# Patient Record
Sex: FEMALE | Race: WHITE | NOT HISPANIC OR LATINO | Employment: PART TIME | URBAN - METROPOLITAN AREA
[De-identification: names, ages, dates, MRNs, and addresses within clinical notes are randomized per-mention and may not be internally consistent; named-entity substitution may affect disease eponyms.]

---

## 2018-10-29 ENCOUNTER — APPOINTMENT (OUTPATIENT)
Dept: GENERAL RADIOLOGY | Facility: CLINIC | Age: 58
End: 2018-10-29
Attending: FAMILY MEDICINE
Payer: MEDICARE

## 2018-10-29 ENCOUNTER — APPOINTMENT (OUTPATIENT)
Dept: CT IMAGING | Facility: CLINIC | Age: 58
End: 2018-10-29
Attending: FAMILY MEDICINE
Payer: MEDICARE

## 2018-10-29 ENCOUNTER — HOSPITAL ENCOUNTER (EMERGENCY)
Facility: CLINIC | Age: 58
Discharge: HOME | End: 2018-10-29
Attending: FAMILY MEDICINE | Admitting: FAMILY MEDICINE
Payer: MEDICARE

## 2018-10-29 ENCOUNTER — TELEPHONE (OUTPATIENT)
Dept: ORTHOPEDICS | Facility: CLINIC | Age: 58
End: 2018-10-29

## 2018-10-29 VITALS
BODY MASS INDEX: 34.69 KG/M2 | RESPIRATION RATE: 16 BRPM | HEIGHT: 62 IN | DIASTOLIC BLOOD PRESSURE: 66 MMHG | SYSTOLIC BLOOD PRESSURE: 145 MMHG | OXYGEN SATURATION: 96 % | WEIGHT: 188.5 LBS | TEMPERATURE: 96.3 F | HEART RATE: 86 BPM

## 2018-10-29 DIAGNOSIS — M84.48XD SACRAL INSUFFICIENCY FRACTURE WITH ROUTINE HEALING, SUBSEQUENT ENCOUNTER: ICD-10-CM

## 2018-10-29 PROCEDURE — 72192 CT PELVIS W/O DYE: CPT

## 2018-10-29 PROCEDURE — 99284 EMERGENCY DEPT VISIT MOD MDM: CPT | Mod: 25 | Performed by: FAMILY MEDICINE

## 2018-10-29 PROCEDURE — 72131 CT LUMBAR SPINE W/O DYE: CPT

## 2018-10-29 PROCEDURE — 99284 EMERGENCY DEPT VISIT MOD MDM: CPT | Mod: Z6 | Performed by: FAMILY MEDICINE

## 2018-10-29 PROCEDURE — 72100 X-RAY EXAM L-S SPINE 2/3 VWS: CPT

## 2018-10-29 PROCEDURE — 72170 X-RAY EXAM OF PELVIS: CPT

## 2018-10-29 RX ORDER — MULTIVITAMIN,THERAPEUTIC
1 TABLET ORAL DAILY
Status: ON HOLD | COMMUNITY
End: 2021-10-13

## 2018-10-29 RX ORDER — SULFASALAZINE 500 MG/1
1000 TABLET ORAL 3 TIMES DAILY
COMMUNITY
End: 2021-10-03

## 2018-10-29 NOTE — DISCHARGE INSTRUCTIONS
Home.  Your xray and ct did not show any major fracture that would require surgery.  Take your pain medications and to follow up with Dr. Cummings at the [facility] for further management plan.  They should call, but call tomorrow afternoon if no one calls you to schedule appointment.  Please bring a copy of your MRI of pelvis and spine to your orthopedic appointment.  Return if any concerns.    Please make an appointment to follow up with Orthopedics (phone: [phone]) as soon as possible.

## 2018-10-29 NOTE — PROGRESS NOTES
"Brief Ortho Note    Paged to review imaging for this patient. Marissa Youssef is a 57 yo F with PMHx including collagenous colitis, PTSD, depression, with hx of pelvic fracture in teens, chronic lower back pain, presenting to the ED for acute on chronic lower back pain with radiculopathy. She has had worsening lower back/pelvic pain and then felt a \"snap\" a week ago. Per chart review, patient has been seen by rheumatology for non-specific spondyloarthritis and sports medicine for back pain in [facility] and was referred to [facility] for her back. Rheum workup significant for normal ESR/mildly elevated CRP, negative KERRI, CCP, RF. Rheum has also sent her for a DEXA scan. Has done PT and had a right abdomen and paraspinal muscle injection by pain medicine in [facility] in April.    Per chart review, MRI of the lumbar spine and pelvis have been done at OSH previously but these are not available to review; MRI pelvis report showed inferior right sacral edema without sacroilitis and lumbar MRI showed degenerative changes at L3-4, L4-5 and L5-S1 with mild to moderate central and foraminal stenosis. XR lumbar spine and XR pelvis in ED today reveal multilevel spondylosis. No pelvic or lumbar fractures visualized. CT pelvis and lumbar spine were also reviewed-- sclerosis seen at right SI joint both anterior and posteriorly, no acute fractures seen.     Recommended WBAT and plan to follow up in spine clinic for SI evaluation. Will need patient to bring discs of lumbar and pelvis MRI to clinic.      Leslee Escobar MD  Orthopaedic Surgery Resident, PGY1   Pager: [phone]      "

## 2018-10-29 NOTE — ED AVS SNAPSHOT
[facility], Emergency Department    [address]    [facility] [address]    Phone:  [phone]    Fax:  [phone]                                       Marissa Youssef   MRN: [id number]    Department:  [facility], Emergency Department   Date of Visit:  10/29/2018           Patient Information     Date Of Birth          1960        Your diagnoses for this visit were:     Sacral insufficiency fracture with routine healing, subsequent encounter        You were seen by Quique Perkins MD.      Follow-up Information     Follow up with Michael Cummings MD In 1 week.    Specialty:  Orthopaedic Surgery    Why:  they should call you for appointment. call tomorrow afternoon if no one calls you to set up appointment.    Contact information:    [address]  [facility] [id number] [phone]          Discharge Instructions       Home.  Your xray and ct did not show any major fracture that would require surgery.  Take your pain medications and to follow up with Dr. Cummings at the [facility] for further management plan.  They should call, but call tomorrow afternoon if no one calls you to schedule appointment.  Please bring a copy of your MRI of pelvis and spine to your orthopedic appointment.  Return if any concerns.    Please make an appointment to follow up with Orthopedics (phone: [phone]) as soon as possible.        24 Hour Appointment Hotline       To make an appointment at any [facility], call [phone] ([phone]). If you don't have a family doctor or clinic, we will help you find one. [facility] are conveniently located to serve the needs of you and your family.          ED Discharge Orders     Follow up with Orthopaedics [facility]       You should receive a call from [facility] to schedule your follow up appointment. If you do not hear from them within 24 business hours, call [phone], option 3 for help in scheduling your  follow up.  If you are seen in the Emergency Department over the weekend, you will receive a phone call on the next business day.                     Review of your medicines      Our records show that you are taking the medicines listed below. If these are incorrect, please call your family doctor or clinic.        Dose / Directions Last dose taken    acetaminophen-codeine 300-30 MG per tablet   Commonly known as:  TYLENOL #3   Dose:  1 tablet        Take 1 tablet by mouth every 6 hours as needed for severe pain   Refills:  0        ALPRAZOLAM PO   Dose:  0.5 mg        Take 0.5 mg by mouth At Bedtime   Refills:  0        ASPIRIN PO   Dose:  81 mg        Take 81 mg by mouth daily   Refills:  0        DILTIAZEM HCL PO   Dose:  120 mg        Take 120 mg by mouth daily   Refills:  0        DULOXETINE HCL PO   Dose:  60 mg        Take 60 mg by mouth daily   Refills:  0        FOLIC ACID PO   Dose:  1 mg        Take 1 mg by mouth daily   Refills:  0        GABAPENTIN PO   Dose:  300 mg        Take 300 mg by mouth 3 times daily   Refills:  0        ISOSORBIDE MONONITRATE PO   Dose:  30 mg        Take 30 mg by mouth daily   Refills:  0        multivitamin, therapeutic Tabs tablet   Dose:  1 tablet        Take 1 tablet by mouth daily   Refills:  0        SIMVASTATIN PO   Dose:  20 mg        Take 20 mg by mouth At Bedtime   Refills:  0        sulfaSALAzine 500 MG tablet   Commonly known as:  AZULFIDINE   Dose:  1000 mg        Take 1,000 mg by mouth 3 times daily   Refills:  0                Information about OPIOIDS     PRESCRIPTION OPIOIDS: WHAT YOU NEED TO KNOW   We gave you an opioid (narcotic) pain medicine. It is important to manage your pain, but opioids are not always the best choice. You should first try all the other options your care team gave you. Take this medicine for as short a time (and as few doses) as possible.    Some activities can increase your pain, such as bandage changes or therapy sessions. It may  help to take your pain medicine 30 to 60 minutes before these activities. Reduce your stress by getting enough sleep, working on hobbies you enjoy and practicing relaxation or meditation. Talk to your care team about ways to manage your pain beyond prescription opioids.    These medicines have risks:    DO NOT drive when on new or higher doses of pain medicine. These medicines can affect your alertness and reaction times, and you could be arrested for driving under the influence (DUI). If you need to use opioids long-term, talk to your care team about driving.    DO NOT operate heavy machinery    DO NOT do any other dangerous activities while taking these medicines.    DO NOT drink any alcohol while taking these medicines.     If the opioid prescribed includes acetaminophen, DO NOT take with any other medicines that contain acetaminophen. Read all labels carefully. Look for the word  acetaminophen  or  Tylenol.  Ask your pharmacist if you have questions or are unsure.    You can get addicted to pain medicines, especially if you have a history of addiction (chemical, alcohol or substance dependence). Talk to your care team about ways to reduce this risk.    All opioids tend to cause constipation. Drink plenty of water and eat foods that have a lot of fiber, such as fruits, vegetables, prune juice, apple juice and high-fiber cereal. Take a laxative (Miralax, milk of magnesia, Colace, Senna) if you don t move your bowels at least every other day. Other side effects include upset stomach, sleepiness, dizziness, throwing up, tolerance (needing more of the medicine to have the same effect), physical dependence and slowed breathing.    Store your pills in a secure place, locked if possible. We will not replace any lost or stolen medicine. If you don t finish your medicine, please throw away (dispose) as directed by your pharmacist. The [place] Pollution Control Agency has more information about safe disposal:  "[email or web address]-[email or web address]        Procedures and tests performed during your visit     CT Pelvis Bone wo Contrast    Lumbar spine CT w/o contrast    XR Lumbar Spine 2/3 Views    XR Pelvis 1/2 Views      Orders Needing Specimen Collection     None      Pending Results     No orders found from 10/27/2018 to 10/30/2018.            Pending Culture Results     No orders found from 10/27/2018 to 10/30/2018.            Pending Results Instructions     If you had any lab results that were not finalized at the time of your Discharge, you can call the ED Lab Result RN at [phone]. You will be contacted by this team for any positive Lab results or changes in treatment. The nurses are available 7 days a week from 10A to 6:30P.  You can leave a message 24 hours per day and they will return your call.        Thank you for choosing [facility]       Thank you for choosing [facility] for your care. Our goal is always to provide you with excellent care. Hearing back from our patients is one way we can continue to improve our services. Please take a few minutes to complete the written survey that you may receive in the mail after you visit with us. Thank you!        [employer] Information     "[employer] lets you send messages to your doctor, view your test results, renew your prescriptions, schedule appointments and more. To sign up, go to [email or web address] . Click on \"Log in\" on the left side of the screen, which will take you to the Welcome page. Then click on \"Sign up Now\" on the right side of the page.     You will be asked to enter the access code listed below, as well as some personal information. Please follow the directions to create your username and password.     Your access code is: [email or web address]  Expires: 2019  9:54 AM     Your access code will  in 90 days. If you need help or a new code, please call your [facility] clinic or [phone].        Care EveryWhere ID     " This is your Care EveryWhere ID. This could be used by other organizations to access your [facility] medical records  [id number]        Equal Access to Services     YONI LIZARRAGA : [place], earline manzano, mariann banks, angy flores. So [facility] [phone].    ATENCIÓN: Si habla español, tiene a winkler disposición servicios gratuitos de asistencia lingüística. Llame al [phone].    We comply with applicable federal civil rights laws and [place] laws. We do not discriminate on the basis of race, color, national origin, age, disability, sex, sexual orientation, or gender identity.            After Visit Summary       This is your record. Keep this with you and show to your community pharmacist(s) and doctor(s) at your next visit.

## 2018-10-29 NOTE — ED AVS SNAPSHOT
[facility] [place], Emergency Department    [address]    [facility] [address]    Phone:  [phone]    Fax:  [phone]                                       Marissa Youssef   MRN: [id number]    Department:  [facility], Emergency Department   Date of Visit:  10/29/2018           After Visit Summary Signature Page     I have received my discharge instructions, and my questions have been answered. I have discussed any challenges I see with this plan with the nurse or doctor.    ..........................................................................................................................................  Patient/Patient Representative Signature      ..........................................................................................................................................  Patient Representative Print Name and Relationship to Patient    ..................................................               ................................................  Date                                   Time    ..........................................................................................................................................  Reviewed by Signature/Title    ...................................................              ..............................................  Date                                               Time          22EPIC Rev 08/18

## 2018-10-29 NOTE — TELEPHONE ENCOUNTER
FUTURE VISIT INFORMATION      FUTURE VISIT INFORMATION:    Date: 11/1/18    Time: 1000    Location: ORTHO  REFERRAL INFORMATION:    Referring provider:  N/A    Referring providers clinic:  [facility]    Reason for visit/diagnosis  SACRAL SPINE    RECORDS REQUESTED FROM:       Clinic name Comments Records Status Imaging Status                                         RECORDS STATUS      ED NOTE AND IMAGES IN CHART

## 2018-10-30 ASSESSMENT — ENCOUNTER SYMPTOMS
BRUISES/BLEEDS EASILY: 0
DECREASED CONCENTRATION: 0
NAUSEA: 0
ABDOMINAL PAIN: 0
FEVER: 0
WEAKNESS: 0
EYE REDNESS: 0
DIFFICULTY URINATING: 0
HEADACHES: 0
NECK STIFFNESS: 0
COLOR CHANGE: 0
NECK PAIN: 0
DYSPHORIC MOOD: 0
ACTIVITY CHANGE: 1
CONFUSION: 0
ARTHRALGIAS: 1
NUMBNESS: 0
SHORTNESS OF BREATH: 0

## 2018-10-30 NOTE — ED PROVIDER NOTES
"  History     Chief Complaint   Patient presents with     Fractured Pelvis     about a month ago was having pain on lower back, pain radiates to both legs. MRI was done: + Fracture on Pelvis. A week ago, came out of the shower \"heard a loud pop\" XR was done\" \" more crack was noted on my pelvis. So I was told to come to the ER for further management\"     HPI  Marissa Youssef is a 58 year old female who presents emergency room for evaluation ongoing pelvic pain.  Patient had history of ongoing pelvic pain and an MRI done a few weeks ago.  Findings did show some right lower sacral edema concerning for a possible fracture.  Patient been doing physical therapy apparently a few days ago was in the shower and felt a pop.  Some increasing pain was seen at an outside facility x-rays were done stating that there was more crack in the pelvis.  Patient now is been managed at outside facility recommended to come to the [place] ER for further evaluation.  Patient notes she does have tell #3 for pain which does help.  She is able to ambulate the pain is just ongoing and does note to do after this.  No bowel or bladder problems no leg weakness.    I have reviewed the Medications, Allergies, Past Medical and Surgical History, and Social History in the Epic system.    Review of Systems   Constitutional: Positive for activity change. Negative for fever.        Pain with ambulation but still able to weight-bear.   HENT: Negative for congestion.    Eyes: Negative for redness.   Respiratory: Negative for shortness of breath.    Cardiovascular: Negative for chest pain.   Gastrointestinal: Negative for abdominal pain and nausea.   Genitourinary: Negative for difficulty urinating.   Musculoskeletal: Positive for arthralgias and gait problem. Negative for neck pain and neck stiffness.   Skin: Negative for color change and rash.   Allergic/Immunologic: Negative for immunocompromised state.   Neurological: Negative for syncope, weakness, " "numbness and headaches.   Hematological: Does not bruise/bleed easily.   Psychiatric/Behavioral: Negative for confusion, decreased concentration and dysphoric mood.   All other systems reviewed and are negative.      Physical Exam   BP: 145/66  Pulse: 86  Temp: 96.3  F (35.7  C)  Resp: 16  Height: 157.5 cm (5' 2\")  Weight: 85.5 kg (188 lb 8 oz)  SpO2: 96 %      Physical Exam   Constitutional: She is oriented to person, place, and time. She appears well-developed and well-nourished. She appears distressed.   Patient ER was pleasant here is minimally uncomfortable  also present.   HENT:   Head: Normocephalic and atraumatic.   Eyes: EOM are normal. Pupils are equal, round, and reactive to light. No scleral icterus.   Neck: Normal range of motion. Neck supple.   Cardiovascular: Regular rhythm.    Pulmonary/Chest: No respiratory distress.   Abdominal: There is no guarding.   Musculoskeletal: She exhibits tenderness. She exhibits no edema or deformity.   Patient with some back tenderness noted.  Neurologically intact otherwise distally without deficit noted.   Neurological: She is alert and oriented to person, place, and time. She has normal reflexes. No cranial nerve deficit. Coordination normal.   Skin: Skin is warm and dry. No rash noted. She is not diaphoretic. No erythema. No pallor.   Psychiatric: She has a normal mood and affect. Her behavior is normal. Judgment and thought content normal.   Nursing note and vitals reviewed.      ED Course     ED Course         Patient evaluated in the ER.  Discussed with ortho.  Lumbar and pelvic xray and CT.      No acute fracture identified.    Discussed  With ortho.  Patient at this point to follow up with ortho spine.    Patient ok to follow up.    Patient did take her t#3 for pain.      Procedures             Critical Care time:  none             Labs Ordered and Resulted from Time of ED Arrival Up to the Time of Departure from the ED - No data to display  Results for " orders placed or performed during the hospital encounter of 10/29/18   Lumbar spine CT w/o contrast    Narrative    CT LUMBAR SPINE WITHOUT CONTRAST 10/29/2018 8:25 AM     HISTORY:  Pain with history of fracture SI region.     Radiation dose for this scan was reduced using automated exposure  control, adjustment of the mA and/or kV according to patient size, or  iterative reconstruction technique.    FINDINGS: There are five nonrib-bearing or lumbar-type vertebrae.  Vertebral body heights, sagittal alignment, and disc heights appear  within normal limits. Apophyseal joint degenerative changes are noted,  most advanced at L5-S1, left greater than right, and next greatest at  L4-L5, right greater than left. There is no CT evidence of disc  bulge/herniation or central stenosis. Lumbar neural foramina appear to  be patent throughout. Sclerotic changes adjacent to the sacroiliac  joints are likely degenerative.      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 Sacral iliac joint degenerative changes. No CT evidence of  central or foraminal stenosis.    BASIA BEAULIEU MD   CT Pelvis Bone wo Contrast    Narrative    CT PELVIS BONE WITHOUT CONTRAST 10/29/2018 8:23 AM     HISTORY:  History of recent pain and question of fracture by MRI of  pelvis, now increasing pain.     Radiation dose for this scan was reduced using automated exposure  control, adjustment of the mA and/or kV according to patient size, or  iterative reconstruction technique.    COMPARISON: None available.    FINDINGS: There is no CT evidence of pelvic, sacral, or proximal  femoral fracture. Degenerative changes are noted at the pubis  symphysis. Sacroiliac joint periarticular sclerosis is noted and  likely degenerative. Apophyseal joint degenerative arthrosis is also  noted in the lower lumbar spine. Hip joint space width appears within  normal limits. No evidence of free peritoneal fluid within the  pelvis.  Prominent fecal debris is noted within the visualized portions of the  colon.      Impression    IMPRESSION:  1. No CT evidence of pelvic fracture. However, MR would be more  sensitive than CT for nondisplaced fracture. MRI referenced inpatient  history is not available for comparison.  2.  Lower lumbar apophyseal joint, sacral iliac joint, and pubis  symphysis degenerative changes.    BASIA BEAULIEU MD   XR Pelvis 1/2 Views    Narrative    PELVIS AP ONE VIEW  10/29/2018 8:30 AM     HISTORY: History of fractures in past; question SI region right.     COMPARISON: None.    FINDINGS: Mild acetabular spurring. There is no gross fracture or  dislocation demonstrated in this single view. There are no worrisome  bony lesions.       Impression    IMPRESSION:  No acute osseous abnormality demonstrated.    DIANNA SEPULVEDA MD   XR Lumbar Spine 2/3 Views    Narrative    LUMBAR SPINE TWO-THREE  VIEWS  10/29/2018 8:30 AM     HISTORY: Back pain, history of pelvic fracture question.     COMPARISON: None.    FINDINGS: Mild multilevel degenerative change.  Normal lumbar  lordosis.   No spondylolysis or spondylolisthesis.  There is no acute  fracture or dislocation.  There are no worrisome bony lesions.      Impression    IMPRESSION:  No acute osseous abnormality demonstrated.    DIANNA SEPULVEDA MD            Assessments & Plan (with Medical Decision Making)  57 yo female with ongoing pelvic and lower back pain. Patient had MRI with ? Fracture noted. CT scan without acute frax. Patient to follow up with ortho spine regarding insufficiency frax possible.           I have reviewed the nursing notes.    I have reviewed the findings, diagnosis, plan and need for follow up with the patient.    Discharge Medication List as of 10/29/2018  9:56 AM          Final diagnoses:   Sacral insufficiency fracture with routine healing, subsequent encounter       10/29/2018   [facility], [place], EMERGENCY DEPARTMENT      This note was created at least  in part by the use of dragon voice dictation system. Inadvertent typographical errors may still exist.  Quique Perkins MD.         Quique Perkins MD  10/30/18 3105

## 2018-11-01 ENCOUNTER — PRE VISIT (OUTPATIENT)
Dept: ORTHOPEDICS | Facility: CLINIC | Age: 58
End: 2018-11-01

## 2018-11-01 ENCOUNTER — TELEPHONE (OUTPATIENT)
Dept: ORTHOPEDICS | Facility: CLINIC | Age: 58
End: 2018-11-01

## 2018-11-01 ENCOUNTER — OFFICE VISIT (OUTPATIENT)
Dept: ORTHOPEDICS | Facility: CLINIC | Age: 58
End: 2018-11-01
Payer: COMMERCIAL

## 2018-11-01 VITALS — WEIGHT: 188 LBS | HEIGHT: 62 IN | BODY MASS INDEX: 34.6 KG/M2

## 2018-11-01 DIAGNOSIS — M53.3 SACROILIAC JOINT PAIN: Primary | ICD-10-CM

## 2018-11-01 ASSESSMENT — ENCOUNTER SYMPTOMS
NUMBNESS: 1
STIFFNESS: 1
BACK PAIN: 1
DECREASED CONCENTRATION: 0
MUSCLE WEAKNESS: 1
MUSCLE CRAMPS: 1
MYALGIAS: 1
NERVOUS/ANXIOUS: 0
MEMORY LOSS: 0
PARALYSIS: 0
HEADACHES: 1
NECK PAIN: 1
SPEECH CHANGE: 0
TREMORS: 0
JOINT SWELLING: 1
SEIZURES: 0
DISTURBANCES IN COORDINATION: 0
WEAKNESS: 1
TINGLING: 1
PANIC: 0
ARTHRALGIAS: 1
DEPRESSION: 0
DIZZINESS: 0
INSOMNIA: 0
LOSS OF CONSCIOUSNESS: 0

## 2018-11-01 NOTE — NURSING NOTE
"Reason For Visit:   Chief Complaint   Patient presents with     Consult     Sacral insufficiency fx. DOI about 1 week ago while in the Shower and feling a pop.        Primary MD: Philomena Altamirano      ?  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  Currently working? Yes.  Work status?  Part-time.  Date of injury: January 2017  Type of injury: Fell on right hip  Date of surgery: None  Smoker: Yes    Ht 1.575 m (5' 2\")  Wt 85.3 kg (188 lb)  BMI 34.39 kg/m2    Pain Assessment  Patient Currently in Pain: Yes  0-10 Pain Scale: 5  Primary Pain Location: Buttocks  Pain Orientation: Right, Left  Pain Descriptors: Aching, Burning, Sharp    Oswestry (FE) Questionnaire    OSWESTRY DISABILITY INDEX 11/1/2018   Count 9   Sum 27   Oswestry Score (%) 60   Some recent data might be hidden        Visual Analog Pain Scale  Back Pain Scale 0-10: 5.5  Right leg pain: 0  Left leg pain: 3    Promis 10 Assessment    PROMIS 10 11/1/2018   In general, would you say your health is: Very good   In general, would you say your quality of life is: Very good   In general, how would you rate your physical health? Very good   In general, how would you rate your mental health, including your mood and your ability to think? Good   In general, how would you rate your satisfaction with your social activities and relationships? Very good   In general, please rate how well you carry out your usual social activities and roles Poor   To what extent are you able to carry out your everyday physical activities such as walking, climbing stairs, carrying groceries, or moving a chair? A little   How often have you been bothered by emotional problems such as feeling anxious, depressed or irritable? Sometimes   How would you rate your fatigue on average? Moderate   How would you rate your pain on average?   0 = No Pain  to  10 = Worst Imaginable Pain 7   In general, would you say your health is: 4   In general, would you say your " quality of life is: 4   In general, how would you rate your physical health? 4   In general, how would you rate your mental health, including your mood and your ability to think? 3   In general, how would you rate your satisfaction with your social activities and relationships? 4   In general, please rate how well you carry out your usual social activities and roles. (This includes activities at home, at work and in your community, and responsibilities as a parent, child, spouse, employee, friend, etc.) 1   To what extent are you able to carry out your everyday physical activities such as walking, climbing stairs, carrying groceries, or moving a chair? 2   In the past 7 days, how often have you been bothered by emotional problems such as feeling anxious, depressed, or irritable? 3   In the past 7 days, how would you rate your fatigue on average? 3   In the past 7 days, how would you rate your pain on average, where 0 means no pain, and 10 means worst imaginable pain? 7   Global Mental Health Score 14   Global Physical Health Score 11   PROMIS TOTAL - SUBSCORES 25   Some recent data might be hidden                Farzaneh Heath LPN

## 2018-11-01 NOTE — PROGRESS NOTES
"REASON FOR CONSULTATION: Consult (Sacral insufficiency fx. DOI about 1 week ago while in the Shower and feling a pop. )     REFERRING PHYSICIAN: Resident Physician Jazmin*   PCP:Philomena Altamirano    Prior surgeries: No previous spine surgeries    History of Present Illness:  Marissa Youssef is a 59 yo F with PMHx including collagenous colitis, PTSD, depression, with hx of pelvic fracture in teens, chronic lower back pain, presenting for consultation on acute on chronic lower back pain with radiculopathy. She has had worsening lower back/pelvic pain after a fall onto her right hip 1 year prior and then felt a \"snap\" a week ago. She had primarily mid back pain after her fall a year ago and then subsequently developed right buttock pain more recently. She did not have any trauma prior to the development of her right buttock pain. She has 75% back pain and 25% leg pain with radiculopathy in her lateral thighs and extending to her arch on the right and to her lateral foot on the left. She does also have occasional numbness and tingling in the same distribution. Per chart review, patient has been seen by rheumatology for non-specific spondyloarthritis and sports medicine for back pain in [facility] and was referred to N for her back. Rheum workup significant for normal ESR/mildly elevated CRP, negative KERRI, CCP, RF. Rheum has also sent her for a DEXA scan. Has done PT and had a right abdomen and paraspinal muscle injection by pain medicine in [facility] in April. She has not had other injections into her lumbar spine or SI joints, although the patient notes the her [facility] provider had suggested SI injection but would defer to N. In terms of medications, she's also tried tizanidine, gabapentin, tramadol, and Tylenol #3 with minimal relief. She is unable to perform her work at [employer] as she is unable to stand for more than 10 minutes without pain. No bowel or bladder symptoms. No gait instability. Per chart review, MRI of " the lumbar spine and pelvis have been done at OSH previously but these are not available to review. Pelvic MRI is available for our review today.    Back 75%, Leg 25%,  Right > Left  Worse: standing, ambulation  Better: rest    Previous treatment:   PT  Paraspinal injections  Meds: gabapentin, tizanidine, tramadol, Tylenol 3      Oswestry (FE) Questionnaire    OSWESTRY DISABILITY INDEX 11/1/2018   Count 9   Sum 27   Oswestry Score (%) 60   Some recent data might be hidden        Visual Analog Pain Scale  Back Pain Scale 0-10: 5.5  Right leg pain: 0  Left leg pain: 3    PROMIS-10 Scores  Global Mental Health Score: (P) 14  Global Physical Health Score: (P) 11  PROMIS TOTAL - SUBSCORES: (P) 25    ROS:  A 12-point review of systems was completed and is negative except for otherwise noted above in the history of present illness.    Med Hx:  Past Medical History:   Diagnosis Date     Hyperlipidemia      Hypertension        Surg Hx:  Past Surgical History:   Procedure Laterality Date     ORTHOPEDIC SURGERY         Allergies:  Allergies   Allergen Reactions     Anzemet [Dolasetron] Difficulty breathing     Demerol [Meperidine] Difficulty breathing     Dilaudid [Hydromorphone] Difficulty breathing       Meds:  Current Outpatient Prescriptions   Medication     acetaminophen-codeine (TYLENOL #3) 300-30 MG per tablet     ALPRAZOLAM PO     ASPIRIN PO     DILTIAZEM HCL PO     DULOXETINE HCL PO     FOLIC ACID PO     GABAPENTIN PO     ISOSORBIDE MONONITRATE PO     multivitamin, therapeutic (THERA-VIT) TABS tablet     SIMVASTATIN PO     sulfaSALAzine (AZULFIDINE) 500 MG tablet     No current facility-administered medications for this visit.        Fam Hx:  No family history on file.    P/S Hx:  Social History   Substance Use Topics     Smoking status: Current Every Day Smoker     Smokeless tobacco: Never Used     Alcohol use No         Physical Exam:  Very pleasant, healthy appearing, alert, oriented x 3, cooperative.  Normal mood  "and affect.  Not in cardiorespiratory distress.  Ht 1.575 m (5' 2\")  Wt 85.3 kg (188 lb)  BMI 34.39 kg/m2  Normal upright posture.    Normal gait without assistive device.  No antalgia / imbalance.  Able to walk on toes and on heels with ease. Negative Romberg's.  Back: no deformity, no skin lesions or surgical scars.  Localizes pain at right PSIS  Tenderness: (-) midline, (-) paraspinal, (-) L PSIS, (+) R PSIS.    Neuro Exam:  Motor: 4/5 right hip flexion (breakaway), 4+/5 right knee extension (breakaway), otherwise 5/5 strength for all muscle groups in both LE's.  Sensory:  Decreased in right L4, otherwise Intact to light touch in both LE's.   Reflexes:  Knee 1+ bilat.  Ankle 1+ bilat.  (-) Babinski, (-) clonus.    Lower Extremity:  Equal leg lengths, full pulses, (-) atrophy / asymmetry.  Full painless passive knee and ankle motion.  Straight leg raise: (-) right, (-) left.  Hip impingement: (-) right, (-) left.  SEBASTIÁN/Damon's: (+) right, (-) left.      Sacroiliac Joint Tests:      TEST RIGHT LEFT   PSIS tenderness + -   Sree finger sign + -   SEBASTIÁN/Damon + + on right   Thigh thrust + +           Gapping +   Compression -   Sacral thrust -   Gaenslen's +           Imaging:  XR lumbar and pelvis 10/29/18: no acute fractures, bony lesions, or soft tissue abnormalities visualized. Mild multilevel spondylosis with mild facet arthropathy.    Lumbar CT and CT pelvis 10/29/18: bridging osteophyte to posterior right SI joint with subchondral sclerosis at right SI joint. No obvious fractures visualized.    MRI pelvis 10/20/18: subtle edema at sacral aspect of right SI joint, no fractures visualized.     Impression:   - 59yo F with lower back, right buttock pain, radicular leg pain likely related to right SI pain 2/2 SI joint arthropathy.     Plan:   We discussed the clinical findings and imaging with the patient today. There is some subtle edema in the right SI on her MRI but this can likely attributed to " degenerative arthropathy in her SI joints. No trauma prior to her SI symptoms to suggest a fracture. We discussed conservative options such as diagnostic/therapeutic injections, RFA, and operative management including SI fusion. We will plan to have her proceed with a diagnostic right SI joint injection, fluoro-guided with both anesthetic and steroids. If she obtains good symptom relief from this injection but it wears off, we could consider an RFA. It would also allow us to determine whether her pain is coming from her SI or if there is a lumbar component as well. We could consider an MIS SI fusion in the future if her SI is truly the cause of her symptoms. She wanted to have the SI injection done locally in [place] which would be reasonable. She will call us with the results of her SI injection and plan our next visit based off of this. She will also continue PT to see if this helps with her symptoms-- an external physical therapy referral was provided today. She will work on obtaining her lumbar MRI images for us.     All questions and concerns were answered to the patient's apparent satisfaction before leaving the clinic.     Total visit time > 30 mins, > 50% counseling and coordination of care.    Leslee Escobar MD  PGY-1 Orthopaedic Surgery    Attestation:  I (Dr. Michael Cummings - Spine Surgeon) have personally evaluated patient with PGY-1 Shawn, and agree with findings and plan outlined in the note.  I discussed at length with the patient/family, explained the nature of spinal condition, and formulated workup and/or treatment plan together.  All questions were answered to the best of my ability and to patient's apparent satisfaction.    58/f, hx of fall on  R buttock 1 yr ago.  Since then, R low back and buttock pain.  Tried and failed nonop mgmt, including PT.  No SIJ injections though suggested.  FE 60%.  PE: at least 3/5 positive provocative SIJ exams.  Imaging:  Pelvis x-rays show SIJ asymmetry, with  increased degenerative changes, spurring and subchondral sclerosis on R side vs L.  Lumbar CT unremarkable.  Opportunistic BMD measurement at L1 = 92 HU.  A>  - Chronic R SIJ pain 2' post-traumatic sacroiliitis 2' to fall approx 1 yr ago.  P>  - R SIJ dxtic injection (anesthetic only); explained to patient; advised to call back re pain response.  - Obtain outside lumbar MRI.  - Continue PT.    If (+) injection response, may consider other SIJ-focused treatments; ultimately, may consider MIS R SIJ fusion.    TT > 30 mins, > 50% CC.

## 2018-11-01 NOTE — LETTER
"11/1/2018     RE: Marissa Youssef  [address]  [place] [address]     Dear Colleague,    Thank you for referring your patient, Marissa Youssef, to [facility]. Please see a copy of my visit note below.    REASON FOR CONSULTATION: Consult (Sacral insufficiency fx. DOI about 1 week ago while in the Shower and feling a pop. )     REFERRING PHYSICIAN: Resident Physician Jazmin*   PCP:Philomena Altamirano    Prior surgeries: No previous spine surgeries    History of Present Illness:  Marissa Youssef is a 59 yo F with PMHx including collagenous colitis, PTSD, depression, with hx of pelvic fracture in teens, chronic lower back pain, presenting for consultation on acute on chronic lower back pain with radiculopathy. She has had worsening lower back/pelvic pain after a fall onto her right hip 1 year prior and then felt a \"snap\" a week ago. She had primarily mid back pain after her fall a year ago and then subsequently developed right buttock pain more recently. She did not have any trauma prior to the development of her right buttock pain. She has 75% back pain and 25% leg pain with radiculopathy in her lateral thighs and extending to her arch on the right and to her lateral foot on the left. She does also have occasional numbness and tingling in the same distribution. Per chart review, patient has been seen by rheumatology for non-specific spondyloarthritis and sports medicine for back pain in [facility] and was referred to [facility] for her back. Rheum workup significant for normal ESR/mildly elevated CRP, negative KERRI, CCP, RF. Rheum has also sent her for a DEXA scan. Has done PT and had a right abdomen and paraspinal muscle injection by pain medicine in [facility] in April. She has not had other injections into her lumbar spine or SI joints, although the patient notes the her [facility] provider had suggested SI injection but would defer to N. In terms " of medications, she's also tried tizanidine, gabapentin, tramadol, and Tylenol #3 with minimal relief. She is unable to perform her work at [employer] as she is unable to stand for more than 10 minutes without pain. No bowel or bladder symptoms. No gait instability. Per chart review, MRI of the lumbar spine and pelvis have been done at OSH previously but these are not available to review. Pelvic MRI is available for our review today.    Back 75%, Leg 25%,  Right > Left  Worse: standing, ambulation  Better: rest    Previous treatment:   PT  Paraspinal injections  Meds: gabapentin, tizanidine, tramadol, Tylenol 3      Oswestry (FE) Questionnaire    OSWESTRY DISABILITY INDEX 11/1/2018   Count 9   Sum 27   Oswestry Score (%) 60   Some recent data might be hidden        Visual Analog Pain Scale  Back Pain Scale 0-10: 5.5  Right leg pain: 0  Left leg pain: 3    PROMIS-10 Scores  Global Mental Health Score: (P) 14  Global Physical Health Score: (P) 11  PROMIS TOTAL - SUBSCORES: (P) 25    ROS:  A 12-point review of systems was completed and is negative except for otherwise noted above in the history of present illness.    Med Hx:  Past Medical History:   Diagnosis Date     Hyperlipidemia      Hypertension        Surg Hx:  Past Surgical History:   Procedure Laterality Date     ORTHOPEDIC SURGERY         Allergies:  Allergies   Allergen Reactions     Anzemet [Dolasetron] Difficulty breathing     Demerol [Meperidine] Difficulty breathing     Dilaudid [Hydromorphone] Difficulty breathing       Meds:  Current Outpatient Prescriptions   Medication     acetaminophen-codeine (TYLENOL #3) 300-30 MG per tablet     ALPRAZOLAM PO     ASPIRIN PO     DILTIAZEM HCL PO     DULOXETINE HCL PO     FOLIC ACID PO     GABAPENTIN PO     ISOSORBIDE MONONITRATE PO     multivitamin, therapeutic (THERA-VIT) TABS tablet     SIMVASTATIN PO     sulfaSALAzine (AZULFIDINE) 500 MG tablet     No current facility-administered medications for this visit.   "      Fam Hx:  No family history on file.    P/S Hx:  Social History   Substance Use Topics     Smoking status: Current Every Day Smoker     Smokeless tobacco: Never Used     Alcohol use No         Physical Exam:  Very pleasant, healthy appearing, alert, oriented x 3, cooperative.  Normal mood and affect.  Not in cardiorespiratory distress.  Ht 1.575 m (5' 2\")  Wt 85.3 kg (188 lb)  BMI 34.39 kg/m2  Normal upright posture.    Normal gait without assistive device.  No antalgia / imbalance.  Able to walk on toes and on heels with ease. Negative Romberg's.  Back: no deformity, no skin lesions or surgical scars.  Localizes pain at right PSIS  Tenderness: (-) midline, (-) paraspinal, (-) L PSIS, (+) R PSIS.    Neuro Exam:  Motor: 4/5 right hip flexion (breakaway), 4+/5 right knee extension (breakaway), otherwise 5/5 strength for all muscle groups in both LE's.  Sensory:  Decreased in right L4, otherwise Intact to light touch in both LE's.   Reflexes:  Knee 1+ bilat.  Ankle 1+ bilat.  (-) Babinski, (-) clonus.    Lower Extremity:  Equal leg lengths, full pulses, (-) atrophy / asymmetry.  Full painless passive knee and ankle motion.  Straight leg raise: (-) right, (-) left.  Hip impingement: (-) right, (-) left.  SEBASTIÁN/Damon's: (+) right, (-) left.      Sacroiliac Joint Tests:      TEST RIGHT LEFT   PSIS tenderness + -   Sree finger sign + -   SEBASTIÁN/Damon + + on right   Thigh thrust + +           Gapping +   Compression -   Sacral thrust -   Gaenslen's +           Imaging:  XR lumbar and pelvis 10/29/18: no acute fractures, bony lesions, or soft tissue abnormalities visualized. Mild multilevel spondylosis with mild facet arthropathy.    Lumbar CT and CT pelvis 10/29/18: bridging osteophyte to posterior right SI joint with subchondral sclerosis at right SI joint. No obvious fractures visualized.    MRI pelvis 10/20/18: subtle edema at sacral aspect of right SI joint, no fractures visualized.     Impression:   - 59yo " F with lower back, right buttock pain, radicular leg pain likely related to right SI pain 2/2 SI joint arthropathy.     Plan:   We discussed the clinical findings and imaging with the patient today. There is some subtle edema in the right SI on her MRI but this can likely attributed to degenerative arthropathy in her SI joints. No trauma prior to her SI symptoms to suggest a fracture. We discussed conservative options such as diagnostic/therapeutic injections, RFA, and operative management including SI fusion. We will plan to have her proceed with a diagnostic right SI joint injection, fluoro-guided with both anesthetic and steroids. If she obtains good symptom relief from this injection but it wears off, we could consider an RFA. It would also allow us to determine whether her pain is coming from her SI or if there is a lumbar component as well. We could consider an MIS SI fusion in the future if her SI is truly the cause of her symptoms. She wanted to have the SI injection done locally in [place] which would be reasonable. She will call us with the results of her SI injection and plan our next visit based off of this. She will also continue PT to see if this helps with her symptoms-- an external physical therapy referral was provided today. She will work on obtaining her lumbar MRI images for us.     All questions and concerns were answered to the patient's apparent satisfaction before leaving the clinic.     Total visit time > 30 mins, > 50% counseling and coordination of care.    Leslee Escobar MD  PGY-1 Orthopaedic Surgery    Attestation:  I (Dr. Michael Cummings - Spine Surgeon) have personally evaluated patient with PGY-1 Shawn, and agree with findings and plan outlined in the note.  I discussed at length with the patient/family, explained the nature of spinal condition, and formulated workup and/or treatment plan together.  All questions were answered to the best of my ability and to patient's apparent  satisfaction.    58/f, hx of fall on  R buttock 1 yr ago.  Since then, R low back and buttock pain.  Tried and failed nonop mgmt, including PT.  No SIJ injections though suggested.  FE 60%.  PE: at least 3/5 positive provocative SIJ exams.  Imaging:  Pelvis x-rays show SIJ asymmetry, with increased degenerative changes, spurring and subchondral sclerosis on R side vs L.  Lumbar CT unremarkable.  Opportunistic BMD measurement at L1 = 92 HU.  A>  - Chronic R SIJ pain 2' post-traumatic sacroiliitis 2' to fall approx 1 yr ago.  P>  - R SIJ dxtic injection (anesthetic only); explained to patient; advised to call back re pain response.  - Obtain outside lumbar MRI.  - Continue PT.    If (+) injection response, may consider other SIJ-focused treatments; ultimately, may consider MIS R SIJ fusion.    TT > 30 mins, > 50% CC.      Again, thank you for allowing me to participate in the care of your patient.      Sincerely,    Michael Cummings MD

## 2018-11-01 NOTE — TELEPHONE ENCOUNTER
[facility] Call Center    Phone Message    May a detailed message be left on voicemail: no    Reason for Call: Other: Pt. would like a call back regarding getting her injections that Dr. Cummings wanted her to get done here at the  [facility] the clinic near her that she was originally going to have it done at.     Action Taken: Message routed to:  [facility] ([facility] Ortho      see phone message & dictation of todays clinic appt.   She changed her mind & wants to do injection here at .  She will call radiology scheduling.   Pt. To call us back after about injection result;  W.O./Emily Raman RN.

## 2018-11-01 NOTE — MR AVS SNAPSHOT
After Visit Summary   11/1/2018    Marissa Youssef    MRN: [id number]           Patient Information     Date Of Birth          1960        Visit Information        Provider Department      11/1/2018 10:00 AM Michael Cummings MD [facility]        Today's Diagnoses     Sacroiliac joint pain    -  1       Follow-ups after your visit        Additional Services     PHYSICAL THERAPY REFERRAL (External-Prints)       Physical Therapy Referral                  Follow-up notes from your care team     Return if symptoms worsen or fail to improve.      Your next 10 appointments already scheduled     Nov 15, 2018 10:00 AM CST   (Arrive by 9:30 AM)   CT SACROILIAC JOINT INJECTION DIAGNOSTIC with URIRCT, UR NEURO RAD, URCT2   [facility], [place], Radiology ([facility])    [address] [place] [id number] [phone]           How do I prepare for my exam? (Food and drink instructions) The day before your exam: Drink extra fluids  at least six 8-ounce glasses (unless your doctor tells you to restrict fluids).  The day of your exam: No eating or drinking for 4 hours before your test. You may take medicine with small sips of water  What should I wear: Please wear loose clothing, such as a sweat suit or jogging clothes. Avoid snaps, zippers and other metal. We may ask you to undress and put on a hospital gown.  How long does the exam take: Plan to spend up to 6 hours at the hospital. The test itself normally takes less than an hour. We will watch for side effects for 1 to 4 hours.  What should I bring: Please bring any scans or X-rays taken at other hospitals, if they may be helpful. Also bring a list of your medicines, including vitamins, minerals and over-the-counter drugs. It is safest to leave personal items at home.  Do I need a : Plan for an adult to drive you home and stay with you until morning.  What do I need to tell  my doctor? Tell your doctor in advance: * If you have any allergies. * If you are breastfeeding or there s any chance you are pregnant. * If you are taking Coumadin (or any other blood thinners) 5 days prior to the exam for any special instructions. * If you are diabetic to determine if your insulin needs have to be adjusted for the exam.  What should I do after the exam: When you get home, you ll need to take it easy for the rest of the day.  Do not drive for 24 hours. You may have slight bruising and mild pain in the area. If so, you may take acetaminophen (Tylenol) or ibuprofen (Motrin, Advil) after you get home.  Some people go home with a small tube (catheter) sewn into the skin. If this case, we will show you how to care for the tube.  What is this test: This test uses a very thin needle to remove tissue, fluid or other cells from your body. We then send the cells to a lab for testing. A biopsy tests for disease in a tissue sample. Aspiration tests for disease or infection in a fluid sample. We use pictures from a CT (computed tomography) scan to guide the needle to the right place. A CT scan is a special X-ray. The scanner creates images of the body in cross sections, much like slices of bread. You may receive contrast (X-ray dye) before or during your scan. Contrast is given through an IV (small needle in your arm) or taken by mouth.  Who should I call with questions: If you have any questions, please call the imaging department where you will have your exam. Directions, parking instructions, and other information is available on our website, [email or web address].              Who to contact     Please call your clinic at [phone] to:    Ask questions about your health    Make or cancel appointments    Discuss your medicines    Learn about your test results    Speak to your doctor            Additional Information About Your Visit        Picitup Information     Picitup is an electronic gateway that  "provides easy, online access to your medical records. With [employer], you can request a clinic appointment, read your test results, renew a prescription or communicate with your care team.     To sign up for [employer] visit the website at [email or web address]   You will be asked to enter the access code listed below, as well as some personal information. Please follow the directions to create your username and password.     Your access code is: [email or web address]  Expires: 2019  8:54 AM     Your access code will  in 90 days. If you need help or a new code, please contact your [facility] or call [phone] for assistance.        Care EveryWhere ID     This is your Care EveryWhere ID. This could be used by other organizations to access your [place] medical records  [id number]        Your Vitals Were     Height BMI (Body Mass Index)                1.575 m (5' 2\") 34.39 kg/m2           Blood Pressure from Last 3 Encounters:   10/29/18 145/66    Weight from Last 3 Encounters:   18 85.3 kg (188 lb)   10/29/18 85.5 kg (188 lb 8 oz)               Primary Care Provider Office Phone # Fax #    Philomena Altamirano [phone]       [address] [address]        Equal Access to Services     YONI LIZARRAGA AH: Hadii aad ku hadasho Socathyali, waaxda luqadaha, qaybta kaalmada adeegyada, angy schaffer . So [facility] [phone].    ATENCIÓN: Si habla español, tiene a winkler disposición servicios gratuitos de asistencia lingüística. [place] al [phone].    We comply with applicable federal civil rights laws and [place] laws. We do not discriminate on the basis of race, color, national origin, age, disability, sex, sexual orientation, or gender identity.            Thank you!     Thank you for choosing [facility]  for your care. Our goal is always to provide you with excellent care. Hearing back " from our patients is one way we can continue to improve our services. Please take a few minutes to complete the written survey that you may receive in the mail after your visit with us. Thank you!             Your Updated Medication List - Protect others around you: Learn how to safely use, store and throw away your medicines at www.disposemymeds.org.          This list is accurate as of 11/1/18 11:59 PM.  Always use your most recent med list.                   Brand Name Dispense Instructions for use Diagnosis    acetaminophen-codeine 300-30 MG per tablet    TYLENOL #3     Take 1 tablet by mouth every 6 hours as needed for severe pain        ALPRAZOLAM PO      Take 0.5 mg by mouth At Bedtime        ASPIRIN PO      Take 81 mg by mouth daily        DILTIAZEM HCL PO      Take 120 mg by mouth daily        DULOXETINE HCL PO      Take 60 mg by mouth daily        FOLIC ACID PO      Take 1 mg by mouth daily        GABAPENTIN PO      Take 300 mg by mouth 3 times daily        ISOSORBIDE MONONITRATE PO      Take 30 mg by mouth daily        multivitamin, therapeutic Tabs tablet      Take 1 tablet by mouth daily        SIMVASTATIN PO      Take 20 mg by mouth At Bedtime        sulfaSALAzine 500 MG tablet    AZULFIDINE     Take 1,000 mg by mouth 3 times daily

## 2018-11-01 NOTE — LETTER
Return to Work  2018     Seen today: yes    Patient:  Marissa Youssef  :   1960  MRN:     [id number]  Physician: EDDY CUMMINGS    Because of level/degree of pain symptoms, patient is deemed unable to return to work at this point.      The next clinic appointment is scheduled for (date/time) prn (as needed).      Electronically signed by Eddy Cummings MD

## 2018-11-13 NOTE — PROGRESS NOTES
Addendum 11/13/18:  Reviewed Lumbar MRI (in PACS) done 5/5/18:  Essentially unremarkable; mild bilateral subarticular stenosis L4-5.  Mild diffuse degenerative changes, age-appropriate.

## 2018-11-15 ENCOUNTER — HOSPITAL ENCOUNTER (OUTPATIENT)
Dept: CT IMAGING | Facility: CLINIC | Age: 58
Discharge: HOME | End: 2018-11-15
Attending: ORTHOPAEDIC SURGERY | Admitting: ORTHOPAEDIC SURGERY
Payer: MEDICARE

## 2018-11-15 DIAGNOSIS — M53.3 SACROILIAC JOINT PAIN: ICD-10-CM

## 2018-11-15 PROCEDURE — 25000125 ZZHC RX 250: Performed by: ORTHOPAEDIC SURGERY

## 2018-11-15 PROCEDURE — 27210258 CT SACROILIAC THERAPEUTIC JOINT INJECTION

## 2018-11-15 PROCEDURE — 25000128 H RX IP 250 OP 636: Performed by: ORTHOPAEDIC SURGERY

## 2018-11-15 RX ORDER — LIDOCAINE HYDROCHLORIDE 10 MG/ML
5 INJECTION, SOLUTION EPIDURAL; INFILTRATION; INTRACAUDAL; PERINEURAL ONCE
Status: COMPLETED | OUTPATIENT
Start: 2018-11-15 | End: 2018-11-15

## 2018-11-15 RX ORDER — TRIAMCINOLONE ACETONIDE 40 MG/ML
40 INJECTION, SUSPENSION INTRA-ARTICULAR; INTRAMUSCULAR ONCE
Status: COMPLETED | OUTPATIENT
Start: 2018-11-15 | End: 2018-11-15

## 2018-11-15 RX ORDER — BUPIVACAINE HYDROCHLORIDE 2.5 MG/ML
5 INJECTION, SOLUTION INFILTRATION; PERINEURAL ONCE
Status: COMPLETED | OUTPATIENT
Start: 2018-11-15 | End: 2018-11-15

## 2018-11-15 RX ADMIN — TRIAMCINOLONE ACETONIDE 40 MG: 40 INJECTION, SUSPENSION INTRA-ARTICULAR; INTRAMUSCULAR at 10:19

## 2018-11-15 RX ADMIN — LIDOCAINE HYDROCHLORIDE 5 ML: 10 INJECTION, SOLUTION EPIDURAL; INFILTRATION; INTRACAUDAL; PERINEURAL at 10:19

## 2018-11-15 RX ADMIN — BUPIVACAINE HYDROCHLORIDE 2 MG: 2.5 INJECTION, SOLUTION EPIDURAL; INFILTRATION; INTRACAUDAL; PERINEURAL at 10:19

## 2018-11-15 NOTE — PROGRESS NOTES
"Patient called regarding increased \"tailbone\" pain and newly decreased forcefulness of her urine stream. Her baseline \"burning\" groin pain has also increased.    Discussed that these symptoms are unlikely to be related to the SI joint injection performed today. Told her that she should seek medical attention if she becomes completely unable to urinate and to call back if she has additional questions.  "

## 2018-11-24 ENCOUNTER — NURSE TRIAGE (OUTPATIENT)
Dept: NURSING | Facility: CLINIC | Age: 58
End: 2018-11-24

## 2018-11-25 NOTE — TELEPHONE ENCOUNTER
Pt calling to discuss her abnormal liver enzyme labs that she had done at [facility] in [place].  She states she was supposed to get a call from her pcp today but has not heard.  She is concerned that her levels keep rising and does not know if she should be seeking care tonight.  Writer advised pt to call her pcp's office and ask to speak to the nurse/provider on call to review the labs she had drawn yesterday.  Pt verbalized understanding and had no further questions.     Jojo Valenzuela RN/FNA

## 2018-11-27 ENCOUNTER — OFFICE VISIT (OUTPATIENT)
Dept: ORTHOPEDICS | Facility: CLINIC | Age: 58
End: 2018-11-27
Payer: COMMERCIAL

## 2018-11-27 ENCOUNTER — TELEPHONE (OUTPATIENT)
Dept: GASTROENTEROLOGY | Facility: CLINIC | Age: 58
End: 2018-11-27

## 2018-11-27 VITALS — BODY MASS INDEX: 34.6 KG/M2 | HEIGHT: 62 IN | WEIGHT: 188 LBS

## 2018-11-27 DIAGNOSIS — M47.817 FACET ARTHROPATHY, LUMBOSACRAL: Primary | ICD-10-CM

## 2018-11-27 DIAGNOSIS — R10.2 PELVIC PAIN IN FEMALE: ICD-10-CM

## 2018-11-27 DIAGNOSIS — M53.3 SACROILIAC JOINT PAIN: ICD-10-CM

## 2018-11-27 ASSESSMENT — ENCOUNTER SYMPTOMS
POLYPHAGIA: 0
BOWEL INCONTINENCE: 0
CHILLS: 1
PARALYSIS: 0
SPEECH CHANGE: 0
NIGHT SWEATS: 1
NAUSEA: 1
WEIGHT LOSS: 1
NERVOUS/ANXIOUS: 0
VOMITING: 0
STIFFNESS: 1
LIGHT-HEADEDNESS: 1
HEADACHES: 1
CONSTIPATION: 0
LEG PAIN: 1
NUMBNESS: 0
ABDOMINAL PAIN: 1
INSOMNIA: 0
SYNCOPE: 0
ARTHRALGIAS: 1
PANIC: 0
POLYDIPSIA: 1
RECTAL PAIN: 0
ORTHOPNEA: 0
BACK PAIN: 1
MEMORY LOSS: 0
PALPITATIONS: 0
FATIGUE: 1
WEIGHT GAIN: 0
WEAKNESS: 1
DIARRHEA: 0
MYALGIAS: 1
HYPOTENSION: 0
DECREASED APPETITE: 1
JOINT SWELLING: 0
EXERCISE INTOLERANCE: 1
LOSS OF CONSCIOUSNESS: 0
BLOATING: 0
DISTURBANCES IN COORDINATION: 0
SLEEP DISTURBANCES DUE TO BREATHING: 0
TINGLING: 0
FEVER: 0
JAUNDICE: 0
SEIZURES: 0
INCREASED ENERGY: 1
HYPERTENSION: 0
DEPRESSION: 1
NECK PAIN: 1
DECREASED CONCENTRATION: 1
BLOOD IN STOOL: 0
ALTERED TEMPERATURE REGULATION: 1
DIZZINESS: 1
TREMORS: 1
HALLUCINATIONS: 0
HEARTBURN: 1
MUSCLE CRAMPS: 1
MUSCLE WEAKNESS: 1

## 2018-11-27 NOTE — PROGRESS NOTES
REASON FOR VISIT: Recheck to follow-up on injection    REFERRING PHYSICIAN: Referred Self     PRIMARY CARE PHYSICIAN: Philomena Altamirano    HISTORY OF PRESENT ILLNESS: Marissa Youssef is a 58 year old female who returns to clinic today for a follow-up after right side sacroiliac joint injection.  This was performed on 11/15/18 at [facility].  Unfortunately, this was not very effective in relieving her pain.  She recalls getting about 30% relief of her right sided low back pain for a short period of time.  However, the injection did not help her left leg sciatica symptoms, the pain in her tailbone and the burning pain of her pubis symphysis.  These   Today, her main complaint is right greater than left groin pain.  Her symptoms are improved with heat and reclining in a chair.  She denies changes in bowel and bladder habits.     Oswestry score: 55.56% (previously 60%)  [id number]: 21 (previously 25)  Pain scale: 0 back pain, 0 right leg pain, 1 left leg pain (previously 5.5 back, 0 right leg pain, 3 left leg)    PHYSICAL EXAM:   Constitutional - Patient is healthy, well-nourished and appears stated age.    BMI = 34.39    Respiratory - Patient is breathing normally and in no respiratory distress.   Skin - No suspicious rashes or lesions.   Psychiatric - Normal mood and affect.   Cardiovascular - Peripheral pulses are normal.   Eyes - Visual acuity is normal to the written word.   ENT - Hearing intact to the spoken word.   GI - No abdominal distention.   Musculoskeletal - Non-antalgic gait without use of assistive devices.            Lumbar Spine:    Appearance - Normal    Palpation - Deferred    ROM - Deferred    Motor -        LOWER EXTREMITY Left Right   Hip flexion 5/5 5/5   Knee flexion 5/5 55/5   Knee extension 5/5 5/5   Ankle dorsiflexion 5/5 5/5   Ankle plantarflexion 5/5 5/5   Great toe extension 5/5 5/5      Neurologic - Sensation intact to light touch bilaterally in the lower extremities.        IMAGING: A review of  the CT guided injection shows that the needle was properly inserted into the right SI joint joint. See full radiologic report in chart.    An MRI of the lumbar spine, dated 5/2018, was reviewed today.  It shows facet arthropathy in the lower lumbar spine.      CLINICAL ASSESSMENT:   1. 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 Facet arthropathy, lumbar  3. Pelvic pain    DISCUSSION/PLAN:   Marissa is a 58 year old female who returned to clinic today for injection follow-up.  She was sent for a CT-guided right sacroiliac joint injection, which was performed on 11/15/18.  Unfortunately, this only provided 30% relief of her right sided low back pain, and it did not touch her other symptoms.  Therefore, if we were to attempt an SI joint fusion, the best we could hope for is about 30% relief, which probably does not justify the procedure.  Her main complaint today is pelvic pain, so we will refer her to the pelvic floor clinic.      All questions and concerns were answered to the patient's apparent satisfaction before leaving the clinic.     Thank you for allowing Dr. Cummings and I to participate in the care of Marissa Youssef.    Respectfully,  Jessica Florez PA-C    Attestation:  I (Dr. Michael Cummings - Spine Surgeon) have personally evaluated patient with SHERRIE Florez, and agree with findings and plan outlined in the note.  I discussed at length with the patient/family, explained the nature of spinal condition, and formulated workup and/or treatment plan together.  All questions were answered to the best of my ability and to patient's apparent satisfaction.    58/f, s/p R SIJ injection, with only minimal relief (~ 30%).  Per patient, did not really hit the right spot.  This response suggests that the SIJ is NOT the main pain generator, and patient unlikely to benefit much from SIJ fusion surgery.  Description of pain may be more suggestive of pelvic floor deficiency.  (+) hx of pelvic fx/injury ~  40 yrs ago.    P> Refer to Gynecology pelvic floor clinic for further evaluation and management.  RTC prn.  TT > 15 mins > 50% CC.    CC  Copy to patient    [address] [place] [address]

## 2018-11-27 NOTE — MR AVS SNAPSHOT
After Visit Summary   11/27/2018    Marissa Youssef    MRN: [id number]           Patient Information     Date Of Birth          1960        Visit Information        Provider Department      11/27/2018 7:30 AM Michael Cummings MD [facility]        Today's Diagnoses     Facet arthropathy, lumbosacral    -  1    Sacroiliac joint pain        Pelvic pain in female           Follow-ups after your visit        Additional Services     OB/GYN REFERRAL       Your provider has referred you to:  [facility] [phone]   [email or web address]    Please be aware that coverage of these services is subject to the terms and limitations of your health insurance plan.  Call member services at your health plan with any benefit or coverage questions.      Please bring the following with you to your appointment:    (1) Any X-Rays, CTs or MRIs which have been performed.  Contact the facility where they were done to arrange for  prior to your scheduled appointment.   (2) List of current medications   (3) This referral request   (4) Any documents/labs given to you for this referral                  Who to contact     Please call your clinic at [phone] to:    Ask questions about your health    Make or cancel appointments    Discuss your medicines    Learn about your test results    Speak to your doctor            Additional Information About Your Visit        MyChart Information     Boom.fmt is an electronic gateway that provides easy, online access to your medical records. With [employer], you can request a clinic appointment, read your test results, renew a prescription or communicate with your care team.     To sign up for [email or web address] visit the website at [email or web address]   You will be asked to enter the access code listed below, as well as some personal information. Please follow the directions to create your username and password.     Your  "access code is: [email or web address]  Expires: 2019  8:54 AM     Your access code will  in 90 days. If you need help or a new code, please contact your [facility] or call [phone] for assistance.        Care EveryWhere ID     This is your Care EveryWhere ID. This could be used by other organizations to access your [facility] medical records  [id number]        Your Vitals Were     Height BMI (Body Mass Index)                1.575 m (5' 2\") 34.39 kg/m2           Blood Pressure from Last 3 Encounters:   10/29/18 145/66    Weight from Last 3 Encounters:   18 85.3 kg (188 lb)   18 85.3 kg (188 lb)   10/29/18 85.5 kg (188 lb 8 oz)              We Performed the Following     OB/GYN REFERRAL        Primary Care Provider Office Phone # Fax #    Philomena Altamirano [phone]       [address] [address]        Equal Access to Services     [facility]: Hadii aad ku hadasho Soomaali, waaxda luqadaha, qaybta kaalmada adeegyada, waxay wadein haybryant schaffer . So [facility] [phone].    ATENCIÓN: Si habla español, tiene a winkler disposición servicios gratuitos de asistencia lingüística. Llame al [phone].    We comply with applicable federal civil rights laws and [place] laws. We do not discriminate on the basis of race, color, national origin, age, disability, sex, sexual orientation, or gender identity.            Thank you!     Thank you for choosing [facility]  for your care. Our goal is always to provide you with excellent care. Hearing back from our patients is one way we can continue to improve our services. Please take a few minutes to complete the written survey that you may receive in the mail after your visit with us. Thank you!             Your Updated Medication List - Protect others around you: Learn how to safely use, store and throw away your medicines at www.disposemymeds.org.    "       This list is accurate as of 11/27/18  7:58 AM.  Always use your most recent med list.                   Brand Name Dispense Instructions for use Diagnosis    acetaminophen-codeine 300-30 MG tablet    TYLENOL #3     Take 1 tablet by mouth every 6 hours as needed for severe pain        ALPRAZOLAM PO      Take 0.5 mg by mouth At Bedtime        ASPIRIN PO      Take 81 mg by mouth daily        DILTIAZEM HCL PO      Take 120 mg by mouth daily        DULOXETINE HCL PO      Take 60 mg by mouth daily        FOLIC ACID PO      Take 1 mg by mouth daily        GABAPENTIN PO      Take 300 mg by mouth 3 times daily        ISOSORBIDE MONONITRATE PO      Take 30 mg by mouth daily        multivitamin, therapeutic Tabs tablet      Take 1 tablet by mouth daily        SIMVASTATIN PO      Take 20 mg by mouth At Bedtime        sulfaSALAzine 500 MG tablet    AZULFIDINE     Take 1,000 mg by mouth 3 times daily

## 2018-11-27 NOTE — TELEPHONE ENCOUNTER
"Referring provider: [facility]    Clinic Contact: Same   Phone: cell [phone] or hospital       Requesting:   Clinic consult  Or Procedure based on recommendation by Dr. Llamas    Evaluation for: Pt previously seen by Dr. Llamas at [facility] for abdominal pain and underwent sphincterotomy per report. Unclear if for SOD or other indication per Dr. Raygoza. I was unable to open in CareEverywhere.    Pt now with recurrence of RUQ pain \"indentical to previous\" and with elevated AST and ALT but normal bili and alk phos. Recent CT with pneumobilia as expected post ERCP.    Imaging reports and labs in CareEverywhere.    Dr. Raygoza calling and requesting eval re if needing additional intervention.    Pt cell = [phone]    Is patient aware of referral and ok to be contacted to schedule? Yes      Additional information: Pt expecting call back in next 24 hours with at least update of plans re ? Clinic or procedure.      "

## 2018-11-27 NOTE — LETTER
11/27/2018       RE: Marissa Youssef  [address]  [place] [address]     Dear Colleague,    Thank you for referring your patient, Marissa Youssef, to the [facility] at [facility]. Please see a copy of my visit note below.    REASON FOR VISIT: Recheck to follow-up on injection    REFERRING PHYSICIAN: Referred Self     PRIMARY CARE PHYSICIAN: Philomena Altamirano    HISTORY OF PRESENT ILLNESS: Marissa Youssef is a 58 year old female who returns to clinic today for a follow-up after right side sacroiliac joint injection.  This was performed on 11/15/18 at [facility].  Unfortunately, this was not very effective in relieving her pain.  She recalls getting about 30% relief of her right sided low back pain for a short period of time.  However, the injection did not help her left leg sciatica symptoms, the pain in her tailbone and the burning pain of her pubis symphysis.  These   Today, her main complaint is right greater than left groin pain.  Her symptoms are improved with heat and reclining in a chair.  She denies changes in bowel and bladder habits.     Oswestry score: 55.56% (previously 60%)  [id number]: 21 (previously 25)  Pain scale: 0 back pain, 0 right leg pain, 1 left leg pain (previously 5.5 back, 0 right leg pain, 3 left leg)    PHYSICAL EXAM:   Constitutional - Patient is healthy, well-nourished and appears stated age.    BMI = 34.39    Respiratory - Patient is breathing normally and in no respiratory distress.   Skin - No suspicious rashes or lesions.   Psychiatric - Normal mood and affect.   Cardiovascular - Peripheral pulses are normal.   Eyes - Visual acuity is normal to the written word.   ENT - Hearing intact to the spoken word.   GI - No abdominal distention.   Musculoskeletal - Non-antalgic gait without use of assistive devices.            Lumbar Spine:    Appearance - Normal    Palpation - Deferred    ROM - Deferred    Motor -        LOWER  EXTREMITY Left Right   Hip flexion 5/5 5/5   Knee flexion 5/5 55/5   Knee extension 5/5 5/5   Ankle dorsiflexion 5/5 5/5   Ankle plantarflexion 5/5 5/5   Great toe extension 5/5 5/5      Neurologic - Sensation intact to light touch bilaterally in the lower extremities.        IMAGING: A review of the CT guided injection shows that the needle was properly inserted into the right SI joint joint. See full radiologic report in chart.    An MRI of the lumbar spine, dated 5/2018, was reviewed today.  It shows facet arthropathy in the lower lumbar spine.      CLINICAL ASSESSMENT:   1. 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 Facet arthropathy, lumbar  3. Pelvic pain    DISCUSSION/PLAN:   Marissa is a 58 year old female who returned to clinic today for injection follow-up.  She was sent for a CT-guided right sacroiliac joint injection, which was performed on 11/15/18.  Unfortunately, this only provided 30% relief of her right sided low back pain, and it did not touch her other symptoms.  Therefore, if we were to attempt an SI joint fusion, the best we could hope for is about 30% relief, which probably does not justify the procedure.  Her main complaint today is pelvic pain, so we will refer her to the pelvic floor clinic.      All questions and concerns were answered to the patient's apparent satisfaction before leaving the clinic.     Thank you for allowing Dr. Cummings and I to participate in the care of Marissa Youssef.    Respectfully,  Jessica Florez PA-C    Attestation:  I (Dr. Michael Cummings - Spine Surgeon) have personally evaluated patient with SHERRIE Florez, and agree with findings and plan outlined in the note.  I discussed at length with the patient/family, explained the nature of spinal condition, and formulated workup and/or treatment plan together.  All questions were answered to the best of my ability and to patient's apparent satisfaction.    58/f, s/p R SIJ injection, with only minimal  relief (~ 30%).  Per patient, did not really hit the right spot.  This response suggests that the SIJ is NOT the main pain generator, and patient unlikely to benefit much from SIJ fusion surgery.  Description of pain may be more suggestive of pelvic floor deficiency.  (+) hx of pelvic fx/injury ~ 40 yrs ago.    P> Refer to Gynecology pelvic floor clinic for further evaluation and management.  RTC prn.  TT > 15 mins > 50% CC.    CC  Copy to patient    [facility] [place] [address]      Again, thank you for allowing me to participate in the care of your patient.      Sincerely,    Michael Cummings MD

## 2018-11-27 NOTE — TELEPHONE ENCOUNTER
Ok to schedule in clinic with Dr. Llamas.     Message sent to scheduling to call and schedule.     Yaneth MEIER RN Care Coordinator  Dr. Llamas, Dr. Up & Dr. Dotson   Advanced Endoscopy  [phone]

## 2018-11-27 NOTE — NURSING NOTE
"Reason For Visit:   Chief Complaint   Patient presents with     RECHECK     F/U CT guided SI joint injection. States she has less sciatic pain but more burning in her pelvis.      Primary MD: Philomena Altamirano        ?  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  Currently working? Yes.  Work status?  Part-time.  Date of injury: January 2017  Type of injury: Fell on right hip  Date of surgery: None  Smoker: Yes       Ht 1.575 m (5' 2\")  Wt 85.3 kg (188 lb)  BMI 34.39 kg/m2    Pain Assessment  Patient Currently in Pain: Yes  0-10 Pain Scale: 4  Primary Pain Location: Other (Comment) (Pelvis)  Pain Descriptors: Burning, Aching    Oswestry (FE) Questionnaire    OSWESTRY DISABILITY INDEX 11/27/2018   Count 9   Sum 25   Oswestry Score (%) 55.56   Some recent data might be hidden          Visual Analog Pain Scale  Back Pain Scale 0-10: 0  Right leg pain: 0  Left leg pain: 1    Promis 10 Assessment    PROMIS 10 11/27/2018   In general, would you say your health is: Fair   In general, would you say your quality of life is: Good   In general, how would you rate your physical health? Good   In general, how would you rate your mental health, including your mood and your ability to think? Good   In general, how would you rate your satisfaction with your social activities and relationships? Fair   In general, please rate how well you carry out your usual social activities and roles Fair   To what extent are you able to carry out your everyday physical activities such as walking, climbing stairs, carrying groceries, or moving a chair? A little   How often have you been bothered by emotional problems such as feeling anxious, depressed or irritable? Sometimes   How would you rate your fatigue on average? Severe   How would you rate your pain on average?   0 = No Pain  to  10 = Worst Imaginable Pain 4   In general, would you say your health is: 2   In general, would you say your quality of life is: 3   In " general, how would you rate your physical health? 3   In general, how would you rate your mental health, including your mood and your ability to think? 3   In general, how would you rate your satisfaction with your social activities and relationships? 2   In general, please rate how well you carry out your usual social activities and roles. (This includes activities at home, at work and in your community, and responsibilities as a parent, child, spouse, employee, friend, etc.) 2   To what extent are you able to carry out your everyday physical activities such as walking, climbing stairs, carrying groceries, or moving a chair? 2   In the past 7 days, how often have you been bothered by emotional problems such as feeling anxious, depressed, or irritable? 3   In the past 7 days, how would you rate your fatigue on average? 4   In the past 7 days, how would you rate your pain on average, where 0 means no pain, and 10 means worst imaginable pain? 4   Global Mental Health Score 11   Global Physical Health Score 10   PROMIS TOTAL - SUBSCORES 21   Some recent data might be hidden                Farzaneh Heath LPN

## 2018-11-28 ENCOUNTER — TELEPHONE (OUTPATIENT)
Dept: GASTROENTEROLOGY | Facility: CLINIC | Age: 58
End: 2018-11-28

## 2018-11-28 NOTE — TELEPHONE ENCOUNTER
"Offered pt 01/02/2019 for clinic consult with , patient declined scheduling, stating, \"I am sitting with my therapist and she does not think I can wait that long. I have not eaten anything for the last two days. I am coming to the ED.\" Advised that we have one spot on 12/5, next Wednesday upon further review of Dr. Llamas's schedule.  Patient was scheduled, location of appt confirmed.   Patient is coming to our ED and may not need this appt.     SR 11/28/18 @ 246 P   "

## 2018-11-29 ENCOUNTER — TELEPHONE (OUTPATIENT)
Dept: ORTHOPEDICS | Facility: CLINIC | Age: 58
End: 2018-11-29

## 2018-11-29 ENCOUNTER — TELEPHONE (OUTPATIENT)
Dept: GASTROENTEROLOGY | Facility: CLINIC | Age: 58
End: 2018-11-29

## 2018-11-29 ENCOUNTER — HOSPITAL ENCOUNTER (EMERGENCY)
Facility: CLINIC | Age: 58
Discharge: HOME | End: 2018-11-29
Attending: EMERGENCY MEDICINE | Admitting: EMERGENCY MEDICINE
Payer: MEDICARE

## 2018-11-29 VITALS
WEIGHT: 186 LBS | SYSTOLIC BLOOD PRESSURE: 145 MMHG | BODY MASS INDEX: 34.02 KG/M2 | HEART RATE: 94 BPM | RESPIRATION RATE: 16 BRPM | OXYGEN SATURATION: 98 % | TEMPERATURE: 98.1 F | DIASTOLIC BLOOD PRESSURE: 87 MMHG

## 2018-11-29 DIAGNOSIS — R10.12 ABDOMINAL PAIN, LEFT UPPER QUADRANT: ICD-10-CM

## 2018-11-29 DIAGNOSIS — R79.89 ABNORMAL LFTS: ICD-10-CM

## 2018-11-29 LAB
ALBUMIN SERPL-MCNC: 4.2 G/DL (ref 3.4–5)
ALP SERPL-CCNC: 101 U/L (ref 40–150)
ALT SERPL W P-5'-P-CCNC: 90 U/L (ref 0–50)
ANION GAP SERPL CALCULATED.3IONS-SCNC: 5 MMOL/L (ref 3–14)
AST SERPL W P-5'-P-CCNC: 61 U/L (ref 0–45)
AST SERPL W P-5'-P-CCNC: ABNORMAL U/L (ref 0–45)
BASOPHILS # BLD AUTO: 0 10E9/L (ref 0–0.2)
BASOPHILS NFR BLD AUTO: 0.3 %
BILIRUB SERPL-MCNC: 0.9 MG/DL (ref 0.2–1.3)
BUN SERPL-MCNC: 22 MG/DL (ref 7–30)
CALCIUM SERPL-MCNC: 9.5 MG/DL (ref 8.5–10.1)
CHLORIDE SERPL-SCNC: 109 MMOL/L (ref 94–109)
CO2 SERPL-SCNC: 30 MMOL/L (ref 20–32)
CREAT SERPL-MCNC: 0.76 MG/DL (ref 0.52–1.04)
DIFFERENTIAL METHOD BLD: NORMAL
EOSINOPHIL # BLD AUTO: 0.2 10E9/L (ref 0–0.7)
EOSINOPHIL NFR BLD AUTO: 3 %
ERYTHROCYTE [DISTWIDTH] IN BLOOD BY AUTOMATED COUNT: 13 % (ref 10–15)
GFR SERPL CREATININE-BSD FRML MDRD: 78 ML/MIN/1.7M2
GLUCOSE SERPL-MCNC: 106 MG/DL (ref 70–99)
HCT VFR BLD AUTO: 44.6 % (ref 35–47)
HGB BLD-MCNC: 14.6 G/DL (ref 11.7–15.7)
IMM GRANULOCYTES # BLD: 0 10E9/L (ref 0–0.4)
IMM GRANULOCYTES NFR BLD: 0.1 %
LACTATE BLD-SCNC: 1 MMOL/L (ref 0.7–2)
LIPASE SERPL-CCNC: 157 U/L (ref 73–393)
LYMPHOCYTES # BLD AUTO: 2.8 10E9/L (ref 0.8–5.3)
LYMPHOCYTES NFR BLD AUTO: 38.5 %
MCH RBC QN AUTO: 32.4 PG (ref 26.5–33)
MCHC RBC AUTO-ENTMCNC: 32.7 G/DL (ref 31.5–36.5)
MCV RBC AUTO: 99 FL (ref 78–100)
MONOCYTES # BLD AUTO: 0.9 10E9/L (ref 0–1.3)
MONOCYTES NFR BLD AUTO: 12.4 %
NEUTROPHILS # BLD AUTO: 3.3 10E9/L (ref 1.6–8.3)
NEUTROPHILS NFR BLD AUTO: 45.7 %
NRBC # BLD AUTO: 0 10*3/UL
NRBC BLD AUTO-RTO: 0 /100
PLATELET # BLD AUTO: 237 10E9/L (ref 150–450)
POTASSIUM SERPL-SCNC: 5 MMOL/L (ref 3.4–5.3)
PROT SERPL-MCNC: 7.1 G/DL (ref 6.8–8.8)
RBC # BLD AUTO: 4.5 10E12/L (ref 3.8–5.2)
SODIUM SERPL-SCNC: 144 MMOL/L (ref 133–144)
WBC # BLD AUTO: 7.3 10E9/L (ref 4–11)

## 2018-11-29 PROCEDURE — 36415 COLL VENOUS BLD VENIPUNCTURE: CPT

## 2018-11-29 PROCEDURE — 84450 TRANSFERASE (AST) (SGOT): CPT | Performed by: EMERGENCY MEDICINE

## 2018-11-29 PROCEDURE — 25000125 ZZHC RX 250: Performed by: EMERGENCY MEDICINE

## 2018-11-29 PROCEDURE — 83690 ASSAY OF LIPASE: CPT | Performed by: EMERGENCY MEDICINE

## 2018-11-29 PROCEDURE — 85025 COMPLETE CBC W/AUTO DIFF WBC: CPT | Performed by: EMERGENCY MEDICINE

## 2018-11-29 PROCEDURE — 80053 COMPREHEN METABOLIC PANEL: CPT | Performed by: EMERGENCY MEDICINE

## 2018-11-29 PROCEDURE — 83605 ASSAY OF LACTIC ACID: CPT | Performed by: EMERGENCY MEDICINE

## 2018-11-29 PROCEDURE — 99283 EMERGENCY DEPT VISIT LOW MDM: CPT

## 2018-11-29 PROCEDURE — 25000132 ZZH RX MED GY IP 250 OP 250 PS 637: Mod: GY | Performed by: EMERGENCY MEDICINE

## 2018-11-29 PROCEDURE — 99284 EMERGENCY DEPT VISIT MOD MDM: CPT | Mod: Z6 | Performed by: EMERGENCY MEDICINE

## 2018-11-29 PROCEDURE — A9270 NON-COVERED ITEM OR SERVICE: HCPCS | Mod: GY | Performed by: EMERGENCY MEDICINE

## 2018-11-29 RX ORDER — PROCHLORPERAZINE 25 MG
25 SUPPOSITORY, RECTAL RECTAL EVERY 12 HOURS PRN
Qty: 5 SUPPOSITORY | Refills: 0 | Status: SHIPPED | OUTPATIENT
Start: 2018-11-29 | End: 2019-03-05

## 2018-11-29 RX ADMIN — LIDOCAINE HYDROCHLORIDE 30 ML: 20 SOLUTION ORAL; TOPICAL at 09:16

## 2018-11-29 ASSESSMENT — ENCOUNTER SYMPTOMS
DIFFICULTY URINATING: 0
HEMATEMESIS: 0
COLOR CHANGE: 0
DIARRHEA: 0
CONSTIPATION: 0
HEMATURIA: 0
FEVER: 0
ANOREXIA: 0
FLATUS: 0
EYE REDNESS: 0
ABDOMINAL PAIN: 1
SORE THROAT: 0
COUGH: 0
DYSURIA: 0
SHORTNESS OF BREATH: 0
CHILLS: 0
HEMATOCHEZIA: 0
HEADACHES: 0
VOMITING: 0
CONFUSION: 0
NECK STIFFNESS: 0
ARTHRALGIAS: 0
BELCHING: 0
FATIGUE: 1
NAUSEA: 1

## 2018-11-29 NOTE — TELEPHONE ENCOUNTER
[facility] Call Center    Phone Message    May a detailed message be left on voicemail: yes    Reason for Call: Other: Pt needs referral for [facility] faxed to the [facility]. [phone]. Pt was told to call them to set appt, and referral was not sent to them. Please do ASAP, as pt has been waiting since Tuesday to schedule.     Action Taken: Message routed to:  [facility] ([facility]): Orthopedics

## 2018-11-29 NOTE — TELEPHONE ENCOUNTER
Received message to organize the following:  This pt came to our ED today and was sent back.wo speaking w me. Live Raygoza her MD in [place] called me concerned about  - my suggestion - admit her to [place] if nec, for hydration and pain control     LETS SEE HER Monday in my clinic.     Called patient and spoke with her and . Advised them that Dr. Llamas would like her to come to clinic on Monday. Advised that they will receive a call tomorrow from our .     LIZZ Landa Dr[address] Dr. Up, & Dr. Dotson  Advanced [facility]  [phone]

## 2018-11-29 NOTE — ED AVS SNAPSHOT
[facility], [place], Emergency Department    [address]    [facility] [address]    Phone:  [phone]    Fax:  [phone]                                       Marissa Youssef   MRN: [id number]    Department:  [facility], Emergency Department   Date of Visit:  11/29/2018           After Visit Summary Signature Page     I have received my discharge instructions, and my questions have been answered. I have discussed any challenges I see with this plan with the nurse or doctor.    ..........................................................................................................................................  Patient/Patient Representative Signature      ..........................................................................................................................................  Patient Representative Print Name and Relationship to Patient    ..................................................               ................................................  Date                                   Time    ..........................................................................................................................................  Reviewed by Signature/Title    ...................................................              ..............................................  Date                                               Time          22EPIC Rev 08/18

## 2018-11-29 NOTE — ED AVS SNAPSHOT
[facility], Emergency Department    [address]    [facility] [address]    Phone:  [phone]    Fax:  [phone]                                       Marissa Youssef   MRN: [id number]    Department:  [facility], Emergency Department   Date of Visit:  11/29/2018           Patient Information     Date Of Birth          1960        Your diagnoses for this visit were:     Abnormal LFTs     Abdominal pain, left upper quadrant        You were seen by King Forman MD.      Follow-up Information     Follow up with Micheal Llamas MD In 6 days.    Specialty:  Gastroenterology    Why:  As scheduled    Contact information:    [address]  [facility] [address] [phone]          Discharge Instructions       Stop sulfasalazine    Discharge References/Attachments     ABDOMINAL PAIN, ADULT (ENGLISH)      Your next 10 appointments already scheduled     Dec 05, 2018 10:30 AM CST   (Arrive by 10:15 AM)   New Patient Visit with Micheal Llamas MD   [facility] ([facility])    [address]  4th Floor  [facility] [address] [phone]              24 Hour Appointment Hotline       To make an appointment at any [facility], call [phone] ([phone]). If you don't have a family doctor or clinic, we will help you find one. [facility] clinics are conveniently located to serve the needs of you and your family.             Review of your medicines      START taking        Dose / Directions Last dose taken    prochlorperazine 25 MG Suppository   Commonly known as:  COMPAZINE   Dose:  25 mg   Quantity:  5 suppository        Place 1 suppository (25 mg) rectally every 12 hours as needed for nausea   Refills:  0          Our records show that you are taking the medicines listed below. If these are incorrect, please call your family doctor or clinic.        Dose / Directions Last dose taken    acetaminophen-codeine 300-30  MG tablet   Commonly known as:  TYLENOL #3   Dose:  1 tablet        Take 1 tablet by mouth every 6 hours as needed for severe pain   Refills:  0        ALPRAZOLAM PO   Dose:  0.5 mg        Take 0.5 mg by mouth At Bedtime   Refills:  0        ASPIRIN PO   Dose:  81 mg        Take 81 mg by mouth daily   Refills:  0        CALCIUM 1200 PO   Dose:  600 capsule        Take 600 capsules by mouth   Refills:  0        DILTIAZEM HCL PO   Dose:  120 mg        Take 120 mg by mouth daily   Refills:  0        DULOXETINE HCL PO   Dose:  60 mg        Take 60 mg by mouth daily   Refills:  0        FOLIC ACID PO   Dose:  1 mg        Take 1 mg by mouth daily   Refills:  0        GABAPENTIN PO   Dose:  300 mg        Take 300 mg by mouth 3 times daily   Refills:  0        ISOSORBIDE MONONITRATE PO   Dose:  30 mg        Take 30 mg by mouth daily   Refills:  0        multivitamin, therapeutic Tabs tablet   Dose:  1 tablet        Take 1 tablet by mouth daily   Refills:  0        SIMVASTATIN PO   Dose:  20 mg        Take 20 mg by mouth At Bedtime   Refills:  0        sulfaSALAzine 500 MG tablet   Commonly known as:  AZULFIDINE   Dose:  1000 mg        Take 1,000 mg by mouth 3 times daily   Refills:  0                Prescriptions were sent or printed at these locations (1 Prescription)                   Other Prescriptions                Printed at Department/Unit printer (1 of 1)         prochlorperazine (COMPAZINE) 25 MG Suppository                Procedures and tests performed during your visit     AST    CBC with platelets differential    Comprehensive metabolic panel    Lactic acid whole blood    Lipase    Peripheral IV catheter      Orders Needing Specimen Collection     Ordered          11/29/18 1863  UA with Microscopic reflex to Culture - STAT, [facility]: STAT, Needs to be Collected     Scheduled Task Status   11/29/18 0931 Collect UA with Microscopic reflex to Culture Open   Order Class:  PCU Collect                  Pending Results   "   Date and Time Order Name Status Description    2018 0944 AST In process             Pending Culture Results     No orders found from 2018 to 2018.            Pending Results Instructions     If you had any lab results that were not finalized at the time of your Discharge, you can call the ED Lab Result RN at [phone]. You will be contacted by this team for any positive Lab results or changes in treatment. The nurses are available 7 days a week from 10A to 6:30P.  You can leave a message 24 hours per day and they will return your call.        Thank you for choosing [facility]       Thank you for choosing [facility] for your care. Our goal is always to provide you with excellent care. Hearing back from our patients is one way we can continue to improve our services. Please take a few minutes to complete the written survey that you may receive in the mail after you visit with us. Thank you!        [email or web address] Information     [employer] lets you send messages to your doctor, view your test results, renew your prescriptions, schedule appointments and more. To sign up, go to [email or web address] . Click on \"Log in\" on the left side of the screen, which will take you to the Welcome page. Then click on \"Sign up Now\" on the right side of the page.     You will be asked to enter the access code listed below, as well as some personal information. Please follow the directions to create your username and password.     Your access code is: [email or web address]  Expires: 2019  8:54 AM     Your access code will  in 90 days. If you need help or a new code, please call your [facility] clinic or [phone].        Care EveryWhere ID     This is your Care EveryWhere ID. This could be used by other organizations to access your [facility] medical records  [id number]        Equal Access to Services     YONI LIZARRAGA AH: earline Roberts, angy mueller " ahmet schaffer ah. So [facility] [phone].    ATENCIÓN: Si habla español, tiene a winkler disposición servicios gratuitos de asistencia lingüística. Llame al [phone].    We comply with applicable federal civil rights laws and [place] laws. We do not discriminate on the basis of race, color, national origin, age, disability, sex, sexual orientation, or gender identity.            After Visit Summary       This is your record. Keep this with you and show to your community pharmacist(s) and doctor(s) at your next visit.

## 2018-11-29 NOTE — ED PROVIDER NOTES
History     Chief Complaint   Patient presents with     Abnormal Labs     Pt c/o increased liver labs     Abdominal Pain     Pt c/o abd pain an unable to eat or drink     Patient is a 58 year old female presenting with abdominal pain. The history is provided by the patient.   Abdominal Pain   Pain location:  Epigastric  Pain quality: not aching, not bloating, not burning, not cramping, not dull, no fullness, not gnawing, not heavy, no pressure, not sharp, not shooting, not squeezing, not stabbing, no stiffness and not tearing    Pain quality comment:  Spasm  Pain radiates to:  Back  Pain severity:  Moderate  Onset quality:  Gradual  Duration:  3 weeks  Timing:  Intermittent  Chronicity:  Recurrent  Context: not alcohol use, not awakening from sleep, not diet changes, not eating, not laxative use, not medication withdrawal, not previous surgeries, not recent illness, not recent sexual activity, not recent travel, not retching, not sick contacts, not suspicious food intake and not trauma    Relieved by:  Nothing  Worsened by:  Eating  Ineffective treatments:  None tried  Associated symptoms: fatigue and nausea    Associated symptoms: no anorexia, no belching, no chest pain, no chills, no constipation, no cough, no diarrhea, no dysuria, no fever, no flatus, no hematemesis, no hematochezia, no hematuria, no melena, no shortness of breath, no sore throat, no vaginal bleeding, no vaginal discharge and no vomiting    Fatigue:     Severity:  Moderate    Duration:  3 weeks  Risk factors: NSAID use    Risk factors: no alcohol abuse, no aspirin use, not elderly, has not had multiple surgeries, not obese, not pregnant and no recent hospitalization      Marissa Youssef is a 58 year old female with a history of sphincter of Oddi dysfunction, hypertension, anxiety, hyperlipidemia who presents for further evaluation of abdominal pain and elevated LFTs.  Patient notes that she was diagnosed with sphincter of Oddi dysfunction more  than 10 years ago.  As well, at that time she was diagnosed with microscopic colitis.  She states that she had done reasonably well until approximately 6 months ago when she had recurrence of pain and diarrhea and her primary internist/gastroenterologist ordered a colonoscopy as she was having pain.  Per her report, she was diagnosed with microscopic colitis and was placed on sulfasalazine.  She notes that approximately 3 weeks ago she felt a pop and had associated postprandial epigastric pain.  She went into the local emergency room on November 13 at which time a CAT scan was obtained which was normal.  She was followed up by her gastroenterologist who had ordered liver enzymes revealing AST and ALT to be elevated.  Further workup including hepatitis C serology was negative.  She was told to follow-up with Dr. Micheal [facility]lamas at the [facility].  She has an appointment for December 5-6 days from now-but while visiting with her therapist yesterday evening was told that she should go to the [facility] to be seen earlier.  She notes that her pain is a spasm that occurs in the epigastric and left upper quadrant region within 10 minutes of eating solid food or ingesting water with ice.  The pain will radiate into her back.  The pain will last approximately 2-3 hours and then gradually fade away.  She has had ongoing nausea but no vomiting or diarrhea.  She denies use of alcohol or other drugs.    I have reviewed the Medications, Allergies, Past Medical and Surgical History, and Social History in the Epic system.    Review of Systems   Constitutional: Positive for fatigue. Negative for chills and fever.   HENT: Negative for congestion and sore throat.    Eyes: Negative for redness.   Respiratory: Negative for cough and shortness of breath.    Cardiovascular: Negative for chest pain.   Gastrointestinal: Positive for abdominal pain and nausea. Negative for anorexia, constipation, diarrhea, flatus, hematemesis,  hematochezia, melena and vomiting.   Genitourinary: Negative for difficulty urinating, dysuria, hematuria, vaginal bleeding and vaginal discharge.   Musculoskeletal: Negative for arthralgias and neck stiffness.   Skin: Negative for color change.   Neurological: Negative for headaches.   Psychiatric/Behavioral: Negative for confusion.       Physical Exam   BP: 138/77  Pulse: 94  Temp: 98.1  F (36.7  C)  Resp: 16  Weight: 84.4 kg (186 lb)  SpO2: 98 %      Physical Exam   Constitutional: No distress.   HENT:   Head: Atraumatic.   Mouth/Throat: Oropharynx is clear and moist. No oropharyngeal exudate.   Eyes: Pupils are equal, round, and reactive to light. No scleral icterus.   Cardiovascular: Normal heart sounds and intact distal pulses.    Pulmonary/Chest: Breath sounds normal. No respiratory distress.   Abdominal: Soft. Bowel sounds are normal. There is tenderness in the left upper quadrant.   Musculoskeletal: She exhibits no edema or tenderness.   Skin: Skin is warm. No rash noted. She is not diaphoretic.       ED Course     ED Course     Procedures             Labs Ordered and Resulted from Time of ED Arrival Up to the Time of Departure from the ED   COMPREHENSIVE METABOLIC PANEL - Abnormal; Notable for the following:        Result Value    Glucose 106 (*)     ALT 90 (*)     All other components within normal limits   CBC WITH PLATELETS DIFFERENTIAL   LIPASE   LACTIC ACID WHOLE BLOOD   AST   ROUTINE UA WITH MICROSCOPIC REFLEX TO CULTURE   PERIPHERAL IV CATHETER     Results for orders placed or performed during the hospital encounter of 11/29/18 (from the past 24 hour(s))   CBC with platelets differential   Result Value Ref Range    WBC 7.3 4.0 - 11.0 10e9/L    RBC Count 4.50 3.8 - 5.2 10e12/L    Hemoglobin 14.6 11.7 - 15.7 g/dL    Hematocrit 44.6 35.0 - 47.0 %    MCV 99 78 - 100 fl    MCH 32.4 26.5 - 33.0 pg    MCHC 32.7 31.5 - 36.5 g/dL    RDW 13.0 10.0 - 15.0 %    Platelet Count 237 150 - 450 10e9/L    Diff Method  Automated Method     % Neutrophils 45.7 %    % Lymphocytes 38.5 %    % Monocytes 12.4 %    % Eosinophils 3.0 %    % Basophils 0.3 %    % Immature Granulocytes 0.1 %    Nucleated RBCs 0 0 /100    Absolute Neutrophil 3.3 1.6 - 8.3 10e9/L    Absolute Lymphocytes 2.8 0.8 - 5.3 10e9/L    Absolute Monocytes 0.9 0.0 - 1.3 10e9/L    Absolute Eosinophils 0.2 0.0 - 0.7 10e9/L    Absolute Basophils 0.0 0.0 - 0.2 10e9/L    Abs Immature Granulocytes 0.0 0 - 0.4 10e9/L    Absolute Nucleated RBC 0.0    Comprehensive metabolic panel   Result Value Ref Range    Sodium 144 133 - 144 mmol/L    Potassium 5.0 3.4 - 5.3 mmol/L    Chloride 109 94 - 109 mmol/L    Carbon Dioxide 30 20 - 32 mmol/L    Anion Gap 5 3 - 14 mmol/L    Glucose 106 (H) 70 - 99 mg/dL    Urea Nitrogen 22 7 - 30 mg/dL    Creatinine 0.76 0.52 - 1.04 mg/dL    GFR Estimate 78 >60 mL/min/1.7m2    GFR Estimate If Black >90 >60 mL/min/1.7m2    Calcium 9.5 8.5 - 10.1 mg/dL    Bilirubin Total 0.9 0.2 - 1.3 mg/dL    Albumin 4.2 3.4 - 5.0 g/dL    Protein Total 7.1 6.8 - 8.8 g/dL    Alkaline Phosphatase 101 40 - 150 U/L    ALT 90 (H) 0 - 50 U/L    AST Unsatisfactory specimen - hemolyzed 0 - 45 U/L   Lipase   Result Value Ref Range    Lipase 157 73 - 393 U/L   Lactic acid whole blood   Result Value Ref Range    Lactic Acid 1.0 0.7 - 2.0 mmol/L              Assessments & Plan (with Medical Decision Making)   58-year-old female presents for evaluation of postprandial left upper quadrant and epigastric abdominal pain over the past several weeks.  In addition she has been noted to have elevated AST and ALT.  These findings began after starting sulfasalazine which she was prescribed for microscopic colitis.  Apparently this medication was chosen secondary to cost and insurance issues.  Differential includes but is not limited to ulcer, gastritis, medication side effect, gastroenteritis.  I have reviewed the nursing notes.  In the emergency room, laboratories were obtained including  CBC, comprehensive metabolic panel, lipase and lactic acid all of which were normal with exception of minimally elevated glucose of 106, ALT of 90 and AST which was hemolyzed.  AST was repeated and was elevated at 61.  A GI cocktail made her symptoms worse.  She has an appointment with gastroenterology in 6 days which she was encouraged to keep.  Sulfasalazine is known to cause her current side effects and therefore I recommended discontinuing this medication until she is seen in 6 days.  I will also discharge her with a prescription for Compazine.  I have asked her to bring a copy of her biopsy report from most recent colonoscopy or have this information forwarded to gastroenterology at the [facility].    I have reviewed the findings, diagnosis, plan and need for follow up with the patient.    New Prescriptions    PROCHLORPERAZINE (COMPAZINE) 25 MG SUPPOSITORY    Place 1 suppository (25 mg) rectally every 12 hours as needed for nausea       Final diagnoses:   Abnormal LFTs   Abdominal pain, left upper quadrant       11/29/2018   [facility], [place], EMERGENCY DEPARTMENT     King Forman MD  11/29/18 6513

## 2018-11-29 NOTE — ED NOTES
"Up at desk with , wanting to leave. IV still in. \"I will take it out myself\". MD aware. IV removed by ERT.   "

## 2018-11-30 ENCOUNTER — TELEPHONE (OUTPATIENT)
Dept: GASTROENTEROLOGY | Facility: CLINIC | Age: 58
End: 2018-11-30

## 2018-11-30 NOTE — TELEPHONE ENCOUNTER
Marissa is informed that she is scheduled for a clinic consult with Dr. Llamas on 12/3/18 @ 230 P.     A 11/30/18

## 2018-12-03 ENCOUNTER — PATIENT OUTREACH (OUTPATIENT)
Dept: CARE COORDINATION | Facility: CLINIC | Age: 58
End: 2018-12-03

## 2018-12-03 ENCOUNTER — OFFICE VISIT (OUTPATIENT)
Dept: GASTROENTEROLOGY | Facility: CLINIC | Age: 58
End: 2018-12-03
Payer: COMMERCIAL

## 2018-12-03 VITALS
DIASTOLIC BLOOD PRESSURE: 81 MMHG | TEMPERATURE: 98.3 F | HEIGHT: 62 IN | OXYGEN SATURATION: 97 % | HEART RATE: 82 BPM | WEIGHT: 181.1 LBS | BODY MASS INDEX: 33.33 KG/M2 | SYSTOLIC BLOOD PRESSURE: 142 MMHG

## 2018-12-03 DIAGNOSIS — K83.4 SPHINCTER OF ODDI DYSFUNCTION: Primary | ICD-10-CM

## 2018-12-03 ASSESSMENT — ENCOUNTER SYMPTOMS
NERVOUS/ANXIOUS: 0
ARTHRALGIAS: 0
TREMORS: 0
POLYPHAGIA: 0
BLOOD IN STOOL: 0
DIZZINESS: 1
BLOATING: 0
MUSCLE CRAMPS: 1
VOMITING: 0
NECK PAIN: 0
HALLUCINATIONS: 0
DIARRHEA: 0
BACK PAIN: 1
DECREASED APPETITE: 1
NIGHT SWEATS: 1
PANIC: 1
ABDOMINAL PAIN: 1
DECREASED CONCENTRATION: 0
STIFFNESS: 0
SEIZURES: 0
BOWEL INCONTINENCE: 0
JOINT SWELLING: 0
FATIGUE: 1
POLYDIPSIA: 0
DEPRESSION: 0
INCREASED ENERGY: 1
MEMORY LOSS: 0
SPEECH CHANGE: 0
HEADACHES: 1
DISTURBANCES IN COORDINATION: 0
JAUNDICE: 0
MYALGIAS: 0
MUSCLE WEAKNESS: 1
CHILLS: 1
HEARTBURN: 0
WEIGHT GAIN: 0
WEAKNESS: 1
LOSS OF CONSCIOUSNESS: 0
NAUSEA: 1
CONSTIPATION: 0
NUMBNESS: 0
WEIGHT LOSS: 1
PARALYSIS: 0
RECTAL PAIN: 0
TINGLING: 0
FEVER: 0
ALTERED TEMPERATURE REGULATION: 0

## 2018-12-03 ASSESSMENT — PAIN SCALES - GENERAL: PAINLEVEL: MILD PAIN (2)

## 2018-12-03 NOTE — LETTER
12/3/2018      RE: Marissa Youssef  [address]  [place] [address]       59 yo referred by Dr Wolfgang Raygoza of [facility]. His full clinic note cut and pasted below.   [place] [facility] [place] Sphincter of Oddi dysfunction, biliary and pancreatic, responded to dual sphincterotomies and repeat dilation then. Developed intractable diarrhea over last 6 months, diagnosed w microscopic colitis w Dr Raygoza. Coulndt afford mesalamine, took sulfasalazine instead, responded excellently. Diarrhea resolved. About 2-3 weeks ago, developed severe left upper abdominal pain, similar to years ago, much worse w any type of food, previously liquids. Now OK if fasting. Several ED evaluations. AST normal May 2018, elevated to peak of 101 on 11/20 w pain. Other lfts, lipase normal. CT w pneumobilia cw patent sphincterotomy. No BM for 2 weeks. EGD negative on Saturday. Meds as listed, off azulfidine for 3 days, still on ASA, no PPI  PEX: mild LUQ tenderness, otherwise abd negative.  IMP: 30 mins more than 50% counseling. Unclear source of pain, not likely related to biliary tract w LUQ pain, mild AST only. More likely fatty liver. Does not drink ETOH per patient. Pain may be related to azulfidine, constipation, less likely sphincter restenosis.   REC: Stay of azulfidine, hydrate w liquids, if needs ED go to [facility] not [facility] as likely better attention, hydration there.   Start PPI at home. Check LFTs at [facility] in two weeks. If not improved, will schedule EUS/possible ERCP here at [facility] in new year. Will make sure Dr Raygoza gets communication.      Wolfgang Raygoza MD - 11/20/2018 9:45 AM CST  Formatting of this note may be different from the original.  SUBJECTIVE:    Marissa Youssef is a 58 y.o. female who presents for microscopic colitis    HPI  Patient's had excellent response to sulfasalazine for symptoms of microscopic colitis. Sulfasalazine was chosen on the basis of cost and insurance issues for  this patient. She is taking a milligram of folic acid each day. She is asymptomatic. Patient's been found to have mildly elevated LFTs most recently aspartate aminotransferase at 39 hepatitis C serology is negative recent CT imaging of the liver was unremarkable  Allergies   Allergen Reactions     Dolasetron Mesylate Shortness Of Breath     Hydromorphone Hcl Shortness Of Breath     Meperidine Hcl Shortness Of Breath   ,   Current Outpatient Prescriptions on File Prior to Visit   Medication Sig Dispense Refill     acetaminophen-codeine, 300-30 mg, (TYLENOL-CODEINE #3) tablet Take 1 tablet by mouth 2 times daily if needed. Max acetaminophen dose: 4000mg in 24 hrs. 60 tablet 0     ALPRAZolam (XANAX) 0.5 mg tablet Take one tablet at bedtime 5     aspirin chewable 81 mg chewable tablet Take 1 tablet by mouth once daily with a meal. 0     diltiazem (CARDIZEM) 120 mg tablet 1 tablet daily 90 tablet 3     DULoxetine (CYMBALTA) 60 mg Delayed-release capsule Take 1 capsule by mouth once daily. 5     folic acid 1 mg tablet Take 1 tablet by mouth once daily. 30 tablet 3     gabapentin (NEURONTIN) 300 mg capsule Take 1 capsule by mouth 3 times daily. 270 capsule 1     ibuprofen (ADVIL; MOTRIN) 100 mg tablet Take 200 mg by mouth 4 times daily if needed.     isosorbide mononitrate (IMDUR) 30 mg extended release tablet 24 Hour Take 1 tablet by mouth once daily. 90 tablet 3     multivitamin (MULTIPLE VITAMINS) tablet Take 1 tablet by mouth once daily.     nitroglycerin (NITROSTAT) 0.4 mg sublingual tablet Place 1 tablet under the tongue every 5 minutes if needed for Chest Pain. 1 Bottle 0     simvastatin (ZOCOR) 20 mg tablet Take 1 tablet by mouth at bedtime. 90 tablet 3     sulfaSALAzine (AZULFIDINE) 500 mg tablet Take 2 tablets by mouth 3 times daily. Rx changed to 2 tablet TID per Dr Raygoza. 540 tablet 3     No current facility-administered medications on file prior to visit.   and   Past Medical History:   Diagnosis Date      "Bladder spasms     Borderline personality disorder (HC)     Chest pain 6/15/2018   Overview:   Left ventricular systolic function is normal.   Left ventricular estimated EF is 55-60%.   Left ventricular diastolic function: Normal.   Left atrium is normal by linear dimension. Left atrial cavity size is normal.   Right ventricular systolic function is normal.   Pulmonary artery pressure could not be obtained.   No prior study available Last Assessment & Plan: Pain has been with recurrent chest pain. CE are negative. EKG without ischemia. Echo noted with normal Ef. For definitive diagnosis, will plan [facility] today to evaluate coronary anatomy. Risk vs benefits explained, she consents. Will follow.     Collagenous colitis 9/28/2018   Last colonoscopy 8/6/2018     Controlled substance agreement signed 4/30/2018   Controlled substance agreement signed on 4-30-18 in the pain clinic with Dr. Ascencion Ramirez MD, PhD Pain Management and Rehabilitation Specilialist of the [facility] pain clinic. See scanned images for further details. Oneida Vilchis .................... 5/10/2018 3:38 PM     Depression     GERD (gastroesophageal reflux disease)     Hyperlipidemia     Hypertension     Miscarriage 1985     Mixed, or nondependent drug abuse 4/23/2007   Overview: Laxative Abuse     Mixed, or nondependent drug abuse, in remission (HC) 4/11/2007   Overview: LW Modifier: Tylenol 3, Ambien ; Substance Abuse Remission     Pre-diabetes 10/6/2016   10/16 - 6.3     PTSD (post-traumatic stress disorder)     Tobacco use disorder 10/05/2016     Transfusion history 1987     REVIEW OF SYSTEMS:  ROS    OBJECTIVE:  /84 (Cuff Site: Right Arm, Position: Sitting, Cuff Size: Adult Large)  Pulse 84  Temp 97.7  F (36.5  C) (Oral)  Ht 1.575 m (5' 2\")  Wt 83.5 kg (184 lb)  BMI 33.65 kg/m      EXAM:   Physical Exam    ASSESSMENT/PLAN:  ICD-10-CM   1. Abnormal LFTs R94.5        Micheal Llamas MD      "

## 2018-12-03 NOTE — PATIENT INSTRUCTIONS
1. LFT's checked locally with Dr. Raygoza     2. Start omeprazole, which you already have at home.     3. Check in via [employer] or phone to let us know how you are doing.     Follow up: as needed     Please call with any questions or concerns regarding your clinic visit today.    It is a pleasure being involved in your health care.    Contacts post-consultation depending on your need:    Schedule Clinic Appointments          [phone] # 1   M-F 7:30 - 5 pm    Yaneth MEIER RN Care Coordinator         [phone]  Chiara Lowe LPN            [phone]       OR Procedure Scheduling                [phone]    My Chart is available 24 hours a day and is a secure way to access your records and communicate with your care team.  I strongly recommend signing up if you haven't already done so, if you are comfortable with computers.  If you would like to inquire about this or are having problems with My Chart access, you may call [phone] or go online at [email or web address].  Please allow at least 24 hours for a response and extra time on weekends and Holidays.

## 2018-12-03 NOTE — LETTER
12/3/2018       RE: Marissa Youssef  [address]  [place] [address]     Dear Colleague,    Thank you for referring your patient, Marissa Youssef, to the [facility]CREAS AND BILIARY at [facility]. Please see a copy of my visit note below.    59 yo referred by Dr Wolfgang Raygoza of [facility]. His full clinic note cut and pasted below.   Pt known to me from 12 years ago at [facility] [place]. Sphincter of Oddi dysfunction, biliary and pancreatic, responded to dual sphincterotomies and repeat dilation then. Developed intractable diarrhea over last 6 months, diagnosed w microscopic colitis w Dr Raygoza. Coulndt afford mesalamine, took sulfasalazine instead, responded excellently. Diarrhea resolved. About 2-3 weeks ago, developed severe left upper abdominal pain, similar to years ago, much worse w any type of food, previously liquids. Now OK if fasting. Several ED evaluations. AST normal May 2018, elevated to peak of 101 on 11/20 w pain. Other lfts, lipase normal. CT w pneumobilia cw patent sphincterotomy. No BM for 2 weeks. EGD negative on Saturday. Meds as listed, off azulfidine for 3 days, still on ASA, no PPI  PEX: mild LUQ tenderness, otherwise abd negative.  IMP: 30 mins more than 50% counseling. Unclear source of pain, not likely related to biliary tract w LUQ pain, mild AST only. More likely fatty liver. Does not drink ETOH per patient. Pain may be related to azulfidine, constipation, less likely sphincter restenosis.   REC: Stay of azulfidine, hydrate w liquids, if needs ED go to [facility] not [facility] as likely better attention, hydration there.   Start PPI at home. Check LFTs at [facility] in two weeks. If not improved, will schedule EUS/possible ERCP here at [facility] in new year. Will make sure Dr Raygoza gets communication.      Wolfgang Raygoza MD - 11/20/2018 9:45 AM CST  Formatting of this note may be different from the original.  SUBJECTIVE:    Marissa Youssef is a 58 y.o.  female who presents for microscopic colitis    HPI  Patient's had excellent response to sulfasalazine for symptoms of microscopic colitis. Sulfasalazine was chosen on the basis of cost and insurance issues for this patient. She is taking a milligram of folic acid each day. She is asymptomatic. Patient's been found to have mildly elevated LFTs most recently aspartate aminotransferase at 39 hepatitis C serology is negative recent CT imaging of the liver was unremarkable  Allergies   Allergen Reactions     Dolasetron Mesylate Shortness Of Breath     Hydromorphone Hcl Shortness Of Breath     Meperidine Hcl Shortness Of Breath   ,   Current Outpatient Prescriptions on File Prior to Visit   Medication Sig Dispense Refill     acetaminophen-codeine, 300-30 mg, (TYLENOL-CODEINE #3) tablet Take 1 tablet by mouth 2 times daily if needed. Max acetaminophen dose: 4000mg in 24 hrs. 60 tablet 0     ALPRAZolam (XANAX) 0.5 mg tablet Take one tablet at bedtime 5     aspirin chewable 81 mg chewable tablet Take 1 tablet by mouth once daily with a meal. 0     diltiazem (CARDIZEM) 120 mg tablet 1 tablet daily 90 tablet 3     DULoxetine (CYMBALTA) 60 mg Delayed-release capsule Take 1 capsule by mouth once daily. 5     folic acid 1 mg tablet Take 1 tablet by mouth once daily. 30 tablet 3     gabapentin (NEURONTIN) 300 mg capsule Take 1 capsule by mouth 3 times daily. 270 capsule 1     ibuprofen (ADVIL; MOTRIN) 100 mg tablet Take 200 mg by mouth 4 times daily if needed.     isosorbide mononitrate (IMDUR) 30 mg extended release tablet 24 Hour Take 1 tablet by mouth once daily. 90 tablet 3     multivitamin (MULTIPLE VITAMINS) tablet Take 1 tablet by mouth once daily.     nitroglycerin (NITROSTAT) 0.4 mg sublingual tablet Place 1 tablet under the tongue every 5 minutes if needed for Chest Pain. 1 Bottle 0     simvastatin (ZOCOR) 20 mg tablet Take 1 tablet by mouth at bedtime. 90 tablet 3     sulfaSALAzine (AZULFIDINE) 500 mg tablet Take 2  tablets by mouth 3 times daily. Rx changed to 2 tablet TID per Dr Raygoza. 540 tablet 3     No current facility-administered medications on file prior to visit.   and   Past Medical History:   Diagnosis Date     Bladder spasms     Borderline personality disorder (HC)     Chest pain 6/15/2018   Overview:   Left ventricular systolic function is normal.   Left ventricular estimated EF is 55-60%.   Left ventricular diastolic function: Normal.   Left atrium is normal by linear dimension. Left atrial cavity size is normal.   Right ventricular systolic function is normal.   Pulmonary artery pressure could not be obtained.   No prior study available Last Assessment & Plan: Pain has been with recurrent chest pain. CE are negative. EKG without ischemia. Echo noted with normal Ef. For definitive diagnosis, will plan [facility] today to evaluate coronary anatomy. Risk vs benefits explained, she consents. Will follow.     Collagenous colitis 9/28/2018   Last colonoscopy 8/6/2018     Controlled substance agreement signed 4/30/2018   Controlled substance agreement signed on 4-30-18 in the pain clinic with Dr. Ascencion Ramirez MD, PhD Pain Management and Rehabilitation Specilialist of the [facility] pain clinic. See scanned images for further details. Oneida Vilchis .................... 5/10/2018 3:38 PM     Depression     GERD (gastroesophageal reflux disease)     Hyperlipidemia     Hypertension     Miscarriage 1985     Mixed, or nondependent drug abuse 4/23/2007   Overview: Laxative Abuse     Mixed, or nondependent drug abuse, in remission (HC) 4/11/2007   Overview: LW Modifier: Tylenol 3, Ambien ; Substance Abuse Remission     Pre-diabetes 10/6/2016   10/16 - 6.3     PTSD (post-traumatic stress disorder)     Tobacco use disorder 10/05/2016     Transfusion history 1987     REVIEW OF SYSTEMS:  ROS    OBJECTIVE:  /84 (Cuff Site: Right Arm, Position: Sitting, Cuff Size: Adult Large)  Pulse 84  Temp 97.7  F (36.5  C) (Oral)  Ht 1.575 m  "(5' 2\")  Wt 83.5 kg (184 lb)  BMI 33.65 kg/m      EXAM:   Physical Exam    ASSESSMENT/PLAN:  ICD-10-CM   1. Abnormal LFTs R94.5        Again, thank you for allowing me to participate in the care of your patient.      Sincerely,    Micheal Llamas MD    "

## 2018-12-03 NOTE — LETTER
12/3/2018       RE: Marissa Youssef  [address]  [place] [address]     Dear Colleague,    Thank you for referring your patient, Marissa Youssef, to the [facility] PANCREAS AND BILIARY at [facility]. Please see a copy of my visit note below.    57 yo referred by Dr Wolfgang Raygoza of [facility]. His full clinic note cut and pasted below.   [place] [facility] [place] Sphincter of Oddi dysfunction, biliary and pancreatic, responded to dual sphincterotomies and repeat dilation then. Developed intractable diarrhea over last 6 months, diagnosed w microscopic colitis w Dr Raygoza. Coulndt afford mesalamine, took sulfasalazine instead, responded excellently. Diarrhea resolved. About 2-3 weeks ago, developed severe left upper abdominal pain, similar to years ago, much worse w any type of food, previously liquids. Now OK if fasting. Several ED evaluations. AST normal May 2018, elevated to peak of 101 on 11/20 w pain. Other lfts, lipase normal. CT w pneumobilia cw patent sphincterotomy. No BM for 2 weeks. EGD negative on Saturday. Meds as listed, off azulfidine for 3 days, still on ASA, no PPI  PEX: mild LUQ tenderness, otherwise abd negative.  IMP: 30 mins more than 50% counseling. Unclear source of pain, not likely related to biliary tract w LUQ pain, mild AST only. More likely fatty liver. Does not drink ETOH per patient. Pain may be related to azulfidine, constipation, less likely sphincter restenosis.   REC: Stay of azulfidine, hydrate w liquids, if needs ED go to [facility] not [facility] as likely better attention, hydration there.   Start PPI at home. Check LFTs at ST in two weeks. If not improved, will schedule EUS/possible ERCP here at [facility] in new year. Will make sure Dr Raygoza gets communication.      Wolfgang Raygoza MD - 11/20/2018 9:45 AM CST  Formatting of this note may be different from the original.  SUBJECTIVE:    Marissa Youssef is a 58 y.o.  female who presents for microscopic colitis    HPI  Patient's had excellent response to sulfasalazine for symptoms of microscopic colitis. Sulfasalazine was chosen on the basis of cost and insurance issues for this patient. She is taking a milligram of folic acid each day. She is asymptomatic. Patient's been found to have mildly elevated LFTs most recently aspartate aminotransferase at 39 hepatitis C serology is negative recent CT imaging of the liver was unremarkable  Allergies   Allergen Reactions     Dolasetron Mesylate Shortness Of Breath     Hydromorphone Hcl Shortness Of Breath     Meperidine Hcl Shortness Of Breath   ,   Current Outpatient Prescriptions on File Prior to Visit   Medication Sig Dispense Refill     acetaminophen-codeine, 300-30 mg, (TYLENOL-CODEINE #3) tablet Take 1 tablet by mouth 2 times daily if needed. Max acetaminophen dose: 4000mg in 24 hrs. 60 tablet 0     ALPRAZolam (XANAX) 0.5 mg tablet Take one tablet at bedtime 5     aspirin chewable 81 mg chewable tablet Take 1 tablet by mouth once daily with a meal. 0     diltiazem (CARDIZEM) 120 mg tablet 1 tablet daily 90 tablet 3     DULoxetine (CYMBALTA) 60 mg Delayed-release capsule Take 1 capsule by mouth once daily. 5     folic acid 1 mg tablet Take 1 tablet by mouth once daily. 30 tablet 3     gabapentin (NEURONTIN) 300 mg capsule Take 1 capsule by mouth 3 times daily. 270 capsule 1     ibuprofen (ADVIL; MOTRIN) 100 mg tablet Take 200 mg by mouth 4 times daily if needed.     isosorbide mononitrate (IMDUR) 30 mg extended release tablet 24 Hour Take 1 tablet by mouth once daily. 90 tablet 3     multivitamin (MULTIPLE VITAMINS) tablet Take 1 tablet by mouth once daily.     nitroglycerin (NITROSTAT) 0.4 mg sublingual tablet Place 1 tablet under the tongue every 5 minutes if needed for Chest Pain. 1 Bottle 0     simvastatin (ZOCOR) 20 mg tablet Take 1 tablet by mouth at bedtime. 90 tablet 3     sulfaSALAzine (AZULFIDINE) 500 mg tablet Take 2  tablets by mouth 3 times daily. Rx changed to 2 tablet TID per Dr Raygoza. 540 tablet 3     No current facility-administered medications on file prior to visit.   and   Past Medical History:   Diagnosis Date     Bladder spasms     Borderline personality disorder (HC)     Chest pain 6/15/2018   Overview:   Left ventricular systolic function is normal.   Left ventricular estimated EF is 55-60%.   Left ventricular diastolic function: Normal.   Left atrium is normal by linear dimension. Left atrial cavity size is normal.   Right ventricular systolic function is normal.   Pulmonary artery pressure could not be obtained.   No prior study available Last Assessment & Plan: Pain has been with recurrent chest pain. CE are negative. EKG without ischemia. Echo noted with normal Ef. For definitive diagnosis, will plan [facility] today to evaluate coronary anatomy. Risk vs benefits explained, she consents. Will follow.     Collagenous colitis 9/28/2018   Last colonoscopy 8/6/2018     Controlled substance agreement signed 4/30/2018   Controlled substance agreement signed on 4-30-18 in the pain clinic with Dr. Ascencion Ramirez MD, PhD Pain Management and Rehabilitation Specilialist of the [facility] pain clinic. See scanned images for further details. Oneida Vilchis .................... 5/10/2018 3:38 PM     Depression     GERD (gastroesophageal reflux disease)     Hyperlipidemia     Hypertension     Miscarriage 1985     Mixed, or nondependent drug abuse 4/23/2007   Overview: Laxative Abuse     Mixed, or nondependent drug abuse, in remission (HC) 4/11/2007   Overview: LW Modifier: Tylenol 3, Ambien ; Substance Abuse Remission     Pre-diabetes 10/6/2016   10/16 - 6.3     PTSD (post-traumatic stress disorder)     Tobacco use disorder 10/05/2016     Transfusion history 1987     REVIEW OF SYSTEMS:  ROS    OBJECTIVE:  /84 (Cuff Site: Right Arm, Position: Sitting, Cuff Size: Adult Large)  Pulse 84  Temp 97.7  F (36.5  C) (Oral)  Ht 1.575 m  "(5' 2\")  Wt 83.5 kg (184 lb)  BMI 33.65 kg/m      EXAM:   Physical Exam    ASSESSMENT/PLAN:  ICD-10-CM   1. Abnormal LFTs R94.5        Again, thank you for allowing me to participate in the care of your patient.      Sincerely,    Micheal Llamas MD    "

## 2018-12-03 NOTE — LETTER
12/3/2018       RE: Marissa Youssef  [address]  [place] [address]     Dear Colleague,    Thank you for referring your patient, Marissa Youssef, to the [facility] PANCREAS AND BILIARY at [facility]. Please see a copy of my visit note below.    59 yo referred by Dr Wolfgang Raygoza of [facility]. His full clinic note cut and pasted below.   Pt known to me from 12 [place] ago at [facility] Sphincter of Oddi dysfunction, biliary and pancreatic, responded to dual sphincterotomies and repeat dilation then. Developed intractable diarrhea over last 6 months, diagnosed w microscopic colitis w Dr Raygoza. Coulndt afford mesalamine, took sulfasalazine instead, responded excellently. Diarrhea resolved. About 2-3 weeks ago, developed severe left upper abdominal pain, similar to years ago, much worse w any type of food, previously liquids. Now OK if fasting. Several ED evaluations. AST normal May 2018, elevated to peak of 101 on 11/20 w pain. Other lfts, lipase normal. CT w pneumobilia cw patent sphincterotomy. No BM for 2 weeks. EGD negative on Saturday. Meds as listed, off azulfidine for 3 days, still on ASA, no PPI  PEX: mild LUQ tenderness, otherwise abd negative.  IMP: 30 mins more than 50% counseling. Unclear source of pain, not likely related to biliary tract w LUQ pain, mild AST only. More likely fatty liver. Does not drink ETOH per patient. Pain may be related to azulfidine, constipation, less likely sphincter restenosis.   REC: Stay of azulfidine, hydrate w liquids, if needs ED go to [facility] not [facility] as likely better attention, hydration there.   Start PPI at home. Check LFTs at [facility] in two weeks. If not improved, will schedule EUS/possible ERCP here at [facility] in new year. Will make sure Dr Raygoza gets communication.      Wolfgang Raygoza MD - 11/20/2018 9:45 AM CST  Formatting of this note may be different from the original.  SUBJECTIVE:    Marissa Youssef is a 58 y.o.  female who presents for microscopic colitis    HPI  Patient's had excellent response to sulfasalazine for symptoms of microscopic colitis. Sulfasalazine was chosen on the basis of cost and insurance issues for this patient. She is taking a milligram of folic acid each day. She is asymptomatic. Patient's been found to have mildly elevated LFTs most recently aspartate aminotransferase at 39 hepatitis C serology is negative recent CT imaging of the liver was unremarkable  Allergies   Allergen Reactions     Dolasetron Mesylate Shortness Of Breath     Hydromorphone Hcl Shortness Of Breath     Meperidine Hcl Shortness Of Breath   ,   Current Outpatient Prescriptions on File Prior to Visit   Medication Sig Dispense Refill     acetaminophen-codeine, 300-30 mg, (TYLENOL-CODEINE #3) tablet Take 1 tablet by mouth 2 times daily if needed. Max acetaminophen dose: 4000mg in 24 hrs. 60 tablet 0     ALPRAZolam (XANAX) 0.5 mg tablet Take one tablet at bedtime 5     aspirin chewable 81 mg chewable tablet Take 1 tablet by mouth once daily with a meal. 0     diltiazem (CARDIZEM) 120 mg tablet 1 tablet daily 90 tablet 3     DULoxetine (CYMBALTA) 60 mg Delayed-release capsule Take 1 capsule by mouth once daily. 5     folic acid 1 mg tablet Take 1 tablet by mouth once daily. 30 tablet 3     gabapentin (NEURONTIN) 300 mg capsule Take 1 capsule by mouth 3 times daily. 270 capsule 1     ibuprofen (ADVIL; MOTRIN) 100 mg tablet Take 200 mg by mouth 4 times daily if needed.     isosorbide mononitrate (IMDUR) 30 mg extended release tablet 24 Hour Take 1 tablet by mouth once daily. 90 tablet 3     multivitamin (MULTIPLE VITAMINS) tablet Take 1 tablet by mouth once daily.     nitroglycerin (NITROSTAT) 0.4 mg sublingual tablet Place 1 tablet under the tongue every 5 minutes if needed for Chest Pain. 1 Bottle 0     simvastatin (ZOCOR) 20 mg tablet Take 1 tablet by mouth at bedtime. 90 tablet 3     sulfaSALAzine (AZULFIDINE) 500 mg tablet Take 2  tablets by mouth 3 times daily. Rx changed to 2 tablet TID per Dr Raygoza. 540 tablet 3     No current facility-administered medications on file prior to visit.   and   Past Medical History:   Diagnosis Date     Bladder spasms     Borderline personality disorder (HC)     Chest pain 6/15/2018   Overview:   Left ventricular systolic function is normal.   Left ventricular estimated EF is 55-60%.   Left ventricular diastolic function: Normal.   Left atrium is normal by linear dimension. Left atrial cavity size is normal.   Right ventricular systolic function is normal.   Pulmonary artery pressure could not be obtained.   No prior study available Last Assessment & Plan: Pain has been with recurrent chest pain. CE are negative. EKG without ischemia. Echo noted with normal Ef. For definitive diagnosis, will plan [facility] today to evaluate coronary anatomy. Risk vs benefits explained, she consents. Will follow.     Collagenous colitis 9/28/2018   Last colonoscopy 8/6/2018     Controlled substance agreement signed 4/30/2018   Controlled substance agreement signed on 4-30-18 in the pain clinic with Dr. Ascencion Ramirez MD, PhD Pain Management and Rehabilitation Specilialist of the [facility] pain clinic. See scanned images for further details. Oneida Vilchis .................... 5/10/2018 3:38 PM     Depression     GERD (gastroesophageal reflux disease)     Hyperlipidemia     Hypertension     Miscarriage 1985     Mixed, or nondependent drug abuse 4/23/2007   Overview: Laxative Abuse     Mixed, or nondependent drug abuse, in remission (HC) 4/11/2007   Overview: LW Modifier: Tylenol 3, Ambien ; Substance Abuse Remission     Pre-diabetes 10/6/2016   10/16 - 6.3     PTSD (post-traumatic stress disorder)     Tobacco use disorder 10/05/2016     Transfusion history 1987     REVIEW OF SYSTEMS:  ROS    OBJECTIVE:  /84 (Cuff Site: Right Arm, Position: Sitting, Cuff Size: Adult Large)  Pulse 84  Temp 97.7  F (36.5  C) (Oral)  Ht 1.575 m  "(5' 2\")  Wt 83.5 kg (184 lb)  BMI 33.65 kg/m      EXAM:   Physical Exam    ASSESSMENT/PLAN:  ICD-10-CM   1. Abnormal LFTs R94.5        Again, thank you for allowing me to participate in the care of your patient.      Sincerely,    Micheal Llamas MD      "

## 2018-12-03 NOTE — NURSING NOTE
"Chief Complaint   Patient presents with     Consult     NEW        Vitals:    12/03/18 1405   BP: 142/81   Pulse: 82   Temp: 98.3  F (36.8  C)   TempSrc: Oral   SpO2: 97%   Weight: 181 lb 1.6 oz   Height: 5' 2\"       Body mass index is 33.12 kg/(m^2).      Skinny Blanc on 12/3/2018 at 2:09 PM                   ]    "

## 2018-12-03 NOTE — MR AVS SNAPSHOT
After Visit Summary   12/3/2018    Marissa Youssef    MRN: [id number]           Patient Information     Date Of Birth          1960        Visit Information        Provider Department      12/3/2018 2:30 PM Micheal Llamas MD [facility] Pancreas and Biliary        Today's Diagnoses     Sphincter of Oddi dysfunction    -  1      Care Instructions    1. LFT's checked locally with Dr. Raygoza     2. Start omeprazole, which you already have at home.     3. Check in via Tanslerhart or phone to let us know how you are doing.     Follow up: as needed     Please call with any questions or concerns regarding your clinic visit today.    It is a pleasure being involved in your health care.    Contacts post-consultation depending on your need:    Schedule Clinic Appointments          [phone] # 1   M-F 7:30 - 5 pm    Yaneth MEIER RN Care Coordinator         [phone]  Chiara Lowe LPN            [phone]       OR Procedure Scheduling                [phone]    My Chart is available 24 hours a day and is a secure way to access your records and communicate with your care team.  I strongly recommend signing up if you haven't already done so, if you are comfortable with computers.  If you would like to inquire about this or are having problems with My Chart access, you may call [phone] or go online at [email or web address].  Please allow at least 24 hours for a response and extra time on weekends and Holidays.               Follow-ups after your visit        Your next 10 appointments already scheduled     Jan 03, 2019 10:30 AM CST   (Arrive by 10:15 AM)   NEW FEMALE PELVIC with Elana See Laron Ma MD   [facility] and [facility] for Prostate and Urologic Cancers ([facility])    [address]  [address] [place] [address] [phone]              Future tests that were ordered for you today     Open Future Orders        Priority Expected  "Expires Ordered    Hepatic panel Routine  12/3/2019 12/3/2018            Who to contact     Please call your clinic at [phone] to:    Ask questions about your health    Make or cancel appointments    Discuss your medicines    Learn about your test results    Speak to your doctor            Additional Information About Your Visit        MyChart Information     [employer] gives you secure access to your electronic health record. If you see a primary care provider, you can also send messages to your care team and make appointments. If you have questions, please call your primary care clinic.  If you do not have a primary care provider, please call [phone] and they will assist you.      [employer] is an electronic gateway that provides easy, online access to your medical records. With [employer], you can request a clinic appointment, read your test results, renew a prescription or communicate with your care team.     To access your existing account, please contact your [facility] or call [phone] for assistance.        Care EveryWhere ID     This is your Care EveryWhere ID. This could be used by other organizations to access your [facility] medical records  [id number]        Your Vitals Were     Pulse Temperature Height Pulse Oximetry BMI (Body Mass Index)       82 98.3  F (36.8  C) (Oral) 1.575 m (5' 2\") 97% 33.12 kg/m2        Blood Pressure from Last 3 Encounters:   12/03/18 142/81   11/29/18 145/87   10/29/18 145/66    Weight from Last 3 Encounters:   12/03/18 82.1 kg (181 lb 1.6 oz)   11/29/18 84.4 kg (186 lb)   11/27/18 85.3 kg (188 lb)               Primary Care Provider Office Phone # Fax #    Philomena Altamirano [phone]       [address] [address]        Equal Access to Services     YONI LIZARRAGA AH: [place], earline manzano, angy mueller. So " [facility] [phone].    ATENCIÓN: Si agn gordillo, tiene a winkler disposición servicios gratuitos de asistencia lingüística. Israel bustillo [phone].    We comply with applicable federal civil rights laws and [place] laws. We do not discriminate on the basis of race, color, national origin, age, disability, sex, sexual orientation, or gender identity.            Thank you!     Thank you for choosing [facility] PANCREAS AND BILIARY  for your care. Our goal is always to provide you with excellent care. Hearing back from our patients is one way we can continue to improve our services. Please take a few minutes to complete the written survey that you may receive in the mail after your visit with us. Thank you!             Your Updated Medication List - Protect others around you: Learn how to safely use, store and throw away your medicines at www.disposemymeds.org.          This list is accurate as of 12/3/18  4:30 PM.  Always use your most recent med list.                   Brand Name Dispense Instructions for use Diagnosis    acetaminophen-codeine 300-30 MG tablet    TYLENOL #3     Take 1 tablet by mouth every 6 hours as needed for severe pain        ALPRAZOLAM PO      Take 0.5 mg by mouth At Bedtime        ASPIRIN PO      Take 81 mg by mouth daily        CALCIUM + D PO           CALCIUM 1200 PO      Take 600 capsules by mouth        DILTIAZEM HCL PO      Take 120 mg by mouth daily        DULOXETINE HCL PO      Take 60 mg by mouth daily        FOLIC ACID PO      Take 1 mg by mouth daily        GABAPENTIN PO      Take 300 mg by mouth 3 times daily        ISOSORBIDE MONONITRATE PO      Take 30 mg by mouth daily        multivitamin, therapeutic Tabs tablet      Take 1 tablet by mouth daily        prochlorperazine 25 MG suppository    COMPAZINE    5 suppository    Place 1 suppository (25 mg) rectally every 12 hours as needed for nausea        SIMVASTATIN PO      Take 20 mg by mouth At Bedtime        sulfaSALAzine 500 MG tablet     AZULFIDINE     Take 1,000 mg by mouth 3 times daily

## 2018-12-07 ENCOUNTER — TRANSFERRED RECORDS (OUTPATIENT)
Dept: HEALTH INFORMATION MANAGEMENT | Facility: CLINIC | Age: 58
End: 2018-12-07

## 2018-12-07 ENCOUNTER — TELEPHONE (OUTPATIENT)
Dept: GASTROENTEROLOGY | Facility: CLINIC | Age: 58
End: 2018-12-07

## 2018-12-07 DIAGNOSIS — K83.4 SPHINCTER OF ODDI DYSFUNCTION: Primary | ICD-10-CM

## 2018-12-07 NOTE — TELEPHONE ENCOUNTER
"Patient reports upper abdominal pain that radiates into her back, rated 5-6/10. Constant nausea. 5 stools, and the last 3 have been clear and oily. Has a bad headache and is taking ibuprofen. All of these symptoms started at 0300 today.    She is unable to eat much and reports she is \"drinking a sip here and there but I think I am staying hydrated\".  Started taking omeprazole since clinic visit on Monday.   Denies fever and vomiting.     Advised I would get this message to Dr. Llamas and let her know his recommendation.     LIZZ Landa Dr., Dr. Up, & Dr. Dotson  [facility]  [phone]    "

## 2018-12-07 NOTE — TELEPHONE ENCOUNTER
Per Dr. Llamas:   We had instructed her to go to [facility] if needs hospitalization rather than come down here, as all supportive care. Recheck LFTs in [facility]. Hydrate.      Called patient and advised her of above. Order placed and faxed to PCP.   Gave clinic number for further questions/concerns.     LIZZ Landa Dr., Dr. Up, & Dr. Dotson  [facility]  [phone]

## 2018-12-11 ENCOUNTER — CARE COORDINATION (OUTPATIENT)
Dept: GASTROENTEROLOGY | Facility: CLINIC | Age: 58
End: 2018-12-11

## 2018-12-11 NOTE — PROGRESS NOTES
Received lab results from [facility] and scanned into Epic.     LIZZ Landa Dr., Dr. Up, & Dr. Dotson  [facility]  [phone]

## 2018-12-18 ENCOUNTER — PRE VISIT (OUTPATIENT)
Dept: UROLOGY | Facility: CLINIC | Age: 58
End: 2018-12-18

## 2018-12-18 NOTE — TELEPHONE ENCOUNTER
MEDICAL RECORDS REQUEST   [facility]  Urology Clinic  [address] [place] [address]  PHONE: [phone]  Fax: [phone]        FUTURE VISIT INFORMATION                                                   Marissa Youssef, : 1960 scheduled for future visit at [facility]    APPOINTMENT INFORMATION:    Date: 2019    Provider:  SHAWN CARMICHAEL    Reason for Visit/Diagnosis: PELVIC PAIN    REFERRAL INFORMATION:    Referring provider:  EDDY PHIPPS    Specialty: MD    Referring providers clinic:  [facility]    Clinic contact number:[phone]      RECORDS REQUESTED FOR VISIT                                                     NOTES  STATUS/DETAILS   OFFICE NOTE from referring provider  yes   OFFICE NOTE from other specialist  yes   DISCHARGE SUMMARY from hospital  no   DISCHARGE REPORT from the ER  no   OPERATIVE REPORT  no   MEDICATION LIST  yes       PRE-VISIT CHECKLIST      Record collection complete Yes   Appointment appropriately scheduled           (right time/right provider) Yes   MyChart activation Yes   Questionnaire complete If no, please explain IN PROCESS     Completed by: Shawn Miller

## 2018-12-19 ENCOUNTER — TELEPHONE (OUTPATIENT)
Dept: GASTROENTEROLOGY | Facility: CLINIC | Age: 58
End: 2018-12-19

## 2018-12-19 NOTE — TELEPHONE ENCOUNTER
[facility] Call Center    Phone Message    May a detailed message be left on voicemail: yes    Reason for Call: Other: Patient called wanting to schedule a follow appointment or procedure with Dr. Llamas. Patient stated she is still feeling nauseous and still experiencing the pain after her appointment on 12/3/18. Patient stated that Dr. Llamas had mentioned possibly doing a procedure. Patient wondering if Dr. Llamas would want to do that now or if he would like to see her back in clinic.       Action Taken: Message routed to:  [facility] ([facility]): Guera-Luis

## 2018-12-21 NOTE — TELEPHONE ENCOUNTER
Returned call to Marissa and she states she is still having pain after eating and nausea all the time and she feels really run down.     Pain is mostly with eating and drinking. Yesterday she had an orange, a popicle and hamburger hotdish. She states the pain is the same no matter what she eats. She has been drinking mostly water but she has pain when she drinks and then she has nausea this is intermittent.      Had labs done locally   AST was the only abnormal lab 42.     Advised will send message to Dr. Llamas for review and get back to her.     Yaneth MEIER RN Care Coordinator  Dr. Llamas, Dr. Up & Dr. Dotson   [facility]  [phone]

## 2018-12-26 ENCOUNTER — PRE VISIT (OUTPATIENT)
Dept: UROLOGY | Facility: CLINIC | Age: 58
End: 2018-12-26

## 2018-12-26 NOTE — PROGRESS NOTES
Reason for visit: pelvic pain consult     Relevant information: history of pelvic injury    Records/imaging/labs: referral note available    Pt called: no need for a call    Rooming: dip/pvr

## 2019-01-03 ENCOUNTER — OFFICE VISIT (OUTPATIENT)
Dept: UROLOGY | Facility: CLINIC | Age: 59
End: 2019-01-03
Payer: COMMERCIAL

## 2019-01-03 VITALS
HEIGHT: 62 IN | HEART RATE: 79 BPM | SYSTOLIC BLOOD PRESSURE: 147 MMHG | WEIGHT: 165 LBS | DIASTOLIC BLOOD PRESSURE: 71 MMHG | BODY MASS INDEX: 30.36 KG/M2

## 2019-01-03 DIAGNOSIS — M79.18 MYALGIA OF PELVIC FLOOR: ICD-10-CM

## 2019-01-03 DIAGNOSIS — M53.3 SI (SACROILIAC) JOINT DYSFUNCTION: ICD-10-CM

## 2019-01-03 DIAGNOSIS — N94.9 PAIN OF FEMALE SYMPHYSIS PUBIS: ICD-10-CM

## 2019-01-03 DIAGNOSIS — M62.89 PELVIC FLOOR DYSFUNCTION: ICD-10-CM

## 2019-01-03 DIAGNOSIS — R10.2 PELVIC PAIN IN FEMALE: Primary | ICD-10-CM

## 2019-01-03 LAB
APPEARANCE UR: CLEAR
BILIRUB UR QL: NORMAL
COLOR UR: YELLOW
GLUCOSE URINE: NORMAL MG/DL
HGB UR QL: NORMAL
KETONES UR QL: NORMAL MG/DL
LEUKOCYTE ESTERASE URINE: NORMAL
NITRITE UR QL STRIP: NORMAL
PH UR STRIP: 8 PH (ref 5–7)
PROTEIN ALBUMIN URINE: NORMAL MG/DL
SOURCE: NORMAL
SP GR UR STRIP: 1.02 (ref 1–1.03)
UROBILINOGEN UR QL STRIP: 0.2 EU/DL (ref 0.2–1)

## 2019-01-03 ASSESSMENT — ENCOUNTER SYMPTOMS
NAIL CHANGES: 0
NUMBNESS: 1
BACK PAIN: 1
DECREASED CONCENTRATION: 1
SEIZURES: 0
MUSCLE WEAKNESS: 1
TREMORS: 1
SKIN CHANGES: 0
JAUNDICE: 0
CHILLS: 0
MYALGIAS: 1
HALLUCINATIONS: 1
PARALYSIS: 0
INCREASED ENERGY: 1
STIFFNESS: 1
RECTAL PAIN: 0
CONSTIPATION: 0
ABDOMINAL PAIN: 1
DISTURBANCES IN COORDINATION: 0
ALTERED TEMPERATURE REGULATION: 1
NIGHT SWEATS: 0
POOR WOUND HEALING: 1
WEIGHT GAIN: 0
JOINT SWELLING: 1
WEAKNESS: 1
DECREASED APPETITE: 1
INSOMNIA: 0
PANIC: 0
HEARTBURN: 0
FEVER: 0
DIZZINESS: 1
BLOOD IN STOOL: 0
DIARRHEA: 0
NERVOUS/ANXIOUS: 1
SPEECH CHANGE: 0
ARTHRALGIAS: 1
WEIGHT LOSS: 1
MUSCLE CRAMPS: 1
NAUSEA: 1
NECK PAIN: 1
HEADACHES: 1
POLYPHAGIA: 0
BLOATING: 0
LOSS OF CONSCIOUSNESS: 0
SWOLLEN GLANDS: 0
MEMORY LOSS: 0
TINGLING: 1
POLYDIPSIA: 0
BRUISES/BLEEDS EASILY: 1
FATIGUE: 1
BOWEL INCONTINENCE: 0

## 2019-01-03 ASSESSMENT — MIFFLIN-ST. JEOR: SCORE: 1276.69

## 2019-01-03 ASSESSMENT — PAIN SCALES - GENERAL: PAINLEVEL: MILD PAIN (3)

## 2019-01-03 NOTE — NURSING NOTE
"Chief Complaint   Patient presents with     Consult     Pelvic pain consult. History of pelvic bone fracture       Blood pressure 147/71, pulse 79, height 1.575 m (5' 2\"), weight 74.8 kg (165 lb), not currently breastfeeding. Body mass index is 30.18 kg/m .    There is no problem list on file for this patient.      Allergies   Allergen Reactions     Anzemet [Dolasetron] Difficulty breathing     Demerol [Meperidine] Difficulty breathing     Dilaudid [Hydromorphone] Difficulty breathing       Current Outpatient Medications   Medication Sig Dispense Refill     acetaminophen-codeine (TYLENOL #3) 300-30 MG per tablet Take 1 tablet by mouth every 6 hours as needed for severe pain       ALPRAZOLAM PO Take 0.5 mg by mouth At Bedtime       ASPIRIN PO Take 81 mg by mouth daily       Calcium Carbonate-Vit D-Min (CALCIUM 1200 PO) Take 600 capsules by mouth       DULOXETINE HCL PO Take 60 mg by mouth daily       FOLIC ACID PO Take 1 mg by mouth daily       multivitamin, therapeutic (THERA-VIT) TABS tablet Take 1 tablet by mouth daily       prochlorperazine (COMPAZINE) 25 MG Suppository Place 1 suppository (25 mg) rectally every 12 hours as needed for nausea 5 suppository 0     SIMVASTATIN PO Take 20 mg by mouth At Bedtime       sulfaSALAzine (AZULFIDINE) 500 MG tablet Take 1,000 mg by mouth 3 times daily       DILTIAZEM HCL PO Take 120 mg by mouth daily       GABAPENTIN PO Take 300 mg by mouth 3 times daily       ISOSORBIDE MONONITRATE PO Take 30 mg by mouth daily         Social History     Tobacco Use     Smoking status: Current Every Day Smoker     Smokeless tobacco: Never Used     Tobacco comment: 3 cigarettes a day   Substance Use Topics     Alcohol use: No     Drug use: No       SILVA Mancia  1/3/2019  10:12 AM       "

## 2019-01-03 NOTE — LETTER
1/3/2019       RE: Marissa Youssef  [address]  [place] [address]     Dear Colleague,    Thank you for referring your patient, Marissa Youssef, to the [facility] UROLOGY AND INST FOR PROSTATE AND UROLOGIC CANCERS at [facility]. Please see a copy of my visit note below.    January 3, 2019    We are pleased to see Mrs. 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 Patient feels the pain in her back, hips, pelvis, pubic symphysis and inner thighs. She has tried physical therapy and steroid injected in SI joint with no much improvement.  She reports pubic symphysis pain and that it is similar to when she was pregnant with her 2nd child (she has 3)    Today she denies frequency, urgency, dysuria, bladder pain, hematuria, nocturia    AMANDA via pfannenstiel for endometriosis and bleeding    On T3 for her pain from her local pain clinic           Past Medical History:     Past Medical History:   Diagnosis Date     Hyperlipidemia      Hypertension      Pelvic floor instability      Sphincter of Oddi dysfunction             Past Surgical History:     Past Surgical History:   Procedure Laterality Date     ORTHOPEDIC SURGERY      right knee replacement 2008            Social History:   Works part time     Alcohol: occasional   IV Drug Use: None         Family History:   No family history on file.  No urologic cancers in the family.         Allergies:     Allergies   Allergen Reactions     Anzemet [Dolasetron] Difficulty breathing     Demerol [Meperidine] Difficulty breathing     Dilaudid [Hydromorphone] Difficulty breathing            Medications:     Current Outpatient  "Medications   Medication Sig     acetaminophen-codeine (TYLENOL #3) 300-30 MG per tablet Take 1 tablet by mouth every 6 hours as needed for severe pain     ALPRAZOLAM PO Take 0.5 mg by mouth At Bedtime     ASPIRIN PO Take 81 mg by mouth daily     Calcium Carbonate-Vit D-Min (CALCIUM 1200 PO) Take 600 capsules by mouth     DULOXETINE HCL PO Take 60 mg by mouth daily     FOLIC ACID PO Take 1 mg by mouth daily     multivitamin, therapeutic (THERA-VIT) TABS tablet Take 1 tablet by mouth daily     prochlorperazine (COMPAZINE) 25 MG Suppository Place 1 suppository (25 mg) rectally every 12 hours as needed for nausea     SIMVASTATIN PO Take 20 mg by mouth At Bedtime     sulfaSALAzine (AZULFIDINE) 500 MG tablet Take 1,000 mg by mouth 3 times daily     DILTIAZEM HCL PO Take 120 mg by mouth daily     GABAPENTIN PO Take 300 mg by mouth 3 times daily     ISOSORBIDE MONONITRATE PO Take 30 mg by mouth daily     No current facility-administered medications for this visit.             PHYSICAL EXAM   /71   Pulse 79   Ht 1.575 m (5' 2\")   Wt 74.8 kg (165 lb)   BMI 30.18 kg/m     GENERAL: No acute distress. Well nourished.   HEENT:  Sclerae anicteric.  Conjunctivae pink.  Moist mucous membranes.  NECK:  Supple.  No lymphadenopathy.  CARDIAC:  Regular rate and rhythm.  LUNGS:  Non-labored breathing  BACK:  No costovertebral tenderness.  ABDOMEN: Soft,minimal diffuse tender, + pfannenstiel scars, no organomegaly, non-tender.  :  Normal looking external genitalia. No stress UI or ureterovaginal prolapse  Diffuse myofascial tenderness and pubic symphysis tenderness.   SKIN: No rashes.  Dry.     EXTREMITIES:  Warm, well perfused.   NEURO: normal gait, no focal deficits.             ASSESSMENT:   Marissa Youssef is a 58 year old female who presents for pelvic pain             PLAN:     referral to pelvic floor PT     Follow up in 4-6 months     Pradeep Castellon MD  Urology PGY4    Addendum:    The patient was seen and evaluated with " the resident.  The plan was formulated in conjunction with me and I agree with the above note with changes made as necessary.    Discussed the musculoskeletal nature of her symptoms in relation to her SI joint and pubic symphysis issues.  We discussed how her symptoms are related to the physical exam findings and her pelvic floor myofascial dysfunction.  We discussed how the recommended treatment is dedicated pelvic floor therapy.  We discussed how the pelvic floor physical therapy works and patient is agreeable.  Referral was placed.  Also will need visceral therapy given the diffuse abdominal discomfort    RTC 4-6 months, sooner if needed    40 minutes were spent with patient today, >50% in counseling and coordination of care    Elana Ma MD MPH   of Urology    CC  Patient Care Team:  Philomena Altamirano as PCP - General (Physician Assistant)  Elana Ma MD as MD (Urology)  Pau Brantley, RN as Registered Nurse (Urology)  EDDY PHIPPS

## 2019-01-03 NOTE — PROGRESS NOTES
Answers for HPI/ROS submitted by the patient on 1/3/2019   General Symptoms: Yes  Skin Symptoms: Yes  HENT Symptoms: No  EYE SYMPTOMS: No  HEART SYMPTOMS: No  LUNG SYMPTOMS: No  INTESTINAL SYMPTOMS: Yes  URINARY SYMPTOMS: No  GYNECOLOGIC SYMPTOMS: No  BREAST SYMPTOMS: No  SKELETAL SYMPTOMS: Yes  BLOOD SYMPTOMS: Yes  NERVOUS SYSTEM SYMPTOMS: Yes  MENTAL HEALTH SYMPTOMS: Yes  Fever: No  Loss of appetite: Yes  Weight loss: Yes  Weight gain: No  Fatigue: Yes  Night sweats: No  Chills: No  Increased stress: Yes  Excessive hunger: No  Excessive thirst: No  Feeling hot or cold when others believe the temperature is normal: Yes  Loss of height: No  Post-operative complications: No  Surgical site pain: No  Hallucinations: Yes  Change in or Loss of Energy: Yes  Hyperactivity: Yes  Confusion: Yes  Changes in hair: No  Changes in moles/birth marks: No  Itching: No  Rashes: Yes  Changes in nails: No  Acne: No  Hair in places you don't want it: No  Change in facial hair: No  Warts: No  Non-healing sores: Yes  Scarring: No  Flaking of skin: No  Color changes of hands/feet in cold : No  Sun sensitivity: No  Skin thickening: No  Heart burn or indigestion: No  Nausea: Yes  Abdominal pain: Yes  Bloating: No  Constipation: No  Diarrhea: No  Blood in stool: No  Black stools: No  Rectal or Anal pain: No  Fecal incontinence: No  Yellowing of skin or eyes: No  Vomit with blood: No  Change in stools: No  Back pain: Yes  Muscle aches: Yes  Neck pain: Yes  Swollen joints: Yes  Joint pain: Yes  Bone pain: Yes  Muscle cramps: Yes  Muscle weakness: Yes  Joint stiffness: Yes  Bone fracture: No  Anemia: No  Swollen glands: No  Easy bleeding or bruising: Yes  Edema or swelling: No  Trouble with coordination: No  Dizziness or trouble with balance: Yes  Fainting or black-out spells: No  Memory loss: No  Headache: Yes  Seizures: No  Speech problems: No  Tingling: Yes  Tremor: Yes  Weakness: Yes  Difficulty walking: Yes  Paralysis: No  Numbness:  Yes  Nervous or Anxious: Yes  Trouble sleeping: No  Trouble thinking or concentrating: Yes  Mood changes: Yes  Panic attacks: No

## 2019-01-03 NOTE — PROGRESS NOTES
January 3, 2019    We are pleased to see Mrs. 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 Patient feels the pain in her back, hips, pelvis, pubic symphysis and inner thighs. She has tried physical therapy and steroid injected in SI joint with no much improvement.  She reports pubic symphysis pain and that it is similar to when she was pregnant with her 2nd child (she has 3)    Today she denies frequency, urgency, dysuria, bladder pain, hematuria, nocturia    AMANDA via pfannenstiel for endometriosis and bleeding    On T3 for her pain from her local pain clinic           Past Medical History:     Past Medical History:   Diagnosis Date     Hyperlipidemia      Hypertension      Pelvic floor instability      Sphincter of Oddi dysfunction             Past Surgical History:     Past Surgical History:   Procedure Laterality Date     ORTHOPEDIC SURGERY      right knee replacement 2008            Social History:   Works part time     Alcohol: occasional   IV Drug Use: None         Family History:   No family history on file.  No urologic cancers in the family.         Allergies:     Allergies   Allergen Reactions     Anzemet [Dolasetron] Difficulty breathing     Demerol [Meperidine] Difficulty breathing     Dilaudid [Hydromorphone] Difficulty breathing            Medications:     Current Outpatient Medications   Medication Sig     acetaminophen-codeine (TYLENOL #3) 300-30 MG per tablet Take 1 tablet by mouth every 6 hours as needed for severe pain     ALPRAZOLAM PO Take 0.5 mg by mouth At Bedtime     ASPIRIN PO Take 81 mg by mouth daily     Calcium Carbonate-Vit D-Min (CALCIUM 1200 PO) Take 600 capsules by mouth      DULOXETINE HCL PO Take 60 mg by mouth daily     FOLIC ACID PO Take 1 mg by mouth daily     multivitamin, therapeutic (THERA-VIT) TABS tablet Take 1 tablet by mouth daily     prochlorperazine (COMPAZINE) 25 MG Suppository Place 1 suppository (25 mg) rectally every 12 hours as needed for nausea     SIMVASTATIN PO Take 20 mg by mouth At Bedtime     sulfaSALAzine (AZULFIDINE) 500 MG tablet Take 1,000 mg by mouth 3 times daily     DILTIAZEM HCL PO Take 120 mg by mouth daily     GABAPENTIN PO Take 300 mg by mouth 3 times daily     ISOSORBIDE MONONITRATE PO Take 30 mg by mouth daily     No current facility-administered medications for this visit.             REVIEW OF SYSTEMS:    See HPI for pertinent details.  Remainder of 10-point ROS negative.    Answers for HPI/ROS submitted by the patient on 1/3/2019   General Symptoms: Yes  Skin Symptoms: Yes  HENT Symptoms: No  EYE SYMPTOMS: No  HEART SYMPTOMS: No  LUNG SYMPTOMS: No  INTESTINAL SYMPTOMS: Yes  URINARY SYMPTOMS: No  GYNECOLOGIC SYMPTOMS: No  BREAST SYMPTOMS: No  SKELETAL SYMPTOMS: Yes  BLOOD SYMPTOMS: Yes  NERVOUS SYSTEM SYMPTOMS: Yes  MENTAL HEALTH SYMPTOMS: Yes  Fever: No  Loss of appetite: Yes  Weight loss: Yes  Weight gain: No  Fatigue: Yes  Night sweats: No  Chills: No  Increased stress: Yes  Excessive hunger: No  Excessive thirst: No  Feeling hot or cold when others believe the temperature is normal: Yes  Loss of height: No  Post-operative complications: No  Surgical site pain: No  Hallucinations: Yes  Change in or Loss of Energy: Yes  Hyperactivity: Yes  Confusion: Yes  Changes in hair: No  Changes in moles/birth marks: No  Itching: No  Rashes: Yes  Changes in nails: No  Acne: No  Hair in places you don't want it: No  Change in facial hair: No  Warts: No  Non-healing sores: Yes  Scarring: No  Flaking of skin: No  Color changes of hands/feet in cold : No  Sun sensitivity: No  Skin thickening: No  Heart burn or indigestion: No  Nausea: Yes  Abdominal pain:  "Yes  Bloating: No  Constipation: No  Diarrhea: No  Blood in stool: No  Black stools: No  Rectal or Anal pain: No  Fecal incontinence: No  Yellowing of skin or eyes: No  Vomit with blood: No  Change in stools: No  Back pain: Yes  Muscle aches: Yes  Neck pain: Yes  Swollen joints: Yes  Joint pain: Yes  Bone pain: Yes  Muscle cramps: Yes  Muscle weakness: Yes  Joint stiffness: Yes  Bone fracture: No  Anemia: No  Swollen glands: No  Easy bleeding or bruising: Yes  Edema or swelling: No  Trouble with coordination: No  Dizziness or trouble with balance: Yes  Fainting or black-out spells: No  Memory loss: No  Headache: Yes  Seizures: No  Speech problems: No  Tingling: Yes  Tremor: Yes  Weakness: Yes  Difficulty walking: Yes  Paralysis: No  Numbness: Yes  Nervous or Anxious: Yes  Trouble sleeping: No  Trouble thinking or concentrating: Yes  Mood changes: Yes  Panic attacks: No         PHYSICAL EXAM   /71   Pulse 79   Ht 1.575 m (5' 2\")   Wt 74.8 kg (165 lb)   BMI 30.18 kg/m    GENERAL: No acute distress. Well nourished.   HEENT:  Sclerae anicteric.  Conjunctivae pink.  Moist mucous membranes.  NECK:  Supple.  No lymphadenopathy.  CARDIAC:  Regular rate and rhythm.  LUNGS:  Non-labored breathing  BACK:  No costovertebral tenderness.  ABDOMEN: Soft,minimal diffuse tender, + pfannenstiel scars, no organomegaly, non-tender.  :  Normal looking external genitalia. No stress UI or ureterovaginal prolapse  Diffuse myofascial tenderness and pubic symphysis tenderness.   SKIN: No rashes.  Dry.     EXTREMITIES:  Warm, well perfused.   NEURO: normal gait, no focal deficits.             ASSESSMENT:   Marissa Youssef is a 58 year old female who presents for pelvic pain             PLAN:     referral to pelvic floor PT     Follow up in 4-6 months     Pradeep Castellon MD  Urology PGY4    Addendum:    The patient was seen and evaluated with the resident.  The plan was formulated in conjunction with me and I agree with the above note " with changes made as necessary.    Discussed the musculoskeletal nature of her symptoms in relation to her SI joint and pubic symphysis issues.  We discussed how her symptoms are related to the physical exam findings and her pelvic floor myofascial dysfunction.  We discussed how the recommended treatment is dedicated pelvic floor therapy.  We discussed how the pelvic floor physical therapy works and patient is agreeable.  Referral was placed.  Also will need visceral therapy given the diffuse abdominal discomfort    RTC 4-6 months, sooner if needed    40 minutes were spent with patient today, >50% in counseling and coordination of care    Elana Ma MD MPH   of Urology    CC  Patient Care Team:  Philomena Altamirano as PCP - General (Physician Assistant)  Elana Ma MD as MD (Urology)  Pau Brantley, RN as Registered Nurse (Urology)  EDDY PHIPPS

## 2019-01-04 NOTE — TELEPHONE ENCOUNTER
Per Dr. Llamas:   No procedures planned  This pt needs pain management. Is not a sphincter or pancreatic biliary problem that I can see     Called and advised of above. Asked her to call this RN back if she had questions/concerns and if she would like [facility]     Clinic number left on voicemail for her to call back.     Yaneth MEIER RN Care Coordinator  Dr. Llamas, Dr. Up & Dr. Dotson   [facility]  [phone]

## 2019-01-16 ENCOUNTER — HOSPITAL ENCOUNTER (OUTPATIENT)
Dept: PHYSICAL THERAPY | Facility: CLINIC | Age: 59
Setting detail: THERAPIES SERIES
End: 2019-01-16
Attending: UROLOGY
Payer: MEDICARE

## 2019-01-16 DIAGNOSIS — N94.9 PAIN OF FEMALE SYMPHYSIS PUBIS: ICD-10-CM

## 2019-01-16 DIAGNOSIS — M62.89 PELVIC FLOOR DYSFUNCTION: ICD-10-CM

## 2019-01-16 DIAGNOSIS — M79.18 MYALGIA OF PELVIC FLOOR: ICD-10-CM

## 2019-01-16 DIAGNOSIS — R10.2 PELVIC PAIN IN FEMALE: ICD-10-CM

## 2019-01-16 DIAGNOSIS — M53.3 SI (SACROILIAC) JOINT DYSFUNCTION: ICD-10-CM

## 2019-01-16 PROCEDURE — 97140 MANUAL THERAPY 1/> REGIONS: CPT | Mod: GP | Performed by: PHYSICAL THERAPIST

## 2019-01-16 PROCEDURE — 97162 PT EVAL MOD COMPLEX 30 MIN: CPT | Mod: GP | Performed by: PHYSICAL THERAPIST

## 2019-01-16 PROCEDURE — 97110 THERAPEUTIC EXERCISES: CPT | Mod: GP | Performed by: PHYSICAL THERAPIST

## 2019-01-17 NOTE — PROGRESS NOTES
[facility]          OUTPATIENT PHYSICAL THERAPY ORTHOPEDIC EVALUATION  PLAN OF TREATMENT FOR OUTPATIENT REHABILITATION  (COMPLETE FOR INITIAL CLAIMS ONLY)  Patient's Last Name, First Name, M.I.  YOB: 1960  Marissa Youssef    Provider s Name:  [facility]   Medical Record No.  [id number]   Start of Care Date:  01/16/19   Onset Date:  01/03/19   Type:     _X__PT   ___OT   ___SLP Medical Diagnosis:  Pelvic pain in female, pain in pubis symphysis, SIJ Dysfunction, myalgia of pelvic floor,      PT Diagnosis:  Impaired function of the Pelvic Floor muscle and lumbopelvic jct   Visits from SOC:  1      _________________________________________________________________________________  Plan of Treatment/Functional Goals:  joint mobilization, manual therapy, motor coordination training, neuromuscular re-education, strengthening, stretching     Biofeedback, Electrical stimulation     Goals  Goal Identifier: STG  Goal Description: 1)Pt will report pain at 4/10 or less for full work day in 3 weeks.   Target Date: 02/06/19    Goal Identifier: LTG  Goal Description: 2)Pt will report no feeling of needing to push out her urine in 4 weeks.  Target Date: 02/13/19    Goal Identifier: LTG  Goal Description: 3)Pt will report 2/7 days with 2/10 or less pain during work day, in 6 weeks.  Target Date: 02/27/19    Goal Identifier: LTG  Goal Description: 4)Pt will be indep in HEP to prevent return of symptoms, in 8 weeks.   Target Date: 03/13/19    Goal Identifier: LTG  Goal Description: 5)Pt will report sleeping through the nite without pain waking her in 8 weeks.   Target Date: 03/13/19        Therapy Frequency:  1 time/week  Predicted Duration of Therapy Intervention:  8 weeks for up to 8 sessions    Karie Dorado, PT                 I CERTIFY THE NEED FOR THESE SERVICES FURNISHED UNDER        THIS PLAN OF TREATMENT AND WHILE UNDER MY CARE     (Physician co-signature of this  document indicates review and certification of the therapy plan).                       Certification Date From:  01/16/19   Certification Date To:  03/13/19    Referring Provider:  Elana Ma MD    Initial Assessment        See Epic Evaluation Start of Care Date: 01/16/19

## 2019-01-17 NOTE — PROGRESS NOTES
"Physical Therapy Initial Pelvic Floor/Lumbar Evaluation   01/16/19 1400   General Information   Type of Visit Initial OP Ortho PT Evaluation   Start of Care Date 01/16/19   Referring Physician Elana Ma MD   Patient/Family Goals Statement \"I want to figure out how I can manage the pain in my pelvis.\"   Orders Evaluate and Treat   Orders Comment visceral work, internal myofascial release, patient with SI joint and pubic symphysis pain   Date of Order 01/03/19   Insurance Type Medicare  (medica)   Medical Diagnosis Pelvic pain in female, pain in pubis symphysis, SIJ Dysfunction, myalgia of pelvic floor,    Surgical/Medical history reviewed Yes   Precautions/Limitations no known precautions/limitations   General Information Comments PMHx: (R) knee surgeries with TKA 2009, cholecystectomy 2007, depression, HTN, menopause, mental illness, arthritis, Osteoporosis, smoking (current). Hysterectomy d/t endometriosis s/p 2002 with (B) oophorectomy.   Body Part(s)   Body Part(s) Pelvic Floor Dysfunction;Lumbar Spine/SI   Presentation and Etiology   Pertinent history of current problem (include personal factors and/or comorbidities that impact the POC) Pt has long h/o pelvic pain d/t \"back problems in my 20's, slipped on ice 3 yrs ago but didn't get treatment for this specficially (thought kidney stones) and this developed into sciatica.  Though possibly had a pelvic fx, but was \"unstable.\" Had been seen at Pain Clinic - finally sent to Dr Cummings (no Pelvic Fx) and had injections and began to have pain in symphysis pubis and was sent to Dr Ma for Pelvic Issues. Had PT on/off for LBP/Pelvis during this time.  Currently sent to PT for PFM, pelvic and visceral treatment. Additionally, pt states long h/o endometriosis.    Impairments A. Pain;D. Decreased ROM;E. Decreased flexibility;F. Decreased strength and endurance;G. Impaired balance;J. Burning;K. Numbness;L. Tingling;N. Headaches;O. Blurred vision   Functional " Limitations perform activities of daily living;perform required work activities;perform desired leisure / sports activities   Symptom Location Symphysis Pubis and SIJ   How/Where did it occur From insidious onset;With a fall   Onset date of current episode/exacerbation 01/03/19   Chronicity Chronic   Best (/10) 3   Worst (/10) 10   Pain quality A. Sharp;B. Dull;C. Aching;D. Burning;E. Shooting;F. Stabbing;G. Cramping   Frequency of pain/symptoms A. Constant   Pain/symptoms are: The same all the time   Pain/symptoms exacerbated by A. Sitting;B. Walking;C. Lifting;D. Carrying;E. Rest;F. Nothing;G. Certain positions;H. Overhead reach;I. Bending;J. ADL;K. Home tasks;L. Work tasks  (to some degree all activity)   Pain/symptoms eased by G. Heat;I. OTC medication(s);K. Other   Pain eased by comment rx for Tylenol #3 prn   Progression of symptoms since onset: Worsened  (beginning of 12/2018 had inj's & not really helpful (R) SIJ)   Current Level of Function   Current Community Support Family/friend caregiver   Patient role/employment history A. Employed   Employment Comments Part time at [employer], standing for shift   Living environment [place]   Fall Risk Screen   Fall screen completed by PT   Have you fallen 2 or more times in the past year? Yes   Have you fallen and had an injury in the past year? No   Timed Up and Go score (seconds) 7 sec   Is patient a fall risk? No   Fall screen comments Has fallen 3x in last 1 yr w/o injury   Abuse Screen (yes response referral indicated)   Feels Unsafe at Home or Work/School no   Feels Threatened by Someone no   Does Anyone Try to Keep You From Having Contact with Others or Doing Things Outside Your Home? no   Physical Signs of Abuse Present no   Lumbar Spine/SI Objective Findings   Integumentary No anomalies noted   Posture Mild increase in lumbar lordosis, mild fwd head and rounded shoulders   Flexion ROM Fingertips 3 inches from floor   Extension ROM WNL   Right Side  "Bending ROM Fingertips to 2 inches above knee jt line   Left Side Bending ROM Fingertips to 2 inches above knee jt line   Lumbar ROM Comment No c/o pain with ROM testing today   Pelvic Screen (L) IC mild elevation vs (R) in static stand, neg march, (+) supine to sit for (L) ilium rotation, (+) SIJ provocations   Hip Flexion (L2) Strength 5 (B   Hip Abduction Strength 5-(B)   Hip Adduction Strength 4+/5 (B) with mild pubic ramus pain/pull   Knee Flexion Strength 5 (B)   Knee Extension (L3) Strength 5 (B)   Ankle Dorsiflexion (L4) Strength 5 (B)   Ankle Plantar Flexion (S1) Strength 5 (B)   Hamstring Flexibility 75* (B)   Obers Flexibility Tight (B) with palpable tension in (B) ITB   Piriformis Flexibility Tight at midline (B)   Lumbar/SI Flexibility Comments Hip abduction (\"frog\") in supine is painful at symphysis pubis.   Sensation Testing WNL for mod and light touch (B) LEs   Patellar Tendon Reflexes  WNL   Achilles Tendon Reflexes WNL   Palpation Tenderness (L) SIJ vs (R) in standing   Pelvic Floor Dysfunction Questions   Regular exercise Yes   Fluid intake-glasses/day (one glass/cup = 8oz Water = 128oz/day   Caffeinated beverages-glasses/day Coffee = not every day, Soda (diet SPrite) = 32-64oz/day   Alcoholic beverages - glasses/day none   Recent diet change? No   How long can you delay the need to urinate?  60 mins   How many times do you wake to urinate at night?   1   How often do you urinate during the day?   every 2 hours if not at work, at work 4+ hours   Can you stop the flow of urine when on the toilet?  No   Is the volume of urine passed usually  Medium   Do you have the sensation that you need to go to the toilet?  Yes   Do you empty your bladder frequently, before you experience the urge to pass urine?  No   Do you have \"triggers\" that make you feel you can't wait to go to the toilet?  No   Number of bladder infections last year?  1   Frequency of bowel movements:  Daily    Consistency of stool?  (Most " common Type 5-7)   Do you ignore the urge to defecate?  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   Pad Pantiliner - just in case   Number of pads 1   Pelvic Symphysis Pubis Pain   Planned Therapy Interventions   Planned Therapy Interventions joint mobilization;manual therapy;motor coordination training;neuromuscular re-education;strengthening;stretching   Planned Modality Interventions   Planned Modality Interventions Biofeedback;Electrical stimulation   Clinical Impression   Criteria for Skilled Therapeutic Interventions Met yes, treatment indicated   PT Diagnosis Impaired function of the Pelvic Floor muscle and lumbopelvic jct   Influenced by the following impairments Pain, poor control of PFM (per subjective report of symptoms), constipation.   Functional limitations due to impairments Not letting symptoms limit her, but is painful at symphysis pubis to do standing for job, sitting for prolonged periods, and pain is waking her at nite.    Clinical Presentation Unstable/Unpredictable   Clinical Presentation Rationale Longstanding h/o pain in pelvis/LB/LE's; pain can change from day to day, is unpredictable in intensity with any given activity; multiple comorbidities   Clinical Decision Making (Complexity) Moderate complexity   Therapy Frequency 1 time/week   Predicted Duration of Therapy Intervention  (days/wks) 8 weeks for up to 8 sessions   Risk & Benefits of therapy have been explained Yes   Patient, Family & other staff in agreement with plan of care Yes   Clinical Impression Comments Pt presents with primary c/o Pelvic/symphysis pubis/Low Back Pain. Has had for many years and has been to PT in past, pain clinic, had SIJ and lumbar injections. Finally was sent to Pelvic Floor MD and referred to PT for symphysis pubis pain.  Additionally diagnosed with sphyncter of Oddi Dysfunction.   Pt could benefit from skilled PT for fascial release, PFM control training/relaxation, and general core/body mechanics training to meet set goals.    Education Assessment   Preferred Learning Style Listening;Reading;Demonstration;Pictures/video   Barriers to Learning No barriers   Ortho Goal 1   Goal Identifier STG   Goal Description 1)Pt will report pain at 4/10 or less for full work day in 3 weeks.    Target Date 02/06/19   Ortho Goal 2   Goal Identifier LTG   Goal Description 2)Pt will report no feeling of needing to push out her urine in 4 weeks.   Target Date 02/13/19   Ortho Goal 3   Goal Identifier LTG   Goal Description 3)Pt will report 2/7 days with 2/10 or less pain during work day, in 6 weeks.   Target Date 02/27/19   Ortho Goal 4   Goal Identifier LTG   Goal Description 4)Pt will be indep in HEP to prevent return of symptoms, in 8 weeks.    Target Date 03/13/19   Ortho Goal 5   Goal Identifier LTG   Goal Description 5)Pt will report sleeping through the nite without pain waking her in 8 weeks.    Target Date 03/13/19   Total Evaluation Time   PT Eval, Moderate Complexity Minutes (50125) 35   Therapy Certification   Certification date from 01/16/19   Certification date to 03/13/19   Medical Diagnosis Pelvic pain in female, pain in pubis symphysis, SIJ Dysfunction, myalgia of pelvic floor,    Thank you for the referral of this patient.  Karie Dorado, PT, MA  #[id number]

## 2019-01-25 ENCOUNTER — HOSPITAL ENCOUNTER (OUTPATIENT)
Dept: PHYSICAL THERAPY | Facility: CLINIC | Age: 59
Setting detail: THERAPIES SERIES
End: 2019-01-25
Attending: UROLOGY
Payer: MEDICARE

## 2019-01-25 PROCEDURE — 97110 THERAPEUTIC EXERCISES: CPT | Mod: GP | Performed by: PHYSICAL THERAPIST

## 2019-01-25 PROCEDURE — 97140 MANUAL THERAPY 1/> REGIONS: CPT | Mod: GP | Performed by: PHYSICAL THERAPIST

## 2019-01-31 ENCOUNTER — HOSPITAL ENCOUNTER (OUTPATIENT)
Dept: PHYSICAL THERAPY | Facility: CLINIC | Age: 59
Setting detail: THERAPIES SERIES
End: 2019-01-31
Attending: UROLOGY
Payer: MEDICARE

## 2019-01-31 PROCEDURE — 97140 MANUAL THERAPY 1/> REGIONS: CPT | Mod: GP | Performed by: PHYSICAL THERAPIST

## 2019-01-31 PROCEDURE — 97110 THERAPEUTIC EXERCISES: CPT | Mod: GP | Performed by: PHYSICAL THERAPIST

## 2019-02-11 ENCOUNTER — HOSPITAL ENCOUNTER (OUTPATIENT)
Dept: PHYSICAL THERAPY | Facility: CLINIC | Age: 59
Setting detail: THERAPIES SERIES
End: 2019-02-11
Attending: UROLOGY
Payer: MEDICARE

## 2019-02-11 DIAGNOSIS — M53.3 SI (SACROILIAC) JOINT DYSFUNCTION: ICD-10-CM

## 2019-02-11 DIAGNOSIS — N94.9 PAIN OF FEMALE SYMPHYSIS PUBIS: Primary | ICD-10-CM

## 2019-02-11 DIAGNOSIS — M62.89 PELVIC FLOOR DYSFUNCTION: ICD-10-CM

## 2019-02-11 PROCEDURE — 97140 MANUAL THERAPY 1/> REGIONS: CPT | Mod: GP | Performed by: PHYSICAL THERAPIST

## 2019-02-15 ENCOUNTER — HEALTH MAINTENANCE LETTER (OUTPATIENT)
Age: 59
End: 2019-02-15

## 2019-02-18 ENCOUNTER — HOSPITAL ENCOUNTER (OUTPATIENT)
Dept: PHYSICAL THERAPY | Facility: CLINIC | Age: 59
Setting detail: THERAPIES SERIES
End: 2019-02-18
Attending: UROLOGY
Payer: MEDICARE

## 2019-02-18 PROCEDURE — 90911 ZZHC PT BIOFEEDBACK (PFM): CPT | Mod: GP | Performed by: PHYSICAL THERAPIST

## 2019-02-18 PROCEDURE — 97110 THERAPEUTIC EXERCISES: CPT | Mod: GP,59 | Performed by: PHYSICAL THERAPIST

## 2019-02-26 ENCOUNTER — HOSPITAL ENCOUNTER (OUTPATIENT)
Dept: PHYSICAL THERAPY | Facility: CLINIC | Age: 59
Setting detail: THERAPIES SERIES
End: 2019-02-26
Attending: UROLOGY
Payer: MEDICARE

## 2019-02-26 PROCEDURE — 97140 MANUAL THERAPY 1/> REGIONS: CPT | Mod: GP | Performed by: PHYSICAL THERAPIST

## 2019-02-26 PROCEDURE — 97110 THERAPEUTIC EXERCISES: CPT | Mod: GP | Performed by: PHYSICAL THERAPIST

## 2019-03-01 ASSESSMENT — ENCOUNTER SYMPTOMS
HOARSE VOICE: 1
NECK MASS: 1
MUSCLE WEAKNESS: 0
TASTE DISTURBANCE: 0
SORE THROAT: 1
NECK PAIN: 0
MUSCLE CRAMPS: 0
JOINT SWELLING: 0
MYALGIAS: 1
BACK PAIN: 1
SINUS PAIN: 0
SINUS CONGESTION: 0
TROUBLE SWALLOWING: 0
STIFFNESS: 1
SMELL DISTURBANCE: 0
ARTHRALGIAS: 1

## 2019-03-01 ASSESSMENT — ANXIETY QUESTIONNAIRES
GAD7 TOTAL SCORE: 1
6. BECOMING EASILY ANNOYED OR IRRITABLE: NOT AT ALL
1. FEELING NERVOUS, ANXIOUS, OR ON EDGE: NOT AT ALL
4. TROUBLE RELAXING: NOT AT ALL
GAD7 TOTAL SCORE: 1
5. BEING SO RESTLESS THAT IT IS HARD TO SIT STILL: NOT AT ALL
7. FEELING AFRAID AS IF SOMETHING AWFUL MIGHT HAPPEN: NOT AT ALL
2. NOT BEING ABLE TO STOP OR CONTROL WORRYING: NOT AT ALL
3. WORRYING TOO MUCH ABOUT DIFFERENT THINGS: SEVERAL DAYS
7. FEELING AFRAID AS IF SOMETHING AWFUL MIGHT HAPPEN: NOT AT ALL

## 2019-03-05 ENCOUNTER — OFFICE VISIT (OUTPATIENT)
Dept: OBGYN | Facility: CLINIC | Age: 59
End: 2019-03-05
Attending: OBSTETRICS & GYNECOLOGY
Payer: MEDICARE

## 2019-03-05 VITALS
DIASTOLIC BLOOD PRESSURE: 79 MMHG | SYSTOLIC BLOOD PRESSURE: 149 MMHG | WEIGHT: 178.2 LBS | HEIGHT: 62 IN | HEART RATE: 75 BPM | BODY MASS INDEX: 32.79 KG/M2

## 2019-03-05 DIAGNOSIS — G89.29 CHRONIC PELVIC PAIN IN FEMALE: ICD-10-CM

## 2019-03-05 DIAGNOSIS — R10.2 CHRONIC PELVIC PAIN IN FEMALE: ICD-10-CM

## 2019-03-05 PROCEDURE — G0463 HOSPITAL OUTPT CLINIC VISIT: HCPCS | Mod: ZF

## 2019-03-05 ASSESSMENT — ANXIETY QUESTIONNAIRES
3. WORRYING TOO MUCH ABOUT DIFFERENT THINGS: NOT AT ALL
2. NOT BEING ABLE TO STOP OR CONTROL WORRYING: NOT AT ALL
1. FEELING NERVOUS, ANXIOUS, OR ON EDGE: NOT AT ALL
GAD7 TOTAL SCORE: 0
7. FEELING AFRAID AS IF SOMETHING AWFUL MIGHT HAPPEN: NOT AT ALL
5. BEING SO RESTLESS THAT IT IS HARD TO SIT STILL: NOT AT ALL
6. BECOMING EASILY ANNOYED OR IRRITABLE: NOT AT ALL

## 2019-03-05 ASSESSMENT — PATIENT HEALTH QUESTIONNAIRE - PHQ9
5. POOR APPETITE OR OVEREATING: NOT AT ALL
SUM OF ALL RESPONSES TO PHQ QUESTIONS 1-9: 8

## 2019-03-05 ASSESSMENT — MIFFLIN-ST. JEOR: SCORE: 1336.56

## 2019-03-05 NOTE — PROGRESS NOTES
Gyn Clinic Visit Note  3/5/2019    S: Marissa Youssef is a 58 year old  here today for chronic pelvic pain. She reports that for as long as she can remember she has had pelvic pain. It has worsened over past year and now is present all day every day. She has tried tylenol #3, gabapentin, ibuprofen, pelvic floor PT, and SI joint injections without significant improvement. Had AMANDA/BSO for pain and endometriosis in 2004, did not have significant change in symptoms after this surgery. No problems with urinary incontinence, diarrhea, constipation, or prolapse symptoms. She had dyspareunia with deep penetration in the past, so severe that she is no longer sexually active. Speculum exams have also always been very painful. She had yeast infection that required several doses of diflucan a few months ago after a course of antibiotics, but that has since resolved. Otherwise no vaginal discharge, itching, irritation, or dryness. No vaginal bleeding since her 2004 hyst.     Gyn Hx:   No LMP recorded. Patient has had a hysterectomy.  Sexual Activity  not at present due to pain  Sexually transmitted disease history:  none  PAP smear history:  Patient is s/p AMANDA in 2004 and had no h/o abnormal paps beforehand.          Answers for HPI/ROS submitted by the patient on 3/1/2019 , reviewed with patient on 3/5/19  RACQUEL 7 TOTAL SCORE: 1  General Symptoms: No  Skin Symptoms: No  HENT Symptoms: Yes  EYE SYMPTOMS: No  HEART SYMPTOMS: No  LUNG SYMPTOMS: No  INTESTINAL SYMPTOMS: No  URINARY SYMPTOMS: No  GYNECOLOGIC SYMPTOMS: No  BREAST SYMPTOMS: No  SKELETAL SYMPTOMS: Yes  BLOOD SYMPTOMS: No  NERVOUS SYSTEM SYMPTOMS: No  MENTAL HEALTH SYMPTOMS: No  Ear pain: Yes  Ear discharge: No  Hearing loss: No  Tinnitus: No  Nosebleeds: No  Congestion: No  Sinus pain: No  Trouble swallowing: No   Voice hoarseness: Yes  Mouth sores: No  Sore throat: Yes  Tooth pain: No  Gum tenderness: No  Bleeding gums: No  Change in taste: No  Change in sense of  "smell: No  Dry mouth: No  Hearing aid used: No  Neck lump: Yes  Back pain: Yes  Muscle aches: Yes  Neck pain: No  Swollen joints: No  Joint pain: Yes  Bone pain: No  Muscle cramps: No  Muscle weakness: No  Joint stiffness: Yes  Bone fracture: No      O:   /79 (BP Location: Left arm, Patient Position: Chair)   Pulse 75   Ht 1.575 m (5' 2\")   Wt 80.8 kg (178 lb 3.2 oz)   Breastfeeding? No   BMI 32.59 kg/m     Gen: NAD  CV: Regular rate, normal perfusion  Resp: No respiratory distress   Pelvic exam: normal-appearing vagina and vulva, uterus surgically absent, adenexa without masses. Diffuse tenderness throughout pelvic exam with no definitive areas of greater tenderness than others. Good vaginal mucosa elasticity without signs of significant atrophy. Pelvic musculature does not feel particularly tight or spastic but is very tender throughout as well.     A/P:  Marissa Youssef is a 58 year old y/o  here today for chronic pelvic pain. She has h/o endometriosis and underwent AMANDA/BSO in  for these symptoms. Discussed that there is no obvious anatomic finding on today's exam that would explain pain, but that findings could be c/w neuropathic pain. Recommended continued pelvic floor PT, potentially with component of targeted relaxation techniques. She can continue to take gabapentin as prescribed. Discussed that while she may have some residual adhesive disease from endometriotic implants that were active prior to her AMANDA-BSO, that surgery at this point would not be likely to improve her pain and has associated risks that outweigh potential benefits.     Agnieszka Weldon MD  OB/GYN Resident-PGY1  3/5/2019 10:51 AM    S Staff Note:  I examined Marissa Youssef on 3/5/2019 with Dr. Weldon and agree with the presentation, exam and plan of care documented in this note with edits by me.   Kerri Greene MD  3/5/19    "

## 2019-03-05 NOTE — LETTER
"  RE: Marissa Youssef  [address]  [place] [address]     Dear Colleague,    Thank you for referring your patient, Marissa Youssef, to the [facility] at [facility]. Please see a copy of my visit note below.    Gyn Clinic Visit Note  3/5/2019    S: Marissa Youssef is a 58 year old  here today for chronic pelvic pain. She reports that for as long as she can remember she has had pelvic pain. It has worsened over past year and now is present all day every day. She has tried tylenol #3, gabapentin, ibuprofen, pelvic floor PT, and SI joint injections without significant improvement. Had AMANDA/BSO for pain and endometriosis in , did not have significant change in symptoms after this surgery. No problems with urinary incontinence, diarrhea, constipation, or prolapse symptoms. She had dyspareunia with deep penetration in the past, so severe that she is no longer sexually active. Speculum exams have also always been very painful. She had yeast infection that required several doses of diflucan a few months ago after a course of antibiotics, but that has since resolved. Otherwise no vaginal discharge, itching, irritation, or dryness. No vaginal bleeding since her 2004 hyst.     Gyn Hx:   No LMP recorded. Patient has had a hysterectomy.  Sexual Activity  not at present due to pain  Sexually transmitted disease history:  none  PAP smear history:  Patient is s/p AMANDA in  and had no h/o abnormal paps beforehand.    O:   /79 (BP Location: Left arm, Patient Position: Chair)   Pulse 75   Ht 1.575 m (5' 2\")   Wt 80.8 kg (178 lb 3.2 oz)   Breastfeeding? No   BMI 32.59 kg/m      Gen: NAD  CV: Regular rate, normal perfusion  Resp: No respiratory distress   Pelvic exam: normal-appearing vagina and vulva, uterus surgically absent, adenexa without masses. Diffuse tenderness throughout pelvic exam with no definitive areas of greater tenderness than " others. Good vaginal mucosa elasticity without signs of significant atrophy. Pelvic musculature does not feel particularly tight or spastic but is very tender throughout as well.     A/P:  Marissa Youssef is a 58 year old y/o  here today for chronic pelvic pain. She has h/o endometriosis and underwent AMANDA/BSO in  for these symptoms. Discussed that there is no obvious anatomic finding on today's exam that would explain pain, but that findings could be c/w neuropathic pain. Recommended continued pelvic floor PT, potentially with component of targeted relaxation techniques. She can continue to take gabapentin as prescribed. Discussed that while she may have some residual adhesive disease from endometriotic implants that were active prior to her AMANDA-BSO, that surgery at this point would not be likely to improve her pain and has associated risks that outweigh potential benefits.     Agnieszka Weldon MD  OB/GYN Resident-PGY1  3/5/2019 10:51 AM    S Staff Note:  I examined Marissa Youssef on 3/5/2019 with Dr. Weldon and agree with the presentation, exam and plan of care documented in this note with edits by me.   Kerri Greene MD  3/5/19

## 2019-03-05 NOTE — NURSING NOTE
Chief Complaint   Patient presents with     Consult For     Hx of endometriosis. Had a complete hysterectomy with oophorectomy. Doing PT for pelvic floor dysfunction and not helping. Wondering if pelvic pain is from scar tissue.       See RUFINO Segura 3/5/2019

## 2019-03-06 ENCOUNTER — HOSPITAL ENCOUNTER (OUTPATIENT)
Dept: PHYSICAL THERAPY | Facility: CLINIC | Age: 59
Setting detail: THERAPIES SERIES
End: 2019-03-06
Attending: UROLOGY
Payer: MEDICARE

## 2019-03-06 PROBLEM — R10.2 CHRONIC PELVIC PAIN IN FEMALE: Status: ACTIVE | Noted: 2019-03-06

## 2019-03-06 PROBLEM — G89.29 CHRONIC PELVIC PAIN IN FEMALE: Status: ACTIVE | Noted: 2019-03-06

## 2019-03-06 PROCEDURE — 97140 MANUAL THERAPY 1/> REGIONS: CPT | Mod: GP | Performed by: PHYSICAL THERAPIST

## 2019-03-06 PROCEDURE — 97110 THERAPEUTIC EXERCISES: CPT | Mod: GP | Performed by: PHYSICAL THERAPIST

## 2019-03-08 ENCOUNTER — NURSE TRIAGE (OUTPATIENT)
Dept: NURSING | Facility: CLINIC | Age: 59
End: 2019-03-08

## 2019-03-08 NOTE — TELEPHONE ENCOUNTER
2 ear infections R ear and 2 courses of abx completed over past month. Still c/o R ear pain and blood/bloody drainage going down throat. No bleeding or discharge from ear canal. Can taste blood all the time and c/o nausea. Vomiting 1x a day w/ scant to small amount of blood and/or brown emesis. Some unsteadiness but not severe.No vertigo. States she has discussed this w/ PCP (non-[facility]) and PCP examined ear. Referred to ENT. Will see ENT in 1 wk. Advised see provider w/i 24 hrs. Call if new/worse sx. Shirin Mcghee RN/FNA      Reason for Disposition    Bloody discharge or unexplained bleeding from ear canal    Additional Information    Negative: Moving the earlobe or touching the ear clearly increases the pain    Negative: Foreign body struck in the ear (e.g., bug, piece of cotton)    Negative: Followed an ear injury    Negative: [1] Recently diagnosed with otitis media AND [2] currently on oral antibiotics    Negative: [1] Stiff neck (unable to touch chin to chest) AND [2] fever    Negative: [1] Bony area of skull behind the ear is pink or swollen AND [2] fever    Negative: Fever > 104 F (40 C)    Negative: Patient sounds very sick or weak to the triager    Negative: [1] SEVERE pain AND [2] not improved 2 hours after taking analgesic medication (e.g., ibuprofen or acetaminophen)    Negative: Walking is very unsteady    Negative: Sudden onset of ear pain after long - thin object was inserted into the ear canal (e.g., pencil, Q-tip)    Negative: Diabetes mellitus or weak immune system (e.g., HIV positive, cancer chemo, splenectomy, organ transplant, chronic steroids)    Negative: Recent onset of blurred vision    Negative: White, yellow, or green discharge    Protocols used: EARACHE-ADULT-

## 2019-03-27 ENCOUNTER — PRE VISIT (OUTPATIENT)
Dept: UROLOGY | Facility: CLINIC | Age: 59
End: 2019-03-27

## 2019-03-27 NOTE — TELEPHONE ENCOUNTER
Reason for visit: PT follow up     Relevant information: Pelvic floor dysfunction    Records/imaging/labs/orders: all records available    Pt called: no need for a call    At Rooming: regular

## 2019-05-23 NOTE — ADDENDUM NOTE
Encounter addended by: Karie Dorado, PT on: 5/23/2019 10:00 AM   Actions taken: Flowsheet data copied forward, Flowsheet accepted, Episode resolved, Sign clinical note

## 2019-05-23 NOTE — PROGRESS NOTES
"PHYSICAL THERAPY DISCHARGE SUMMARY    DATE:  5/23/2019  NAME:  Marissa Youssef           MR#:  [id number]  YOB: 1960  Diagnosis:  Myofascial pain, symphysis pubis pain  Referring Physician:  Elana Ma MD    Patient was seen from 1/16/19 to 3/6/19.    Session Number: 7/8 (, [facility])      Subjective Report: Had CTS on 2/27/19 (B).  Has an ear infection \"issue,\" but won't be seen by ENT for 1.5 weeks.  Did see OB/GYN and found she thinks there is scarring left from her endometriosis, and told that she no longer requires PAP smears.  Continues to have pain.        Objective Measurements:  Objective Measure: Pelvic Alignment  Details: (R) ilium anteriorly rotated, WNL in transverse plane.    Objective Measure: Palpation  Details: TrP in (B) glute meds and piriformis mms.  Decreased fascial mobility in lower abdomen (B) and primarily central over scar tissue.   Objective Measure: Pain  Details: Overall - \"normal dull pain\" and points to (B) SIJ region.  Has not been in to work d/t CTS, so pain is less as she is not on her feet as much.   Objective Measure: Segmental Mobility  Details: No anomalies noted today           Goals:    Goal Identifier STG   Goal Description 1)Pt will report pain at 4/10 or less for full work day in 3 weeks.   Not Met: has not been into work since CTS.(Cont for 2 weeks)   Target Date 03/20/19   Date Met      Progress:     Goal Identifier LTG   Goal Description 2)Pt will report no feeling of needing to push out her urine in 4 weeks.  Met: \"I don't feel I have to push it out anymore.\"    Target Date 02/13/19   Date Met  03/06/19   Progress:     Goal Identifier LTG   Goal Description 3)Pt will report 2/7 days with 2/10 or less pain during work day, in 6 weeks.   Target Date 02/27/19   Date Met      Progress:     Goal Identifier LTG   Goal Description 4)Pt will be indep in HEP to prevent return of symptoms, in 8 weeks.    Target Date 03/13/19   Date Met      Progress:     Goal " Identifier LTG   Goal Description 5)Pt will report sleeping through the nite without pain waking her in 8 weeks.  Met: no longer getting woke with pain, will be up 1x per nite to void.    Target Date 03/13/19   Date Met  03/06/19       Progress Toward Goals:   Progress this reporting period: Last known status as above.  Pt did not return for further recommended sessions.  Current status unkown.    Plan:  Discharge from therapy. Cont with HEP on her own.     Reason for Discharge:  Patient chooses to discontinue therapy.      Karie Dorado, PT, MA  #[id number]

## 2019-07-25 ENCOUNTER — TELEPHONE (OUTPATIENT)
Dept: UROLOGY | Facility: CLINIC | Age: 59
End: 2019-07-25

## 2019-07-25 NOTE — TELEPHONE ENCOUNTER
[facility] Call Center    Phone Message    May a detailed message be left on voicemail: yes    Reason for Call: Other: pt was sent to pelvic floor therapy and to see an OBGYN, pt has stage 4 bladder prolapse, pt is concerned that she was never examined by Dr Ma, pt would like a call back to discuss further     Action Taken: Message routed to:  [facility] ([facility]): Urology

## 2019-07-26 NOTE — TELEPHONE ENCOUNTER
Patient is scheduled to have surgery next week with Dr Jaimes. She wanted to let Dr Ma know how she wasted her time by not completing her examination back in January. As I was reading what was written for the examination the patient started to raise her voice to me to say that was not true, she had me undress and left the room and never came back to the room. Patient says she was sent to physical therapy. When asked if she wanted to return to clinic patient stated never. When asked why are you calling patient states just wanted to make sure you know you wasted my time.  I wished her well with surgery next week and she ended the call    Pau Brantley, RN   Care Coordinator Urology

## 2019-10-02 ENCOUNTER — HEALTH MAINTENANCE LETTER (OUTPATIENT)
Age: 59
End: 2019-10-02

## 2019-12-15 ENCOUNTER — HEALTH MAINTENANCE LETTER (OUTPATIENT)
Age: 59
End: 2019-12-15

## 2020-03-04 ENCOUNTER — RECORDS - HEALTHEAST (OUTPATIENT)
Dept: ADMINISTRATIVE | Facility: OTHER | Age: 60
End: 2020-03-04

## 2020-03-09 ENCOUNTER — RECORDS - HEALTHEAST (OUTPATIENT)
Dept: RADIOLOGY | Facility: CLINIC | Age: 60
End: 2020-03-09

## 2021-01-15 ENCOUNTER — HEALTH MAINTENANCE LETTER (OUTPATIENT)
Age: 61
End: 2021-01-15

## 2021-05-11 ENCOUNTER — TRANSFERRED RECORDS (OUTPATIENT)
Dept: HEALTH INFORMATION MANAGEMENT | Facility: CLINIC | Age: 61
End: 2021-05-11

## 2021-09-04 ENCOUNTER — HEALTH MAINTENANCE LETTER (OUTPATIENT)
Age: 61
End: 2021-09-04

## 2021-10-03 ENCOUNTER — HOSPITAL ENCOUNTER (INPATIENT)
Facility: CLINIC | Age: 61
LOS: 22 days | Discharge: HOME | DRG: 885 | End: 2021-10-25
Attending: EMERGENCY MEDICINE | Admitting: PSYCHIATRY & NEUROLOGY
Payer: MEDICARE

## 2021-10-03 ENCOUNTER — TELEPHONE (OUTPATIENT)
Dept: BEHAVIORAL HEALTH | Facility: CLINIC | Age: 61
End: 2021-10-03

## 2021-10-03 DIAGNOSIS — F31.81 BIPOLAR 2 DISORDER (H): ICD-10-CM

## 2021-10-03 DIAGNOSIS — M79.18 MYALGIA OF PELVIC FLOOR: ICD-10-CM

## 2021-10-03 DIAGNOSIS — K21.00 GASTROESOPHAGEAL REFLUX DISEASE WITH ESOPHAGITIS, UNSPECIFIED WHETHER HEMORRHAGE: ICD-10-CM

## 2021-10-03 DIAGNOSIS — F33.2 MDD (MAJOR DEPRESSIVE DISORDER), RECURRENT SEVERE, WITHOUT PSYCHOSIS (H): Primary | ICD-10-CM

## 2021-10-03 DIAGNOSIS — I10 HYPERTENSION, UNSPECIFIED TYPE: ICD-10-CM

## 2021-10-03 DIAGNOSIS — E55.9 VITAMIN D DEFICIENCY: ICD-10-CM

## 2021-10-03 DIAGNOSIS — M53.3 SI (SACROILIAC) JOINT DYSFUNCTION: ICD-10-CM

## 2021-10-03 DIAGNOSIS — Z11.52 ENCOUNTER FOR SCREENING LABORATORY TESTING FOR SEVERE ACUTE RESPIRATORY SYNDROME CORONAVIRUS 2 (SARS-COV-2): ICD-10-CM

## 2021-10-03 DIAGNOSIS — F41.1 GAD (GENERALIZED ANXIETY DISORDER): ICD-10-CM

## 2021-10-03 DIAGNOSIS — R45.851 SUICIDAL IDEATION: ICD-10-CM

## 2021-10-03 LAB
ALBUMIN SERPL-MCNC: 3.7 G/DL (ref 3.4–5)
ALBUMIN UR-MCNC: NEGATIVE MG/DL
ALP SERPL-CCNC: 103 U/L (ref 40–150)
ALT SERPL W P-5'-P-CCNC: 37 U/L (ref 0–50)
AMORPH CRY #/AREA URNS HPF: ABNORMAL /HPF
AMPHETAMINES UR QL SCN: NORMAL
ANION GAP SERPL CALCULATED.3IONS-SCNC: 3 MMOL/L (ref 3–14)
APPEARANCE UR: ABNORMAL
AST SERPL W P-5'-P-CCNC: 32 U/L (ref 0–45)
BARBITURATES UR QL: NORMAL
BASOPHILS # BLD AUTO: 0 10E3/UL (ref 0–0.2)
BASOPHILS NFR BLD AUTO: 0 %
BENZODIAZ UR QL: NORMAL
BILIRUB SERPL-MCNC: 0.8 MG/DL (ref 0.2–1.3)
BILIRUB UR QL STRIP: NEGATIVE
BUN SERPL-MCNC: 22 MG/DL (ref 7–30)
CALCIUM SERPL-MCNC: 9 MG/DL (ref 8.5–10.1)
CANNABINOIDS UR QL SCN: NORMAL
CAOX CRY #/AREA URNS HPF: ABNORMAL /HPF
CHLORIDE BLD-SCNC: 109 MMOL/L (ref 94–109)
CO2 SERPL-SCNC: 27 MMOL/L (ref 20–32)
COCAINE UR QL: NORMAL
COLOR UR AUTO: ABNORMAL
CREAT SERPL-MCNC: 1.02 MG/DL (ref 0.52–1.04)
EOSINOPHIL # BLD AUTO: 0.2 10E3/UL (ref 0–0.7)
EOSINOPHIL NFR BLD AUTO: 2 %
ERYTHROCYTE [DISTWIDTH] IN BLOOD BY AUTOMATED COUNT: 11.9 % (ref 10–15)
GFR SERPL CREATININE-BSD FRML MDRD: 60 ML/MIN/1.73M2
GLUCOSE BLD-MCNC: 109 MG/DL (ref 70–99)
GLUCOSE UR STRIP-MCNC: NEGATIVE MG/DL
HCG UR QL: NEGATIVE
HCT VFR BLD AUTO: 41.6 % (ref 35–47)
HGB BLD-MCNC: 13.9 G/DL (ref 11.7–15.7)
HGB UR QL STRIP: NEGATIVE
IMM GRANULOCYTES # BLD: 0 10E3/UL
IMM GRANULOCYTES NFR BLD: 0 %
KETONES UR STRIP-MCNC: NEGATIVE MG/DL
LEUKOCYTE ESTERASE UR QL STRIP: ABNORMAL
LYMPHOCYTES # BLD AUTO: 2.9 10E3/UL (ref 0.8–5.3)
LYMPHOCYTES NFR BLD AUTO: 39 %
MCH RBC QN AUTO: 31.3 PG (ref 26.5–33)
MCHC RBC AUTO-ENTMCNC: 33.4 G/DL (ref 31.5–36.5)
MCV RBC AUTO: 94 FL (ref 78–100)
MONOCYTES # BLD AUTO: 1 10E3/UL (ref 0–1.3)
MONOCYTES NFR BLD AUTO: 13 %
MUCOUS THREADS #/AREA URNS LPF: PRESENT /LPF
NEUTROPHILS # BLD AUTO: 3.4 10E3/UL (ref 1.6–8.3)
NEUTROPHILS NFR BLD AUTO: 46 %
NITRATE UR QL: NEGATIVE
NRBC # BLD AUTO: 0 10E3/UL
NRBC BLD AUTO-RTO: 0 /100
OPIATES UR QL SCN: NORMAL
PH UR STRIP: 7 [PH] (ref 5–7)
PLATELET # BLD AUTO: 248 10E3/UL (ref 150–450)
POTASSIUM BLD-SCNC: 3.9 MMOL/L (ref 3.4–5.3)
PROT SERPL-MCNC: 6.9 G/DL (ref 6.8–8.8)
RBC # BLD AUTO: 4.44 10E6/UL (ref 3.8–5.2)
RBC URINE: <1 /HPF
SARS-COV-2 RNA RESP QL NAA+PROBE: NEGATIVE
SODIUM SERPL-SCNC: 139 MMOL/L (ref 133–144)
SP GR UR STRIP: 1.02 (ref 1–1.03)
SQUAMOUS EPITHELIAL: 2 /HPF
TRANSITIONAL EPI: 1 /HPF
UROBILINOGEN UR STRIP-MCNC: NORMAL MG/DL
WBC # BLD AUTO: 7.5 10E3/UL (ref 4–11)
WBC URINE: 52 /HPF

## 2021-10-03 PROCEDURE — 90791 PSYCH DIAGNOSTIC EVALUATION: CPT

## 2021-10-03 PROCEDURE — 81001 URINALYSIS AUTO W/SCOPE: CPT | Performed by: EMERGENCY MEDICINE

## 2021-10-03 PROCEDURE — 87086 URINE CULTURE/COLONY COUNT: CPT | Performed by: EMERGENCY MEDICINE

## 2021-10-03 PROCEDURE — 80053 COMPREHEN METABOLIC PANEL: CPT | Performed by: EMERGENCY MEDICINE

## 2021-10-03 PROCEDURE — 250N000013 HC RX MED GY IP 250 OP 250 PS 637: Performed by: NURSE PRACTITIONER

## 2021-10-03 PROCEDURE — 99284 EMERGENCY DEPT VISIT MOD MDM: CPT | Performed by: EMERGENCY MEDICINE

## 2021-10-03 PROCEDURE — H2032 ACTIVITY THERAPY, PER 15 MIN: HCPCS

## 2021-10-03 PROCEDURE — 81025 URINE PREGNANCY TEST: CPT | Performed by: EMERGENCY MEDICINE

## 2021-10-03 PROCEDURE — 85025 COMPLETE CBC W/AUTO DIFF WBC: CPT | Performed by: EMERGENCY MEDICINE

## 2021-10-03 PROCEDURE — 250N000013 HC RX MED GY IP 250 OP 250 PS 637: Performed by: EMERGENCY MEDICINE

## 2021-10-03 PROCEDURE — 250N000011 HC RX IP 250 OP 636: Performed by: EMERGENCY MEDICINE

## 2021-10-03 PROCEDURE — 36415 COLL VENOUS BLD VENIPUNCTURE: CPT | Performed by: EMERGENCY MEDICINE

## 2021-10-03 PROCEDURE — C9803 HOPD COVID-19 SPEC COLLECT: HCPCS | Performed by: EMERGENCY MEDICINE

## 2021-10-03 PROCEDURE — 124N000003 HC R&B MH SENIOR/ADOLESCENT

## 2021-10-03 PROCEDURE — U0003 INFECTIOUS AGENT DETECTION BY NUCLEIC ACID (DNA OR RNA); SEVERE ACUTE RESPIRATORY SYNDROME CORONAVIRUS 2 (SARS-COV-2) (CORONAVIRUS DISEASE [COVID-19]), AMPLIFIED PROBE TECHNIQUE, MAKING USE OF HIGH THROUGHPUT TECHNOLOGIES AS DESCRIBED BY CMS-2020-01-R: HCPCS | Performed by: EMERGENCY MEDICINE

## 2021-10-03 PROCEDURE — 99223 1ST HOSP IP/OBS HIGH 75: CPT | Mod: AI | Performed by: NURSE PRACTITIONER

## 2021-10-03 PROCEDURE — 80307 DRUG TEST PRSMV CHEM ANLYZR: CPT | Performed by: EMERGENCY MEDICINE

## 2021-10-03 PROCEDURE — 99285 EMERGENCY DEPT VISIT HI MDM: CPT | Mod: 25 | Performed by: EMERGENCY MEDICINE

## 2021-10-03 RX ORDER — AMLODIPINE BESYLATE 5 MG/1
5 TABLET ORAL DAILY
Status: ON HOLD | COMMUNITY
End: 2021-10-13

## 2021-10-03 RX ORDER — QUETIAPINE 300 MG/1
300 TABLET, FILM COATED, EXTENDED RELEASE ORAL AT BEDTIME
Status: DISCONTINUED | OUTPATIENT
Start: 2021-10-03 | End: 2021-10-03

## 2021-10-03 RX ORDER — LANOLIN ALCOHOL/MO/W.PET/CERES
3 CREAM (GRAM) TOPICAL
Status: DISCONTINUED | OUTPATIENT
Start: 2021-10-03 | End: 2021-10-03

## 2021-10-03 RX ORDER — TOPIRAMATE SPINKLE 25 MG/1
50 CAPSULE ORAL EVERY 12 HOURS SCHEDULED
Status: DISCONTINUED | OUTPATIENT
Start: 2021-10-03 | End: 2021-10-03

## 2021-10-03 RX ORDER — NALOXONE HYDROCHLORIDE 0.4 MG/ML
0.4 INJECTION, SOLUTION INTRAMUSCULAR; INTRAVENOUS; SUBCUTANEOUS
Status: DISCONTINUED | OUTPATIENT
Start: 2021-10-03 | End: 2021-10-25 | Stop reason: HOSPADM

## 2021-10-03 RX ORDER — MULTIVIT-MIN/IRON/FOLIC ACID/K 18-600-40
1000 CAPSULE ORAL
Status: ON HOLD | COMMUNITY
End: 2021-10-13

## 2021-10-03 RX ORDER — QUETIAPINE FUMARATE 50 MG/1
100 TABLET, EXTENDED RELEASE ORAL AT BEDTIME
Status: DISCONTINUED | OUTPATIENT
Start: 2021-10-03 | End: 2021-10-03

## 2021-10-03 RX ORDER — PROPRANOLOL HYDROCHLORIDE 10 MG/1
10 TABLET ORAL 2 TIMES DAILY PRN
Status: ON HOLD | COMMUNITY
End: 2021-10-13

## 2021-10-03 RX ORDER — PANTOPRAZOLE SODIUM 40 MG/1
40 TABLET, DELAYED RELEASE ORAL DAILY
Status: DISCONTINUED | OUTPATIENT
Start: 2021-10-03 | End: 2021-10-25 | Stop reason: HOSPADM

## 2021-10-03 RX ORDER — QUETIAPINE FUMARATE 50 MG/1
100 TABLET, EXTENDED RELEASE ORAL AT BEDTIME
Status: ON HOLD | COMMUNITY
End: 2021-10-13

## 2021-10-03 RX ORDER — MULTIVITAMIN,THERAPEUTIC
1 TABLET ORAL DAILY
Status: DISCONTINUED | OUTPATIENT
Start: 2021-10-03 | End: 2021-10-25 | Stop reason: HOSPADM

## 2021-10-03 RX ORDER — HYDROXYZINE HYDROCHLORIDE 50 MG/1
50 TABLET, FILM COATED ORAL 3 TIMES DAILY PRN
Status: ON HOLD | COMMUNITY
End: 2021-10-13

## 2021-10-03 RX ORDER — OLANZAPINE 5 MG/1
5 TABLET ORAL 3 TIMES DAILY PRN
Status: DISCONTINUED | OUTPATIENT
Start: 2021-10-03 | End: 2021-10-25 | Stop reason: HOSPADM

## 2021-10-03 RX ORDER — ONDANSETRON 4 MG/1
TABLET, FILM COATED ORAL 2 TIMES DAILY
Status: ON HOLD | COMMUNITY
End: 2021-10-13

## 2021-10-03 RX ORDER — HYDROXYZINE HYDROCHLORIDE 50 MG/1
50 TABLET, FILM COATED ORAL 3 TIMES DAILY PRN
Status: DISCONTINUED | OUTPATIENT
Start: 2021-10-03 | End: 2021-10-25 | Stop reason: HOSPADM

## 2021-10-03 RX ORDER — NICOTINE POLACRILEX 4 MG/1
40 GUM, CHEWING ORAL DAILY
Status: ON HOLD | COMMUNITY
End: 2021-10-13

## 2021-10-03 RX ORDER — NALOXONE HYDROCHLORIDE 0.4 MG/ML
0.2 INJECTION, SOLUTION INTRAMUSCULAR; INTRAVENOUS; SUBCUTANEOUS
Status: DISCONTINUED | OUTPATIENT
Start: 2021-10-03 | End: 2021-10-25 | Stop reason: HOSPADM

## 2021-10-03 RX ORDER — DULOXETIN HYDROCHLORIDE 60 MG/1
60 CAPSULE, DELAYED RELEASE ORAL DAILY
Status: DISCONTINUED | OUTPATIENT
Start: 2021-10-03 | End: 2021-10-06

## 2021-10-03 RX ORDER — QUETIAPINE FUMARATE 200 MG/1
200 TABLET, FILM COATED ORAL AT BEDTIME
Status: DISCONTINUED | OUTPATIENT
Start: 2021-10-03 | End: 2021-10-04

## 2021-10-03 RX ORDER — CEPHALEXIN 500 MG/1
500 CAPSULE ORAL EVERY 12 HOURS SCHEDULED
Status: DISCONTINUED | OUTPATIENT
Start: 2021-10-03 | End: 2021-10-04

## 2021-10-03 RX ORDER — PHENOL 1.4 %
10 AEROSOL, SPRAY (ML) MUCOUS MEMBRANE
Status: ON HOLD | COMMUNITY
End: 2021-10-13

## 2021-10-03 RX ORDER — AMOXICILLIN 250 MG
1 CAPSULE ORAL 2 TIMES DAILY PRN
Status: DISCONTINUED | OUTPATIENT
Start: 2021-10-03 | End: 2021-10-25 | Stop reason: HOSPADM

## 2021-10-03 RX ORDER — TOPIRAMATE 50 MG/1
50 TABLET, FILM COATED ORAL 2 TIMES DAILY
Status: ON HOLD | COMMUNITY
End: 2021-10-13

## 2021-10-03 RX ORDER — MAGNESIUM HYDROXIDE/ALUMINUM HYDROXICE/SIMETHICONE 120; 1200; 1200 MG/30ML; MG/30ML; MG/30ML
30 SUSPENSION ORAL EVERY 4 HOURS PRN
Status: DISCONTINUED | OUTPATIENT
Start: 2021-10-03 | End: 2021-10-25 | Stop reason: HOSPADM

## 2021-10-03 RX ORDER — ONDANSETRON 4 MG/1
4 TABLET, FILM COATED ORAL EVERY 6 HOURS PRN
Status: DISCONTINUED | OUTPATIENT
Start: 2021-10-03 | End: 2021-10-25 | Stop reason: HOSPADM

## 2021-10-03 RX ORDER — TOPIRAMATE SPINKLE 25 MG/1
50 CAPSULE ORAL DAILY
Status: DISCONTINUED | OUTPATIENT
Start: 2021-10-03 | End: 2021-10-03

## 2021-10-03 RX ORDER — QUETIAPINE FUMARATE 200 MG/1
200 TABLET, FILM COATED ORAL AT BEDTIME
Status: ON HOLD | COMMUNITY
End: 2021-10-13

## 2021-10-03 RX ORDER — OLANZAPINE 10 MG/2ML
5 INJECTION, POWDER, FOR SOLUTION INTRAMUSCULAR 3 TIMES DAILY PRN
Status: DISCONTINUED | OUTPATIENT
Start: 2021-10-03 | End: 2021-10-25 | Stop reason: HOSPADM

## 2021-10-03 RX ORDER — VITAMIN B COMPLEX
1000 TABLET ORAL DAILY
Status: DISCONTINUED | OUTPATIENT
Start: 2021-10-03 | End: 2021-10-25 | Stop reason: HOSPADM

## 2021-10-03 RX ORDER — PROPRANOLOL HYDROCHLORIDE 10 MG/1
10 TABLET ORAL 2 TIMES DAILY PRN
Status: DISCONTINUED | OUTPATIENT
Start: 2021-10-03 | End: 2021-10-04

## 2021-10-03 RX ORDER — AMLODIPINE BESYLATE 5 MG/1
5 TABLET ORAL DAILY
Status: DISCONTINUED | OUTPATIENT
Start: 2021-10-03 | End: 2021-10-25 | Stop reason: HOSPADM

## 2021-10-03 RX ORDER — QUETIAPINE FUMARATE 200 MG/1
200 TABLET, FILM COATED ORAL AT BEDTIME
Status: DISCONTINUED | OUTPATIENT
Start: 2021-10-03 | End: 2021-10-03

## 2021-10-03 RX ORDER — QUETIAPINE 300 MG/1
300 TABLET, FILM COATED, EXTENDED RELEASE ORAL AT BEDTIME
COMMUNITY
End: 2021-10-03

## 2021-10-03 RX ORDER — TOPIRAMATE 50 MG/1
50 TABLET, FILM COATED ORAL 2 TIMES DAILY
Status: DISCONTINUED | OUTPATIENT
Start: 2021-10-03 | End: 2021-10-04

## 2021-10-03 RX ORDER — QUETIAPINE FUMARATE 50 MG/1
100 TABLET, EXTENDED RELEASE ORAL AT BEDTIME
Status: DISCONTINUED | OUTPATIENT
Start: 2021-10-03 | End: 2021-10-04

## 2021-10-03 RX ADMIN — THERA TABS 1 TABLET: TAB at 07:58

## 2021-10-03 RX ADMIN — PANTOPRAZOLE SODIUM 40 MG: 40 TABLET, DELAYED RELEASE ORAL at 07:58

## 2021-10-03 RX ADMIN — QUETIAPINE FUMARATE 100 MG: 50 TABLET, EXTENDED RELEASE ORAL at 21:28

## 2021-10-03 RX ADMIN — TOPIRAMATE 50 MG: 50 TABLET, FILM COATED ORAL at 19:52

## 2021-10-03 RX ADMIN — Medication 1000 UNITS: at 12:46

## 2021-10-03 RX ADMIN — Medication 10 MG: at 21:28

## 2021-10-03 RX ADMIN — ACETAMINOPHEN AND CODEINE PHOSPHATE 2 TABLET: 300; 30 TABLET ORAL at 08:13

## 2021-10-03 RX ADMIN — CEPHALEXIN 500 MG: 500 CAPSULE ORAL at 09:20

## 2021-10-03 RX ADMIN — TOPIRAMATE 50 MG: 25 CAPSULE, COATED PELLETS ORAL at 09:50

## 2021-10-03 RX ADMIN — HYDROXYZINE HYDROCHLORIDE 50 MG: 50 TABLET, FILM COATED ORAL at 07:47

## 2021-10-03 RX ADMIN — CEPHALEXIN 500 MG: 500 CAPSULE ORAL at 19:52

## 2021-10-03 RX ADMIN — AMLODIPINE BESYLATE 5 MG: 5 TABLET ORAL at 12:46

## 2021-10-03 RX ADMIN — QUETIAPINE 200 MG: 200 TABLET, FILM COATED ORAL at 21:28

## 2021-10-03 RX ADMIN — HYDROXYZINE HYDROCHLORIDE 50 MG: 50 TABLET, FILM COATED ORAL at 19:52

## 2021-10-03 RX ADMIN — ONDANSETRON HYDROCHLORIDE 4 MG: 4 TABLET, FILM COATED ORAL at 21:28

## 2021-10-03 RX ADMIN — DULOXETINE HYDROCHLORIDE 60 MG: 30 CAPSULE, DELAYED RELEASE ORAL at 07:58

## 2021-10-03 ASSESSMENT — ENCOUNTER SYMPTOMS
NAUSEA: 0
FEVER: 0
COUGH: 0
SORE THROAT: 0
BACK PAIN: 0
DIFFICULTY URINATING: 0
SLEEP DISTURBANCE: 0
ABDOMINAL PAIN: 0
HEADACHES: 0
EYE REDNESS: 0
NECK PAIN: 0
VOMITING: 0
DYSPHORIC MOOD: 1
SHORTNESS OF BREATH: 0
DYSURIA: 0

## 2021-10-03 ASSESSMENT — ACTIVITIES OF DAILY LIVING (ADL)
DRESS: INDEPENDENT
WALKING_OR_CLIMBING_STAIRS_DIFFICULTY: NO
ORAL_HYGIENE: INDEPENDENT
CONCENTRATING,_REMEMBERING_OR_MAKING_DECISIONS_DIFFICULTY: NO
TOILETING_ISSUES: NO
VISION_MANAGEMENT: GLASSES
HEARING_DIFFICULTY_OR_DEAF: NO
DRESSING/BATHING_DIFFICULTY: NO
HYGIENE/GROOMING: INDEPENDENT
DIFFICULTY_COMMUNICATING: NO
HYGIENE/GROOMING: INDEPENDENT
DRESS: INDEPENDENT
WEAR_GLASSES_OR_BLIND: YES
PATIENT_/_FAMILY_COMMUNICATION_STYLE: SPOKEN LANGUAGE (ENGLISH OR BILINGUAL)
DIFFICULTY_EATING/SWALLOWING: NO
ORAL_HYGIENE: INDEPENDENT
DOING_ERRANDS_INDEPENDENTLY_DIFFICULTY: NO

## 2021-10-03 ASSESSMENT — MIFFLIN-ST. JEOR: SCORE: 1396.42

## 2021-10-03 NOTE — TELEPHONE ENCOUNTER
8:05a Provider called intake. The pt's UA has abnormal results. Provider would like the ER doc to assess the pt's UA. The pt can been placed on 3B for admission after consult.     8:06p Intake called ER RN to report the providers concerns. Intake awaiting return call from ER that the pt is cleared.

## 2021-10-03 NOTE — TELEPHONE ENCOUNTER
R: Pt fully medically cleared and has received first dose of antibiotics.   Pt accepted for 3B/[facility]. Pt placed in queue.   ED and unit informed of disposition. Unit is ready for Pt.

## 2021-10-03 NOTE — ED PROVIDER NOTES
ED Provider Note  [facility]      History     Chief Complaint   Patient presents with     Suicidal     Inpatient in [place]-bad experience, then went to [place]-  beds. Discharged then came here for evaluation. Suicidal thought with plan to OD. Pt states she OD in [place]. Took Propranolol  Amlodipine.     HPI  Marissa Youssef is a 60 year old female who presents to the emergency department with ongoing suicidal ideation.  She was admitted to the hospital in [place] last week after amlodipine and propranolol overdose.  She continues to feel suicidal and unsafe at home.  She went to [facility] in [place] yesterday and was going to be a voluntary admission; however, there are no beds available.  Patient subsequently was discharged and decided to come here.  She continues to endorse suicidal ideation with plan to overdose.  She denies any subsequent attempt at self-harm.  She denies any medical concerns or recent illness.    Past Medical History  Past Medical History:   Diagnosis Date     Anxiety      Depressive disorder      Diabetes (H)     Diet controlled.     Eating disorder      Hx of previous reproductive problem 1977     Hyperlipidemia      Hypertension      Pelvic floor instability      PTSD (post-traumatic stress disorder)      Sphincter of Oddi dysfunction      Past Surgical History:   Procedure Laterality Date     ABDOMEN SURGERY  1999     CHOLECYSTECTOMY  2000     COLONOSCOPY  2018     GYN SURGERY  2005    Complete hysterectomy     ORTHOPEDIC SURGERY      right knee replacement 2008     SOFT TISSUE SURGERY  2019    Carpel tunnel, trigger finger release     No current outpatient medications on file.    No Known Allergies  Family History  No family history on file.  Social History   Social History     Tobacco Use     Smoking status: Former Smoker     Smokeless tobacco: Never Used     Tobacco comment: 3 cigarettes a day   Substance Use Topics      "Alcohol use: No     Drug use: No      Past medical history, past surgical history, medications, allergies, family history, and social history were reviewed with the patient. No additional pertinent items.       Review of Systems   Constitutional: Negative for fever.   HENT: Negative for sore throat.    Eyes: Negative for redness.   Respiratory: Negative for cough and shortness of breath.    Cardiovascular: Negative for chest pain.   Gastrointestinal: Negative for abdominal pain, nausea and vomiting.   Genitourinary: Negative for difficulty urinating and dysuria.   Musculoskeletal: Negative for back pain and neck pain.   Skin: Negative for rash.   Neurological: Negative for headaches.   Psychiatric/Behavioral: Positive for dysphoric mood and suicidal ideas. Negative for sleep disturbance.   All other systems reviewed and are negative.    A complete review of systems was performed with pertinent positives and negatives noted in the HPI, and all other systems negative.    Physical Exam   BP: 136/86  Pulse: 79  Temp: 98.6  F (37  C)  Resp: 16  Height: 160 cm (5' 3\")  Weight: 86.2 kg (190 lb)  SpO2: 98 %  Physical Exam  Vitals and nursing note reviewed.   Constitutional:       General: She is not in acute distress.     Appearance: Normal appearance. She is not diaphoretic.   HENT:      Head: Normocephalic and atraumatic.      Mouth/Throat:      Pharynx: No oropharyngeal exudate.   Eyes:      General: No scleral icterus.     Pupils: Pupils are equal, round, and reactive to light.   Cardiovascular:      Rate and Rhythm: Normal rate.      Heart sounds: Normal heart sounds.   Pulmonary:      Effort: Pulmonary effort is normal. No respiratory distress.      Breath sounds: Normal breath sounds.   Abdominal:      General: Bowel sounds are normal.      Palpations: Abdomen is soft.      Tenderness: There is no abdominal tenderness.   Musculoskeletal:         General: No tenderness. Normal range of motion.   Skin:     General: " Skin is warm.      Capillary Refill: Capillary refill takes less than 2 seconds.      Findings: No rash.   Neurological:      General: No focal deficit present.      Mental Status: She is alert.      Motor: No weakness.      Gait: Gait normal.         ED Course      Procedures       The medical record was reviewed and interpreted.  Current labs reviewed and interpreted.       Results for orders placed or performed during the hospital encounter of 10/03/21   HCG qualitative urine     Status: Normal   Result Value Ref Range    hCG Urine Qualitative Negative Negative   Drug abuse screen 1 urine (ED)     Status: Normal   Result Value Ref Range    Amphetamines Urine Screen Negative Screen Negative    Barbiturates Urine Screen Negative Screen Negative    Benzodiazepines Urine Screen Negative Screen Negative    Cannabinoids Urine Screen Negative Screen Negative    Cocaine Urine Screen Negative Screen Negative    Opiates Urine Screen Negative Screen Negative   Asymptomatic COVID-19 Virus (Coronavirus) by PCR Nasopharyngeal     Status: Normal    Specimen: Nasopharyngeal; Swab   Result Value Ref Range    SARS CoV2 PCR Negative Negative    Narrative    Testing was performed using the [employer]  SARS-CoV-2 & Influenza A/B Assay on the [employer]  [employer]  System.  This test should be ordered for the detection of SARS-COV-2 in individuals who meet SARS-CoV-2 clinical and/or epidemiological criteria. Test performance is unknown in asymptomatic patients.  This test is for in vitro diagnostic use under the FDA EUA for laboratories certified under CLIA to perform moderate and/or high complexity testing. This test has not been FDA cleared or approved.  A negative test does not rule out the presence of PCR inhibitors in the specimen or target RNA in concentration below the limit of detection for the assay. The possibility of a false negative should be considered if the patient's recent exposure or clinical presentation suggests COVID-19.  [facility] are certified under the Clinical Laboratory Improvement Amendments of 1988 (CLIA-88) as qualified to perform moderate and/or high complexity laboratory testing.   Comprehensive metabolic panel     Status: Abnormal   Result Value Ref Range    Sodium 139 133 - 144 mmol/L    Potassium 3.9 3.4 - 5.3 mmol/L    Chloride 109 94 - 109 mmol/L    Carbon Dioxide (CO2) 27 20 - 32 mmol/L    Anion Gap 3 3 - 14 mmol/L    Urea Nitrogen 22 7 - 30 mg/dL    Creatinine 1.02 0.52 - 1.04 mg/dL    Calcium 9.0 8.5 - 10.1 mg/dL    Glucose 109 (H) 70 - 99 mg/dL    Alkaline Phosphatase 103 40 - 150 U/L    AST 32 0 - 45 U/L    ALT 37 0 - 50 U/L    Protein Total 6.9 6.8 - 8.8 g/dL    Albumin 3.7 3.4 - 5.0 g/dL    Bilirubin Total 0.8 0.2 - 1.3 mg/dL    GFR Estimate 60 (L) >60 mL/min/1.73m2   UA with Microscopic reflex to Culture     Status: Abnormal    Specimen: Urine, Midstream   Result Value Ref Range    Color Urine Light Yellow Colorless, Straw, Light Yellow, Yellow    Appearance Urine Slightly Cloudy (A) Clear    Glucose Urine Negative Negative mg/dL    Bilirubin Urine Negative Negative    Ketones Urine Negative Negative mg/dL    Specific Gravity Urine 1.016 1.003 - 1.035    Blood Urine Negative Negative    pH Urine 7.0 5.0 - 7.0    Protein Albumin Urine Negative Negative mg/dL    Urobilinogen Urine Normal Normal, 2.0 mg/dL    Nitrite Urine Negative Negative    Leukocyte Esterase Urine Large (A) Negative    Mucus Urine Present (A) None Seen /LPF    Amorphous Crystals Urine Few (A) None Seen /HPF    Calcium Oxalate Crystals Urine Few (A) None Seen /HPF    RBC Urine <1 <=2 /HPF    WBC Urine 52 (H) <=5 /HPF    Squamous Epithelials Urine 2 (H) <=1 /HPF    Transitional Epithelials Urine 1 <=1 /HPF    Narrative    Urine Culture ordered based on laboratory criteria   CBC with platelets and differential     Status: None   Result Value Ref Range    WBC Count 7.5 4.0 - 11.0 10e3/uL    RBC Count 4.44 3.80 - 5.20 10e6/uL     Hemoglobin 13.9 11.7 - 15.7 g/dL    Hematocrit 41.6 35.0 - 47.0 %    MCV 94 78 - 100 fL    MCH 31.3 26.5 - 33.0 pg    MCHC 33.4 31.5 - 36.5 g/dL    RDW 11.9 10.0 - 15.0 %    Platelet Count 248 150 - 450 10e3/uL    % Neutrophils 46 %    % Lymphocytes 39 %    % Monocytes 13 %    % Eosinophils 2 %    % Basophils 0 %    % Immature Granulocytes 0 %    NRBCs per 100 WBC 0 <1 /100    Absolute Neutrophils 3.4 1.6 - 8.3 10e3/uL    Absolute Lymphocytes 2.9 0.8 - 5.3 10e3/uL    Absolute Monocytes 1.0 0.0 - 1.3 10e3/uL    Absolute Eosinophils 0.2 0.0 - 0.7 10e3/uL    Absolute Basophils 0.0 0.0 - 0.2 10e3/uL    Absolute Immature Granulocytes 0.0 <=0.0 10e3/uL    Absolute NRBCs 0.0 10e3/uL   Urine Drugs of Abuse Screen     Status: Normal    Narrative    The following orders were created for panel order Urine Drugs of Abuse Screen.  Procedure                               Abnormality         Status                     ---------                               -----------         ------                     Drug abuse screen 1 urin...[[id number]]  Normal              Final result                 Please view results for these tests on the individual orders.   CBC with platelets differential     Status: None    Narrative    The following orders were created for panel order CBC with platelets differential.  Procedure                               Abnormality         Status                     ---------                               -----------         ------                     CBC with platelets and d...[[id number]]                      Final result                 Please view results for these tests on the individual orders.     Medications   DULoxetine (CYMBALTA) DR capsule 60 mg (60 mg Oral Given 10/3/21 5149)   hydrOXYzine (ATARAX) tablet 50 mg (50 mg Oral Given 10/3/21 1159)   multivitamin, therapeutic (THERA-VIT) tablet 1 tablet (1 tablet Oral Given 10/3/21 2948)   pantoprazole (PROTONIX) EC tablet 40 mg (40 mg Oral Given 10/3/21  0758)   ondansetron (ZOFRAN) tablet 4 mg (has no administration in time range)   cephALEXin (KEFLEX) capsule 500 mg (500 mg Oral Given 10/3/21 0920)   alum & mag hydroxide-simethicone (MAALOX) suspension 30 mL (has no administration in time range)   senna-docusate (SENOKOT-S/PERICOLACE) 8.6-50 MG per tablet 1 tablet (has no administration in time range)   OLANZapine (zyPREXA) tablet 5 mg (has no administration in time range)     Or   OLANZapine (zyPREXA) injection 5 mg (has no administration in time range)   amLODIPine (NORVASC) tablet 5 mg (5 mg Oral Given 10/3/21 1246)   propranolol (INDERAL) tablet 10 mg (has no administration in time range)   QUEtiapine (SEROquel XR) 24 hr tablet 100 mg (has no administration in time range)   QUEtiapine (SEROquel) tablet 200 mg (has no administration in time range)   topiramate (TOPAMAX) tablet 50 mg (has no administration in time range)   Vitamin D3 (CHOLECALCIFEROL) tablet 1,000 Units (1,000 Units Oral Given 10/3/21 1246)   Melatonin tablet 10 mg (has no administration in time range)   acetaminophen-codeine (TYLENOL #3) 300-30 MG per tablet 1 tablet (has no administration in time range)   naloxone (NARCAN) injection 0.2 mg (has no administration in time range)     Or   naloxone (NARCAN) injection 0.4 mg (has no administration in time range)     Or   naloxone (NARCAN) injection 0.2 mg (has no administration in time range)     Or   naloxone (NARCAN) injection 0.4 mg (has no administration in time range)   acetaminophen-codeine (TYLENOL #3) 300-30 MG per tablet 2 tablet (2 tablets Oral Given 10/3/21 0813)         Patient evaluated by DEC .  Patient discussed with DEC consultant.  See note for complete details.       Assessments & Plan (with Medical Decision Making)   60 year old female with history of depression and borderline personality through disorder to the emergency department with worsening symptoms of depression.  She is unable to contract for safety.  She feels  suicidal but has not attempted to harm herself in the past day.  She reportedly overdosed last week on beta-blocker and amlodipine and was admitted to hospital in [place].  She endorses ongoing suicidal ideation.  She does not have any medical concerns or acute illness.  She does have white blood cells in her urine but no symptoms of UTI.  Will perform urine culture.  Patient underwent DEC assessment.  She will be admitted on a voluntary basis.    I have reviewed the nursing notes. I have reviewed the findings, diagnosis, plan and need for follow up with the patient.    Current Discharge Medication List          Final diagnoses:   Suicidal ideation     Chart documentation was completed with Dragon voice-recognition software. Even though reviewed, this chart may still contain some grammatical, spelling, and word errors.     --  Wagner Jimenez Md  [facility] EMERGENCY DEPARTMENT  10/3/2021     Wagner Jimenez MD  10/03/21 4396
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
## 2021-10-03 NOTE — PLAN OF CARE
"Resting in bed this afternoon because of the lack of sleep.  States her anxiety remains at 8/10 but depression is now about a 4-5/10.  Denies SI or the wish to be dead at this time.  Visible in the lounge watching TV.  Very pleasant.  Appears comfortable.    At approx 2000 patient c/o \"chest pain\", upper midsternal, nonradiating, no SOB.  VS as follows: /85, HR 82, 98% room air, afebrile.  Gill lighter notified of chest discomfort and examined the patient.  Patient states she had a recent ECHO which was negative.  Also an angiogram 3 yrs ago which was also negative.  Patient believes this is anxiety.  Patient given hydroxyzine 50mg per request and pain was gone within 10-15 min.  Patient states she will inform us if the discomfort returns or changes in nature.    Out in the lounge visiting with peers.  Appears comfortable.    P:  Continue to monitor closely.  "
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
## 2021-10-03 NOTE — TELEPHONE ENCOUNTER
Patient cleared and ready for behavioral bed placement: Yes     S: 61 y/o female presented to the [facility] ED with SI and depression.      B: Hx depression, SI, fibromyalgia, hypertension.  Pt reported she overdosed on propanolol and amlodipine on Wednesday in [place] and was referred for IP MH.  Pt then went to [place] in [place] and was discharged but she reported she was still suicidal.  There were no beds available in [place] so pt subsequently drove with  to [place].  Hx 12 suicide attempts via overdose.  Pt does not feel she can keep herself safe at home and pt's  is unable to monitor her 24/7.  No reported HI, psychosis or substance abuse.    A: Voluntary  No acute medical concerns.  COVID has been ordered.  Drug screen  HCG negative  Most recent vitals: 136/86 P 79 T 98.6    R: [id number] On-Call paged.    0648 On-Call requesting CBC, CMP and UA for admission to .  MD notified at 0650.  ED will notify Intake once requested lab results are back.  Case will be passed to days at 0700 to track.

## 2021-10-03 NOTE — PHARMACY-ADMISSION MEDICATION HISTORY
Admission Medication History status for the 10/3/2021 admission is complete.  See EPIC admission navigator for Prior to Admission medications.    Medication history sources:  patient and Surescripts     Medication history source reliability: Good    Medication adherence:  Good    Changes made to PTA medication list (reason)  Added:   1. Topamax 50 po bid  2. Propranolol 10 mg po bid prn  3. Amlodipine 5 mg po daily    4. Vitamin D, Cholecalciferol, 25 MCG (1000 UT) TABS  Deleted: n/a  Changed:   1. QUEtiapine (SEROQUEL XR) 300 MG TB24 24 hr tablet at bedtime changed to QUEtiapine (SEROQUEL XR) 100 MG TB24 24 hr tablet at bedtime and QUEtiapine (SEROQUEL ) 200 MG tab at bedtime     Additional medication history information (including reliability of information, actions taken by pharmacist): None    Time spent in this activity: 20 minutes     Medication history completed by: Dianna Che Pharm.D    Prior to Admission medications    Medication Sig Last Dose Taking? Auth Provider   acetaminophen-codeine (TYLENOL #3) 300-30 MG per tablet Take 1 tablet by mouth every 6 hours as needed for severe pain Past Week at Unknown time Yes Reported, Patient   amLODIPine (NORVASC) 5 MG tablet Take 5 mg by mouth daily Past Week at Unknown time Yes Unknown, Entered By History   Calcium Carbonate-Vit D-Min (CALCIUM 1200 PO) Take 600 capsules by mouth Past Week at Unknown time Yes Reported, Patient   DULOXETINE HCL PO Take 60 mg by mouth daily 10/3/2021 at Unknown time Yes Reported, Patient   hydrOXYzine (ATARAX) 50 MG tablet Take 50 mg by mouth 3 times daily as needed for itching Past Week at Unknown time Yes Reported, Patient   Melatonin 10 MG TABS tablet Take 10 mg by mouth nightly as needed for sleep Past Week at Unknown time Yes Reported, Patient   multivitamin, therapeutic (THERA-VIT) TABS tablet Take 1 tablet by mouth daily Past Week at Unknown time Yes Reported, Patient   omeprazole 20 MG tablet Take 40 mg by mouth daily  Past Week at Unknown time Yes Reported, Patient   ondansetron (ZOFRAN) 4 MG tablet Take by mouth 2 times daily AM and HS Past Week at Unknown time Yes Reported, Patient   propranolol (INDERAL) 10 MG tablet Take 10 mg by mouth 2 times daily as needed Past Week at Unknown time Yes Unknown, Entered By History   QUEtiapine (SEROQUEL XR) 50 MG TB24 24 hr tablet Take 100 mg by mouth At Bedtime Pt takes Seroquel XR 100MG at bedtime AND Seroquel 200MG at bedtime Past Week at Unknown time Yes Unknown, Entered By History   QUEtiapine (SEROQUEL) 200 MG tablet Take 200 mg by mouth At Bedtime Pt takes Seroquel XR 100MG at bedtime AND Seroquel 200MG at bedtime Past Week at Unknown time Yes Unknown, Entered By History   topiramate (TOPAMAX) 50 MG tablet Take 50 mg by mouth 2 times daily Past Week at Unknown time Yes Unknown, Entered By History   Vitamin D, Cholecalciferol, 25 MCG (1000 UT) TABS Take 1,000 Units by mouth Past Week at Unknown time Yes Unknown, Entered By History

## 2021-10-03 NOTE — PROGRESS NOTES
10/03/21 1133   Patient Belongings   Did you bring any home meds/supplements to the hospital?  No   Patient Belongings locker;sent to security per site process   Patient Belongings Put in Hospital Secure Location (Security or Locker, etc.) cash/credit card;cell phone/electronics;purse/wallet;shoes   Belongings Search Yes     In locker:  1 phone, 1 pair of crocs, 1 pad, 1 floral print wallet, [employer] membership card, [employer] library card, Silver sneakers card, Roadside assistance card, [employer] card, [employer] card, Covid vaxx card, dental care card, [place] drivers license, [place] [employer] membership card, [employer] auto card, Smart trip card, AARP medicare card, AARP membership card, phone number list, medica card, medicare health insurance card, & speedway cards x3    In security:  [place] debit card x2, [employer] visa card, Capital One card, Care credit card, [employer] credit union card, & $10.50    Addendum 10/4:    With Pt:  5 gold and silver colored rings, 4 silver colored earrings, red pants, socks, shirt, glasses, sweatpants, sweatshirt, t shirt, flannel shirt, gregg, underwear x2, toothbrush, toothpaste    In locker:  Pink cap, black pants, black sweater, hairbrush, underwear x3, purple t shirt, beige gregg, smithsonian tshirt, toothpaste, socks x2.    Security:  Sealed envelope with meds.    A               Admission:  I am responsible for any personal items that are not sent to the safe or pharmacy.  [facility] is not responsible for loss, theft or damage of any property in my possession.    Signature:  _________________________________ Date: _______  Time: _____                                              Staff Signature:  ____________________________ Date: ________  Time: _____      2nd Staff person, if patient is unable/unwilling to sign:    Signature: ________________________________ Date: ________  Time: _____     Discharge:  Jase has returned all of my personal belongings:    Signature:  _________________________________ Date: ________  Time: _____                                          Staff Signature:  ____________________________ Date: ________  Time: _____

## 2021-10-03 NOTE — ED NOTES
[facility] ED Mental Health Handoff Note:       Brief HPI:  This is a 60 year old female signed out to me by Dr. Jimenez.  See initial ED Provider note for full details of the presentation. Pt is a 60 yof who presented seeking admission, was just discharged from [facility]. Has been admitted for overdose. She is voluntarily admitted, not holdable at this time. Pt is borderline.     Home meds reviewed and ordered/administered: No    Medically stable for inpatient mental health admission: Yes.    Evaluated by mental health: Yes. The recommendation is for inpatient mental health treatment. Bed search in process    Safety concerns: At the time I received sign out, there were no safety concerns.    Hold Status:  Active Orders   N/A            Exam:   Patient Vitals for the past 24 hrs:   BP Temp Temp src Pulse Resp SpO2 Weight   10/03/21 0501 136/86 98.6  F (37  C) Oral 79 16 98 % 86.2 kg (190 lb)             ED Course:    Medications - No data to display         There were no significant events during my shift.    Patient to be signed out to the oncoming provider, Dr. Lubin      Impression:    ICD-10-CM    1. Suicidal ideation  R45.851        Plan:    1. Awaiting inpatient mental health admission/transfer.      RESULTS:   Results for orders placed or performed during the hospital encounter of 10/03/21 (from the past 24 hour(s))   Urine Drugs of Abuse Screen     Status: Normal    Collection Time: 10/03/21  5:14 AM    Narrative    The following orders were created for panel order Urine Drugs of Abuse Screen.  Procedure                               Abnormality         Status                     ---------                               -----------         ------                     Drug abuse screen 1 urin...[[id number]]  Normal              Final result                 Please view results for these tests on the individual orders.   HCG qualitative urine     Status: Normal    Collection Time: 10/03/21  5:14  AM   Result Value Ref Range    hCG Urine Qualitative Negative Negative   Drug abuse screen 1 urine (ED)     Status: Normal    Collection Time: 10/03/21  5:14 AM   Result Value Ref Range    Amphetamines Urine Screen Negative Screen Negative    Barbiturates Urine Screen Negative Screen Negative    Benzodiazepines Urine Screen Negative Screen Negative    Cannabinoids Urine Screen Negative Screen Negative    Cocaine Urine Screen Negative Screen Negative    Opiates Urine Screen Negative Screen Negative   Asymptomatic COVID-19 Virus (Coronavirus) by PCR Nasopharyngeal     Status: Normal    Collection Time: 10/03/21  6:56 AM    Specimen: Nasopharyngeal; Swab   Result Value Ref Range    SARS CoV2 PCR Negative Negative    Narrative    Testing was performed using the diomedes  SARS-CoV-2 & Influenza A/B Assay on the diomedes  Josselin  System.  This test should be ordered for the detection of SARS-COV-2 in individuals who meet SARS-CoV-2 clinical and/or epidemiological criteria. Test performance is unknown in asymptomatic patients.  This test is for in vitro diagnostic use under the FDA EUA for laboratories certified under CLIA to perform moderate and/or high complexity testing. This test has not been FDA cleared or approved.  A negative test does not rule out the presence of PCR inhibitors in the specimen or target RNA in concentration below the limit of detection for the assay. The possibility of a false negative should be considered if the patient's recent exposure or clinical presentation suggests COVID-19.  [facility] are certified under the Clinical Laboratory Improvement Amendments of 1988 (CLIA-88) as qualified to perform moderate and/or high complexity laboratory testing.   CBC with platelets differential     Status: None    Collection Time: 10/03/21  7:03 AM    Narrative    The following orders were created for panel order CBC with platelets differential.  Procedure                                Abnormality         Status                     ---------                               -----------         ------                     CBC with platelets and d...[[id number]]                      Final result                 Please view results for these tests on the individual orders.   CBC with platelets and differential     Status: None    Collection Time: 10/03/21  7:03 AM   Result Value Ref Range    WBC Count 7.5 4.0 - 11.0 10e3/uL    RBC Count 4.44 3.80 - 5.20 10e6/uL    Hemoglobin 13.9 11.7 - 15.7 g/dL    Hematocrit 41.6 35.0 - 47.0 %    MCV 94 78 - 100 fL    MCH 31.3 26.5 - 33.0 pg    MCHC 33.4 31.5 - 36.5 g/dL    RDW 11.9 10.0 - 15.0 %    Platelet Count 248 150 - 450 10e3/uL    % Neutrophils 46 %    % Lymphocytes 39 %    % Monocytes 13 %    % Eosinophils 2 %    % Basophils 0 %    % Immature Granulocytes 0 %    NRBCs per 100 WBC 0 <1 /100    Absolute Neutrophils 3.4 1.6 - 8.3 10e3/uL    Absolute Lymphocytes 2.9 0.8 - 5.3 10e3/uL    Absolute Monocytes 1.0 0.0 - 1.3 10e3/uL    Absolute Eosinophils 0.2 0.0 - 0.7 10e3/uL    Absolute Basophils 0.0 0.0 - 0.2 10e3/uL    Absolute Immature Granulocytes 0.0 <=0.0 10e3/uL    Absolute NRBCs 0.0 10e3/uL             MD Betzy Marroquin Michelle C, MD  10/03/21 1119

## 2021-10-03 NOTE — ED NOTES
ED to Behavioral Floor Handoff    SITUATION  Marissa Youssef is a 60 year old female who speaks English and lives in a home with a spouse The patient arrived in the ED by private car from [facility] from [facility] with a complaint of Suicidal (Inpatient in [facility] experience, then went to [facility]. Discharged then came here for evaluation. Suicidal thought with plan to OD. Pt states she OD in [place]. Took Propranolol  Amlodipine.)  .The patient's current symptoms started/worsened 2 week(s) ago and during this time the symptoms have increased.   In the ED, pt was diagnosed with   Final diagnoses:   Suicidal ideation        Initial vitals were: BP: 136/86  Pulse: 79  Temp: 98.6  F (37  C)  Resp: 16  Weight: 86.2 kg (190 lb)  SpO2: 98 %   --------  Is the patient diabetic? No   If yes, last blood glucose? --     If yes, was this treated in the ED? --  --------  Is the patient inebriated (ETOH) No or Impaired on other substances? No  MSSA done? N/A  Last MSSA score: --    Were withdrawal symptoms treated? N/A  Does the patient have a seizure history? No. If yes, date of most recent seizure--  --------  Is the patient patient experiencing suicidal ideation? reports the following suicide factors: Pt states she OD last Wed on propranolol amlodipine and was treated.    Homicidal ideation? denies current or recent homicidal ideation or behaviors.    Self-injurious behavior/urges? denies current or recent self injurious behavior or ideation.  ------  Was pt aggressive in the ED No  Was a code called No  Is the pt now cooperative? Yes  -------  Meds given in ED:   Medications   DULoxetine (CYMBALTA) DR capsule 60 mg (60 mg Oral Given 10/3/21 4378)   hydrOXYzine (ATARAX) tablet 50 mg (50 mg Oral Given 10/3/21 6515)   multivitamin, therapeutic (THERA-VIT) tablet 1 tablet (1 tablet Oral Given 10/3/21 9378)   pantoprazole (PROTONIX) EC tablet 40 mg (40 mg Oral Given  10/3/21 0758)   ondansetron (ZOFRAN) tablet 4 mg (has no administration in time range)   QUEtiapine ER (SEROquel XR) 24 hr tablet 300 mg (has no administration in time range)   topiramate (TOPAMAX) capsule 50 mg (has no administration in time range)   cephALEXin (KEFLEX) capsule 500 mg (has no administration in time range)   acetaminophen-codeine (TYLENOL #3) 300-30 MG per tablet 2 tablet (2 tablets Oral Given 10/3/21 0818)      Family present during ED course? Yes  Family currently present? No    BACKGROUND  Does the patient have a cognitive impairment or developmental disability? No  Allergies: No Known Allergies.   Social demographics are   Social History     Socioeconomic History     Marital status:      Spouse name: None     Number of children: None     Years of education: None     Highest education level: None   Occupational History     None   Tobacco Use     Smoking status: Former Smoker     Smokeless tobacco: Never Used     Tobacco comment: 3 cigarettes a day   Substance and Sexual Activity     Alcohol use: No     Drug use: No     Sexual activity: Not Currently     Partners: Male     Birth control/protection: Post-menopausal, Female Surgical   Other Topics Concern     Parent/sibling w/ CABG, MI or angioplasty before 65F 55M? Not Asked   Social History Narrative     None     Social Determinants of Health     Financial Resource Strain:      Difficulty of Paying Living Expenses:    Food Insecurity:      Worried About Running Out of Food in the Last Year:      Ran Out of Food in the Last Year:    Transportation Needs:      Lack of Transportation (Medical):      Lack of Transportation (Non-Medical):    Physical Activity:      Days of Exercise per Week:      Minutes of Exercise per Session:    Stress:      Feeling of Stress :    Social Connections:      Frequency of Communication with Friends and Family:      Frequency of Social Gatherings with Friends and Family:      Attends Religion Services:       Active Member of Clubs or Organizations:      Attends Club or Organization Meetings:      Marital Status:    Intimate Partner Violence:      Fear of Current or Ex-Partner:      Emotionally Abused:      Physically Abused:      Sexually Abused:         ASSESSMENT  Labs results   Labs Ordered and Resulted from Time of ED Arrival Up to the Time of Departure from the ED   COMPREHENSIVE METABOLIC PANEL - Abnormal; Notable for the following components:       Result Value    Glucose 109 (*)     GFR Estimate 60 (*)     All other components within normal limits   ROUTINE UA WITH MICROSCOPIC REFLEX TO CULTURE - Abnormal; Notable for the following components:    Appearance Urine Slightly Cloudy (*)     Leukocyte Esterase Urine Large (*)     Mucus Urine Present (*)     Amorphous Crystals Urine Few (*)     Calcium Oxalate Crystals Urine Few (*)     WBC Urine 52 (*)     Squamous Epithelials Urine 2 (*)     All other components within normal limits    Narrative:     Urine Culture ordered based on laboratory criteria   HCG QUALITATIVE URINE - Normal   DRUG ABUSE SCREEN 1 URINE (ED) - Normal   COVID-19 VIRUS (CORONAVIRUS) BY PCR - Normal    Narrative:     Testing was performed using the diomedes  SARS-CoV-2 & Influenza A/B Assay on the diomedes  Josselin  System.  This test should be ordered for the detection of SARS-COV-2 in individuals who meet SARS-CoV-2 clinical and/or epidemiological criteria. Test performance is unknown in asymptomatic patients.  This test is for in vitro diagnostic use under the FDA EUA for laboratories certified under CLIA to perform moderate and/or high complexity testing. This test has not been FDA cleared or approved.  A negative test does not rule out the presence of PCR inhibitors in the specimen or target RNA in concentration below the limit of detection for the assay. The possibility of a false negative should be considered if the patient's recent exposure or clinical presentation suggests COVID-19.  [facility]  [employer] are certified under the Clinical Laboratory Improvement Amendments of 1988 (CLIA-88) as qualified to perform moderate and/or high complexity laboratory testing.   CBC WITH PLATELETS AND DIFFERENTIAL   URINE CULTURE   URINE DRUGS OF ABUSE SCREEN    Narrative:     The following orders were created for panel order Urine Drugs of Abuse Screen.  Procedure                               Abnormality         Status                     ---------                               -----------         ------                     Drug abuse screen 1 urin...[[id number]]  Normal              Final result                 Please view results for these tests on the individual orders.   CBC WITH PLATELETS & DIFFERENTIAL    Narrative:     The following orders were created for panel order CBC with platelets differential.  Procedure                               Abnormality         Status                     ---------                               -----------         ------                     CBC with platelets and d...[[id number]]                      Final result                 Please view results for these tests on the individual orders.      Imaging Studies: No results found for this or any previous visit (from the past 24 hour(s)).   Most recent vital signs BP (!) 145/84   Pulse 74   Temp 97.9  F (36.6  C) (Oral)   Resp 18   Wt 86.2 kg (190 lb)   SpO2 98%   Breastfeeding No   BMI 34.75 kg/m     Abnormal labs/tests/findings requiring intervention:---   Pain control: fair  Nausea control: pt had none    RECOMMENDATION  Are any infection precautions needed (MRSA, VRE, etc.)? No If yes, what infection?  Pt does not have any open wounds currently  Treated in 6/2021 for MRAa  ---  Does the patient have mobility issues? independently. If yes, what device does the pt use? ---  ---  Is patient on 72 hour hold or commitment? No If on 72 hour hold, have hold and rights been given to patient? N/A  Are admitting orders  written if after 10 p.m. ?N/A  Tasks needing to be completed:---     Jazmin Gupta RN   [facility]-- [phone] [facility] ED   [phone] East ED
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
## 2021-10-04 LAB
BACTERIA UR CULT: NORMAL
CHOLEST SERPL-MCNC: 252 MG/DL
DEPRECATED CALCIDIOL+CALCIFEROL SERPL-MC: 49 UG/L (ref 20–75)
FOLATE SERPL-MCNC: 28.7 NG/ML
GLUCOSE BLDC GLUCOMTR-MCNC: 118 MG/DL (ref 70–99)
HDLC SERPL-MCNC: 61 MG/DL
LDLC SERPL CALC-MCNC: 147 MG/DL
NONHDLC SERPL-MCNC: 191 MG/DL
T4 FREE SERPL-MCNC: 0.85 NG/DL (ref 0.76–1.46)
TRIGL SERPL-MCNC: 220 MG/DL
TROPONIN I SERPL-MCNC: <0.015 UG/L (ref 0–0.04)
TSH SERPL DL<=0.005 MIU/L-ACNC: 0.12 MU/L (ref 0.4–4)
VIT B12 SERPL-MCNC: 503 PG/ML (ref 193–986)

## 2021-10-04 PROCEDURE — 250N000013 HC RX MED GY IP 250 OP 250 PS 637: Performed by: PSYCHIATRY & NEUROLOGY

## 2021-10-04 PROCEDURE — 36415 COLL VENOUS BLD VENIPUNCTURE: CPT | Performed by: NURSE PRACTITIONER

## 2021-10-04 PROCEDURE — 84443 ASSAY THYROID STIM HORMONE: CPT | Performed by: NURSE PRACTITIONER

## 2021-10-04 PROCEDURE — 84439 ASSAY OF FREE THYROXINE: CPT | Performed by: NURSE PRACTITIONER

## 2021-10-04 PROCEDURE — 250N000013 HC RX MED GY IP 250 OP 250 PS 637: Performed by: EMERGENCY MEDICINE

## 2021-10-04 PROCEDURE — 124N000003 HC R&B MH SENIOR/ADOLESCENT

## 2021-10-04 PROCEDURE — 82465 ASSAY BLD/SERUM CHOLESTEROL: CPT | Performed by: NURSE PRACTITIONER

## 2021-10-04 PROCEDURE — G0177 OPPS/PHP; TRAIN & EDUC SERV: HCPCS

## 2021-10-04 PROCEDURE — 250N000013 HC RX MED GY IP 250 OP 250 PS 637: Performed by: PHYSICIAN ASSISTANT

## 2021-10-04 PROCEDURE — 82607 VITAMIN B-12: CPT | Performed by: NURSE PRACTITIONER

## 2021-10-04 PROCEDURE — 84484 ASSAY OF TROPONIN QUANT: CPT | Performed by: PHYSICIAN ASSISTANT

## 2021-10-04 PROCEDURE — 99207 PR CONSULT E&M CHANGED TO SUBSEQUENT LEVEL: CPT | Performed by: PHYSICIAN ASSISTANT

## 2021-10-04 PROCEDURE — 90853 GROUP PSYCHOTHERAPY: CPT

## 2021-10-04 PROCEDURE — 82746 ASSAY OF FOLIC ACID SERUM: CPT | Performed by: NURSE PRACTITIONER

## 2021-10-04 PROCEDURE — 82306 VITAMIN D 25 HYDROXY: CPT | Performed by: NURSE PRACTITIONER

## 2021-10-04 PROCEDURE — 250N000011 HC RX IP 250 OP 636: Performed by: EMERGENCY MEDICINE

## 2021-10-04 PROCEDURE — 36415 COLL VENOUS BLD VENIPUNCTURE: CPT | Performed by: PHYSICIAN ASSISTANT

## 2021-10-04 PROCEDURE — 99232 SBSQ HOSP IP/OBS MODERATE 35: CPT | Performed by: PHYSICIAN ASSISTANT

## 2021-10-04 PROCEDURE — 99232 SBSQ HOSP IP/OBS MODERATE 35: CPT | Performed by: PSYCHIATRY & NEUROLOGY

## 2021-10-04 PROCEDURE — 250N000013 HC RX MED GY IP 250 OP 250 PS 637: Performed by: NURSE PRACTITIONER

## 2021-10-04 RX ORDER — QUETIAPINE FUMARATE 50 MG/1
100 TABLET, EXTENDED RELEASE ORAL DAILY
Status: DISCONTINUED | OUTPATIENT
Start: 2021-10-05 | End: 2021-10-25 | Stop reason: HOSPADM

## 2021-10-04 RX ORDER — TOPIRAMATE 50 MG/1
100 TABLET, FILM COATED ORAL 2 TIMES DAILY
Status: DISCONTINUED | OUTPATIENT
Start: 2021-10-04 | End: 2021-10-15

## 2021-10-04 RX ORDER — QUETIAPINE FUMARATE 200 MG/1
400 TABLET, FILM COATED ORAL AT BEDTIME
Status: DISCONTINUED | OUTPATIENT
Start: 2021-10-05 | End: 2021-10-25 | Stop reason: HOSPADM

## 2021-10-04 RX ORDER — PRAZOSIN HYDROCHLORIDE 1 MG/1
1 CAPSULE ORAL AT BEDTIME
Status: DISCONTINUED | OUTPATIENT
Start: 2021-10-04 | End: 2021-10-08

## 2021-10-04 RX ADMIN — THERA TABS 1 TABLET: TAB at 08:43

## 2021-10-04 RX ADMIN — PRAZOSIN HYDROCHLORIDE 1 MG: 1 CAPSULE ORAL at 21:00

## 2021-10-04 RX ADMIN — AMLODIPINE BESYLATE 5 MG: 5 TABLET ORAL at 09:43

## 2021-10-04 RX ADMIN — Medication 1000 UNITS: at 08:43

## 2021-10-04 RX ADMIN — BACLOFEN 5 MG: 20 TABLET ORAL at 13:18

## 2021-10-04 RX ADMIN — QUETIAPINE 200 MG: 200 TABLET, FILM COATED ORAL at 21:00

## 2021-10-04 RX ADMIN — DULOXETINE HYDROCHLORIDE 60 MG: 30 CAPSULE, DELAYED RELEASE ORAL at 08:43

## 2021-10-04 RX ADMIN — Medication 10 MG: at 21:00

## 2021-10-04 RX ADMIN — ACETAMINOPHEN AND CODEINE PHOSPHATE 1 TABLET: 300; 30 TABLET ORAL at 16:31

## 2021-10-04 RX ADMIN — PANTOPRAZOLE SODIUM 40 MG: 40 TABLET, DELAYED RELEASE ORAL at 08:43

## 2021-10-04 RX ADMIN — TOPIRAMATE 100 MG: 50 TABLET ORAL at 21:00

## 2021-10-04 RX ADMIN — TOPIRAMATE 50 MG: 50 TABLET, FILM COATED ORAL at 08:43

## 2021-10-04 RX ADMIN — CEPHALEXIN 500 MG: 500 CAPSULE ORAL at 08:43

## 2021-10-04 RX ADMIN — DOCUSATE SODIUM AND SENNOSIDES 1 TABLET: 8.6; 5 TABLET ORAL at 21:34

## 2021-10-04 RX ADMIN — ONDANSETRON HYDROCHLORIDE 4 MG: 4 TABLET, FILM COATED ORAL at 21:00

## 2021-10-04 ASSESSMENT — ACTIVITIES OF DAILY LIVING (ADL)
ORAL_HYGIENE: INDEPENDENT
LAUNDRY: UNABLE TO COMPLETE
HYGIENE/GROOMING: INDEPENDENT
ORAL_HYGIENE: INDEPENDENT
HYGIENE/GROOMING: INDEPENDENT
DRESS: INDEPENDENT
DRESS: STREET CLOTHES

## 2021-10-04 NOTE — PLAN OF CARE
Problem: Suicidal Behavior  Goal: Suicidal Behavior is Absent or Managed  Outcome: Improving  Pt. is pleasant, calm and cooperative with care. Presented with full range affect with a calm mood. Denies SI, SIB and hallucination. Reported worsening depression today and rated 8/10, anxiety rated 6/10. Pt. is medication complaint. Denied any chest pain and physical discomfort during the shift. Appetite and sleep is good. Participated in group. Social with staff.  visited her. At 1254 while pt. was eating lunch she reported that having spasm feeling on her throat and couldn't swallow anything, she appeared to be having a feeling of vomiting, denied any CP and trouble breathing. Pt. stated that this happed in the past, mostly caused by the texture of food she is eating, vital sign were taken and WNL, IM notified and a one time dose of baclofen suspension ordered and given, pt. stated that was helpful. Continue to monitor pt. as plan of care.

## 2021-10-04 NOTE — CONSULTS
"[facility]  Consult Note - Hospitalist Service     Date of Admission:  10/3/2021  Consult Requested by: Anita Elizondo NP  Reason for Consult: \"Co-management of patient with newly diagnosed UTI, chronic back pain, history of gastric ulcer, fibromyalgia & dysphagia\"     Assessment & Plan   Marissa Youssef is a 60 year old female with PMHx  HLD, DM2, prinzmetal angina, GERD with hx of gastric ulcer, chronic pelvic pain, sphincter of Oddi dysfunction, pancreatic insufficiency, microscopic colitis, borderline personality disorder, PTSD, and depression  admitted on 10/3/2021 to inpatient psychiatry for suicidal ideation with plan to overdose.     # Depresssion with suicidal ideation  # Anxiety  # PTSD  - Management per psychiatry. Patient previuosly on duloxetine 60 mg daily, atarax 50 mg TID PRN, propanolol 10 mg BID PRN, seroquel  mg at bedtime and seroquel 200 mg at bedtime, and topamax 50 mg BID.     # Chest pain, hx of Prinzmetal angina - patient with chest pain last night that resolved after taking atarax. Reports having chronic intermittent pain.  +Family hx of CAD, with patient's father dying of MI at age 52. Previously see in ED at [facility] on 9/18/21. EKG, troponin negative for any ACS. Stress echocardiogram test on 9/24/21 grossly normal with negative for ST segment depression with no WMA on echo. Previously followed with cardiology, with cardiac cath in 6/2018 with normal coronaries. Currently denies any CP. Troponin negative.   - Continue PTA Norvasc 5 mg daily  - Add on troponin to AM labs   - Please notify medicine if patient develops any chest pain     # Abnormal UA - Denies any urinary symptoms. UA with + LE, and 52 WBCs. Urine culture with mixed urogenital kennedy. Afebrile. No leukocytosis. No antibiotics indicated. Discontinue keflex.     # Subclinical hyperthyroidism - TSH low at 0.12 with normal T4 0.85. ROS as noted below.   - Recommend repeat " "thyroid function test in 2-4 weeks with PCP     # HLD- Total Cholesterol 252, , HDL 61. Not on any medications. Outpatient follow up with PCP.     # DM2- Diet controlled. Hemoglobin A1c 6.0 on 3/29/2021.      # GERD, Hx of gastric ulcer-   EGD on 12/21/2020 with grade B reflux esophagitis. Continue PTA omeprazole 40 mg daily    # Pancreatic insufficieny- Insurance does not cover Creon or ZenPEP, and patient unable to afford out of pocket price. Previous EGD with EUS on 12/2020 at OSH with Fatty pancreas. No chronic pancreatitis by EUS criteria. Patient reports improvement in diarrhea with zofran.   - Continue PTA zofran BID PRN     # Collagenous colitis - Positive biopsy on 12/29/2020 for collagenous colitis at OSH. Not on any medications. Denies any currently diarrhea, or abdominal pain.     # Chronic pelvic pain - Previously followed by GYN and Urology. She has h/o endometriosis and underwent AMANDA/BSO in 2004 for these symptoms. Previously seen with GYN, who felt her symptoms were consistent with neuropathic pain. Patient was recommended to work with pelvic floor PT, potentially with component of targeted relaxation techniques. Previously prescribed gabapentin but no longer takes.     # Dysphagia - Patient followed by GI and had a recent manometry on 9/27/21 which showed no anatomic abnormalities present to dispose patient to GERD, including hiatal hernia. Normal peristalsis. No signs of obstruction. No evident of esophageal motility disorder seen. Patient reports intermittent symptoms with dry foods like rice and pasta which has been happening for \"a while\".   - Outpatient follow up with GI     ADDENDUM: Patient had an episode of dysphagia, where after eating a sand which she felt like it got stuck in her esophagus with nausea and vomiting up clear liquid. Symptoms improved after a dose of baclofen. Discussed with GI who recommends outpatient follow up with patient's establish GI doc. If happens again in " the hospital can try another dose of baclofen 5 mg once PRN for possible esophageal spasms.     # Chronic pain- Chronic low back pain and bilateral shoulder pain. S/p cortisone injection in the past.   Patient follows with outpatient pain specialist Dr. Ramirez in [facility]. Patient takes tylenol #3 300-30 mg, 1 tab TID PRN, confirmed on PDMP.   - Continue PTA tylenol #3 300-30 mg, 1 tab TID PRN        Medicine will sign off. No further recommendations at this time. Please page the on-call JOSÉ ANTONIO for any intercurrent medical issues which arise.     Feli Haque PA-C  [facility]  Securely message with the Vocera Web Console (learn more here)  Text page via [employer] Paging/Directory      Clinically Significant Risk Factors Present on Admission                ______________________________________________________________________    Chief Complaint   Depression, suicidal ideation     History is obtained from the patient    History of Present Illness   Marissa Youssef is a 60 year old female with PMHx  HLD, DM2, prinzmetal angina, GERD with hx of gastric ulcer, chronic pelvic pain, sphincter of Oddi dysfunction, pancreatic insufficiency, microscopic colitis, borderline personality disorder, PTSD, and depression  admitted on 10/3/2021 to inpatient psychiatry for suicidal ideation with plan to overdose.     Patient denies any acute new medical issues.  Denies any dysuria, hematuria, or urinary frequency.      Patient reports intermittent chest pain that she has had for years which she reports is due to her Prinzmetal angina.  States she had an episode last night which was a squeezing in her chest with some mild shortness of breath, occurring at rest that improved after taking Atarax.  She says she has seen a cardiologist in the past and had a cath in 2018 in [place].  Per chart review patient was recently in the ED on 9/18/2021 with chest pain with a nonischemic EKG  and negative troponin.  Patient did have a stress echocardiogram on 9/24/2021 which was grossly normal without any evidence of ST changes or wall motion abnormalities on echo.  Patient states she takes amlodipine for this chest pain.    Patient reports having pancreatic insufficiency which she previously had up to 7 episodes of diarrhea a day.  Unfortunately her insurance does not cover any Creon or Zenpep.  She follows a Facebook group for EIP, and some in her group felt they had relief from Zofran.  Since taking Zofran twice a day she has had resolution of diarrhea.  Patient denies any nausea, vomiting, or abdominal pain.  Patient does report intermittent difficulty swallowing more so when she eats dry foods like braces in the past does.  Patient has been following with the GI with multiple studies with recently a manometry that was normal.  Patient does follow with a pain specialist at [facility] and takes Tylenol 3 1 tab 3 times a day as needed for pain in her low back, bilateral shoulders and chronic pelvic pain.    Currently patient denies any chest pain, shortness of breath, fevers, chills, rashes, joint pain, or joint swelling.       Review of Systems   The 10 point Review of Systems is negative other than noted in the HPI or here.     Past Medical History    I have reviewed this patient's medical history and updated it with pertinent information if needed.   Past Medical History:   Diagnosis Date     Anxiety      Depressive disorder      Diabetes (H)     Diet controlled.     Eating disorder      Hx of previous reproductive problem 1977     Hyperlipidemia      Hypertension      Pelvic floor instability      PTSD (post-traumatic stress disorder)      Sphincter of Oddi dysfunction        Past Surgical History   I have reviewed this patient's surgical history and updated it with pertinent information if needed.  Past Surgical History:   Procedure Laterality Date     ABDOMEN SURGERY  1999      CHOLECYSTECTOMY  2000     COLONOSCOPY  2018     GYN SURGERY  2005    Complete hysterectomy     ORTHOPEDIC SURGERY      right knee replacement 2008     SOFT TISSUE SURGERY  2019    Carpel tunnel, trigger finger release       Social History   I have reviewed this patient's social history and updated it with pertinent information if needed.  Social History     Tobacco Use     Smoking status: Former Smoker     Smokeless tobacco: Never Used     Tobacco comment: 3 cigarettes a day   Substance Use Topics     Alcohol use: No     Drug use: No       Family History   I have reviewed this patient's family history and updated it with pertinent information if needed.  Family History   Problem Relation Age of Onset     Chronic Obstructive Pulmonary Disease Mother      Coronary Artery Disease Father      Myocardial Infarction Father      Hyperlipidemia Sister      Kidney failure Brother      Heart Failure Brother        Medications   I have reviewed this patient's current medications  Current Facility-Administered Medications   Medication     acetaminophen-codeine (TYLENOL #3) 300-30 MG per tablet 1 tablet     alum & mag hydroxide-simethicone (MAALOX) suspension 30 mL     amLODIPine (NORVASC) tablet 5 mg     cephALEXin (KEFLEX) capsule 500 mg     DULoxetine (CYMBALTA) DR capsule 60 mg     hydrOXYzine (ATARAX) tablet 50 mg     Melatonin tablet 10 mg     multivitamin, therapeutic (THERA-VIT) tablet 1 tablet     naloxone (NARCAN) injection 0.2 mg    Or     naloxone (NARCAN) injection 0.4 mg    Or     naloxone (NARCAN) injection 0.2 mg    Or     naloxone (NARCAN) injection 0.4 mg     OLANZapine (zyPREXA) tablet 5 mg    Or     OLANZapine (zyPREXA) injection 5 mg     ondansetron (ZOFRAN) tablet 4 mg     pantoprazole (PROTONIX) EC tablet 40 mg     propranolol (INDERAL) tablet 10 mg     QUEtiapine (SEROquel XR) 24 hr tablet 100 mg     QUEtiapine (SEROquel) tablet 200 mg     senna-docusate (SENOKOT-S/PERICOLACE) 8.6-50 MG per tablet 1  tablet     topiramate (TOPAMAX) tablet 50 mg     Vitamin D3 (CHOLECALCIFEROL) tablet 1,000 Units       Allergies   No Known Allergies    Physical Exam   Vital Signs: Temp: (!) 96.5  F (35.8  C) Temp src: Temporal BP: 126/81 Pulse: 80   Resp: 16 SpO2: 97 % O2 Device: None (Room air)    Weight: 190 lbs 1.6 oz  GENERAL: Alert and oriented x 3. NAD. Pleasant and conversational. Pink dyed hair.  HEENT: AT/NC. Anicteric sclera. Mucous membranes moist   CARDIOVASCULAR: RRR. S1, S2. No murmurs, rubs, or gallops.   RESPIRATORY: Effort normal on RA. Clear to auscultation bilaterally, no rales, rhonchi or wheezes, respirations unlabored   GI: Abdomen soft, non-tender abdomen without rebound or guarding, normoactive bowel sounds present  EXTREMITIES: No peripheral edema.   NEUROLOGICAL: CN II-XII grossly intact. Moving all extremities symmetrically. Steady gait.   SKIN: Intact. Warm and dry. No jaundice.     Data   Results for orders placed or performed during the hospital encounter of 10/03/21 (from the past 24 hour(s))   TSH with free T4 reflex   Result Value Ref Range    TSH 0.12 (L) 0.40 - 4.00 mU/L   Lipid panel reflex to direct LDL   Result Value Ref Range    Cholesterol 252 (H) <200 mg/dL    Triglycerides 220 (H) <150 mg/dL    Direct Measure HDL 61 >=50 mg/dL    LDL Cholesterol Calculated 147 (H) <=100 mg/dL    Non HDL Cholesterol 191 (H) <130 mg/dL    Narrative    Cholesterol  Desirable:  <200 mg/dL    Triglycerides  Normal:  Less than 150 mg/dL  Borderline High:  150-199 mg/dL  High:  200-499 mg/dL  Very High:  Greater than or equal to 500 mg/dL    Direct Measure HDL  Female:  Greater than or equal to 50 mg/dL   Male:  Greater than or equal to 40 mg/dL    LDL Cholesterol  Desirable:  <100mg/dL  Above Desirable:  100-129 mg/dL   Borderline High:  130-159 mg/dL   High:  160-189 mg/dL   Very High:  >= 190 mg/dL    Non HDL Cholesterol  Desirable:  130 mg/dL  Above Desirable:  130-159 mg/dL  Borderline High:  160-189  mg/dL  High:  190-219 mg/dL  Very High:  Greater than or equal to 220 mg/dL   T4 free   Result Value Ref Range    Free T4 0.85 0.76 - 1.46 ng/dL   Glucose by meter   Result Value Ref Range    GLUCOSE BY METER POCT 118 (H) 70 - 99 mg/dL

## 2021-10-04 NOTE — PLAN OF CARE
RN Assessment:    Pt presented with euthymic affect. Pt was calm and cooperative while interacting with the writer. Pt was alert and oriented x 4. Pt denied having SI, HI, thoughts of SIB, and hallucinations. Pt denied having a wish to be dead. Pt endorsed having left hip pain. Pt given PRN medication for pain. Pt also used T-Pump to help reduce hip pain. Pt endorsed having constipation. Pt stated she has not had a bowel movement in five days. Pt given PRN medication for constipation. Pt feels the medications that are currently ordered are working well. Pt could not elaborate on that notion. No medication side effects endorsed by pt or observed by writer. Pt identified listening to music as an effective coping skill. Pt was intermittently present in the milieu. Continue to monitor for safety and changes in medical condition.     Pt is requesting that [facility] help to find a Psychiatrist for pt to see after discharge that practices in the area where she lives.     PRN Medications passed this shift:    1631: Tylenol #3 PO for left hip pain. This medication was effective per pt report.     2100: Zofran 4 mg PO for nausea. This medication was effective per pt report.    2100: Melatonin 10 mg PO for insomnia.     2134: Senna-Docusate 8.6-50 mg PO for constipation.

## 2021-10-04 NOTE — PROGRESS NOTES
I was called for chest pain  Interviewed and examined the patient  On arrival, patient sitting on the front lobby and eating night snacks  Patient reports its not chest pain, but more like palpitation and pounding.   She reports she has these episodes of anxiety,and already feeling better after taking hydroxyzine  Patient reports hx of prinzmental angina, and reports she had extensive workup last month which were all normal  Discussed getting EKG,and Troponin. Patient strongly feels it is due to anxiety  On my impression, I feel that patient is aware of these episodes, and feel that this most likely is due to anxiety  I advised to call medicine if clinical situation changes    Dhaval Matthews MD  [facility]  Contact information available via [facility] Paging/Directory

## 2021-10-04 NOTE — PLAN OF CARE
BEHAVIORAL TEAM DISCUSSION    Participants: Anjel Sarah MD, Devon Dillon, RN, Geneva Paul, FRANCISCA, CTC, Emily Bolden, DONIS  Progress:New admit  Anticipated length of stay: 5-7 days  Continued Stay Criteria/Rationale: SI  Medical/Physical: see H&P  Precautions:   Behavioral Orders   Procedures     Code 1 - Restrict to Unit     Discontinue 1:1 attendant for suicide risk     Order Specific Question:   I have performed an in person assessment of the patient     Answer:   Based on this assessment the patient no longer requires a one on one attendant at this point in time.     Routine Programming     As clinically indicated     Status 15     Every 15 minutes.     Suicide precautions     Patients on Suicide Precautions should have a Combination Diet ordered that includes a Diet selection(s) AND a Behavioral Tray selection for Safe Tray - with utensils, or Safe Tray - NO utensils       Plan: Patient will have psychiatric assessment and medication management by the psychiatrist. Medications will be reviewed and adjusted per MD as indicated. The treatment team will continue to assess and stabilize the patient's mental health symptoms with the use of medications and therapeutic programming. Hospital staff will provide a safe environment and a therapeutic milieu. Staff will continue to assess patient as needed. Patient will participate in unit groups and activities. Patient will receive individual and group support on the unit.      CTC will do individual inpatient treatment planning and after care planning. CTC will discuss options for increasing community supports with the patient. CTC will coordinate with outpatient providers and will place referrals to ensure appropriate follow up care is in place.    Rationale for change in precautions or plan: Initial plan

## 2021-10-04 NOTE — PLAN OF CARE
Problem: OT General Care Plan  Goal: OT Goal 1  Description: Will consistently attend OT groups and improve coping strategies with increasing repertoire of ideas and understanding of symptoms of when to use the strategies.       Note: Pt attended 2 of 2 OT groups. She participated in a goal oriented task group for 50 minutes with 6 pts working independently on a multi step task of familiar steps. She took time to plan and organize details and followed through on her ideas. She was social, initiated conversations and talked about hoping for improved care from past experiences at other hospitals.. She also participated for 60 minutes with 6 pts in an activity focused on Stress management, identifying areas on one's life that are balanced and areas to chosen to focus on by setting helpful goals. She offered insightful ideas and answers, appeared interested in being actively engaged. See other OT note with OT goals pt set.

## 2021-10-04 NOTE — PLAN OF CARE
Problem: Adult Inpatient Plan of Care  Goal: Optimal Comfort and Wellbeing  Outcome: Improving     Slept all night for 8.5 hours  No complaints raised

## 2021-10-04 NOTE — PROGRESS NOTES
"Called by Medicine consult regarding episodic dysphagia, unable to swallow food last for about a few hours, then resolved. Occurs randomly from once a week to a few times a years, been on going for several years.     \"At 1254 while pt. was eating lunch she reported that having spasm feeling on her throat and couldn't swallow anything, she appeared to be having a feeling of vomiting, denied any CP and trouble breathing. Pt. stated that this happed in the past, mostly caused by the texture of food she is eating, vital sign were taken and WNL, IM notified and a one time dose of baclofen suspension ordered and given, pt. stated that was helpful. \"    Patient is a 59 yo F admitted with suicidal attempt. Other PMH include HLD, DM2, prinzmetal angina, GERD with hx of gastric ulcer, chronic pelvic pain, sphincter of Oddi dysfunction, microscopic colitis (pathology 12/2020), borderline personality disorder, PTSD, and depression.     Recent manometry 9/2021 was unremarkable:   1. There were no anatomic abnormalities present that would predispose the patient to GERD. There is no manometric evidence of a hiatal hernia.   2. Pharyngeal peristalsis is normal.   3. UES function is normal.   4. Esophagel body peristalsis is normal.   5. LES function is normal. On supine swallow, the IRP was elevated at 15.6, suggestive of possible EGJ outflow obstruction but this was ruled out with upright swallows showing normal IRP of 11.1   6. There is no manometric evidence of an esophageal motility   disorder based on current guidelines.      Pathology  A.  Duodenum, 2nd part, endoscopic biopsy:  Small bowel   mucosa without diagnostic abnormality. Villi and plasma   cells are present. No evidence of Whipple's disease, celiac   sprue, or Giardia.   B.  Stomach, antrum, body, incisura, endoscopic biopsy:   Antral fundic mucosa with mild chronic gastritis.  No   intestinal metaplasia.  Immunohistochemical stain for   Helicobacter pylori is " pending and will be reported in an   addendum.   C.  Colon, left, endoscopic biopsy:  Collagenous colitis.    EUS 12/2020  - Grade B reflux esophagitis. Small bowel and gastric biopsies taken as        requested.        - Fatty pancreas. No chronic pancreatitis by EUS criteria. No pancreas        divisum. PD head 2.5mm and tapers normally to the tail.        - Fatty liver. Status post cholecystectomy. CBD 3.8mm. Pneumobilia. No        common ducts stones/ sludge.        - No ascites or lymphadenopathy.    Assessment and plan:  Given recent work-up on manometry and endoscopy were negative. Less likely having concerning lesion including obstruction or esophageal dysmotility. Can consider VDO barium swallowing/esophagogram if persistent, or as outpatient.     Recommendation  - Follow-up GI outpatient, can follow-up with her previous GI provider  - No GI intervention required at this time    Discussed w/ Dr. Bishop.    Kiera Jules MD  Gastroenterology/Hepatology Fellow  Page: [phone]

## 2021-10-04 NOTE — PLAN OF CARE
"  Problem: General Rehab Plan of Care  Goal: Therapeutic Recreation/Music Therapy Goal (Art Therapy)  Description: The patient and/or their representative will achieve their patient-specific goals related to the plan of care.  The patient-specific goals include:  Outcome: Improving   Art Therapy directive was the \"Two Hearts\" directive is which pt draw two overlapping hearts with their own personal values in one heart, the values of a loved one(s) in the other heart and shared values where the hearts overlap.  Goals of directive: relational, communication, identifying personal values, personal strengths and goals.  Pt was a quiet participant, participated for a half hour of group total. Pt finished drawing and briefly shared with group.   Pt shared her own personal values and the values of her close friends and family.  Pt said she is close to those who share similar values to her, especially her son.  Pts mood was calm, pleasant participant.  "

## 2021-10-05 PROCEDURE — H2032 ACTIVITY THERAPY, PER 15 MIN: HCPCS

## 2021-10-05 PROCEDURE — 99221 1ST HOSP IP/OBS SF/LOW 40: CPT | Mod: GC | Performed by: INTERNAL MEDICINE

## 2021-10-05 PROCEDURE — 250N000013 HC RX MED GY IP 250 OP 250 PS 637: Performed by: EMERGENCY MEDICINE

## 2021-10-05 PROCEDURE — 250N000013 HC RX MED GY IP 250 OP 250 PS 637: Performed by: NURSE PRACTITIONER

## 2021-10-05 PROCEDURE — G0177 OPPS/PHP; TRAIN & EDUC SERV: HCPCS

## 2021-10-05 PROCEDURE — 250N000013 HC RX MED GY IP 250 OP 250 PS 637: Performed by: PHYSICIAN ASSISTANT

## 2021-10-05 PROCEDURE — 124N000003 HC R&B MH SENIOR/ADOLESCENT

## 2021-10-05 PROCEDURE — 250N000013 HC RX MED GY IP 250 OP 250 PS 637: Performed by: PSYCHIATRY & NEUROLOGY

## 2021-10-05 RX ORDER — POLYETHYLENE GLYCOL 3350 17 G/17G
17 POWDER, FOR SOLUTION ORAL DAILY
Status: DISCONTINUED | OUTPATIENT
Start: 2021-10-05 | End: 2021-10-22

## 2021-10-05 RX ADMIN — POLYETHYLENE GLYCOL 3350 17 G: 17 POWDER, FOR SOLUTION ORAL at 15:43

## 2021-10-05 RX ADMIN — TOPIRAMATE 100 MG: 50 TABLET ORAL at 22:01

## 2021-10-05 RX ADMIN — TOPIRAMATE 100 MG: 50 TABLET ORAL at 08:35

## 2021-10-05 RX ADMIN — DOCUSATE SODIUM AND SENNOSIDES 1 TABLET: 8.6; 5 TABLET ORAL at 08:35

## 2021-10-05 RX ADMIN — DOCUSATE SODIUM AND SENNOSIDES 1 TABLET: 8.6; 5 TABLET ORAL at 22:01

## 2021-10-05 RX ADMIN — AMLODIPINE BESYLATE 5 MG: 5 TABLET ORAL at 08:35

## 2021-10-05 RX ADMIN — THERA TABS 1 TABLET: TAB at 08:35

## 2021-10-05 RX ADMIN — QUETIAPINE 400 MG: 200 TABLET, FILM COATED ORAL at 22:01

## 2021-10-05 RX ADMIN — PRAZOSIN HYDROCHLORIDE 1 MG: 1 CAPSULE ORAL at 22:01

## 2021-10-05 RX ADMIN — DULOXETINE HYDROCHLORIDE 60 MG: 30 CAPSULE, DELAYED RELEASE ORAL at 08:35

## 2021-10-05 RX ADMIN — Medication 1000 UNITS: at 08:35

## 2021-10-05 RX ADMIN — QUETIAPINE FUMARATE 100 MG: 50 TABLET, EXTENDED RELEASE ORAL at 08:35

## 2021-10-05 RX ADMIN — PANTOPRAZOLE SODIUM 40 MG: 40 TABLET, DELAYED RELEASE ORAL at 08:35

## 2021-10-05 ASSESSMENT — ACTIVITIES OF DAILY LIVING (ADL)
DRESS: STREET CLOTHES
LAUNDRY: UNABLE TO COMPLETE
ORAL_HYGIENE: INDEPENDENT
LAUNDRY: UNABLE TO COMPLETE
ORAL_HYGIENE: INDEPENDENT
HYGIENE/GROOMING: INDEPENDENT
HYGIENE/GROOMING: INDEPENDENT
DRESS: STREET CLOTHES

## 2021-10-05 NOTE — PLAN OF CARE
Problem: Sleep Disturbance (Depressive Signs/Symptoms)  Goal: Improved Sleep (Depressive Signs/Symptoms)  Outcome: Improving     Slept well for 8.5 hours  No complaints/ requests made.

## 2021-10-05 NOTE — PROGRESS NOTES
Behavioral Health  Note    Behavioral Health  Spirituality Group Note    UNIT 3BW    Name: Marissa Youssef YOB: 1960   MRN: [id number] Age: 60 year old      Patient attended -led group, which included discussion of spirituality, coping with illness and cultivating peace.    Patient attended group for 1.0 hrs.    The patient actively participated in group discussion and patient demonstrated an appreciation of topic's application for their personal circumstances.    Prudence Nevarez MDiv  Associate   Pager [phone]  Office [phone]

## 2021-10-05 NOTE — PLAN OF CARE
Pt presents with full range affect and calm mood. Denies SI/SIB and hallucinations. Rates depression and anxiety both 4/10 - states they are starting to level out. Pt was present in the lounge and social with peers. Pt c/o constipation - PRN Senokot given, offered Prune juice but pt refused, later in the day pt requested next intervention as she has not had a BM x6 days, typically goes every other day, internal medicine paged. Attended groups. VSS. Medication compliant. Denies pain. Appetite and fluid intake adequate. No other concerns or complaints noted.

## 2021-10-05 NOTE — PLAN OF CARE
Problem: OT General Care Plan  Goal: OT Goal 1  Description: Will consistently attend OT groups and improve coping strategies with increasing repertoire of ideas and understanding of symptoms of when to use the strategies.       Note: Attended 2 of 2 OT groups. In the am group, she participated for 55 minutes with 7 pts in a goal oriented task group. She continued work on a highly detailed creatively designed task, taking time to plan and follow through on her ideas. She was social, initiated comments and booker peers into conversation. She appeared comfortable, invested, focused and attentive.  In the afternoon group, she participated for 55 minutes with 6 pts in an activity focused on identifying helpful ideas for calming using the 5 senses, and practicing a grounding technique with this focus. Answers were elaborated on with information in context and insightful information. She appears engaged and interested, and supported a peer in a thoughtful manner.

## 2021-10-05 NOTE — PROGRESS NOTES
PSYCHIATRIC PROGRESS NOTE    Admission Date: 10/03/2021  Date of Service: 10/04/2021    The patient was seen, her chart reviewed, her case discussed with staff.  Observation on the unit revealed the patient to be normally active and social. She has been visible in the [facility] [place];ieu and attended some groups. She slept about 8 hours.  Upon evaluation today the patient appeared to be clearly manic with some emotional lability. She told me that she had a long history of mood swing whereby she would feel depressed for a couple of weeks had an intermission lasting up to a month and then would become manic for about a week. Most recently her mood would change from one phase to another without intermissionsShe has been on Seroquel and Cymbalta for 4-5 years and never has been prescribed mood stabilizers. She was prescribed small doses of Topamax but not for mood stabilization    This is a 60 year old  white mother of three who presented to our ED  with ongoing suicidal ideation. She was admitted to the hospital in [place] last week after amlodipine and propranolol overdose. She continues to feel suicidal and unsafe at home. She went to [facility] in [place] one day prior to admission however, there were no beds available. Patient subsequently was discharged and decided to come here. She continued to endorse suicidal ideation with plan to overdose. She denied any subsequent attempt at self-harm. She was admitted to [facility] and was seen by BRIT Oneal CNP    MEDICATIONS    Cymbalta 60 mg QAM  Seroquel 200 mg at bedtime  Seroquel  mg at bedtime  Topamax 50 mg BID  Amlodipine 5 mg daily  Protonix 40 mg daily  Vitamin D3 1000 units daily    LABS: Glucose by meter 118. Troponin <0.015.    VS: Pulse - 80, BP - 11/64, T - 96.9, Resp - 16, SpO2 - 97%    MENTAL STATUS EXAMINATION  Revealed a normally built and normally developed, pleasant, friendly and cooperative 60-year-old white female appearing  her stated age. She was alert and oriented X 3. Her speech was slightly pressured. Her mood was elated, affect euphoric but labile. She denied SI at the moment but admitted to a recent overdose. No overt psychotic features were noted. She had marginal insight into her problems    DIAGNOSTIC IMPRESSION  Bipolar Disorder, mixed  RACQUEL  PTSD    Plan: I will increase Topamax to 100 mg BID for better mood stabilization. I will change Seroquel XR to QAM and increase her Seroquel to 400 mg at bedtime. I will give her a trial with Prazosin. Continue Cymbalta in the same dose.    Anjel Sarah MD

## 2021-10-05 NOTE — PROGRESS NOTES
" 10/04/21 2200   Groups   Details    (Psychotherapy)   Number of patients attending the group:  5  Group Length:  1 Hours     Group Therapy Type: Psychotherapy Process Group     Summary of Group / Topics Discussed:        The  Psychotherapy group goal is to promote insight to positive choice and change. Group processing is within a supportive and safe environment. Patients will process emotions using verbal group and expressive psychotherapy interventions including visual art/writing interventions.     Group interventions support patients by: communication/social skills and supports, emotional regulation and mental health management     Modalities to reach these goals include: Mindfulness and acceptance and commitment therapy skills- process group     Subjective -patient report of mood today-  \" less anxiety but more depression\" anxiety is 4, depression is 8    Objective/ Intervention- Goal of group and Therapeutic modality utilized- Mindfulness and leaves on a stream meditation, discussion about yoga and meditative music and sounds.     Group Response- engaged     Patient Response-Pt was pleasant, cooperative and engaged. She spoke about anxiety being bad yesterday when trying to find hospitalization.  She could not find a place in [place] close to home. Today, feeling depressed because she misses home. She participated in a conversation about yoga. She had some experience, she has a niece who is an expert.    Otis Downs, LINDA, ATR-BC           "

## 2021-10-05 NOTE — PROGRESS NOTES
"   10/04/21 1400   General Information   Date Initially Attended OT 10/05/21   Special Considerations see note   Clinical Impression   Affect Appropriate to situation   Orientation Oriented to person, place and time   Appearance and ADLs General cleanliness observed in most areas   Attention to Internal Stimuli No observed signs   Interaction Skills Initiates appropriately with staff;Initiates appropriately with peers   Ability to Communicate Needs Independent   Verbal Content Clear;Appropriate to topic   Ability to Maintain Boundaries Maintains appropriate physical boundaries;Maintains appropriate verbal boundaries   Participation Initiates participation   Concentration Concentrates 50 minutes   Ability to Concentrate Without difficulty   Follows and Comprehends Directions Independently follows multi-step directions   Memory Delayed and immediate recall intact;Needs further assessment   Organization Independently organizes medium tasks   Decision Making Independent   Planning and Problem Solving Independently plans ahead   Ability to Apply and Learn Concepts Applies within group structure   Frustrations / Stress Tolerance Independently identifies sources of frustration/stress   Level of Insight Insightful into needs, issues, goals   Self Esteem Can identify positives   Social Supports Identifies utilizing supports   General Observation/Plan   General Observations/Plan See Comments   Attended 2 of 2 OT groups, see other note with attendance details.   Pt stated reason for admission as \"hopeless\". She identified a personal strength as \"empathy\". Changes she hopes for at time of discharge is \"feel worthwhile\". OT goal focus she chose was deal with frustration more effectively, improve self confidence and esteem, ask for support when needed and finish what she begins.  Plan: Will encourage attendance, provide opportunity for more creative and complex task work to provide a feeling of success and challenge as well as " self esteem and confidence.  Provide opportunity for exploring and expanding coping skills and signs of when to use them. Assess further.

## 2021-10-06 PROCEDURE — 250N000013 HC RX MED GY IP 250 OP 250 PS 637: Performed by: NURSE PRACTITIONER

## 2021-10-06 PROCEDURE — G0177 OPPS/PHP; TRAIN & EDUC SERV: HCPCS

## 2021-10-06 PROCEDURE — H2032 ACTIVITY THERAPY, PER 15 MIN: HCPCS

## 2021-10-06 PROCEDURE — 250N000013 HC RX MED GY IP 250 OP 250 PS 637: Performed by: PSYCHIATRY & NEUROLOGY

## 2021-10-06 PROCEDURE — 124N000003 HC R&B MH SENIOR/ADOLESCENT

## 2021-10-06 PROCEDURE — 90853 GROUP PSYCHOTHERAPY: CPT

## 2021-10-06 PROCEDURE — 250N000013 HC RX MED GY IP 250 OP 250 PS 637: Performed by: EMERGENCY MEDICINE

## 2021-10-06 PROCEDURE — 250N000013 HC RX MED GY IP 250 OP 250 PS 637: Performed by: PHYSICIAN ASSISTANT

## 2021-10-06 RX ADMIN — PANTOPRAZOLE SODIUM 40 MG: 40 TABLET, DELAYED RELEASE ORAL at 08:55

## 2021-10-06 RX ADMIN — QUETIAPINE 400 MG: 200 TABLET, FILM COATED ORAL at 21:39

## 2021-10-06 RX ADMIN — QUETIAPINE FUMARATE 100 MG: 50 TABLET, EXTENDED RELEASE ORAL at 08:55

## 2021-10-06 RX ADMIN — THERA TABS 1 TABLET: TAB at 08:55

## 2021-10-06 RX ADMIN — POLYETHYLENE GLYCOL 3350 17 G: 17 POWDER, FOR SOLUTION ORAL at 08:55

## 2021-10-06 RX ADMIN — TOPIRAMATE 100 MG: 50 TABLET ORAL at 21:39

## 2021-10-06 RX ADMIN — ALUMINUM HYDROXIDE, MAGNESIUM HYDROXIDE, AND SIMETHICONE 30 ML: 200; 200; 20 SUSPENSION ORAL at 10:43

## 2021-10-06 RX ADMIN — PRAZOSIN HYDROCHLORIDE 1 MG: 1 CAPSULE ORAL at 21:39

## 2021-10-06 RX ADMIN — DULOXETINE HYDROCHLORIDE 60 MG: 30 CAPSULE, DELAYED RELEASE ORAL at 08:55

## 2021-10-06 RX ADMIN — Medication 1000 UNITS: at 08:55

## 2021-10-06 RX ADMIN — AMLODIPINE BESYLATE 5 MG: 5 TABLET ORAL at 08:56

## 2021-10-06 RX ADMIN — TOPIRAMATE 100 MG: 50 TABLET ORAL at 08:55

## 2021-10-06 RX ADMIN — HYDROXYZINE HYDROCHLORIDE 50 MG: 50 TABLET, FILM COATED ORAL at 10:43

## 2021-10-06 ASSESSMENT — ACTIVITIES OF DAILY LIVING (ADL)
ORAL_HYGIENE: INDEPENDENT
LAUNDRY: UNABLE TO COMPLETE
DRESS: SCRUBS (BEHAVIORAL HEALTH);INDEPENDENT
HYGIENE/GROOMING: INDEPENDENT
ORAL_HYGIENE: INDEPENDENT
HYGIENE/GROOMING: INDEPENDENT
DRESS: STREET CLOTHES

## 2021-10-06 NOTE — PLAN OF CARE
Assessment/Intervention/Current Symptoms and Care Coordination  The patient's care was discussed with the treatment team and chart notes were reviewed    Discharge Plan or Goal  Discharge to home when stable.  She has outpatient psychiatry.  plans to increase frequency of therapy.  She requests a DBT telehealth referral.    Barriers to Discharge   Needs further psychiatric Stabilization and disposition planning    Referral Status  None identified.     Legal Status  Vol.

## 2021-10-06 NOTE — PLAN OF CARE
"  Problem: Activity and Energy Impairment (Depressive Signs/Symptoms)  Goal: Optimized Energy Level (Depressive Signs/Symptoms)  Outcome: No Change  Intervention: Optimize Energy Level  Recent Flowsheet Documentation  Taken 10/6/2021 1110 by Phyllis Wiggins RN  Patient Performed Hygiene: (by patient) teeth brushed  Activity (Behavioral Health): up [facility]     Problem: Suicidal Behavior  Goal: Suicidal Behavior is Absent or Managed  10/6/2021 1243 by Phyllis Wiggins RN  Outcome: Improving  Flowsheets (Taken 10/6/2021 1243)  Mutually Determined Action Steps (Suicidal Behavior Absent/Managed): verbalizes safety check rationale  10/6/2021 1107 by Phyllis Wiggins RN  Outcome: Improving     Patient stated that she is quite disoriented when she woke up this morning. She said that she had two (2) things to talk with the psychiatrist this morning but \"they were gone. It was really clear when I woke up but now, they are gone\". She further stated that this happened before too.  Patient's goal today is to attend all groups and to socialize with everyone. That, as much as possible she gets out of her room and interact with other patients on the unit. Patient said that she still feels depressed and rated her depression as 7 out of 10 wherein 10 is the worst. She also rated her anxiety as 8 out of 10. Patient denied SI/SIB nor hallucinations. Denies racing thoughts. She denied wishing herself to be dead. Patient said that her medications are starting to work. She is hopeful and feels safe on the unit. Her appetite is good. She complained of heartburns. Maalox 30 ml given for epigastric discomfort. Hydroxyzine 50 mgs given as prn for anxiety.    "

## 2021-10-06 NOTE — PLAN OF CARE
Pt presents with full range affect and calm mood. Denies SI/SIB and hallucinations. Continues to rate depression and anxiety 4/10. Pt was present in the lounge this evening and social with peers. Watched movie until bedtime. Attended all groups. Was given Miralax that was ordered for constipation, no effects as of yet. PRN Senokot given at HS as well. Denies pain. VSS. Medication compliant. Appetite and fluid intake adequate. No other concerns or complaints noted.

## 2021-10-06 NOTE — PROGRESS NOTES
"Pt participated in dance/movement therapy (D/MT) using synchronous movement to make social connections based in the body and symbolic nonverbal communication.  Group theme centered on the need for these connections to reconnect to personal mental health.  She had \"Big Moves\" in her and she stood to express herself through dance.  She also joined discussion appropriately, seeking vitality through both verbal and nonverbal therapeutic interactions.       10/05/21 1115   Dance Movement Therapy   Type of Intervention structured groups   Response participates, initiates socially appropriate   Hours 1     "

## 2021-10-06 NOTE — PLAN OF CARE
Pt attended the structured Therapeutic Recreation group, participating in a group activity. Pt participated in group discussion, leisure participation, and social engagement to gain self-esteem, manage behaviors, improve social skills, decrease isolation, and reduce anxiety/depression.   Pt remained focused and engaged throughout group activity.  Pt mood was sociable and was appropriate with interactions, contributing to the clues and descriptions throughout the activity. Pt was a full participant for the duration of the group. Pt had a bright affect, often laughing and joking with peers appropriately.

## 2021-10-06 NOTE — PLAN OF CARE
Problem: OT General Care Plan  Goal: OT Goal 1  Description: Will consistently attend OT groups and improve coping strategies with increasing repertoire of ideas and understanding of symptoms of when to use the strategies.       Outcome: Improving  Note: Attended 2 of 2 OT groups. She took an active role in both groups. In the first session, she participated for 50 minutes with 7 pts in a goal oriented task group. She worked independently on a creative and highly detailed task with her taking time to plan and organize to completion. She also attended the 2nd session for 60 minutes with 7 pts and participated in an activity on the topic of Affirmations, choosing ones that would be helpful to work on and explain the reasons why they were chosen.  She initiated speaking up, added comments to support others and offered insightful comments. Affect brightened with interactions. She spoke of gratitude and support she is receiving.

## 2021-10-06 NOTE — PLAN OF CARE
Assessment/Intervention/Current Symptoms and Care Coordination  The patient's care was discussed with the treatment team and chart notes were reviewed. Psychiatric met with patient to discuss her progress, current symptomology, ongoing cares and discharge plans/outpatient services.      Discharge Plan or Goal  Discharge to home when stable.  She has outpatient psychiatry.  plans to increase frequency of therapy.  She requests a DBT telehealth referral.     Barriers to Discharge   Needs further psychiatric Stabilization and disposition planning     Referral Status  None identified.      Legal Status  Vol.

## 2021-10-07 PROCEDURE — 250N000013 HC RX MED GY IP 250 OP 250 PS 637: Performed by: PSYCHIATRY & NEUROLOGY

## 2021-10-07 PROCEDURE — 250N000013 HC RX MED GY IP 250 OP 250 PS 637: Performed by: NURSE PRACTITIONER

## 2021-10-07 PROCEDURE — G0177 OPPS/PHP; TRAIN & EDUC SERV: HCPCS

## 2021-10-07 PROCEDURE — 124N000003 HC R&B MH SENIOR/ADOLESCENT

## 2021-10-07 PROCEDURE — 250N000013 HC RX MED GY IP 250 OP 250 PS 637: Performed by: PHYSICIAN ASSISTANT

## 2021-10-07 PROCEDURE — 250N000013 HC RX MED GY IP 250 OP 250 PS 637: Performed by: EMERGENCY MEDICINE

## 2021-10-07 PROCEDURE — H2032 ACTIVITY THERAPY, PER 15 MIN: HCPCS

## 2021-10-07 RX ADMIN — DULOXETINE 80 MG: 20 CAPSULE, DELAYED RELEASE ORAL at 08:51

## 2021-10-07 RX ADMIN — QUETIAPINE 400 MG: 200 TABLET, FILM COATED ORAL at 21:28

## 2021-10-07 RX ADMIN — ACETAMINOPHEN AND CODEINE PHOSPHATE 1 TABLET: 300; 30 TABLET ORAL at 21:33

## 2021-10-07 RX ADMIN — Medication 10 MG: at 21:33

## 2021-10-07 RX ADMIN — HYDROXYZINE HYDROCHLORIDE 50 MG: 50 TABLET, FILM COATED ORAL at 12:58

## 2021-10-07 RX ADMIN — QUETIAPINE FUMARATE 100 MG: 50 TABLET, EXTENDED RELEASE ORAL at 08:51

## 2021-10-07 RX ADMIN — Medication 1000 UNITS: at 08:51

## 2021-10-07 RX ADMIN — HYDROXYZINE HYDROCHLORIDE 50 MG: 50 TABLET, FILM COATED ORAL at 17:43

## 2021-10-07 RX ADMIN — PRAZOSIN HYDROCHLORIDE 1 MG: 1 CAPSULE ORAL at 21:27

## 2021-10-07 RX ADMIN — THERA TABS 1 TABLET: TAB at 08:52

## 2021-10-07 RX ADMIN — POLYETHYLENE GLYCOL 3350 17 G: 17 POWDER, FOR SOLUTION ORAL at 08:50

## 2021-10-07 RX ADMIN — TOPIRAMATE 100 MG: 50 TABLET ORAL at 08:51

## 2021-10-07 RX ADMIN — AMLODIPINE BESYLATE 5 MG: 5 TABLET ORAL at 08:52

## 2021-10-07 RX ADMIN — DOCUSATE SODIUM AND SENNOSIDES 1 TABLET: 8.6; 5 TABLET ORAL at 21:34

## 2021-10-07 RX ADMIN — PANTOPRAZOLE SODIUM 40 MG: 40 TABLET, DELAYED RELEASE ORAL at 08:52

## 2021-10-07 RX ADMIN — TOPIRAMATE 100 MG: 50 TABLET ORAL at 21:28

## 2021-10-07 ASSESSMENT — ACTIVITIES OF DAILY LIVING (ADL)
DRESS: INDEPENDENT
LAUNDRY: UNABLE TO COMPLETE
ORAL_HYGIENE: INDEPENDENT
HYGIENE/GROOMING: INDEPENDENT

## 2021-10-07 ASSESSMENT — MIFFLIN-ST. JEOR: SCORE: 1407.76

## 2021-10-07 NOTE — PROGRESS NOTES
PSYCHIATRIC PROGRESS NOTE    Admission Date: 10/03/2021  Date of Service: 10/06/2021    The patient was seen, her chart reviewed, he case discussed with staff  She slept better last couple of nights and had no nightmares. She seems to have tolerated recent medication change well and now reports feeling less anxious. Yet she continued to have fleeting suicidal thoughts    Marissa is a 60 year old  white mother of three with a long history of mood swings whereby she would feel depressed for a couple of weeks, would have an intermission lasting up to a month and then would become manic for about a week. Most recently her moods would change from one phase to another without intermissions. She has been on Seroquel and Cymbalta for 4-5 years and has never has been prescribed mood stabilizers. She was prescribed small doses of Topamax but not for mood stabilization. She presented to our ED with ongoing suicidal ideation. She was admitted to the hospital in [place] the week prior to this admission after amlodipine and propranolol overdose. She continued to feel suicidal and unsafe at home. She went to [facility] in [place], [place] one day prior to admission however, there were no beds available and she subsequently was discharged and decided to come here. She continued to endorse suicidal ideation with plan to overdose. She denied any subsequent attempt at self-harm. She was admitted to [facility] and was seen by BRIT Oneal CNP. I saw her for the first time on 10/04/21, she c/o nightly nightmares. I increased her Topamax, changed her Seroquel XR to QAM and increased HS Seroquel. I gave her a trial with Prazosin.    MEDICATIONS  Cymbalta 60 mg QAM   Seroquel 400 mg at bedtime   Seroquel  mg QAM  Topamax 100 mg BID   Amlodipine 5 mg daily   Protonix 40 mg daily   Vitamin D3 1000 units daily   Prazosin 1 mg at bedtime    LABS: No new results    VS: Pulse - 82, BP - 130/82, T - 98.5, Resp - 16,  SpO2 - 99%    MENTAL STATUS EXAMINATION   Revealed a normally built and normally developed, pleasant, friendly and cooperative 60-year-old white female appearing her stated age. She was alert and oriented X 3. Her speech was coherent and goal related.. Her mood was slightly elated, affect euphoric but labile. She admitted to fleeting thoughts but no plan. No overt psychotic features were noted. She had marginal insight into her problems     DIAGNOSTIC IMPRESSION   Bipolar Disorder, mixed   RACQUEL   PTSD    PLAN: I will increase Cymbalta to 80 mg QAM and continue the rest of medications without change.    Anjel Sarah MD

## 2021-10-07 NOTE — PLAN OF CARE
Problem: OT General Care Plan  Goal: OT Goal 1  Description: Will consistently attend OT groups and improve coping strategies with increasing repertoire of ideas and understanding of symptoms of when to use the strategies.       Outcome: Improving  Note: Attended 2 of 2 OT groups. In the am session, she participated for 50 minutes with 8 pts. in a goal oriented  task group. She continues to take an active role in all opportunities of involvement. She worked at a constant pace on a task requiring creative planning and organization. She initiated conversation and booker others in with comments. She is supportive of others in a thoughtful manner. Affect brightens with interactions. She appears interested, involved, attentive and motivated.  In the afternoon session, she attended and participated for 50 minutes with 4 pts in an activity focused on Distorted Thinking, identifying thinking she may resort to at times and methods to identify and work through those moments. Again, comments are insightful. She offered a long list of healthy ideas of how to feel grounded and ideas of managing distorted thinking.

## 2021-10-07 NOTE — PLAN OF CARE
Pt presents with full range affect and depressed, anxious mood. Denies SI/SIB and hallucinations. Rates depression and anxiety 6/10 - reports that she is doing somewhat better than this morning. She was present in the lounge all shift and social with staff and peers. Attended all groups. Appetite and fluid intake adequate. Reports having bowel movement this AM. Denies pain. Medication compliant. VSS. No other concerns or complaints noted.

## 2021-10-07 NOTE — PROGRESS NOTES
Pt participated in dance/movement therapy (D/MT) using inspirational images, music and movements to remain vitalized and socially connected. Pt also used body and posture to self-sooth and express comfort.  She offered support to peers.       10/07/21 6513   Dance Movement Therapy   Type of Intervention structured groups   Response participates, initiates socially appropriate   Hours 1

## 2021-10-07 NOTE — PLAN OF CARE
Problem: General Rehab Plan of Care  Goal: Therapeutic Recreation/Music Therapy Goal  Description: The patient and/or their representative will achieve their patient-specific goals related to the plan of care.  The patient-specific goals include:  Outcome: No Change        Marissa attended art therapy group for 1 hour (5 patients total). Her affect appeared bright. Marissa engaged in group discussion about self-compassion, stating that she didn't know what it was but was interested in learning. She was receptive to writer's explanation of self-compassion, but had little to say about the idea. She participated in the art directive to make an image or symbol to represent self-compassion. She decided to color a coloring sheet of a hand holding a heart. She appeared calm and focused while coloring and appeared pleased when peers complimented her work. She interacted appropriately with peers, complimenting other's work.

## 2021-10-07 NOTE — PLAN OF CARE
Problem: Sleep Disturbance (Depressive Signs/Symptoms)  Goal: Improved Sleep (Depressive Signs/Symptoms)  Outcome: Improving    Patient slept comfortably for 8.75 hours. No complaints made.

## 2021-10-07 NOTE — PLAN OF CARE
"  Problem: Suicidal Behavior  Goal: Suicidal Behavior is Absent or Managed  Outcome: Improving  Note: Patient verbalized having one fleeting thoughts about suicide today, during group. She said she thought about overdosing on the medications she is taking now. She contracted for safety. Denied wishing to be dead. Patient talked about her most recent overdose, and her other 2 previous attempts 20 yrs ago. She said she did not have any specific event or situation that lead to the overdose. Said it was just her depression. Patient said that in sone ways she feels improvement and in some not yet, but she admitted that he symptoms are not getting worse. She rated depression and anxiety at 6/10. Accepted PRN Hydroxyzine 50 mg PO. Patient denied hallucinations, HI, or paranoia.   Behavior is acceptable, she is socially appropriate. Reported good night sleep, and feeling rested today. She kept self clean and well groomed. Appetite is good, she ate close to 100% of both meals. Communicated needs well.   Thoughts are linear and future oriented. Patient is asking for a DBT classes after discharge. She is also asking to be assigned to a more permanent psychiatrist, as she has been seeing different providers each time she would have an appointment. Speech is clear and organized. Mood is calm, affect is blunted. Memory appeared intact.  Patient took scheduled medications: she reported having \"tingling in her face\" with the increased Topamax. However, she also said that she had the same sensations when she srated taking it, that went away with time. Patient said she tad talked to the provider about this and he told her that he expects that the SE will subside with time.      "

## 2021-10-07 NOTE — PLAN OF CARE
Assessment/Intervention/Current Symptoms and Care Coordination  The patient's care was discussed with the treatment team and chart notes were reviewed. [facility] met with patient to discuss her progress, current symptomology, ongoing cares and discharge plans/outpatient services.      Patient notified writer that she needs help find a psychiatrist that she can on more long term basis. She stated that she sees different psychiatrist their [facility] and that she wants to have someone who is consistent. She stated he medications get adjusted every time she see a new psychiatrist and that has been hard on her. [facility] will help get a new psychiatrist in [place].       Discharge Plan or Goal  Discharge to home when stable.  She has outpatient psychiatry.  plans to increase frequency of therapy.  She requests a DBT telehealth referral.     Barriers to Discharge   Needs further psychiatric Stabilization and disposition planning     Referral Status  None identified.      Legal Status  Vol.

## 2021-10-08 PROCEDURE — H2032 ACTIVITY THERAPY, PER 15 MIN: HCPCS

## 2021-10-08 PROCEDURE — 87641 MR-STAPH DNA AMP PROBE: CPT | Performed by: PHYSICIAN ASSISTANT

## 2021-10-08 PROCEDURE — 250N000013 HC RX MED GY IP 250 OP 250 PS 637: Performed by: EMERGENCY MEDICINE

## 2021-10-08 PROCEDURE — G0177 OPPS/PHP; TRAIN & EDUC SERV: HCPCS

## 2021-10-08 PROCEDURE — 250N000013 HC RX MED GY IP 250 OP 250 PS 637: Performed by: PHYSICIAN ASSISTANT

## 2021-10-08 PROCEDURE — 250N000013 HC RX MED GY IP 250 OP 250 PS 637: Performed by: NURSE PRACTITIONER

## 2021-10-08 PROCEDURE — 87640 STAPH A DNA AMP PROBE: CPT | Performed by: PHYSICIAN ASSISTANT

## 2021-10-08 PROCEDURE — 99231 SBSQ HOSP IP/OBS SF/LOW 25: CPT | Performed by: PSYCHIATRY & NEUROLOGY

## 2021-10-08 PROCEDURE — 124N000003 HC R&B MH SENIOR/ADOLESCENT

## 2021-10-08 PROCEDURE — 250N000013 HC RX MED GY IP 250 OP 250 PS 637: Performed by: PSYCHIATRY & NEUROLOGY

## 2021-10-08 RX ORDER — PRAZOSIN HYDROCHLORIDE 1 MG/1
2 CAPSULE ORAL AT BEDTIME
Status: DISCONTINUED | OUTPATIENT
Start: 2021-10-08 | End: 2021-10-25 | Stop reason: HOSPADM

## 2021-10-08 RX ADMIN — TOPIRAMATE 100 MG: 50 TABLET ORAL at 08:45

## 2021-10-08 RX ADMIN — PANTOPRAZOLE SODIUM 40 MG: 40 TABLET, DELAYED RELEASE ORAL at 08:46

## 2021-10-08 RX ADMIN — Medication 1000 UNITS: at 08:46

## 2021-10-08 RX ADMIN — QUETIAPINE 400 MG: 200 TABLET, FILM COATED ORAL at 22:08

## 2021-10-08 RX ADMIN — TOPIRAMATE 100 MG: 50 TABLET ORAL at 22:09

## 2021-10-08 RX ADMIN — QUETIAPINE FUMARATE 100 MG: 50 TABLET, EXTENDED RELEASE ORAL at 08:45

## 2021-10-08 RX ADMIN — PRAZOSIN HYDROCHLORIDE 2 MG: 1 CAPSULE ORAL at 22:09

## 2021-10-08 RX ADMIN — THERA TABS 1 TABLET: TAB at 08:46

## 2021-10-08 RX ADMIN — Medication 10 MG: at 22:09

## 2021-10-08 RX ADMIN — AMLODIPINE BESYLATE 5 MG: 5 TABLET ORAL at 08:46

## 2021-10-08 RX ADMIN — ACETAMINOPHEN AND CODEINE PHOSPHATE 1 TABLET: 300; 30 TABLET ORAL at 17:26

## 2021-10-08 RX ADMIN — POLYETHYLENE GLYCOL 3350 17 G: 17 POWDER, FOR SOLUTION ORAL at 08:45

## 2021-10-08 RX ADMIN — DULOXETINE 80 MG: 20 CAPSULE, DELAYED RELEASE ORAL at 08:46

## 2021-10-08 RX ADMIN — HYDROXYZINE HYDROCHLORIDE 50 MG: 50 TABLET, FILM COATED ORAL at 23:50

## 2021-10-08 ASSESSMENT — ACTIVITIES OF DAILY LIVING (ADL)
HYGIENE/GROOMING: INDEPENDENT
HYGIENE/GROOMING: INDEPENDENT
LAUNDRY: UNABLE TO COMPLETE
LAUNDRY: WITH SUPERVISION
DRESS: INDEPENDENT
ORAL_HYGIENE: INDEPENDENT
DRESS: INDEPENDENT
ORAL_HYGIENE: INDEPENDENT

## 2021-10-08 NOTE — PLAN OF CARE
Problem: General Rehab Plan of Care  Goal: Therapeutic Recreation/Music Therapy Goal (Art Therapy)  Description: The patient and/or their representative will achieve their patient-specific goals related to the plan of care.  The patient-specific goals include:  Outcome: Improving   Art Therapy directive was to create an image representing a personal character of strength.  Goals of directive: identifying personal strengths and positive support systems, emotional expression.  Pt was a pleasant participant, focused on task for the majority of group. Pt booker an image representing her sister and brother in law. Pt talked about how they are positive relationships in her life.  Pts mood was calm.

## 2021-10-08 NOTE — PLAN OF CARE
"Group length: 1 hour     Number of participants: 6      Summary of Group / Topics Discussed:    The Psychotherapy group goal is to promote insight to positive choice and change. Group processing is within a supportive and safe environment. Patients will process emotions using verbal group and expressive psychotherapy interventions. The specific topic discussed today was \"practicing vulnerability\"     Patient's Response: Patient was alert cooperative and oriented x4, patient was attentive, engaged, made eye contact and asked questions. Patient was respectful towards other participants and engaged with others appropriately.     "

## 2021-10-08 NOTE — PLAN OF CARE
Pt participated in OT clinic IND, where she initiated a chosen project (pour painting), followed through with plan, and asked for support with supplies as needed. Pt was engaged and attentive to task throughout and assisted peers with projects when asked.

## 2021-10-08 NOTE — PLAN OF CARE
Pt actively participated in occupational therapy clinic with 3 patients for 45 minutes. Pt was able to ask for assistance as needed, and independently return to a novel, multi-step, goal-directed task with minimal assistance for task setup. Pt demonstrated good focus (30 minutes without interruption), planning, and attention to detail during task completion. Pt appeared comfortable interacting with peers and writer, and socialized pleasantly throughout her time in clinic. She offered support and encouragement to a peer who was expressing some difficult emotions. She was calm/pleasant and engaged for the full duration of clinic.

## 2021-10-08 NOTE — PROGRESS NOTES
Call placed to infection control regarding +MRSA. Recommend retesting which involves 2 negative nares cultures collected 1 week apart from each other. Will update IM with recommendation. Im web paged for order for nares swabs for MRSA.

## 2021-10-08 NOTE — PLAN OF CARE
"  Problem: Cognitive Impairment (Anxiety Signs/Symptoms)  Goal: Optimized Cognitive Function (Anxiety Signs/Symptoms)  Outcome: Improving  Note:   Patient denied thoughts of suicide today this evening. She contracted for safety. Denied wishing to be dead. She rated depression at 5/10. Accepted PRN Hydroxyzine 50 mg PO for anxiety 6/10, that was effective and decreased her anxiety to 4/10. Patient denied hallucinations, HI, or paranoia.   Behavior is acceptable, she is socially appropriate. Took PRN Melatonin at HS. She kept self clean and well groomed. Appetite is good, she ate close to 100% of dinner. Communicated needs well.   Thoughts are linear and future oriented. Patient continued to talk about her need for DBT after discharge and finding a more permanent psychiatrist. Speech is clear and organized. Mood is calm, affect is blunted. Memory appeared intact.  Patient took scheduled medications: she reported decrease in having \"tingling in her face\".   Pain, bilateral hip, rated at 6/10, she requested and took Tylenol#3 PRN along with Loraine Colace.      "

## 2021-10-08 NOTE — PLAN OF CARE
Problem: Mood Impairment (Anxiety Signs/Symptoms)  Goal: Improved Mood Symptoms (Anxiety Signs/Symptoms)  Outcome: Improving     Behavioral  Pt slept 7.5 hours overnight; Pt oriented x 4; Pt pleasant and cooperative upon approach;  pt compliant with medications and cares; pt took shower and performed self-cares.  Pt rates appetite as ok eating 75% of meals and hydrating adequately; Pt social with select staff and peers; attended groups; speech appropriate in context; Pt denied SI, SIB, HI, and hallucinations; affect flat and blunted.     Medical   Pt endorses no new medical complaints    Plan   Discharge home when stable

## 2021-10-08 NOTE — PLAN OF CARE
Problem: Sleep Disturbance (Depressive Signs/Symptoms)  Goal: Improved Sleep (Depressive Signs/Symptoms)  Outcome: Improving     Slept well for 7.5 hours  No issues encountered this shift.

## 2021-10-08 NOTE — PROGRESS NOTES
"  PSYCHIATRIC PROGRESS NOTE    Admission Date: 10/03/2021  Date of Service: 10/08/2021    The patient was seen, her chart reviewed, her case was discussed with staff at the Team Meeting.  Reportedly she did not sleep well last night having \"a terrible nightmare\" which turned out to be very trivial. She dreamed about being forcefully discharged without saying good bye to her friends. She slept well the night before and tolerated Prazosin well. Otherwise she has been pleasant, normally active and social attending all scheduled activities. Her mood was more stable and she has been feeling less anxious.    This is a 60 year old  white mother of three with a long history of mood swings whereby she would feel depressed for a couple of weeks, would have an intermission lasting up to a month and then would become manic for about a week. Most recently her moods would change from one phase to another without intermissions. She has been on Seroquel and Cymbalta for 4-5 years and has never has been prescribed mood stabilizers. She was prescribed small doses of Topamax ordered to trat her \"essential tremor\". She presented to our ED with ongoing suicidal ideation. She was admitted to the hospital in [place] the week prior to this admission after amlodipine and propranolol overdose. She continued to feel suicidal and unsafe at home. She went to [facility] in [place], [place] one day prior to admission however, there were no beds available and she subsequently was discharged and decided to come here. She continued to endorse suicidal ideation with plan to overdose. She denied any subsequent attempt at self-harm. She was admitted to [facility] and was seen by BRIT Oneal CNP. I saw her for the first time on 10/04/21, she c/o nightly nightmares. I increased her Topamax, changed her Seroquel XR to QAM and increased HS Seroquel. I gave her a trial with Prazosin.    MEDICATIONS  Cymbalta 60 mg QAM   Seroquel 400 " mg at bedtime   Seroquel  mg QAM   Topamax 100 mg BID   Amlodipine 5 mg daily   Protonix 40 mg daily   Vitamin D3 1000 units daily   Prazosin 1 mg at bedtime    LABS: No new results    VS: Pulse - 82, BP - 113/74, T - 96.7, Resp - 16, SpO2 - 96%    MENTAL STATUS EXAMINATION  Revealed a normally built and normally developed, pleasant, friendly and cooperative 60-year-old white female appearing her stated age. She was alert and oriented X 3. Her speech was coherent and goal related.. Her mood was was more stable. She appeared to be less depressed and less anxious. She denied SI at the moment. She contracted for safety. No overt psychotic features were noted. She had marginal insight into her problems     DIAGNOSTIC IMPRESSION   Bipolar Disorder, mixed   RACQUEL   PTSD     PLAN: I will increase Prazosin to 2 mg at bedtime and continue the rest of medications without change.    Anjel Sarah MD

## 2021-10-09 LAB
MRSA DNA SPEC QL NAA+PROBE: NEGATIVE
SA TARGET DNA: POSITIVE

## 2021-10-09 PROCEDURE — 250N000013 HC RX MED GY IP 250 OP 250 PS 637: Performed by: PHYSICIAN ASSISTANT

## 2021-10-09 PROCEDURE — 124N000003 HC R&B MH SENIOR/ADOLESCENT

## 2021-10-09 PROCEDURE — 250N000013 HC RX MED GY IP 250 OP 250 PS 637: Performed by: EMERGENCY MEDICINE

## 2021-10-09 PROCEDURE — 250N000013 HC RX MED GY IP 250 OP 250 PS 637: Performed by: PSYCHIATRY & NEUROLOGY

## 2021-10-09 PROCEDURE — 250N000013 HC RX MED GY IP 250 OP 250 PS 637: Performed by: NURSE PRACTITIONER

## 2021-10-09 PROCEDURE — H2032 ACTIVITY THERAPY, PER 15 MIN: HCPCS

## 2021-10-09 RX ADMIN — THERA TABS 1 TABLET: TAB at 08:36

## 2021-10-09 RX ADMIN — ACETAMINOPHEN AND CODEINE PHOSPHATE 1 TABLET: 300; 30 TABLET ORAL at 08:33

## 2021-10-09 RX ADMIN — TOPIRAMATE 100 MG: 50 TABLET ORAL at 08:31

## 2021-10-09 RX ADMIN — Medication 1000 UNITS: at 08:32

## 2021-10-09 RX ADMIN — DOCUSATE SODIUM AND SENNOSIDES 1 TABLET: 8.6; 5 TABLET ORAL at 08:31

## 2021-10-09 RX ADMIN — PANTOPRAZOLE SODIUM 40 MG: 40 TABLET, DELAYED RELEASE ORAL at 08:32

## 2021-10-09 RX ADMIN — QUETIAPINE 400 MG: 200 TABLET, FILM COATED ORAL at 22:02

## 2021-10-09 RX ADMIN — PRAZOSIN HYDROCHLORIDE 2 MG: 1 CAPSULE ORAL at 22:03

## 2021-10-09 RX ADMIN — AMLODIPINE BESYLATE 5 MG: 5 TABLET ORAL at 08:32

## 2021-10-09 RX ADMIN — POLYETHYLENE GLYCOL 3350 17 G: 17 POWDER, FOR SOLUTION ORAL at 08:29

## 2021-10-09 RX ADMIN — DULOXETINE 80 MG: 20 CAPSULE, DELAYED RELEASE ORAL at 08:31

## 2021-10-09 RX ADMIN — ACETAMINOPHEN AND CODEINE PHOSPHATE 1 TABLET: 300; 30 TABLET ORAL at 22:03

## 2021-10-09 RX ADMIN — QUETIAPINE FUMARATE 100 MG: 50 TABLET, EXTENDED RELEASE ORAL at 08:29

## 2021-10-09 RX ADMIN — TOPIRAMATE 100 MG: 50 TABLET ORAL at 22:02

## 2021-10-09 RX ADMIN — Medication 10 MG: at 22:03

## 2021-10-09 ASSESSMENT — ACTIVITIES OF DAILY LIVING (ADL)
HYGIENE/GROOMING: INDEPENDENT
ORAL_HYGIENE: INDEPENDENT
DRESS: INDEPENDENT
HYGIENE/GROOMING: INDEPENDENT
ORAL_HYGIENE: INDEPENDENT
LAUNDRY: WITH SUPERVISION
DRESS: INDEPENDENT

## 2021-10-09 NOTE — PLAN OF CARE
Calm, pleasant, cooperative. Rates depression at 4/10, anxiety at 6/10.  Denies SI or the wish to be dead.  Visible in the lounge and social with select peers.  Attends group.  Tylenol #3 given x1 per request for back pain and patient states that it was helpful  P:  continue same plan of care.

## 2021-10-09 NOTE — PROGRESS NOTES
Pt requested PRN Hydroxyzine at around 2345. Pt appeared to be sleeping comfortably. Total number of 7.5 hours . No other concerns at this time. Will continue to monitor.

## 2021-10-09 NOTE — PLAN OF CARE
"Music Therapy Group note    Clinical Hours in session: 1.0    Number of patients in group: 5    Scope of service: psychodynamic     Patient progress: initial encounter    Intervention: Motivational Moments     Goal of group: to inspire     Patient response/reaction to treatment intervention(s): Marissa was highly involved and cooperative in group process tonight.  Showed positive social skills and was expressive about what the music from the musical \"Alvarado\" meant, explaining it in detail, and expressing it being very close to her heart.  Stated how helpful group was and how she wishes there could be \"more music on the unit.\"      Delma Richard, MT-BC  Board-Certified Music Therapist           "

## 2021-10-10 PROCEDURE — H2032 ACTIVITY THERAPY, PER 15 MIN: HCPCS

## 2021-10-10 PROCEDURE — 250N000013 HC RX MED GY IP 250 OP 250 PS 637: Performed by: NURSE PRACTITIONER

## 2021-10-10 PROCEDURE — 250N000013 HC RX MED GY IP 250 OP 250 PS 637: Performed by: PHYSICIAN ASSISTANT

## 2021-10-10 PROCEDURE — 124N000003 HC R&B MH SENIOR/ADOLESCENT

## 2021-10-10 PROCEDURE — 250N000013 HC RX MED GY IP 250 OP 250 PS 637: Performed by: PSYCHIATRY & NEUROLOGY

## 2021-10-10 PROCEDURE — 250N000013 HC RX MED GY IP 250 OP 250 PS 637: Performed by: EMERGENCY MEDICINE

## 2021-10-10 RX ORDER — LIDOCAINE 4 G/G
1 PATCH TOPICAL
Status: DISCONTINUED | OUTPATIENT
Start: 2021-10-10 | End: 2021-10-25 | Stop reason: HOSPADM

## 2021-10-10 RX ADMIN — PANTOPRAZOLE SODIUM 40 MG: 40 TABLET, DELAYED RELEASE ORAL at 08:34

## 2021-10-10 RX ADMIN — Medication 10 MG: at 22:20

## 2021-10-10 RX ADMIN — ACETAMINOPHEN AND CODEINE PHOSPHATE 1 TABLET: 300; 30 TABLET ORAL at 22:20

## 2021-10-10 RX ADMIN — PRAZOSIN HYDROCHLORIDE 2 MG: 1 CAPSULE ORAL at 22:20

## 2021-10-10 RX ADMIN — QUETIAPINE 400 MG: 200 TABLET, FILM COATED ORAL at 22:19

## 2021-10-10 RX ADMIN — TOPIRAMATE 100 MG: 50 TABLET ORAL at 22:19

## 2021-10-10 RX ADMIN — LIDOCAINE PATCH 4% 1 PATCH: 40 PATCH TOPICAL at 12:49

## 2021-10-10 RX ADMIN — AMLODIPINE BESYLATE 5 MG: 5 TABLET ORAL at 08:36

## 2021-10-10 RX ADMIN — ACETAMINOPHEN AND CODEINE PHOSPHATE 1 TABLET: 300; 30 TABLET ORAL at 11:07

## 2021-10-10 RX ADMIN — QUETIAPINE FUMARATE 100 MG: 50 TABLET, EXTENDED RELEASE ORAL at 08:34

## 2021-10-10 RX ADMIN — Medication 1000 UNITS: at 08:34

## 2021-10-10 RX ADMIN — TOPIRAMATE 100 MG: 50 TABLET ORAL at 08:34

## 2021-10-10 RX ADMIN — DULOXETINE 80 MG: 20 CAPSULE, DELAYED RELEASE ORAL at 08:34

## 2021-10-10 RX ADMIN — HYDROXYZINE HYDROCHLORIDE 50 MG: 50 TABLET, FILM COATED ORAL at 22:22

## 2021-10-10 RX ADMIN — ACETAMINOPHEN AND CODEINE PHOSPHATE 1 TABLET: 300; 30 TABLET ORAL at 05:06

## 2021-10-10 RX ADMIN — THERA TABS 1 TABLET: TAB at 08:34

## 2021-10-10 RX ADMIN — POLYETHYLENE GLYCOL 3350 17 G: 17 POWDER, FOR SOLUTION ORAL at 08:34

## 2021-10-10 ASSESSMENT — MIFFLIN-ST. JEOR: SCORE: 1421.37

## 2021-10-10 ASSESSMENT — ACTIVITIES OF DAILY LIVING (ADL)
ORAL_HYGIENE: INDEPENDENT
DRESS: INDEPENDENT
DRESS: INDEPENDENT
LAUNDRY: WITH SUPERVISION
HYGIENE/GROOMING: INDEPENDENT
ORAL_HYGIENE: INDEPENDENT
HYGIENE/GROOMING: INDEPENDENT

## 2021-10-10 NOTE — PLAN OF CARE
Visible in the lounge and is social with select peers.  Affect bright on approach.  Rates depression at3/10, anxiety at 8/10.  Anxious because was thinking about discharge tomorrow and is now afraid to go home without a plan in place and she wants to go to DBT but doesn't know how to go about getting into that or where.  Denies SI or the wish to be dead.  C/o neck pain now and requesting lidocaine patches and tylenol#3.  Will get a lido patch order but will give the T3 as requested after she comes out of group.  Lidocaine patch applied.  No further concerns or complaints.

## 2021-10-10 NOTE — PLAN OF CARE
"  Problem: General Rehab Plan of Care  Goal: Therapeutic Recreation/Music Therapy Goal  Description: The patient and/or their representative will achieve their patient-specific goals related to the plan of care.  The patient-specific goals include:  Outcome: Improving     Marissa attended art therapy group for 1 hour (5 patients total). She reported feeling \"alright.\" She reported she already had an idea for what she wanted to make. Writer allowed her to work on her idea. She used oil pastels to make a landscape drawing. She appeared calm and focused wile drawing. She interacted appropriately with peers, making friendly conversation. She shared her art with the group and talked about how much she loves sunsets. She reported feeling relaxed.   "

## 2021-10-10 NOTE — PLAN OF CARE
Problem: Sleep Disturbance (Depressive Signs/Symptoms)  Goal: Improved Sleep (Depressive Signs/Symptoms)  Outcome: Improving    Patient slept comfortably for 7.25 hours with a T-pump on.  She complained of pain on her shoulders. Tylenol #3 given 1 tablet per patient's request. No other complained made.      ----- Message from Jennie Ontiveros MD sent at 4/27/2019  9:58 AM CDT -----  Please call patient with results of blood work  First his thyroid level is very very very high.  Looks like he has not been taking his thyroid medications for quite some time  All of the prescriptions were sent to the pharmacy, he needs to restart those as soon as possible, plus he needs to see endocrinologist, we discussed this already  Also his cholesterol is high, atorvastatin was refilled  His diabetes still remains controlled.  This appears to continue with diet  The rest of the blood work was normal

## 2021-10-10 NOTE — PLAN OF CARE
Problem: Suicidal Behavior  Goal: Suicidal Behavior is Absent or Managed  Outcome: Improving  Note:   Patient reported feeling better these evening. She talked about having pain last night, said she did not sleep very well. She agreed to plan taking a PRN at HS, before her pain gets out of control. Patient took a nap at the beginning of the shift. She denied suicidal thoughts or wishing to be dead, contracted for safety. Rated again depression at 3/10 and anxiety at 4/10. Denied hallucinations, HI, or paranoia.   Behavior is appropriate, patient is social, pleasant on approach, keeps self clean dn well groomed. Appetite is good.   Thoughts are linear and future oriented. Speech is clear and organized. Mood is blunted, affect is calm. Memory appeared intact.   Given PRN Tylenol#3 1 tab for back pain and PRN melatonin 10 mg per her request.

## 2021-10-10 NOTE — PROGRESS NOTES
Brief Medicine Note    Contacted by nursing regarding patient requesting lidocaine patch for some neck pain. Ordered lidocaine patch per request.     Medicine will sign off. No further recommendations at this time. Please page the on-call [facility]TON[facility] for any intercurrent medical issues which arise.     Feli Haque PA-C  Hospitalist Service  Contact information available via [facility] Paging/Directory

## 2021-10-10 NOTE — PLAN OF CARE
Assessment/Intervention/Current Symptoms and Care Coordination  The patient's care was discussed with the treatment team and chart notes were reviewed. [facility] met with patient to discuss her progress, current symptomology, ongoing cares and discharge plans/outpatient services.      Discharge Plan or Goal  Discharge to home when stable.  She has outpatient psychiatry.  plans to increase frequency of therapy.  She requests a DBT telehealth referral.     Barriers to Discharge   Needs further psychiatric Stabilization and disposition planning     Referral Status  none     Legal Status  Vol.

## 2021-10-11 PROCEDURE — 250N000013 HC RX MED GY IP 250 OP 250 PS 637: Performed by: EMERGENCY MEDICINE

## 2021-10-11 PROCEDURE — H2032 ACTIVITY THERAPY, PER 15 MIN: HCPCS

## 2021-10-11 PROCEDURE — 250N000013 HC RX MED GY IP 250 OP 250 PS 637: Performed by: PSYCHIATRY & NEUROLOGY

## 2021-10-11 PROCEDURE — 250N000013 HC RX MED GY IP 250 OP 250 PS 637: Performed by: PHYSICIAN ASSISTANT

## 2021-10-11 PROCEDURE — 124N000003 HC R&B MH SENIOR/ADOLESCENT

## 2021-10-11 PROCEDURE — 250N000013 HC RX MED GY IP 250 OP 250 PS 637: Performed by: NURSE PRACTITIONER

## 2021-10-11 PROCEDURE — G0177 OPPS/PHP; TRAIN & EDUC SERV: HCPCS

## 2021-10-11 RX ORDER — BUSPIRONE HYDROCHLORIDE 5 MG/1
5 TABLET ORAL 3 TIMES DAILY
Status: DISCONTINUED | OUTPATIENT
Start: 2021-10-11 | End: 2021-10-13

## 2021-10-11 RX ADMIN — Medication 10 MG: at 21:35

## 2021-10-11 RX ADMIN — TOPIRAMATE 100 MG: 50 TABLET ORAL at 08:53

## 2021-10-11 RX ADMIN — THERA TABS 1 TABLET: TAB at 08:55

## 2021-10-11 RX ADMIN — BUSPIRONE HYDROCHLORIDE 5 MG: 5 TABLET ORAL at 14:33

## 2021-10-11 RX ADMIN — QUETIAPINE FUMARATE 100 MG: 50 TABLET, EXTENDED RELEASE ORAL at 08:56

## 2021-10-11 RX ADMIN — AMLODIPINE BESYLATE 5 MG: 5 TABLET ORAL at 08:55

## 2021-10-11 RX ADMIN — QUETIAPINE 400 MG: 200 TABLET, FILM COATED ORAL at 21:35

## 2021-10-11 RX ADMIN — POLYETHYLENE GLYCOL 3350 17 G: 17 POWDER, FOR SOLUTION ORAL at 08:53

## 2021-10-11 RX ADMIN — HYDROXYZINE HYDROCHLORIDE 50 MG: 50 TABLET, FILM COATED ORAL at 21:35

## 2021-10-11 RX ADMIN — PANTOPRAZOLE SODIUM 40 MG: 40 TABLET, DELAYED RELEASE ORAL at 08:53

## 2021-10-11 RX ADMIN — DULOXETINE 80 MG: 20 CAPSULE, DELAYED RELEASE ORAL at 08:53

## 2021-10-11 RX ADMIN — ACETAMINOPHEN AND CODEINE PHOSPHATE 1 TABLET: 300; 30 TABLET ORAL at 10:04

## 2021-10-11 RX ADMIN — Medication 1000 UNITS: at 08:53

## 2021-10-11 RX ADMIN — ACETAMINOPHEN AND CODEINE PHOSPHATE 1 TABLET: 300; 30 TABLET ORAL at 21:36

## 2021-10-11 RX ADMIN — TOPIRAMATE 100 MG: 50 TABLET ORAL at 21:35

## 2021-10-11 RX ADMIN — LIDOCAINE PATCH 4% 1 PATCH: 40 PATCH TOPICAL at 08:52

## 2021-10-11 RX ADMIN — BUSPIRONE HYDROCHLORIDE 5 MG: 5 TABLET ORAL at 21:35

## 2021-10-11 RX ADMIN — PRAZOSIN HYDROCHLORIDE 2 MG: 1 CAPSULE ORAL at 21:36

## 2021-10-11 ASSESSMENT — ACTIVITIES OF DAILY LIVING (ADL)
LAUNDRY: WITH SUPERVISION
DRESS: INDEPENDENT
HYGIENE/GROOMING: INDEPENDENT
ORAL_HYGIENE: INDEPENDENT

## 2021-10-11 NOTE — PLAN OF CARE
Pt actively participated in a structured Therapeutic Recreation group with a focus on leisure participation, socializing, and exercise. Pt participated in the guided exercise for the full duration of the group. Pt followed along, engaged in the guided chair exercise routine and added to the discussion prompts throughout the routine.  Pt was encouraged to use positive imagery with the deep breathing and stretching to foster relaxation, improves focus, and reduce stress.

## 2021-10-11 NOTE — PLAN OF CARE
Patient has been calm, attending group activities and socializing with peers. She reports improved mood, decreased anxiety and depression. She complained of neck and shoulder pain, had scheduled lidocaine patch and PRN Tylenol #3. She reports anxiety at 6/10, depression at 2/10, denies SI/SIB. She reports readiness for discharge but states she would like to establish DBT treatment program before she discharges.

## 2021-10-11 NOTE — PROGRESS NOTES
"Patient seen, chart reviewed, care discussed with staff.  Blood pressure 129/89, pulse 78, temperature 98.3  F (36.8  C), temperature source Oral, resp. rate 16, height 1.6 m (5' 3\"), weight 88.7 kg (195 lb 9.6 oz), SpO2 96 %, not currently breastfeeding.    Patient Active Problem List   Diagnosis     Pain of female symphysis pubis     Pelvic floor dysfunction     Myalgia of pelvic floor     SI (sacroiliac) joint dysfunction     Chronic pelvic pain in female     Suicidal ideation     Current Facility-Administered Medications   Medication     acetaminophen-codeine (TYLENOL #3) 300-30 MG per tablet 1 tablet     alum & mag hydroxide-simethicone (MAALOX) suspension 30 mL     amLODIPine (NORVASC) tablet 5 mg     busPIRone (BUSPAR) tablet 5 mg     [START ON 10/12/2021] DULoxetine (CYMBALTA) DR capsule 90 mg     hydrOXYzine (ATARAX) tablet 50 mg     Lidocaine (LIDOCARE) 4 % Patch 1 patch     lidocaine patch in PLACE     Melatonin tablet 10 mg     multivitamin, therapeutic (THERA-VIT) tablet 1 tablet     naloxone (NARCAN) injection 0.2 mg    Or     naloxone (NARCAN) injection 0.4 mg    Or     naloxone (NARCAN) injection 0.2 mg    Or     naloxone (NARCAN) injection 0.4 mg     OLANZapine (zyPREXA) tablet 5 mg    Or     OLANZapine (zyPREXA) injection 5 mg     ondansetron (ZOFRAN) tablet 4 mg     pantoprazole (PROTONIX) EC tablet 40 mg     polyethylene glycol (MIRALAX) Packet 17 g     prazosin (MINIPRESS) capsule 2 mg     QUEtiapine (SEROquel XR) 24 hr tablet 100 mg     QUEtiapine (SEROquel) tablet 400 mg     senna-docusate (SENOKOT-S/PERICOLACE) 8.6-50 MG per tablet 1 tablet     topiramate (TOPAMAX) tablet 100 mg     Vitamin D3 (CHOLECALCIFEROL) tablet 1,000 Units     She is quite concerned about safety of leaving without firm appointments in place.  General appearance: fair  Alert.   Affect: fair  Mood: fair    Speech:  normal.   Eye contact:  good.    Psychomotor behavior: normal  Gait: normal.    Abnormal movements: none.  " TD risk and movements discussed in detail  Delusions: none  Hallucinations:   none  Thoughts: logical  Associations: intact  Judgement: good  Insight: good  Cognitions: intact in conversation  Memory:  intact in conversation  Orientation: normal    Not suicidal.    Imp: Bipolar mixed  Anxiety    Plan: increase Cymbalta to 90mg  2.  Add Buspar

## 2021-10-11 NOTE — PLAN OF CARE
Problem: Sleep Disturbance (Depressive Signs/Symptoms)  Goal: Improved Sleep (Depressive Signs/Symptoms)  Outcome: Improving       Patient slept well the whole night for 8.75 hours. No complaints of pain made.

## 2021-10-11 NOTE — PLAN OF CARE
Problem: OT General Care Plan  Goal: OT Goal 1  Description: Will consistently attend OT groups and improve coping strategies with increasing repertoire of ideas and understanding of symptoms of when to use the strategies.       Note: Attended am OT goal oriented task groups for 50 minutes  with 4 pts X2. She initiated conversation and booker others in with focus on assorted topics. Work was organized and attentive to detail. She appeared comfortable, involved and engaged. Affect brightened some with interactions.   Patient attended the afternoon OT session for 70 minutes with 6 patients, participated in an activity focused on Aftercare,  identiying support people, coping strategies, behaviors and red flags, affirmations and daily and weekly routines that are helpful with gaining balance.  Comments were insightful with initiating additions and support to peers with information helpful to the topic.  She appears interested in being actively involved.

## 2021-10-11 NOTE — PLAN OF CARE
Problem: Mood Impairment (Anxiety Signs/Symptoms)  Goal: Improved Mood Symptoms (Anxiety Signs/Symptoms)  Outcome: Improving  Note:   Patient reported feeling better since admission. Reported low depression and anxiety. She said she feels safe and ready to go home, however, she is asking to have DBT and psychiatrist set before discharge.     Behavior is appropriate, patient is social, pleasant on approach, keeps self clean dn well groomed. Appetite is good.     Thoughts are linear and future oriented. Speech is clear and organized. Mood is calm, affect is bright on approach. Memory appeared intact.     Given PRN Tylenol#3 1 tab for left neck/shoulder pain, PRN melatonin 10 mg, and PRN Hydroxyzine 50 mg at HS per pt's request.

## 2021-10-12 LAB — SARS-COV-2 RNA RESP QL NAA+PROBE: NEGATIVE

## 2021-10-12 PROCEDURE — G0177 OPPS/PHP; TRAIN & EDUC SERV: HCPCS

## 2021-10-12 PROCEDURE — 250N000013 HC RX MED GY IP 250 OP 250 PS 637: Performed by: PSYCHIATRY & NEUROLOGY

## 2021-10-12 PROCEDURE — 124N000003 HC R&B MH SENIOR/ADOLESCENT

## 2021-10-12 PROCEDURE — U0003 INFECTIOUS AGENT DETECTION BY NUCLEIC ACID (DNA OR RNA); SEVERE ACUTE RESPIRATORY SYNDROME CORONAVIRUS 2 (SARS-COV-2) (CORONAVIRUS DISEASE [COVID-19]), AMPLIFIED PROBE TECHNIQUE, MAKING USE OF HIGH THROUGHPUT TECHNOLOGIES AS DESCRIBED BY CMS-2020-01-R: HCPCS | Performed by: PSYCHIATRY & NEUROLOGY

## 2021-10-12 PROCEDURE — 250N000013 HC RX MED GY IP 250 OP 250 PS 637: Performed by: EMERGENCY MEDICINE

## 2021-10-12 PROCEDURE — 250N000013 HC RX MED GY IP 250 OP 250 PS 637: Performed by: NURSE PRACTITIONER

## 2021-10-12 PROCEDURE — 250N000013 HC RX MED GY IP 250 OP 250 PS 637: Performed by: PHYSICIAN ASSISTANT

## 2021-10-12 PROCEDURE — H2032 ACTIVITY THERAPY, PER 15 MIN: HCPCS

## 2021-10-12 RX ADMIN — TOPIRAMATE 100 MG: 50 TABLET ORAL at 21:33

## 2021-10-12 RX ADMIN — PRAZOSIN HYDROCHLORIDE 2 MG: 1 CAPSULE ORAL at 21:33

## 2021-10-12 RX ADMIN — DULOXETINE HYDROCHLORIDE 90 MG: 30 CAPSULE, DELAYED RELEASE ORAL at 08:28

## 2021-10-12 RX ADMIN — TOPIRAMATE 100 MG: 50 TABLET ORAL at 08:29

## 2021-10-12 RX ADMIN — PANTOPRAZOLE SODIUM 40 MG: 40 TABLET, DELAYED RELEASE ORAL at 08:30

## 2021-10-12 RX ADMIN — ACETAMINOPHEN AND CODEINE PHOSPHATE 1 TABLET: 300; 30 TABLET ORAL at 14:04

## 2021-10-12 RX ADMIN — AMLODIPINE BESYLATE 5 MG: 5 TABLET ORAL at 08:33

## 2021-10-12 RX ADMIN — HYDROXYZINE HYDROCHLORIDE 50 MG: 50 TABLET, FILM COATED ORAL at 04:10

## 2021-10-12 RX ADMIN — HYDROXYZINE HYDROCHLORIDE 50 MG: 50 TABLET, FILM COATED ORAL at 14:04

## 2021-10-12 RX ADMIN — THERA TABS 1 TABLET: TAB at 08:30

## 2021-10-12 RX ADMIN — QUETIAPINE FUMARATE 100 MG: 50 TABLET, EXTENDED RELEASE ORAL at 08:28

## 2021-10-12 RX ADMIN — QUETIAPINE 400 MG: 200 TABLET, FILM COATED ORAL at 21:33

## 2021-10-12 RX ADMIN — Medication 1000 UNITS: at 08:29

## 2021-10-12 RX ADMIN — POLYETHYLENE GLYCOL 3350 17 G: 17 POWDER, FOR SOLUTION ORAL at 08:28

## 2021-10-12 RX ADMIN — BUSPIRONE HYDROCHLORIDE 5 MG: 5 TABLET ORAL at 14:04

## 2021-10-12 RX ADMIN — ACETAMINOPHEN AND CODEINE PHOSPHATE 1 TABLET: 300; 30 TABLET ORAL at 21:33

## 2021-10-12 RX ADMIN — BUSPIRONE HYDROCHLORIDE 5 MG: 5 TABLET ORAL at 08:29

## 2021-10-12 RX ADMIN — LIDOCAINE PATCH 4% 1 PATCH: 40 PATCH TOPICAL at 08:34

## 2021-10-12 RX ADMIN — BUSPIRONE HYDROCHLORIDE 5 MG: 5 TABLET ORAL at 21:33

## 2021-10-12 ASSESSMENT — ACTIVITIES OF DAILY LIVING (ADL)
LAUNDRY: WITH SUPERVISION
HYGIENE/GROOMING: INDEPENDENT
ORAL_HYGIENE: INDEPENDENT
DRESS: INDEPENDENT
ORAL_HYGIENE: INDEPENDENT
DRESS: INDEPENDENT
HYGIENE/GROOMING: INDEPENDENT

## 2021-10-12 ASSESSMENT — MIFFLIN-ST. JEOR: SCORE: 1427.26

## 2021-10-12 NOTE — PLAN OF CARE
Assessment/Intervention/Current Symptoms and Care Coordination  The patient's care was discussed with the treatment team and chart notes were reviewed. [facility] met with patient to discuss her progress, current symptomology, ongoing cares and discharge plans/outpatient services.      Discharge Plan or Goal  Discharge to home when stable.  She has outpatient psychiatry.  plans to increase frequency of therapy.  She requests a DBT telehealth referral.     Barriers to Discharge   Needs further psychiatric Stabilization and disposition planning     Referral Status  CTC made a referral to [facility] [place] (all locations) for DBT. Facility will likely call to make an appointment. Patient was also encouraged to call the agency if she doesn't receive a call.     CTC made a referral to [facility].      Legal Status  Vol.   INDICATION:

fall on ice



COMPARISON:

None.



TECHNIQUE:

AP, lateral views of the right humerus.



FINDINGS:

The osseous structures and joint spaces are intact and normal.  There is no evidence

for acute fracture or dislocation.  Surrounding soft tissues are unremarkable.  No

subcutaneous emphysema or radiodense foreign body.



IMPRESSION:

.  No acute fracture or dislocation.





<Electronically signed by José Miguel Gordon > 02/01/21 0916

## 2021-10-12 NOTE — PROGRESS NOTES
"Patient seen, chart reviewed, care discussed with staff.  Blood pressure 126/85, pulse 89, temperature 98.2  F (36.8  C), resp. rate 16, height 1.6 m (5' 3\"), weight 89.3 kg (196 lb 14.4 oz), SpO2 98 %, not currently breastfeeding.  By report, slept, anxious (8 out of 10)  General appearance: good  Alert.   Affect: fair, anxious  Mood: fair    Speech:  normal.   Eye contact:  good.    Psychomotor behavior: normal  Gait: normal.    Abnormal movements: none  Delusions: none  Hallucinations:   none  Thoughts: logical  Associations: intact  Judgement: good  Insight: good  Cognitions: intact in conversation  Memory:  intact in conversation  Orientation: normal    Not suicidal.  Imp: MDD f33.2, plus:   Patient Active Problem List   Diagnosis     Pain of female symphysis pubis     Pelvic floor dysfunction     Myalgia of pelvic floor     SI (sacroiliac) joint dysfunction     Chronic pelvic pain in female     Suicidal ideation     \  Plan:  Discharge when appointments are in place and when anxiety is manageable for her.  Buspar start yesterday    Current Facility-Administered Medications   Medication     acetaminophen-codeine (TYLENOL #3) 300-30 MG per tablet 1 tablet     alum & mag hydroxide-simethicone (MAALOX) suspension 30 mL     amLODIPine (NORVASC) tablet 5 mg     busPIRone (BUSPAR) tablet 5 mg     DULoxetine (CYMBALTA) DR capsule 90 mg     hydrOXYzine (ATARAX) tablet 50 mg     Lidocaine (LIDOCARE) 4 % Patch 1 patch     lidocaine patch in PLACE     Melatonin tablet 10 mg     multivitamin, therapeutic (THERA-VIT) tablet 1 tablet     naloxone (NARCAN) injection 0.2 mg    Or     naloxone (NARCAN) injection 0.4 mg    Or     naloxone (NARCAN) injection 0.2 mg    Or     naloxone (NARCAN) injection 0.4 mg     OLANZapine (zyPREXA) tablet 5 mg    Or     OLANZapine (zyPREXA) injection 5 mg     ondansetron (ZOFRAN) tablet 4 mg     pantoprazole (PROTONIX) EC tablet 40 mg     polyethylene glycol (MIRALAX) Packet 17 g     prazosin " (MINIPRESS) capsule 2 mg     QUEtiapine (SEROquel XR) 24 hr tablet 100 mg     QUEtiapine (SEROquel) tablet 400 mg     senna-docusate (SENOKOT-S/PERICOLACE) 8.6-50 MG per tablet 1 tablet     topiramate (TOPAMAX) tablet 100 mg     Vitamin D3 (CHOLECALCIFEROL) tablet 1,000 Units     Recent Results (from the past 168 hour(s))   MRSA MSSA PCR, Nasal Swab    Collection Time: 10/08/21  5:46 PM    Specimen: Nares, Bilateral; Swab   Result Value Ref Range    MRSA Target DNA Negative Negative    SA Target DNA Positive

## 2021-10-12 NOTE — PLAN OF CARE
Problem: OT General Care Plan  Goal: OT Goal 1  Description: Will consistently attend OT groups and improve coping strategies with increasing repertoire of ideas and understanding of symptoms of when to use the strategies.       Outcome: Improving  Note: Attended 2 of 2 OT groups.  In the a.m. she participated for 50 minutes with 6 patients in a goal oriented task group.  She worked independently on a highly detailed task, commented on the shakiness in her hands though continued work with a positive outcome and seemingly acceptance.  She was social and supported others in a kind and thoughtful manner.  In the afternoon session, she participated or 50 minutes with 7 pts  focused on the Process of Recovery, identifying healthy perspectives and ways in managing one's positive growth.  She spoke up offering insightful comments and a positive perspective in which she had to say.

## 2021-10-12 NOTE — PLAN OF CARE
Problem: Sleep Disturbance (Depressive Signs/Symptoms)  Goal: Improved Sleep (Depressive Signs/Symptoms)  Outcome: Improving    Patient slept comfortably for 7 hours the whole night. No complaints of pain the whole shift. Lidocaine patch removed. She complained of anxiety. Hydroxyzine 50 mgs given orally @ 4:10 a.m. as prn for anxiety.

## 2021-10-12 NOTE — PLAN OF CARE
"Music Therapy Group note    Clinical Hours in session: 0.75    Number of patients in group: 8    Scope of service: psychodynamic     Patient progress: improving    Intervention: Songs of the Heart     Goal of group: group cohesion    Patient response/reaction to treatment intervention(s): Marissa shared an upbeat song from \"Vivo\" in group tonight.  Stated \"I am that girl\" (The main character).  \"She lost her dad, her mother was never there for her and she didn't fit in with the other kids-that was me.\", she said.  Marissa has very strong connections to music and musicals specifically, and they appear to be very healing and comforting for her. She was a positive group participant with an upbeat affect.    Delma Richard, [facility]  Board-Certified Music Therapist           "

## 2021-10-12 NOTE — PLAN OF CARE
Problem: Activity and Energy Impairment (Depressive Signs/Symptoms)  Goal: Optimized Energy Level (Depressive Signs/Symptoms)  Outcome: Improving  Intervention: Optimize Energy Level  Recent Flowsheet Documentation  Taken 10/12/2021 0900 by Anjel Perkins RN  Diversional Activity: (conversing with other patients)    art work    other (see comments)     Problem: Sleep Disturbance (Depressive Signs/Symptoms)  Goal: Improved Sleep (Depressive Signs/Symptoms)  Outcome: Improving     Problem: Cognitive Impairment (Anxiety Signs/Symptoms)  Goal: Optimized Cognitive Function (Anxiety Signs/Symptoms)  Outcome: Improving     Problem: Mood Impairment (Anxiety Signs/Symptoms)  Goal: Improved Mood Symptoms (Anxiety Signs/Symptoms)  Outcome: Improving       This morning patient is pleasant cooperative and medication compliment. Good appetite. Patient attended morning group. Denies SI/SIB. Reports levels of depression and anxiety are decreasing. Patient reported an episode of racing thoughts this morning ~0400, prn atarax given. No reports of racing thoughts this shift, but patient requested Atarax at 1400 this shift.  Cymbalta recently increased to 90 mg and Buspar added will continue to monitor for efficacy. Patient reports having access to Narcotics Anonymous meeting after discharge. DBT referral made by [facility], patient encouraged to also follow up. Right shoulder pain ongoing, managed with lidocaine patch and tylenol. Patient requested Tylenol #3 at 1400. Will continue with plan of care.

## 2021-10-12 NOTE — PLAN OF CARE
Problem: Mood Impairment (Anxiety Signs/Symptoms)  Goal: Improved Mood Symptoms (Anxiety Signs/Symptoms)  Outcome: Improving  Note: Patient continued to deny suicidal thoughts or wishing to be dead. Rated depression at 2/10, anxiety at 5/10. She related her anxiety to her difficulties finding an answer from [facility] about DBT programs in [place]. Said she talked to them, they told her they will check the available DBT programs, and never called her back. She said she is planning to call them back in the morning.  Patient said she is happy with Dr. Hightower approach and about changes in her medications.   Behavior is appropriate. Patient spent most of the evening out on the unit, playing cards, coloring, attending a group, and socializing with other patients. She keeps self clean and well groomed. Appetite is good. Patient reported good night sleep.   Pain: shoulder, patient is happy with the effect of the Lidocaine patch, but asked to take PRN Tylenol # 3 at HS, as the pain is getting worse at night.   Patient is medications compliant. Denied SE, none assessed by staff.

## 2021-10-12 NOTE — PLAN OF CARE
Patient rates depression at 4/10, anxiety at 6/10.  Denies SI or the wish to be dead.  Social in the lounge.  Attends group and participates.    States she  may be discharging tomorrow if [facility] call her back to set up an appointment.  Patient apparently called them yesterday and got no return phone call today.  Didn't want to bother them again today with another phone call.  This writer called and left a message again with Trav and  and explained she would not be discharged until she talked to them (according to the patient).  Patient taking tylenol#3 for neck discomfort as needed.  No further concerns.

## 2021-10-13 ENCOUNTER — TELEPHONE (OUTPATIENT)
Dept: BEHAVIORAL HEALTH | Facility: CLINIC | Age: 61
End: 2021-10-13

## 2021-10-13 PROBLEM — F33.2 MDD (MAJOR DEPRESSIVE DISORDER), RECURRENT SEVERE, WITHOUT PSYCHOSIS (H): Status: ACTIVE | Noted: 2021-10-13

## 2021-10-13 PROCEDURE — G0177 OPPS/PHP; TRAIN & EDUC SERV: HCPCS

## 2021-10-13 PROCEDURE — 124N000003 HC R&B MH SENIOR/ADOLESCENT

## 2021-10-13 PROCEDURE — 250N000013 HC RX MED GY IP 250 OP 250 PS 637: Performed by: PSYCHIATRY & NEUROLOGY

## 2021-10-13 PROCEDURE — 250N000013 HC RX MED GY IP 250 OP 250 PS 637: Performed by: EMERGENCY MEDICINE

## 2021-10-13 PROCEDURE — 90853 GROUP PSYCHOTHERAPY: CPT

## 2021-10-13 PROCEDURE — H2032 ACTIVITY THERAPY, PER 15 MIN: HCPCS

## 2021-10-13 PROCEDURE — 250N000013 HC RX MED GY IP 250 OP 250 PS 637: Performed by: PHYSICIAN ASSISTANT

## 2021-10-13 PROCEDURE — 250N000013 HC RX MED GY IP 250 OP 250 PS 637: Performed by: NURSE PRACTITIONER

## 2021-10-13 RX ORDER — QUETIAPINE FUMARATE 400 MG/1
400 TABLET, FILM COATED ORAL AT BEDTIME
Qty: 30 TABLET | Refills: 3 | Status: ON HOLD | OUTPATIENT
Start: 2021-10-13 | End: 2022-04-22

## 2021-10-13 RX ORDER — MULTIVIT-MIN/IRON/FOLIC ACID/K 18-600-40
1000 CAPSULE ORAL DAILY
Qty: 90 TABLET | Refills: 1 | Status: SHIPPED | OUTPATIENT
Start: 2021-10-13

## 2021-10-13 RX ORDER — ONDANSETRON 4 MG/1
4 TABLET, FILM COATED ORAL EVERY 6 HOURS PRN
Qty: 60 TABLET | Refills: 1 | Status: SHIPPED | OUTPATIENT
Start: 2021-10-13

## 2021-10-13 RX ORDER — HYDROXYZINE HYDROCHLORIDE 50 MG/1
50 TABLET, FILM COATED ORAL 3 TIMES DAILY PRN
Qty: 90 TABLET | Refills: 3 | Status: ON HOLD | OUTPATIENT
Start: 2021-10-13 | End: 2022-04-22

## 2021-10-13 RX ORDER — BUSPIRONE HYDROCHLORIDE 10 MG/1
10 TABLET ORAL 3 TIMES DAILY
Qty: 90 TABLET | Refills: 3 | Status: SHIPPED | OUTPATIENT
Start: 2021-10-13 | End: 2021-10-19

## 2021-10-13 RX ORDER — QUETIAPINE FUMARATE 50 MG/1
100 TABLET, EXTENDED RELEASE ORAL DAILY
Qty: 30 TABLET | Refills: 3 | Status: SHIPPED | OUTPATIENT
Start: 2021-10-14 | End: 2022-04-12

## 2021-10-13 RX ORDER — NICOTINE POLACRILEX 4 MG/1
40 GUM, CHEWING ORAL DAILY
Qty: 30 TABLET | Refills: 3 | Status: SHIPPED | OUTPATIENT
Start: 2021-10-13

## 2021-10-13 RX ORDER — DULOXETIN HYDROCHLORIDE 30 MG/1
90 CAPSULE, DELAYED RELEASE ORAL DAILY
Qty: 90 CAPSULE | Refills: 3 | Status: ON HOLD | OUTPATIENT
Start: 2021-10-14 | End: 2022-04-22

## 2021-10-13 RX ORDER — BUSPIRONE HYDROCHLORIDE 10 MG/1
10 TABLET ORAL 3 TIMES DAILY
Status: DISCONTINUED | OUTPATIENT
Start: 2021-10-13 | End: 2021-10-19

## 2021-10-13 RX ORDER — PRAZOSIN HYDROCHLORIDE 2 MG/1
2 CAPSULE ORAL AT BEDTIME
Qty: 30 CAPSULE | Refills: 3 | Status: SHIPPED | OUTPATIENT
Start: 2021-10-13 | End: 2022-04-12

## 2021-10-13 RX ORDER — TOPIRAMATE 100 MG/1
100 TABLET, FILM COATED ORAL 2 TIMES DAILY
Qty: 60 TABLET | Refills: 3 | Status: SHIPPED | OUTPATIENT
Start: 2021-10-13 | End: 2021-10-18

## 2021-10-13 RX ORDER — AMLODIPINE BESYLATE 5 MG/1
5 TABLET ORAL DAILY
Qty: 30 TABLET | Refills: 3 | Status: SHIPPED | OUTPATIENT
Start: 2021-10-13

## 2021-10-13 RX ORDER — LIDOCAINE 4 G/G
1 PATCH TOPICAL EVERY 24 HOURS
Qty: 30 PATCH | Refills: 3 | Status: ON HOLD | OUTPATIENT
Start: 2021-10-13 | End: 2022-04-20

## 2021-10-13 RX ORDER — PHENOL 1.4 %
10 AEROSOL, SPRAY (ML) MUCOUS MEMBRANE
Qty: 30 TABLET | Refills: 3 | Status: SHIPPED | OUTPATIENT
Start: 2021-10-13

## 2021-10-13 RX ORDER — MULTIVITAMIN,THERAPEUTIC
1 TABLET ORAL DAILY
Qty: 90 TABLET | Refills: 1 | Status: SHIPPED | OUTPATIENT
Start: 2021-10-13

## 2021-10-13 RX ADMIN — TOPIRAMATE 100 MG: 50 TABLET ORAL at 08:18

## 2021-10-13 RX ADMIN — BUSPIRONE HYDROCHLORIDE 10 MG: 10 TABLET ORAL at 14:03

## 2021-10-13 RX ADMIN — QUETIAPINE 400 MG: 200 TABLET, FILM COATED ORAL at 21:21

## 2021-10-13 RX ADMIN — LIDOCAINE PATCH 4% 1 PATCH: 40 PATCH TOPICAL at 08:31

## 2021-10-13 RX ADMIN — PRAZOSIN HYDROCHLORIDE 2 MG: 1 CAPSULE ORAL at 21:21

## 2021-10-13 RX ADMIN — HYDROXYZINE HYDROCHLORIDE 50 MG: 50 TABLET, FILM COATED ORAL at 08:29

## 2021-10-13 RX ADMIN — PANTOPRAZOLE SODIUM 40 MG: 40 TABLET, DELAYED RELEASE ORAL at 08:19

## 2021-10-13 RX ADMIN — ACETAMINOPHEN AND CODEINE PHOSPHATE 1 TABLET: 300; 30 TABLET ORAL at 21:22

## 2021-10-13 RX ADMIN — BUSPIRONE HYDROCHLORIDE 5 MG: 5 TABLET ORAL at 08:19

## 2021-10-13 RX ADMIN — AMLODIPINE BESYLATE 5 MG: 5 TABLET ORAL at 08:19

## 2021-10-13 RX ADMIN — Medication 1000 UNITS: at 08:19

## 2021-10-13 RX ADMIN — DULOXETINE HYDROCHLORIDE 90 MG: 30 CAPSULE, DELAYED RELEASE ORAL at 08:19

## 2021-10-13 RX ADMIN — BUSPIRONE HYDROCHLORIDE 10 MG: 10 TABLET ORAL at 21:21

## 2021-10-13 RX ADMIN — THERA TABS 1 TABLET: TAB at 08:19

## 2021-10-13 RX ADMIN — QUETIAPINE FUMARATE 100 MG: 50 TABLET, EXTENDED RELEASE ORAL at 08:19

## 2021-10-13 RX ADMIN — TOPIRAMATE 100 MG: 50 TABLET ORAL at 21:21

## 2021-10-13 ASSESSMENT — ACTIVITIES OF DAILY LIVING (ADL)
ORAL_HYGIENE: INDEPENDENT
ORAL_HYGIENE: INDEPENDENT
LAUNDRY: WITH SUPERVISION
LAUNDRY: WITH SUPERVISION
DRESS: INDEPENDENT
HYGIENE/GROOMING: INDEPENDENT
HYGIENE/GROOMING: INDEPENDENT
DRESS: INDEPENDENT

## 2021-10-13 NOTE — PLAN OF CARE
Pt Navigator scheduled  eval for 55+ program with Yamilka Contreras on Tuesday, 10/26/21.  This will be a video visit through [employer].

## 2021-10-13 NOTE — PLAN OF CARE
Problem: Sleep Disturbance (Depressive Signs/Symptoms)  Goal: Improved Sleep (Depressive Signs/Symptoms)  Outcome: Improving    Patient slept a total of 8.25 hours. Patient's Lidocaine patch has been removed earlier in the evening shift. No complaints of pain and anxiety noted the whole night.

## 2021-10-13 NOTE — PLAN OF CARE
Assessment/Intervention/Current Symptoms and Care Coordination  The patient's care was discussed with the treatment team and chart notes were reviewed. [facility] met with patient to discuss her progress, current symptomology, ongoing cares and discharge plans/outpatient services.     [facility] spoke with Grant at the [facility] program. She notified writer that she received the referral and that they do have openings for next week. She stated their Social workers are currently reviewing this referral and will reach out to [facility] when and if patient gets accepted.     Discharge Plan or Goal  Discharge to home when stable.  She has outpatient psychiatry.  plans to increase frequency of therapy.  She requests a DBT telehealth referral.       Barriers to Discharge   Needs further psychiatric Stabilization and disposition planning     Referral Status  CTC made a referral to [facility] (all locations) for DBT. Facility will likely call to make an appointment. Patient was also encouraged to call the agency if she doesn't receive a call.     [facility] made a referral to [facility] in [place].     PCP - Gerardo Mcmillan PA-C - [facility]  Psychiatry - [facility]  Provider: Jessica Wallace  In person appointment: October 14th, 2021 at 1:30 pm  Address: [address] [place] [address] [address], [place] [address]  Phone: [phone]    Individual Therapy: [facility]  Therapist - Devi Barajas PsyD   Appointment: November 8th, 2021 at 9 am   Address: [address] [place] [address]  Phone: [phone]     Legal Status  Vol.

## 2021-10-13 NOTE — PLAN OF CARE
Group length: 1 hour      Number of participants: 6        Summary of Group / Topics Discussed:     The Psychotherapy group goal is to promote insight to positive choice and change. Group processing is within a supportive and safe environment. Patients will process emotions using verbal group and expressive psychotherapy interventions. The specific topic discussed today was safety planning      Patient's Response: Patient was alert cooperative and oriented x4, patient was attentive, engaged, made eye contact, and asked questions. Patient was respectful towards other participants and engaged with others appropriately. Patient presented with a great insight when discussing their problem behaviors, triggers, warning signs, and helpful interventions during a crisis. Patient was able to appropriately participate and create a safety plan for crisis prevention.

## 2021-10-13 NOTE — PLAN OF CARE
Assessment/Intervention/Current Symptoms and Care Coordination  The patient's care was discussed with the treatment team and chart notes were reviewed. CTC met with patient to discuss her progress, current symptomology, ongoing cares and discharge plans/outpatient services.      Patent told ELIZABETH that she no longer wants to attend [facility] program.     Discharge Plan or Goal  Discharge to home when stable.  She has outpatient psychiatry.  plans to increase frequency of therapy.  She requests a DBT telehealth referral.        Barriers to Discharge   Needs further psychiatric Stabilization and disposition planning     Referral Status  CTC made a referral to [facility] [place] (all locations) for DBT. Facility will likely call to make an appointment. Patient was also encouraged to call the agency if she doesn't receive a call.      CTC made a referral to [facility].      PCP - Gerardo Mcmillan PA-C - [facility]  Psychiatry - [facility]  Provider: Jessica Wallace  In person appointment: October 14th, 2021 at 1:30 pm  Address: [address] [place] [address] [address], [place] [address]  Phone: [phone]     Individual Therapy: [facility]  Therapist - Devi Barajas PsyD   Appointment: November 8th, 2021 at 9 am   Address: [address] [place] [address]  Phone: [phone]     Legal Status  Vol.

## 2021-10-13 NOTE — PLAN OF CARE
BEHAVIORAL TEAM DISCUSSION    Participants: Yohan Hightower MD, Kasie Mcmullen, RAFAL, Geneva Paul, LGSW, CTC, Emily Bolden, OT  Progress: Improving  Anticipated length of stay: 3-5 days  Continued Stay Criteria/Rationale: Medication adjustments.   Medical/Physical:   Major depressive disorder, severe, recurrent, without psychosis  Generalized anxiety disorder  PTSD  Borderline personality disorder.  Precautions:   Behavioral Orders   Procedures     Code 1 - Restrict to Unit     Discontinue 1:1 attendant for suicide risk     Order Specific Question:   I have performed an in person assessment of the patient     Answer:   Based on this assessment the patient no longer requires a one on one attendant at this point in time.     Occupational Therapy     Order Specific Question:   Reason for Consult     Answer:   Eval of thought process, functional skills and behavior     Order Specific Question:   Course of Action:     Answer:   Eval & Treat as indicated     Routine Programming     As clinically indicated     Status 15     Every 15 minutes.     Suicide precautions     Patients on Suicide Precautions should have a Combination Diet ordered that includes a Diet selection(s) AND a Behavioral Tray selection for Safe Tray - with utensils, or Safe Tray - NO utensils       Plan:   1. refer to our adult day or senior day treatment program  2.  Increase Buspar  3.  Anticipate discharge Friday 10/15  Rationale for change in precautions or plan: continue with current plan.

## 2021-10-13 NOTE — TELEPHONE ENCOUNTER
By request of CTC on 3B, Pt Navigator scheduled  eval for 55+ program with Yamilka Contreras on Tuesday, 10/26.  This will be a video visit through [employer].  Referral created, teresa requested

## 2021-10-13 NOTE — PROGRESS NOTES
Pt participated in dance/movement therapy (D/MT) with vitalized and symbolic movement that inspired a sense of hope, connection and strength.  For example, group movement themes were: keeping our heads up, marching like a soldier, chopping through obstacles until we are victorious.  Dances were organized and emotionally-charged imbuing participants with the will to rise up.    Pt shared both verbally and through nonverbal movement.  She was organized, socially-engaged and personally-expressive.         10/12/21 1130   Dance Movement Therapy   Type of Intervention structured groups   Response participates, initiates socially appropriate   Hours 1

## 2021-10-13 NOTE — PROGRESS NOTES
"Patient seen, chart reviewed, care discussed with staff.    Blood pressure 134/80, pulse 84, temperature 97  F (36.1  C), temperature source Temporal, resp. rate 16, height 1.6 m (5' 3\"), weight 89.3 kg (196 lb 14.4 oz), SpO2 97 %, not currently breastfeeding.    The delay in followup services is very difficult for her, This is likely related to the Borderline personality diagnosis as well as the MDD and RACUQEL    General appearance: fair  Alert.   Affect: fair, anxious  Mood: fair    Speech:  normal.   Eye contact:  good.    Psychomotor behavior: normal  Gait: normal.    Abnormal movements: none  Delusions: none  Hallucinations:   none  Thoughts: logical  Associations: intact  Judgement: fair  Insight: fair  Cognitions: intact in conversation  Memory:  intact in conversation  Orientation: normal    Not suicidal with adequate supports.    Plan: refer to our adult day or senior day treatment program  2.  Increase Buspar  3.  Anticipate discharge Friday 10/15    Current Facility-Administered Medications   Medication     acetaminophen-codeine (TYLENOL #3) 300-30 MG per tablet 1 tablet     alum & mag hydroxide-simethicone (MAALOX) suspension 30 mL     amLODIPine (NORVASC) tablet 5 mg     busPIRone (BUSPAR) tablet 5 mg     DULoxetine (CYMBALTA) DR capsule 90 mg     hydrOXYzine (ATARAX) tablet 50 mg     Lidocaine (LIDOCARE) 4 % Patch 1 patch     lidocaine patch in PLACE     Melatonin tablet 10 mg     multivitamin, therapeutic (THERA-VIT) tablet 1 tablet     naloxone (NARCAN) injection 0.2 mg    Or     naloxone (NARCAN) injection 0.4 mg    Or     naloxone (NARCAN) injection 0.2 mg    Or     naloxone (NARCAN) injection 0.4 mg     OLANZapine (zyPREXA) tablet 5 mg    Or     OLANZapine (zyPREXA) injection 5 mg     ondansetron (ZOFRAN) tablet 4 mg     pantoprazole (PROTONIX) EC tablet 40 mg     polyethylene glycol (MIRALAX) Packet 17 g     prazosin (MINIPRESS) capsule 2 mg     QUEtiapine (SEROquel XR) 24 hr tablet 100 mg     " QUEtiapine (SEROquel) tablet 400 mg     senna-docusate (SENOKOT-S/PERICOLACE) 8.6-50 MG per tablet 1 tablet     topiramate (TOPAMAX) tablet 100 mg     Vitamin D3 (CHOLECALCIFEROL) tablet 1,000 Units     Recent Results (from the past 168 hour(s))   MRSA MSSA PCR, Nasal Swab    Collection Time: 10/08/21  5:46 PM    Specimen: Nares, Bilateral; Swab   Result Value Ref Range    MRSA Target DNA Negative Negative    SA Target DNA Positive    Asymptomatic COVID-19 Virus (Coronavirus) by PCR Nasopharyngeal    Collection Time: 10/12/21 12:36 PM    Specimen: Nasopharyngeal; Swab   Result Value Ref Range    SARS CoV2 PCR Negative Negative

## 2021-10-13 NOTE — PLAN OF CARE
Problem: Activity and Energy Impairment (Depressive Signs/Symptoms)  Goal: Optimized Energy Level (Depressive Signs/Symptoms)  Outcome: Improving     Problem: Sleep Disturbance (Depressive Signs/Symptoms)  Goal: Improved Sleep (Depressive Signs/Symptoms)  Outcome: Improving     Problem: Cognitive Impairment (Anxiety Signs/Symptoms)  Goal: Optimized Cognitive Function (Anxiety Signs/Symptoms)  Outcome: Improving     Problem: Mood Impairment (Anxiety Signs/Symptoms)  Goal: Improved Mood Symptoms (Anxiety Signs/Symptoms)  Outcome: Improving     Patient reports very little depression but reports anxiety. Prn atarax given this morning with good effect. Patient Denied pain but scheduled lidocaine patch applied right shoulder. Buspar aslo increased from 5mg to 10mg today. Patient denies SI/SIB. Miralax not given this shift, patient reports not needing it today. Patient attended groups, and works on artwork in the milieu. Tentative discharge planned for Friday October 15th.

## 2021-10-14 PROCEDURE — H2032 ACTIVITY THERAPY, PER 15 MIN: HCPCS

## 2021-10-14 PROCEDURE — 124N000003 HC R&B MH SENIOR/ADOLESCENT

## 2021-10-14 PROCEDURE — 250N000013 HC RX MED GY IP 250 OP 250 PS 637: Performed by: PSYCHIATRY & NEUROLOGY

## 2021-10-14 PROCEDURE — 250N000013 HC RX MED GY IP 250 OP 250 PS 637: Performed by: PHYSICIAN ASSISTANT

## 2021-10-14 PROCEDURE — G0177 OPPS/PHP; TRAIN & EDUC SERV: HCPCS

## 2021-10-14 PROCEDURE — 250N000013 HC RX MED GY IP 250 OP 250 PS 637: Performed by: NURSE PRACTITIONER

## 2021-10-14 PROCEDURE — 250N000013 HC RX MED GY IP 250 OP 250 PS 637: Performed by: EMERGENCY MEDICINE

## 2021-10-14 RX ADMIN — BUSPIRONE HYDROCHLORIDE 10 MG: 10 TABLET ORAL at 08:17

## 2021-10-14 RX ADMIN — THERA TABS 1 TABLET: TAB at 08:17

## 2021-10-14 RX ADMIN — QUETIAPINE FUMARATE 100 MG: 50 TABLET, EXTENDED RELEASE ORAL at 08:17

## 2021-10-14 RX ADMIN — PRAZOSIN HYDROCHLORIDE 2 MG: 1 CAPSULE ORAL at 19:17

## 2021-10-14 RX ADMIN — TOPIRAMATE 100 MG: 50 TABLET ORAL at 19:17

## 2021-10-14 RX ADMIN — Medication 10 MG: at 21:19

## 2021-10-14 RX ADMIN — QUETIAPINE 400 MG: 200 TABLET, FILM COATED ORAL at 19:16

## 2021-10-14 RX ADMIN — PANTOPRAZOLE SODIUM 40 MG: 40 TABLET, DELAYED RELEASE ORAL at 08:17

## 2021-10-14 RX ADMIN — ACETAMINOPHEN AND CODEINE PHOSPHATE 1 TABLET: 300; 30 TABLET ORAL at 21:19

## 2021-10-14 RX ADMIN — TOPIRAMATE 100 MG: 50 TABLET ORAL at 09:23

## 2021-10-14 RX ADMIN — Medication 1000 UNITS: at 08:17

## 2021-10-14 RX ADMIN — DULOXETINE HYDROCHLORIDE 90 MG: 30 CAPSULE, DELAYED RELEASE ORAL at 08:16

## 2021-10-14 RX ADMIN — HYDROXYZINE HYDROCHLORIDE 50 MG: 50 TABLET, FILM COATED ORAL at 14:45

## 2021-10-14 RX ADMIN — AMLODIPINE BESYLATE 5 MG: 5 TABLET ORAL at 08:17

## 2021-10-14 RX ADMIN — BUSPIRONE HYDROCHLORIDE 10 MG: 10 TABLET ORAL at 14:45

## 2021-10-14 RX ADMIN — BUSPIRONE HYDROCHLORIDE 10 MG: 10 TABLET ORAL at 19:17

## 2021-10-14 ASSESSMENT — ACTIVITIES OF DAILY LIVING (ADL)
ORAL_HYGIENE: INDEPENDENT
LAUNDRY: UNABLE TO COMPLETE
HYGIENE/GROOMING: INDEPENDENT
LAUNDRY: UNABLE TO COMPLETE
DRESS: INDEPENDENT
HYGIENE/GROOMING: INDEPENDENT
ORAL_HYGIENE: INDEPENDENT
DRESS: INDEPENDENT

## 2021-10-14 ASSESSMENT — MIFFLIN-ST. JEOR: SCORE: 1416.38

## 2021-10-14 NOTE — PLAN OF CARE
Pt participated in Therapeutic Recreation group with focus on socializing, problem solving, and leisure participation. Engaged and cooperative in group recreational intervention; word puzzles. Pt participated throughout entire duration of the group. Pt affect was flat and mood was calm. Pt added to the group discussion during the activity, relating the discussion to each puzzle. Pt talked about what each quote meant to her. Pt often verbally processed through each clue and figured out many of the puzzles, as part of the activity. Pt was able to show more organized thinking and problem solving throughout the entire group.   0

## 2021-10-14 NOTE — PROGRESS NOTES
"Patient seen, chart reviewed, care discussed with staff.  Blood pressure 117/75, pulse 79, temperature 98.7  F (37.1  C), temperature source Oral, resp. rate 16, height 1.6 m (5' 3\"), weight 88.2 kg (194 lb 8 oz), SpO2 98 %, not currently breastfeeding.    Patient Active Problem List   Diagnosis     Pain of female symphysis pubis     Pelvic floor dysfunction     Myalgia of pelvic floor     SI (sacroiliac) joint dysfunction     Chronic pelvic pain in female     Suicidal ideation     MDD (major depressive disorder), recurrent severe, without psychosis (H)     Recent Results (from the past 168 hour(s))   MRSA MSSA PCR, Nasal Swab    Collection Time: 10/08/21  5:46 PM    Specimen: Nares, Bilateral; Swab   Result Value Ref Range    MRSA Target DNA Negative Negative    SA Target DNA Positive    Asymptomatic COVID-19 Virus (Coronavirus) by PCR Nasopharyngeal    Collection Time: 10/12/21 12:36 PM    Specimen: Nasopharyngeal; Swab   Result Value Ref Range    SARS CoV2 PCR Negative Negative     By report, working on discharge plans.  The delay between discharge and appointments is difficult for her.    General appearance: good  Alert.   Affect: fair  Mood: fair    Speech:  normal.   Eye contact:  good.    Psychomotor behavior: normal  Gait: normal.    Abnormal movements: none  Delusions: none  Hallucinations:   none  Thoughts: logical  Associations: intact  Judgement:  good  Insight: good  Cognitions: Intact in conversation  Memory:  intact in conversation  Orientation: normal    Not suicidal.    Plan:  Continue same meds  2.  Continue nailing down appointments  "

## 2021-10-14 NOTE — PLAN OF CARE
Problem: OT General Care Plan  Goal: OT Goal 1  Description: Will consistently attend OT groups and improve coping strategies with increasing repertoire of ideas and understanding of symptoms of when to use the strategies.       Note: Attended 2 of 3 OT groups. In the am session, she participated for 50 minutes with 7 pts in a goal oriented task group. She worked at a constant pace on a familiar step highly creative and detailed task. She was social, spoke of positive success she has had with DBT and offered insightful comments to a peer in a thoughtful manner. She appears engaged and interested.  In the afternoon sessions she participated for 50 minutes X2 with 7 pts in activities focused on the topics of Gratitude, techniques of Staying in the Moment and identifying self positives. She was actively involved, spoke up and continues to offer insightful ideas and answers.

## 2021-10-14 NOTE — PLAN OF CARE
"Pt verbalized sleeping \"good\" last night. Pt grateful for \"Dr. Hightower taking my mental health seriously\". Pt stated goal is to \"attend all groups today\". Pt eating and talking fluids freely, pt social with staff and peers. Pt declined miralax this morning. Pharmacy messaged to send Topamax to unit, Topamax administered. Pt wearing teds, pt educated on care instructions for TEDs, pt verbalized understanding.     Sitting: /75 P 87  Standing: /79 P 101  Pt denies feeling dizzy/light headed. Gait slow and steady. Fluids encouraged. Pt educated on orthostatic hypotension, proper hydration, changing positions slowly and asking for assistance when needed, pt verbalized and demonstrated understanding. Plan to recheck orthostatic VS and continue to monitor.   "

## 2021-10-14 NOTE — PLAN OF CARE
Assessment/Intervention/Current Symptoms and Care Coordination  The patient's care was discussed with the treatment team and chart notes were reviewed. [facility] met with patient to discuss her progress, current symptomology, ongoing cares and discharge plans/outpatient services.      [facility] received a call from [facility] regarding the only DBT referral writer submitted last week. [facility] was told that the agency does not have a medicare provider who is running a DBT group right now. Patient can call and check in a month or two. Patient can call  ([phone]).     [facility] rescheduled patient's psychiatry appointment with Jessica Wallace to Thursday October 21st, 2021 at 11 am     Patient notified writer that she was accepted to [facility] DBT program and has an intake appointment scheduled for Monday 10/18. She notified writer that Quique Mc from intake requested clinical documents. Writer called Quique and asked what documents he needed. Qiuque requested patient's H&P, MAR and facesheet. Writer faxed these documents to [phone].      AVS: Completed.    Discharge Plan or Goal  Discharge to home when stable.  She has outpatient psychiatry.  plans to increase frequency of therapy.  She requests a DBT telehealth referral.        Barriers to Discharge   Needs further psychiatric Stabilization and disposition planning     Referral Status  CTC made a referral to [facility] [place] (all locations) for DBT. Facility will likely call to make an appointment. Patient was also encouraged to call the agency if she doesn't receive a call.      [facility] made a referral to [facility].      PCP - Gerardo Mcmillan PA-C - [facility]    Psychiatry - [facility]  Provider: Jessica Wallace  In person appointment: Thursday October 21st, 2021 at 11 am   Address: [address] [place] [address] [address], [place] [address]  Phone: [phone]     Individual Therapy: [facility]  Therapist - Devi Barajas PsyD   Appointment: November 8th, 2021 at 9 am   Address: [facility] [address] [place] [address], [place] [address]  Phone: [phone]     Pt Navigator scheduled [facility] for 55+ program with Yamilka Contreras on Tuesday, 10/26/21.  This will be a video visit through [employer].        Legal Status  Vol.

## 2021-10-14 NOTE — PLAN OF CARE
Writer attempted to r/s Marissa's psychiatry appt. They didn't have any regular openings anytime soon, so writer was transferred to  of someone named Isabella (nurse?) who can tell us when they can carve out an acute needs/urgent spot to work her in. Writer does not have a direct # for her to call back to, so writer provided CTC's name and number.

## 2021-10-14 NOTE — PLAN OF CARE
Patient was calm, social and attending group activities. She endorses high anxiety related to not being able to get out patient resources set up due to her residence and insurance. She states she needs a good out pt treatment to help manage her mental illness. She states her  does not want her to discharge without having ongoing resources to support her when she goes home. Pt was tearful when talking about her frustrations regarding not being able to get into the resources she is looking for. She reports improved depressive symptoms and no suicidal ideations. She reported chronic shoulder pain, requested and was given PRN Tylenol #3.

## 2021-10-14 NOTE — PROGRESS NOTES
Pt was eager to join the dance/movement therapy (D/MT) group that used self-referencing movement to integrate the self, and reaching-out movements to reconnect with others and break out of isolation.  Gentle rocking and swaying was self-soothing to physical and emotional pain while stretching supported moving outside of comfort zones to seek the external supports needed.  Pt was more conflicted today, unsure about which decision to make and struggling between two choices.  She practiced listening to body-based knowledge and trusting that.     10/14/21 4392   Dance Movement Therapy   Type of Intervention structured groups   Response participates, initiates socially appropriate   Hours 1

## 2021-10-14 NOTE — PLAN OF CARE
Problem: Sleep Disturbance (Depressive Signs/Symptoms)  Goal: Improved Sleep (Depressive Signs/Symptoms)  Outcome: No Change    Patient slept well for a total of 9 hours. No concerns raised during the shift.    No longer under prescribers care

## 2021-10-15 LAB
MRSA DNA SPEC QL NAA+PROBE: NEGATIVE
SA TARGET DNA: POSITIVE

## 2021-10-15 PROCEDURE — H2032 ACTIVITY THERAPY, PER 15 MIN: HCPCS

## 2021-10-15 PROCEDURE — 124N000003 HC R&B MH SENIOR/ADOLESCENT

## 2021-10-15 PROCEDURE — G0177 OPPS/PHP; TRAIN & EDUC SERV: HCPCS

## 2021-10-15 PROCEDURE — 250N000013 HC RX MED GY IP 250 OP 250 PS 637: Performed by: PHYSICIAN ASSISTANT

## 2021-10-15 PROCEDURE — 250N000013 HC RX MED GY IP 250 OP 250 PS 637: Performed by: PSYCHIATRY & NEUROLOGY

## 2021-10-15 PROCEDURE — 250N000013 HC RX MED GY IP 250 OP 250 PS 637: Performed by: EMERGENCY MEDICINE

## 2021-10-15 PROCEDURE — 250N000013 HC RX MED GY IP 250 OP 250 PS 637: Performed by: NURSE PRACTITIONER

## 2021-10-15 PROCEDURE — 87641 MR-STAPH DNA AMP PROBE: CPT | Performed by: PHYSICIAN ASSISTANT

## 2021-10-15 RX ORDER — TOPIRAMATE 50 MG/1
50 TABLET, FILM COATED ORAL 2 TIMES DAILY
Status: DISCONTINUED | OUTPATIENT
Start: 2021-10-15 | End: 2021-10-25 | Stop reason: HOSPADM

## 2021-10-15 RX ADMIN — THERA TABS 1 TABLET: TAB at 08:39

## 2021-10-15 RX ADMIN — QUETIAPINE 400 MG: 200 TABLET, FILM COATED ORAL at 21:03

## 2021-10-15 RX ADMIN — Medication 1000 UNITS: at 08:39

## 2021-10-15 RX ADMIN — ALUMINUM HYDROXIDE, MAGNESIUM HYDROXIDE, AND SIMETHICONE 30 ML: 200; 200; 20 SUSPENSION ORAL at 18:35

## 2021-10-15 RX ADMIN — QUETIAPINE FUMARATE 100 MG: 50 TABLET, EXTENDED RELEASE ORAL at 08:39

## 2021-10-15 RX ADMIN — TOPIRAMATE 50 MG: 50 TABLET ORAL at 12:34

## 2021-10-15 RX ADMIN — BUSPIRONE HYDROCHLORIDE 10 MG: 10 TABLET ORAL at 08:39

## 2021-10-15 RX ADMIN — BUSPIRONE HYDROCHLORIDE 10 MG: 10 TABLET ORAL at 14:34

## 2021-10-15 RX ADMIN — TOPIRAMATE 50 MG: 50 TABLET ORAL at 21:04

## 2021-10-15 RX ADMIN — LIDOCAINE PATCH 4% 1 PATCH: 40 PATCH TOPICAL at 08:39

## 2021-10-15 RX ADMIN — ACETAMINOPHEN AND CODEINE PHOSPHATE 1 TABLET: 300; 30 TABLET ORAL at 21:07

## 2021-10-15 RX ADMIN — POLYETHYLENE GLYCOL 3350 17 G: 17 POWDER, FOR SOLUTION ORAL at 08:39

## 2021-10-15 RX ADMIN — PANTOPRAZOLE SODIUM 40 MG: 40 TABLET, DELAYED RELEASE ORAL at 08:39

## 2021-10-15 RX ADMIN — HYDROXYZINE HYDROCHLORIDE 50 MG: 50 TABLET, FILM COATED ORAL at 21:07

## 2021-10-15 RX ADMIN — PRAZOSIN HYDROCHLORIDE 2 MG: 1 CAPSULE ORAL at 21:03

## 2021-10-15 RX ADMIN — Medication 10 MG: at 21:07

## 2021-10-15 RX ADMIN — DULOXETINE HYDROCHLORIDE 90 MG: 30 CAPSULE, DELAYED RELEASE ORAL at 08:39

## 2021-10-15 RX ADMIN — BUSPIRONE HYDROCHLORIDE 10 MG: 10 TABLET ORAL at 21:04

## 2021-10-15 RX ADMIN — AMLODIPINE BESYLATE 5 MG: 5 TABLET ORAL at 08:39

## 2021-10-15 RX ADMIN — HYDROXYZINE HYDROCHLORIDE 50 MG: 50 TABLET, FILM COATED ORAL at 06:05

## 2021-10-15 ASSESSMENT — ACTIVITIES OF DAILY LIVING (ADL)
DRESS: INDEPENDENT
ORAL_HYGIENE: INDEPENDENT
ORAL_HYGIENE: INDEPENDENT
LAUNDRY: UNABLE TO COMPLETE
DRESS: INDEPENDENT
HYGIENE/GROOMING: INDEPENDENT
HYGIENE/GROOMING: INDEPENDENT

## 2021-10-15 NOTE — PLAN OF CARE
"  Problem: Sleep Disturbance (Depressive Signs/Symptoms)  Goal: Improved Sleep (Depressive Signs/Symptoms)  Outcome: No Change    Patient slept until  6am and upon waking up, she complained of anxiety and requested for an anxiety medication. When asked what was making her anxious, she said,\" There is a big gap between my discharge and next outpatient appointment. I am afraid, I would not know what to do if something would happen to me again.\" She said she needs to talk to her doctor again before she gets discharged.     Slept a total of 8 hours.   "

## 2021-10-15 NOTE — PLAN OF CARE
Nursing Assessment    Suicidal ideation [R45.851]  MDD (major depressive disorder), recurrent severe, without psychosis (H) [F33.2]    Admit Date: 10/3/2021    Length of Stay: 12    Patient evaluation continues. Assessed mood,anxiety,thoughts and behavior. Patient is progressing towards goals. Patient is encouraged to participate in groups and assisted to develop healthy coping skills.  Patient denies auditory or visual hallucinations.   pt feels that she is not ready for discharge and has anxiety about possibly being tempted to use when she has such a long gap in treatment. Provider notified.    Mood: bright     Patient reports depression 7/10 and reports anxiety 7/10 due to discharge    Affect:bright social with peers    Sleep: 8 hours last night    Appetite: good 75%    SI: denies    HI: denies    SIB: denies      Medication Compliance: medication compliant    Group participation:yes    ADL's: independent    Fall risk interventions: proper foot wear, orthostatic B/P    Jessee Score Interventions:none    Discharge planning in process    Refer to daily team meeting notes for individualized plan of care. Nursing will continue to assess.    *Scale is 1-10 and 10 is the worst.

## 2021-10-15 NOTE — PLAN OF CARE
"  Problem: Activity and Energy Impairment (Depressive Signs/Symptoms)  Goal: Optimized Energy Level (Depressive Signs/Symptoms)  10/14/2021 1900 by Ashley Gamino, RN  Outcome: No Change  10/14/2021 1859 by Ashley Gamino, RN  Outcome: No Change  Intervention: Optimize Energy Level  Recent Flowsheet Documentation  Taken 10/14/2021 1843 by Ashley Gamino, RN  Patient Performed Hygiene: bath, partial  Diversional Activity: art work  Activity (Behavioral Health): up ad [facility]inai   Pt is visible in the milieu, socially appropriate with peers, attends all groups today. Pt is agreeable to check in, denies any SI/SIB AH or VH. Pt states feeling depressed and worried about discharging tomorrow. Pt feels like she's not going to keep herself safe from \"drugs\" until 10/26 when she's suppose to come back for an intensive outpatient treatment, therefore requesting to have her stay at the hospital and not discharge till then (according to pt).   Pt reports good appetite and states feeling better after having a talk with writer.  2119;PRN Tylenol #3 administered for c/o shoulder and back pain rated 6/10 on a scale of 0-10, zero being the least and 10 being the worst pain.  PRN Melatonin administered per pt's request.  2243;Pt is currently in her bed, appears to be resting during rounds will continue to monitor.    "

## 2021-10-15 NOTE — PROGRESS NOTES
"Patient seen, chart reviewed, care discussed with staff.  Blood pressure 134/75, pulse 91, temperature (!) 96.5  F (35.8  C), temperature source Temporal, resp. rate 16, height 1.6 m (5' 3\"), weight 88.2 kg (194 lb 8 oz), SpO2 95 %, not currently breastfeeding.    By report, very worried about thinking she will not seek care after discharge if the wait is too long.  She cannot explain this and feels \"foggy\"    General appearance: fair  Alert. anxious  Affect: fair  Mood: fair    Speech:  normal.   Eye contact:  good.    Psychomotor behavior: normal  Gait: normal.    Abnormal movements: none  Delusions: none  Hallucinations:  none  Thoughts: a bit concrete, focused on insecurity about unscheduled time post discharge  Associations: intact  Judgement: fair  Insight: fair  Cognitions: intact in conversation  Memory:  intact in conversation  Orientation: normal    Not suicidal.  Feels \"foggy\"    Plan: decrease Topamax  2.  OT cognitive eval    Current Facility-Administered Medications   Medication     acetaminophen-codeine (TYLENOL #3) 300-30 MG per tablet 1 tablet     alum & mag hydroxide-simethicone (MAALOX) suspension 30 mL     amLODIPine (NORVASC) tablet 5 mg     busPIRone (BUSPAR) tablet 10 mg     DULoxetine (CYMBALTA) DR capsule 90 mg     hydrOXYzine (ATARAX) tablet 50 mg     Lidocaine (LIDOCARE) 4 % Patch 1 patch     lidocaine patch in PLACE     Melatonin tablet 10 mg     multivitamin, therapeutic (THERA-VIT) tablet 1 tablet     naloxone (NARCAN) injection 0.2 mg    Or     naloxone (NARCAN) injection 0.4 mg    Or     naloxone (NARCAN) injection 0.2 mg    Or     naloxone (NARCAN) injection 0.4 mg     OLANZapine (zyPREXA) tablet 5 mg    Or     OLANZapine (zyPREXA) injection 5 mg     ondansetron (ZOFRAN) tablet 4 mg     pantoprazole (PROTONIX) EC tablet 40 mg     polyethylene glycol (MIRALAX) Packet 17 g     prazosin (MINIPRESS) capsule 2 mg     QUEtiapine (SEROquel XR) 24 hr tablet 100 mg     QUEtiapine (SEROquel) " tablet 400 mg     senna-docusate (SENOKOT-S/PERICOLACE) 8.6-50 MG per tablet 1 tablet     topiramate (TOPAMAX) tablet 50 mg     Vitamin D3 (CHOLECALCIFEROL) tablet 1,000 Units     Recent Results (from the past 168 hour(s))   MRSA MSSA PCR, Nasal Swab    Collection Time: 10/08/21  5:46 PM    Specimen: Nares, Bilateral; Swab   Result Value Ref Range    MRSA Target DNA Negative Negative    SA Target DNA Positive    Asymptomatic COVID-19 Virus (Coronavirus) by PCR Nasopharyngeal    Collection Time: 10/12/21 12:36 PM    Specimen: Nasopharyngeal; Swab   Result Value Ref Range    SARS CoV2 PCR Negative Negative     Patient Active Problem List   Diagnosis     Pain of female symphysis pubis     Pelvic floor dysfunction     Myalgia of pelvic floor     SI (sacroiliac) joint dysfunction     Chronic pelvic pain in female     Suicidal ideation     MDD (major depressive disorder), recurrent severe, without psychosis (H)

## 2021-10-15 NOTE — PLAN OF CARE
Problem: OT General Care Plan  Goal: OT Goal 1  Description: Will consistently attend OT groups and improve coping strategies with increasing repertoire of ideas and understanding of symptoms of when to use the strategies.       Note: Attended 2 of 2 OT groups.  She participated in a goal oriented task group for 50 minutes with 5 patients.  She worked at a steady pace on a task requiring creative and finally detailed work.  She was pleasant and social though also talked about the trauma experienced growing up.  She did identify positive self aspects.  She also participated for 50 minutes with 5 patients in an activity focused on playful and positive thinking.  She took an active role in identifying multiple ideas, booker others into conversation in a pleasant manner and seems positive focused.  Order was received for OT Cognitive Assessment requested by Dr. Hightower.  Patient will be seen in the morning on 10-18-21.  Patient explained to this author she is experiencing lapses in her memory versus concentration and looks forward to discussing this through the testing.

## 2021-10-16 PROCEDURE — 90853 GROUP PSYCHOTHERAPY: CPT

## 2021-10-16 PROCEDURE — 250N000013 HC RX MED GY IP 250 OP 250 PS 637: Performed by: PSYCHIATRY & NEUROLOGY

## 2021-10-16 PROCEDURE — 124N000003 HC R&B MH SENIOR/ADOLESCENT

## 2021-10-16 PROCEDURE — 250N000013 HC RX MED GY IP 250 OP 250 PS 637: Performed by: PHYSICIAN ASSISTANT

## 2021-10-16 PROCEDURE — 250N000013 HC RX MED GY IP 250 OP 250 PS 637: Performed by: EMERGENCY MEDICINE

## 2021-10-16 PROCEDURE — 250N000013 HC RX MED GY IP 250 OP 250 PS 637: Performed by: NURSE PRACTITIONER

## 2021-10-16 RX ADMIN — QUETIAPINE FUMARATE 100 MG: 50 TABLET, EXTENDED RELEASE ORAL at 08:40

## 2021-10-16 RX ADMIN — QUETIAPINE 400 MG: 200 TABLET, FILM COATED ORAL at 20:55

## 2021-10-16 RX ADMIN — Medication 10 MG: at 20:56

## 2021-10-16 RX ADMIN — ACETAMINOPHEN AND CODEINE PHOSPHATE 1 TABLET: 300; 30 TABLET ORAL at 20:57

## 2021-10-16 RX ADMIN — BUSPIRONE HYDROCHLORIDE 10 MG: 10 TABLET ORAL at 20:56

## 2021-10-16 RX ADMIN — PRAZOSIN HYDROCHLORIDE 2 MG: 1 CAPSULE ORAL at 20:56

## 2021-10-16 RX ADMIN — BUSPIRONE HYDROCHLORIDE 10 MG: 10 TABLET ORAL at 14:08

## 2021-10-16 RX ADMIN — PANTOPRAZOLE SODIUM 40 MG: 40 TABLET, DELAYED RELEASE ORAL at 08:40

## 2021-10-16 RX ADMIN — HYDROXYZINE HYDROCHLORIDE 50 MG: 50 TABLET, FILM COATED ORAL at 20:56

## 2021-10-16 RX ADMIN — BUSPIRONE HYDROCHLORIDE 10 MG: 10 TABLET ORAL at 08:40

## 2021-10-16 RX ADMIN — AMLODIPINE BESYLATE 5 MG: 5 TABLET ORAL at 08:40

## 2021-10-16 RX ADMIN — HYDROXYZINE HYDROCHLORIDE 50 MG: 50 TABLET, FILM COATED ORAL at 09:16

## 2021-10-16 RX ADMIN — THERA TABS 1 TABLET: TAB at 08:40

## 2021-10-16 RX ADMIN — ACETAMINOPHEN AND CODEINE PHOSPHATE 1 TABLET: 300; 30 TABLET ORAL at 14:12

## 2021-10-16 RX ADMIN — TOPIRAMATE 50 MG: 50 TABLET ORAL at 20:57

## 2021-10-16 RX ADMIN — Medication 1000 UNITS: at 08:40

## 2021-10-16 RX ADMIN — TOPIRAMATE 50 MG: 50 TABLET ORAL at 08:39

## 2021-10-16 RX ADMIN — DULOXETINE HYDROCHLORIDE 90 MG: 30 CAPSULE, DELAYED RELEASE ORAL at 08:40

## 2021-10-16 ASSESSMENT — ACTIVITIES OF DAILY LIVING (ADL)
HYGIENE/GROOMING: INDEPENDENT
ORAL_HYGIENE: INDEPENDENT
DRESS: INDEPENDENT
HYGIENE/GROOMING: INDEPENDENT
DRESS: INDEPENDENT
ORAL_HYGIENE: INDEPENDENT

## 2021-10-16 NOTE — PLAN OF CARE
"Assessment/Intervention/Current Symptoms and Care Coordination  The patient's care was discussed with the treatment team and chart notes were reviewed. [facility] met with patient to discuss her progress, current symptomology, ongoing cares and discharge plans/outpatient services.     CTC received a call from Quique Mc from [facility] intake. He told writer \"I received 3 disturbing voicemails from Marissa. She said that she is being forced to attend this program for a short period of time then withdraw from the program and go to her intake appointment on 10/26 with the [facility] 55+ program.\" he stated that in one of her voicemails, she stated \"I was told to lie, deceive and manipulate you so I can attend your program temporarily while I wait to get into this other program.\" Quique stated that these statements are concerning and after consulting with his supervisor, They will be cancelling the patient's intake appointment  And will not be allowing her to attend their program as this \"feels unethical\".        CTC met with patient who notified writer that she will be staying hospitilized until Monday so she can get a cognitive testing.       AVS: Completed.     Discharge Plan or Goal  Discharge to home when stable.  She has outpatient psychiatry.  plans to increase frequency of therapy.  She requests a DBT telehealth referral.        Barriers to Discharge   Needs further psychiatric Stabilization and disposition planning     Referral Status  CTC made a referral to [facility] [place] (all locations) for DBT. Facility will likely call to make an appointment. Patient was also encouraged to call the agency if she doesn't receive a call.      CTC made a referral to [facility].      PCP - Gerardo Mcmillan PA-C - [facility]     Psychiatry - [facility]  Provider: Jessica Wallace  In person appointment: Thursday October 21st, 2021 at 11 am   Address: [address], [place] [address]  Phone: [phone]     Individual Therapy: [facility]  Therapist - Devi Barajas PsyD   Appointment: November 8th, 2021 at 9 am   Address: [address], [place] [address]  Phone: [phone]     Pt Navigator scheduled [facility] with Yamilka Contreras on Tuesday, 10/26/21.  This will be a video visit through [employer].          Legal Status  Vol.

## 2021-10-16 NOTE — PLAN OF CARE
"Nursing Assessment    Suicidal ideation [R45.851]  MDD (major depressive disorder), recurrent severe, without psychosis (H) [F33.2]    Admit Date: 10/3/2021    Length of Stay: 13    Patient evaluation continues. Assessed mood,anxiety,thoughts and behavior. Patient is  progressing towards goals. Patient is encouraged to participate in groups and assisted to develop healthy coping skills.  Patient  talked about a visual hallucination last evening where she saw the med room wall fall towards her and she yelled and put her hands up to protect herself. Per report pt denied any hallucinations last evening.  Pt an hour later stated that she was in her room and thought someone opened her door but when she got up to close it it was already closed. Pt reporting hallucinations that seem to be starting Friday. When asked what might be triggering these hallucinations recently pt stated \" I don't know maybe stress\". Pt declined prn medication. Pt has been walking around the unit not appearing stressed out, social with peers watching TV and doing puzzles.  Mood: labile happy and then becomes irritable at times.     Patient reports depression 6/10 and reports anxiety 6/10    Affect: full range    Sleep: 8    Appetite: good    SI: denies    HI: denies    SIB: denies      Medication Compliance medication compliant, requests prn medication as needed. Received prn Tylenol # 3 1 tablet at 1405 for shoulder pain.    Group participation: yes    ADL's: independent    Fall risk interventions: proper foot wear, orthostatic B/P    Jessee Score Interventions: none    Discharge planning  in process    Refer to daily team meeting notes for individualized plan of care. Nursing will continue to assess.    *Scale is 1-10 and 10 is the worst.         "

## 2021-10-16 NOTE — PLAN OF CARE
"  Problem: Activity and Energy Impairment (Depressive Signs/Symptoms)  Goal: Optimized Energy Level (Depressive Signs/Symptoms)  Outcome: Improving  Intervention: Optimize Energy Level  Recent Flowsheet Documentation  Taken 10/15/2021 1950 by Phyllis Wiggins RN  Diversional Activity:    art work    puzzles    television  Activity (Behavioral Health): up ad [facility]     Problem: Cognitive Impairment (Anxiety Signs/Symptoms)  Goal: Optimized Cognitive Function (Anxiety Signs/Symptoms)  Outcome: Improving    Patient is visible in the milieu majority of the shift working on with word puzzles together with two other patients on the unit. She attended and actively participated in two group activities this shift. She stated that she feels \"frustrated today\" because she is supposed to be discharged today but it is moved to Monday. Patient reported that she will undergo a cognitive testing before she leaves. She said that this morning she feels \"Brain fog\" so that's probably why her discharge was moved. Patient denied SI/SIB nor hallucinations. She denied racing thoughts. She sleeps good at night. She said that she likes the Minipress because it prevents her from getting nightmares. Patient is pleasant and cooperative.    She complained of heartburns. Maalox 30 ml given and felt relieved after. Patient is med compliant. Melatonin 10 mgs; Tylenol # 3 one tab and Hydroxyzine 50 mgs for anxiety given orally per patient's request before she went to bed.     "

## 2021-10-16 NOTE — PLAN OF CARE
Music Therapy Group note    Clinical Hours in session: 1.0    Number of patients in group: 5    Scope of service: psychodynamic     Patient progress: improving    Intervention: Evening Relaxation     Goal of group: to promote sleep     Patient response/reaction to treatment intervention(s): Cooperatively engaged in Evening Music Relaxation group to decrease anxiety and promote sleep.  Calm affect, appropriately engaged in session, responding well to the music.     Delma Richard, MT-BC  Board-Certified Music Therapist

## 2021-10-16 NOTE — PROGRESS NOTES
Pt appeared to be sleeping a total of about 8 hrs during the night. Pt left a note at the  saying she should not be woken up for breakfast. No concerns during the night. Will continue to monitor.

## 2021-10-16 NOTE — PROGRESS NOTES
"This morning writer was changing patients linens. Approached pt and asked when her linens were last changed, pt reported \"Never\". At this time, writer offered to change patient linens, to which the patient responded, \"No, I will just lay in my own filth.\" Writer insisted that they would change the linens for her now. Pt adamantly refused. Advised patient that if she changed her mind, writer would be more than happy to do this for her.   "

## 2021-10-17 PROCEDURE — 250N000013 HC RX MED GY IP 250 OP 250 PS 637: Performed by: PHYSICIAN ASSISTANT

## 2021-10-17 PROCEDURE — 124N000003 HC R&B MH SENIOR/ADOLESCENT

## 2021-10-17 PROCEDURE — 250N000013 HC RX MED GY IP 250 OP 250 PS 637: Performed by: PSYCHIATRY & NEUROLOGY

## 2021-10-17 PROCEDURE — 250N000013 HC RX MED GY IP 250 OP 250 PS 637: Performed by: NURSE PRACTITIONER

## 2021-10-17 PROCEDURE — G0177 OPPS/PHP; TRAIN & EDUC SERV: HCPCS

## 2021-10-17 PROCEDURE — H2032 ACTIVITY THERAPY, PER 15 MIN: HCPCS

## 2021-10-17 PROCEDURE — 250N000013 HC RX MED GY IP 250 OP 250 PS 637: Performed by: EMERGENCY MEDICINE

## 2021-10-17 RX ADMIN — AMLODIPINE BESYLATE 5 MG: 5 TABLET ORAL at 07:58

## 2021-10-17 RX ADMIN — TOPIRAMATE 50 MG: 50 TABLET ORAL at 21:21

## 2021-10-17 RX ADMIN — Medication 1000 UNITS: at 07:58

## 2021-10-17 RX ADMIN — BUSPIRONE HYDROCHLORIDE 10 MG: 10 TABLET ORAL at 21:21

## 2021-10-17 RX ADMIN — QUETIAPINE FUMARATE 100 MG: 50 TABLET, EXTENDED RELEASE ORAL at 07:58

## 2021-10-17 RX ADMIN — BUSPIRONE HYDROCHLORIDE 10 MG: 10 TABLET ORAL at 14:40

## 2021-10-17 RX ADMIN — Medication 10 MG: at 21:21

## 2021-10-17 RX ADMIN — PRAZOSIN HYDROCHLORIDE 2 MG: 1 CAPSULE ORAL at 21:20

## 2021-10-17 RX ADMIN — BUSPIRONE HYDROCHLORIDE 10 MG: 10 TABLET ORAL at 07:58

## 2021-10-17 RX ADMIN — HYDROXYZINE HYDROCHLORIDE 50 MG: 50 TABLET, FILM COATED ORAL at 05:36

## 2021-10-17 RX ADMIN — LIDOCAINE PATCH 4% 1 PATCH: 40 PATCH TOPICAL at 07:58

## 2021-10-17 RX ADMIN — DULOXETINE HYDROCHLORIDE 90 MG: 30 CAPSULE, DELAYED RELEASE ORAL at 07:58

## 2021-10-17 RX ADMIN — ACETAMINOPHEN AND CODEINE PHOSPHATE 1 TABLET: 300; 30 TABLET ORAL at 21:20

## 2021-10-17 RX ADMIN — PANTOPRAZOLE SODIUM 40 MG: 40 TABLET, DELAYED RELEASE ORAL at 07:58

## 2021-10-17 RX ADMIN — HYDROXYZINE HYDROCHLORIDE 50 MG: 50 TABLET, FILM COATED ORAL at 16:31

## 2021-10-17 RX ADMIN — TOPIRAMATE 50 MG: 50 TABLET ORAL at 07:58

## 2021-10-17 RX ADMIN — THERA TABS 1 TABLET: TAB at 07:58

## 2021-10-17 RX ADMIN — QUETIAPINE 400 MG: 200 TABLET, FILM COATED ORAL at 21:21

## 2021-10-17 ASSESSMENT — MIFFLIN-ST. JEOR: SCORE: 1416.83

## 2021-10-17 ASSESSMENT — ACTIVITIES OF DAILY LIVING (ADL)
HYGIENE/GROOMING: INDEPENDENT
HYGIENE/GROOMING: INDEPENDENT
ORAL_HYGIENE: INDEPENDENT
DRESS: INDEPENDENT
ORAL_HYGIENE: INDEPENDENT

## 2021-10-17 NOTE — PROGRESS NOTES
"Marissa  attended a Life Skills group this a.m. that involved sharing reflections according to question prompts. Pleasant and talkative. Discussed in vague terms her recent diagnosis of Bipolar Disorder, a diagnosis she described as \"totally validating.\" Also discussed happy memories of times spent on the [place].      10/17/21 1300   Occupational Therapy   Type of Intervention structured groups   Response Initiates, socially acceptable   Hours 1       "

## 2021-10-17 NOTE — PLAN OF CARE
"  Problem: Cognitive Impairment (Anxiety Signs/Symptoms)  Goal: Optimized Cognitive Function (Anxiety Signs/Symptoms)  Outcome: No Change     Problem: General Plan of Care (Inpatient Behavioral)  Goal: Individualization/Patient Specific Goal (IP Behavioral)  Description: The patient and/or their representative will achieve their patient-specific goals related to the plan of care.    The patient-specific goals include:  Outcome: Improving     Problem: Activity and Energy Impairment (Depressive Signs/Symptoms)  Goal: Optimized Energy Level (Depressive Signs/Symptoms)  Outcome: Improving  Intervention: Optimize Energy Level  Recent Flowsheet Documentation  Taken 10/17/2021 1746 by Pamela Padilla, RN  Diversional Activity:    music    television  Activity (Behavioral Health): up ad [facility]    Patient expresses feeling of hopelessness regarding possible discharge. Patient states, \"I don't know what I might do.\" Writer asked patient to elaborate on statement. Patient stated, \"I have stash at home, I don't want my  or anyone to know where it is.\" Writer asked patient if the \"stash\" is something she can use to harm herself and patient replied, \"I cannot rule that out.\"    Patient is pleasant with flat and blunted affect.    Patient reports anxiety 8 and depression 4 on a scale of 0-10. Denies suicidal ideation and contracts for safety while in hospital, and continues to state that she cannot tell what she might do when she discharges home.     Patient reports sciatic nerve pain, states that pain is bearable.   [place] was observed working on a crossword puzzle, engaged with select peers.     Good appetite.  Medication compliant.   Prns hydroxyzine, melatonin and tylenol #3 given.   Staff monitoring patient closely.    "

## 2021-10-17 NOTE — PLAN OF CARE
"Patient was isolative and withdrawn at the beginning of the shift, she stay in her room until 1700 when she came out to attend group. Patient has been calm and cooperative, she has been socializing with peers on the unit. No delusional or paranoid statement so far, she requested taking her medication later because the medications help her sleep better, request prn hydroxyzine/tylenol #3 and melatonin. Patient stated\" I'm excited about leaving on Monday, I will continue to use the coping skill I learn in my day to day activities\" Writer encourage patient to just take one day at a time. Patient rated her depression 2/10 and anxiety 4/10 stated\" They are always there but Im learning how to cope and manage them\" . Ate 100% of meal and was medication compliant.    "

## 2021-10-17 NOTE — PROGRESS NOTES
" 10/16/21 2200   Groups   Details   (Psychotherapy)   Number of patients attending the group:  9  Group Length:  1 Hours    Group Therapy Type: Psychotherpay Process Group    Summary of Group / Topics Discussed:      The  Psychotherapy group goal is to promote insight to positive choice and change. Group processing is within a supportive and safe environment. Patients will process emotions using verbal group and expressive psychotherapy interventions including visual art/writing interventions.    Group interventions support patients by: self compassion, communication/social skills and supports and hope/optimism    Modalities to reach these goals include: CBT (Cognitive Behavioral Therapy)    Subjective -patient report of mood today- \"long boring day\"    Objective/ Intervention- Goal of group and Therapeutic modality utilized- Process group and worksheet, discussing shame and guilt and compassion for self, identifying good decisions one has made in life    Group Response- engaged very large group    Patient Response-Pt was engaged for the duration of group. She is the only one of 9 people that wanted to create art, she worked with [employer]. Writer gave her a directive to address some thoughts she expressed about that she is hesitant about following after care plans. She also expressed that she didn't feel she would use her safety plan but wanted to discharge. She did two drawings of leaves. One was bright orange and yellow that was depicting if she follows her aftercare recommendations. The other was a dried up brown leaf. She said that depicts her ambivalence about wanting to ignore advice and fears she cant be safe. She said \" they want me out of here.\" She also said she has a good supportive relationship with her  of 40 years.    Otis Downs, BRYNFT, ATR-BC          "

## 2021-10-17 NOTE — PLAN OF CARE
Nursing Assessment    Suicidal ideation [R45.851]  MDD (major depressive disorder), recurrent severe, without psychosis (H) [F33.2]    Admit Date: 10/3/2021    Length of Stay: 14    Patient evaluation continues. Assessed mood,anxiety,thoughts and behavior. Patient is progressing towards goals. Patient is encouraged to participate in groups and assisted to develop healthy coping skills.  Patient denies auditory or visual hallucinations.     Mood: good    Patient reports depression 5/10 and reports anxiety 4/10    Affect:bright    Sleep: 7 hours last night    Appetite: good    SI: denies    HI: denies    SIB: denies      Medication Compliance compliant    Group participation: yes    ADL's: independent    Fall risk interventions: proper foot wear, orthostatic B/P    Jessee Score Interventions: none    Discharge planning none    Refer to daily team meeting notes for individualized plan of care. Nursing will continue to assess.    *Scale is 1-10 and 10 is the worst.

## 2021-10-17 NOTE — PLAN OF CARE
Problem: Sleep Disturbance (Depressive Signs/Symptoms)  Goal: Improved Sleep (Depressive Signs/Symptoms)  Outcome: Improving     Patient slept soundly well for 7.5 hours. She complained of anxiety. Hydroxyzine 50 mgs given. No other complaints given.

## 2021-10-18 PROCEDURE — G0177 OPPS/PHP; TRAIN & EDUC SERV: HCPCS

## 2021-10-18 PROCEDURE — 90853 GROUP PSYCHOTHERAPY: CPT

## 2021-10-18 PROCEDURE — 96125 COGNITIVE TEST BY HC PRO: CPT | Mod: GO

## 2021-10-18 PROCEDURE — 124N000003 HC R&B MH SENIOR/ADOLESCENT

## 2021-10-18 PROCEDURE — 250N000013 HC RX MED GY IP 250 OP 250 PS 637: Performed by: PSYCHIATRY & NEUROLOGY

## 2021-10-18 PROCEDURE — 250N000013 HC RX MED GY IP 250 OP 250 PS 637: Performed by: NURSE PRACTITIONER

## 2021-10-18 PROCEDURE — 250N000013 HC RX MED GY IP 250 OP 250 PS 637: Performed by: EMERGENCY MEDICINE

## 2021-10-18 PROCEDURE — 250N000013 HC RX MED GY IP 250 OP 250 PS 637: Performed by: PHYSICIAN ASSISTANT

## 2021-10-18 RX ORDER — TOPIRAMATE 50 MG/1
50 TABLET, FILM COATED ORAL 2 TIMES DAILY
Qty: 60 TABLET | Refills: 1 | Status: SHIPPED | OUTPATIENT
Start: 2021-10-18 | End: 2022-04-12

## 2021-10-18 RX ADMIN — BUSPIRONE HYDROCHLORIDE 10 MG: 10 TABLET ORAL at 08:31

## 2021-10-18 RX ADMIN — PANTOPRAZOLE SODIUM 40 MG: 40 TABLET, DELAYED RELEASE ORAL at 08:31

## 2021-10-18 RX ADMIN — BUSPIRONE HYDROCHLORIDE 10 MG: 10 TABLET ORAL at 12:53

## 2021-10-18 RX ADMIN — Medication 1000 UNITS: at 08:31

## 2021-10-18 RX ADMIN — LIDOCAINE PATCH 4% 1 PATCH: 40 PATCH TOPICAL at 08:32

## 2021-10-18 RX ADMIN — TOPIRAMATE 50 MG: 50 TABLET ORAL at 08:32

## 2021-10-18 RX ADMIN — BUSPIRONE HYDROCHLORIDE 10 MG: 10 TABLET ORAL at 21:31

## 2021-10-18 RX ADMIN — QUETIAPINE FUMARATE 100 MG: 50 TABLET, EXTENDED RELEASE ORAL at 08:31

## 2021-10-18 RX ADMIN — HYDROXYZINE HYDROCHLORIDE 50 MG: 50 TABLET, FILM COATED ORAL at 08:31

## 2021-10-18 RX ADMIN — HYDROXYZINE HYDROCHLORIDE 50 MG: 50 TABLET, FILM COATED ORAL at 12:53

## 2021-10-18 RX ADMIN — Medication 10 MG: at 21:30

## 2021-10-18 RX ADMIN — TOPIRAMATE 50 MG: 50 TABLET ORAL at 21:30

## 2021-10-18 RX ADMIN — ACETAMINOPHEN AND CODEINE PHOSPHATE 1 TABLET: 300; 30 TABLET ORAL at 21:31

## 2021-10-18 RX ADMIN — AMLODIPINE BESYLATE 5 MG: 5 TABLET ORAL at 08:30

## 2021-10-18 RX ADMIN — HYDROXYZINE HYDROCHLORIDE 50 MG: 50 TABLET, FILM COATED ORAL at 21:29

## 2021-10-18 RX ADMIN — THERA TABS 1 TABLET: TAB at 08:30

## 2021-10-18 RX ADMIN — PRAZOSIN HYDROCHLORIDE 2 MG: 1 CAPSULE ORAL at 21:30

## 2021-10-18 RX ADMIN — QUETIAPINE 400 MG: 200 TABLET, FILM COATED ORAL at 21:29

## 2021-10-18 RX ADMIN — ACETAMINOPHEN AND CODEINE PHOSPHATE 1 TABLET: 300; 30 TABLET ORAL at 08:31

## 2021-10-18 RX ADMIN — DULOXETINE HYDROCHLORIDE 90 MG: 30 CAPSULE, DELAYED RELEASE ORAL at 08:30

## 2021-10-18 ASSESSMENT — ACTIVITIES OF DAILY LIVING (ADL)
ADLS_ACUITY_SCORE: 6
ADLS_ACUITY_SCORE: 6
DRESS: INDEPENDENT
ADLS_ACUITY_SCORE: 6
HYGIENE/GROOMING: INDEPENDENT
ADLS_ACUITY_SCORE: 6
ORAL_HYGIENE: INDEPENDENT
DRESS: INDEPENDENT
ADLS_ACUITY_SCORE: 6
HYGIENE/GROOMING: INDEPENDENT
ADLS_ACUITY_SCORE: 6
ORAL_HYGIENE: INDEPENDENT
ADLS_ACUITY_SCORE: 6

## 2021-10-18 NOTE — PROGRESS NOTES
"Patient seen, chart reviewed, care discussed with staff.  Blood pressure 120/80, pulse 80, temperature 96.9  F (36.1  C), temperature source Temporal, resp. rate 16, height 1.6 m (5' 3\"), weight 88.3 kg (194 lb 9.6 oz), SpO2 96 %, not currently breastfeeding.    Discharge was set up, but she told the nurse she was afraid she would self harm by overusing meds.  She refused to sign an ROGER for the  or allow him to dispense meds.      Slept well.  Scored normal on cognitive testing    General appearance: good  Alert.   Affect: fair, anxious  Mood: fair    Speech:  normal.   Eye contact:  good.    Psychomotor behavior: normal  Gait: normal.    Abnormal movements: none  Delusions: none  Hallucinations:   none  Thoughts: logical  Associations: intact  Judgement: poor  Insight: poor  Cognitions: intact in conversation  Memory:  intact in conversation  Orientation: normal    Not suicidal but stating not safe from med overuse, refuses to allow  to be involved    Imp: MDD F33.2  2.  Borderline personality    Current Facility-Administered Medications   Medication     acetaminophen-codeine (TYLENOL #3) 300-30 MG per tablet 1 tablet     alum & mag hydroxide-simethicone (MAALOX) suspension 30 mL     amLODIPine (NORVASC) tablet 5 mg     busPIRone (BUSPAR) tablet 10 mg     DULoxetine (CYMBALTA) DR capsule 90 mg     hydrOXYzine (ATARAX) tablet 50 mg     Lidocaine (LIDOCARE) 4 % Patch 1 patch     lidocaine patch in PLACE     Melatonin tablet 10 mg     multivitamin, therapeutic (THERA-VIT) tablet 1 tablet     naloxone (NARCAN) injection 0.2 mg    Or     naloxone (NARCAN) injection 0.4 mg    Or     naloxone (NARCAN) injection 0.2 mg    Or     naloxone (NARCAN) injection 0.4 mg     OLANZapine (zyPREXA) tablet 5 mg    Or     OLANZapine (zyPREXA) injection 5 mg     ondansetron (ZOFRAN) tablet 4 mg     pantoprazole (PROTONIX) EC tablet 40 mg     polyethylene glycol (MIRALAX) Packet 17 g     prazosin (MINIPRESS) capsule 2 mg "     QUEtiapine (SEROquel XR) 24 hr tablet 100 mg     QUEtiapine (SEROquel) tablet 400 mg     senna-docusate (SENOKOT-S/PERICOLACE) 8.6-50 MG per tablet 1 tablet     topiramate (TOPAMAX) tablet 50 mg     Vitamin D3 (CHOLECALCIFEROL) tablet 1,000 Units     Recent Results (from the past 168 hour(s))   Asymptomatic COVID-19 Virus (Coronavirus) by PCR Nasopharyngeal    Collection Time: 10/12/21 12:36 PM    Specimen: Nasopharyngeal; Swab   Result Value Ref Range    SARS CoV2 PCR Negative Negative   MRSA MSSA PCR, Nasal Swab    Collection Time: 10/15/21  1:35 PM    Specimen: Nares, Bilateral; Swab   Result Value Ref Range    MRSA Target DNA Negative Negative    SA Target DNA Positive    Plan: postpone discharge

## 2021-10-18 NOTE — PLAN OF CARE
"Assessment/Intervention/Current Symptoms and Care Coordination  The patient's care was discussed with the treatment team and chart notes were reviewed.     CTC met with patient to discuss her progress, current symptomology, ongoing cares and discharge plans/outpatient services. Patient notified writer that she \"panicked and felt unsafe to go home today\" CTC asked what triggered her and what it would take to get her ready for discharge and make her feel safe. Writer asked what she needs from her CTC and her treatment team at this time to support her. Patient responded with \"you don't know what is going on in my head. I didn't feel ready today.\" Writer asked why she rescinded the ALISSON for her  and notified her that we will need to communicate with her  before she discharges to make sure that he will be administering/managing her medications. Writer explained that this is a safety precaution.  Patient then responded with \"I can still find ways to hurt myself. Him holding my meds won't change anything and I signed the ALISSON again. You can talk to him if you want.\" Writer discussed her options and the lack of DBT programs in her area. She stated, she left \"those voicemails for Qu[facility] at [facility] last week because I didn't want to drive to [place].\" Writer asked what her thoughts are about the 55+ program at [facility], patient responded with \"I love it. I don't have to drive here.\" patient shared willingness to participate in the program. Writer again asked what would help her feel less anxious and get her ready for post discharge. Patient states \"I can't think of anything.\" patient was resistant to having a conversation about safety planning, readiness for discharge, and using healthy coping skills. Patient states \"I just feel like people here don't want to help me and want me to discharge.\" Writer reassured her that staff are here to help her and that she can always ask to speak with her RN and CTC. " Writer also reassured her that it is up to Dr. Hightower to determine when she will be ready for discharge and that he will likely meet with her tomorrow and have further discussions with her.     Patient notified writer that she is relieved about the results from the cognitive testing she completed with the OT today.           AVS: Completed.     Discharge Plan or Goal  Discharge to home when stable.  She has outpatient psychiatry.  plans to increase frequency of therapy.  She requests a DBT telehealth referral.        Barriers to Discharge   Needs further psychiatric Stabilization and disposition planning     Referral Status  CTC made a referral to [facility] [place] (all locations) for DBT. Facility will likely call to make an appointment. Patient was also encouraged to call the agency if she doesn't receive a call.      [facility] in [place].      PCP - Gerardo Mcmillan PA-C - [facility]     Psychiatry - [facility]  Provider: Jessica Wallace  In person appointment: Thursday October 21st, 2021 at 11 am   Address: [address] [place] [address] [address], [place] [address]  Phone: [phone]     Individual Therapy: [facility]  Therapist - Devi Barajas PsyD   Appointment: November 8th, 2021 at 9 am   Address: [address], [place] [address]  Phone: [phone]     Pt Navigator scheduled  eval for 55+ program with Yamilka Contreras on Tuesday, 10/26/21. This will be a video visit through [employer].     ________________________________________________________________________    Because patient continues to state she is interested in attending a DBT program, [facility] reached out to the following organizations and attempted to find a DBT program for the patient.      [facility]- Failed: have an adult DBT program but patient will have to be from [facility] and will have to have a therapist and psychiatry through  their clinic. Patient is from [facility] and would qualify for this DBT program.     [facility]-  Failed. agency provides DBT services to Teens only. Currently does not have an adult DBT program.     [facility] human services- [facility] reached out to the behavioral Health access worker ([phone]) to ask for suggestions, and recommendations on agencies that offer DBT services in this [facility] or any other adult IOP program. [facility] left a voicemail for the  adult access worker and requested a call back.       Legal Status  Vol.

## 2021-10-18 NOTE — PLAN OF CARE
Problem: General Rehab Plan of Care  Goal: Therapeutic Recreation/Music Therapy Goal (Art Therapy)  Description: The patient and/or their representative will achieve their patient-specific goals related to the plan of care.  The patient-specific goals include:  Outcome: Improving      AT directive was related to self compassion and compassion for others. Pts booker three gifts for 1) Yourself 2) Someone you care for 3) A stranger  Goals of directive: self compassion, emotional expression  Pt was a positive participant, focused on task for the full duration of group. Pt finished drawings and briefly shared with group.   Pts mood was calm, pleasant participant.

## 2021-10-18 NOTE — PROGRESS NOTES
Left vm with infection prevention *[id number] of notification of 2nd negative MRSA result and pt's request to removed infection flag from chart.     pls follow up in am.

## 2021-10-18 NOTE — PLAN OF CARE
Marissa was out of her room nearly all shift. In the morning she was bright, very social with select peers, cooperative. She expressed interest in wanting to discharge today. Throughout the shift she approached [facility] staff stating that she was ruminating on discharge, specifically that she wanted to go home to overdose on her medications at home. I suggested if she felt that she could not be safely around her medications that her  could manage her medications. She stated that for that exact reason, she did not want anybody else managing her medications, that she rescinded her ALISSON last evening for her .     She also became tearful in the lounge stating that other patients are getting attention when she isn't. She gave as an example music group. She went to music and another patient got to request what song they wanted first and it was a 20 minute long concerto, it was so long that she walked out and didn't even get to hear the song she wanted to hear.     This information was discussed with Dr Hightower, and planned discharge at 8pm today was cancelled. Pt was informed that her  would at some point need to be involved In her discharge planning.     Atarax requested and administered twice this shift, with minimal relief.

## 2021-10-18 NOTE — PLAN OF CARE
Problem: Sleep Disturbance (Depressive Signs/Symptoms)  Goal: Improved Sleep (Depressive Signs/Symptoms)  Outcome: Improving     Slept well for 7.5 hours   No complaints of pain

## 2021-10-18 NOTE — PROGRESS NOTES
"Marissa came late to group today.  Checked in feeling like she was \"Obsessing\" about one thing but did not share what it was.  Stated she had no preference for songs in group.  MT played a Classical piece that peer had indicated would be helpful for them.  Marissa became irritable and impatient as the piece played and was a longer piece.  MT stopped the song to ask her if she would like to chose something else but Marissa ignored MT as Marissa was walking out the door.   "

## 2021-10-18 NOTE — CONSULTS
OCCUPATIONAL THERAPY:  Independent Living Skills Evaluation / CPT / MoCA   [facility]   [address]            Emily Bolden, OTR/L    Patient Name       Marissa Youssef   Date of Assessment:    10-     Time of day:   Early AM    THE RESULTS OF THIS ASSESSMENT PROVIDE RECOMMENDATIONS BASED UPON FUNCTION AT THE TIME OF ADMINISTRATION ONLY, MAY NOT REFLECT BASELINE FUNCTION.    COGNITIVE PERFORMANCE TEST: The CPT is a standardized, performance-based assessment used to assess cognitive-functional information processing and executive processing.  This test is based on performance of some common activities. The level of performance may help to describe how information is being processed, potential safety risks and recommendations for supervision needs in the individual s living environment.  Updated with 2018 guidelines    LEXIE COGNITIVE ASSESSMENT (MoCA)      VERSION __8.1   The Lexie Cognitive Assessment (MoCA) was designed as a rapid screening instrument for mild cognitive dysfunction. It assesses different cognitive domains: attention and concentration, executive functions, memory, language, visuoconstructional skills, conceptual thinking, calculations, and orientation. The total possible score is 30 points; a score of 26 or above is considered normal.    GENERAL RECOMMENDATIONS BASED UPON THE COGNITIVE PERFORMANCE TEST (CPT) SCORE.  Note these are ranges and there may be fluctuations in abilities for an individual at different times of days                                                                                                             LEXIE COGNITIVE ASSESSMENT SCORE:  27 /30  which is within  the normal range.    Draw a Clock Subtest: Score:   3   / 3  (details if missed points)    SUBTASK SCORE SUBTASK SCORE   Visuospatial/Executive    5  /5 Abstraction    2  /2   Naming    3  /3 Delayed Recall    4  /5   Attention    6  /6 Orientation    6  /6    Language    1  /3         CPT RESULTS:    SUBTASK SCORE COMMENTS   Phone   6  /6 Completed task correctly.   Shop   5  /6 Completed task successfully with general cue of looking for additional item they had sufficient funds to purchase. Did not preplan by checking amount of funds available prior to making purchase.   Med Box   5.5  /6 Corrected errors correctly with general cues.   Wash   5  /5 Completed concrete task correctly.   Toast   5  /5 Completed concrete task correctly.     TOTAL CPT 5 TASK AVERAGE SCORE:    5.3    / 5.6  which is well within the normal range.    LEVEL 5.6 - 5.2  Normal range  Indicates normal functioning (absence of cognitive - functional disability). Information needed can be retrieved from stored memory to carry out complex purposeful activity with safety and accuracy.     1. Independent in managing personal affairs, seeking help or advice as needed.  2. Uses complex information to carry out daily activities with safety and accuracy.    3. Written information, numbers, symbols and hypothetical thoughts are well understood.  4. Problems are anticipated, errors are avoided, and consequences of actions are considered.     ASSESSMENT/RECOMMENDATIONS (based upon assessment/group observation)    Marissa was pleasant and cooperative during this assessment session. She explained she at times has difficulty remembering periods of time. We discussed memory and multiple reasons that may factor into when a person may have memory difficulties. We also discussed techniques that may be beneficial in improving memory.     Marissa scored within the normal range on the CPT with a score of 5.3. The score on the MoCA was 27/30 which is also within the normal range. Note on the Delayed Recall of the MoCA question she scored 4/5. The Memory Index Score was 14/15, recalling 4 words without cues and 1 word requiring a generalized cue. She scored 5/5 on the Visuospatial/Executive questions.    One would expect  with both scores of the CPT and MoCA being within the normal range, that a person would be able to manage all cares of daily living successfully and independently and without difficulty.     Additional recommendations discussed included general brain health tips of utilizing brain stimulating activities on a daily basis, such as cross word puzzles, word searches, reading, etc. Additional information was also discussed such as following as best possible to eat  heart healthy , sleeping adequate number of hours, getting physical activity within range of comfort ability, and managing stress levels as best possible.

## 2021-10-18 NOTE — PLAN OF CARE
"  Problem: OT General Care Plan  Goal: OT Goal 1  Description: Will consistently attend OT groups and improve coping strategies with increasing repertoire of ideas and understanding of symptoms of when to use the strategies.       Note: Attended 2 of 2 OT groups. She was pleasant, quick to participate and followed through to completion on a complex and highly detailed task. She participated for 50 minutes with 6 pts. She stated feeling anxious about discharge with having \"urges\". We discussed her vast and creative knowledge of coping ideas. \"I know it is just hard to use them.\" She also participated for 50 minutes with 5 pts in an activity focused on Memory, factors that may interfere, and methods to improve it. Comments were insightful and she took an active role in speaking up and initiating comments, appearing involved, interested and engaged.       "

## 2021-10-19 LAB — SARS-COV-2 RNA RESP QL NAA+PROBE: NEGATIVE

## 2021-10-19 PROCEDURE — 87635 SARS-COV-2 COVID-19 AMP PRB: CPT | Performed by: PSYCHIATRY & NEUROLOGY

## 2021-10-19 PROCEDURE — 250N000013 HC RX MED GY IP 250 OP 250 PS 637: Performed by: NURSE PRACTITIONER

## 2021-10-19 PROCEDURE — 250N000013 HC RX MED GY IP 250 OP 250 PS 637: Performed by: PSYCHIATRY & NEUROLOGY

## 2021-10-19 PROCEDURE — G0177 OPPS/PHP; TRAIN & EDUC SERV: HCPCS

## 2021-10-19 PROCEDURE — 250N000013 HC RX MED GY IP 250 OP 250 PS 637: Performed by: PHYSICIAN ASSISTANT

## 2021-10-19 PROCEDURE — 250N000013 HC RX MED GY IP 250 OP 250 PS 637: Performed by: EMERGENCY MEDICINE

## 2021-10-19 PROCEDURE — 124N000003 HC R&B MH SENIOR/ADOLESCENT

## 2021-10-19 PROCEDURE — H2032 ACTIVITY THERAPY, PER 15 MIN: HCPCS

## 2021-10-19 RX ORDER — BUSPIRONE HYDROCHLORIDE 10 MG/1
20 TABLET ORAL 3 TIMES DAILY
Status: DISCONTINUED | OUTPATIENT
Start: 2021-10-19 | End: 2021-10-25 | Stop reason: HOSPADM

## 2021-10-19 RX ORDER — BUSPIRONE HYDROCHLORIDE 10 MG/1
20 TABLET ORAL 3 TIMES DAILY
Qty: 180 TABLET | Refills: 1 | Status: SHIPPED | OUTPATIENT
Start: 2021-10-19 | End: 2022-04-12

## 2021-10-19 RX ADMIN — PRAZOSIN HYDROCHLORIDE 2 MG: 1 CAPSULE ORAL at 21:41

## 2021-10-19 RX ADMIN — DULOXETINE HYDROCHLORIDE 90 MG: 30 CAPSULE, DELAYED RELEASE ORAL at 08:48

## 2021-10-19 RX ADMIN — BUSPIRONE HYDROCHLORIDE 20 MG: 10 TABLET ORAL at 14:43

## 2021-10-19 RX ADMIN — BUSPIRONE HYDROCHLORIDE 10 MG: 10 TABLET ORAL at 08:48

## 2021-10-19 RX ADMIN — LIDOCAINE PATCH 4% 1 PATCH: 40 PATCH TOPICAL at 08:48

## 2021-10-19 RX ADMIN — BUSPIRONE HYDROCHLORIDE 20 MG: 10 TABLET ORAL at 21:40

## 2021-10-19 RX ADMIN — QUETIAPINE FUMARATE 100 MG: 50 TABLET, EXTENDED RELEASE ORAL at 08:48

## 2021-10-19 RX ADMIN — HYDROXYZINE HYDROCHLORIDE 50 MG: 50 TABLET, FILM COATED ORAL at 21:42

## 2021-10-19 RX ADMIN — POLYETHYLENE GLYCOL 3350 17 G: 17 POWDER, FOR SOLUTION ORAL at 08:48

## 2021-10-19 RX ADMIN — ACETAMINOPHEN AND CODEINE PHOSPHATE 1 TABLET: 300; 30 TABLET ORAL at 08:49

## 2021-10-19 RX ADMIN — THERA TABS 1 TABLET: TAB at 08:48

## 2021-10-19 RX ADMIN — TOPIRAMATE 50 MG: 50 TABLET ORAL at 12:17

## 2021-10-19 RX ADMIN — Medication 10 MG: at 21:41

## 2021-10-19 RX ADMIN — PANTOPRAZOLE SODIUM 40 MG: 40 TABLET, DELAYED RELEASE ORAL at 08:48

## 2021-10-19 RX ADMIN — ACETAMINOPHEN AND CODEINE PHOSPHATE 1 TABLET: 300; 30 TABLET ORAL at 21:39

## 2021-10-19 RX ADMIN — AMLODIPINE BESYLATE 5 MG: 5 TABLET ORAL at 08:49

## 2021-10-19 RX ADMIN — Medication 1000 UNITS: at 08:48

## 2021-10-19 RX ADMIN — HYDROXYZINE HYDROCHLORIDE 50 MG: 50 TABLET, FILM COATED ORAL at 04:47

## 2021-10-19 RX ADMIN — TOPIRAMATE 50 MG: 50 TABLET ORAL at 21:40

## 2021-10-19 RX ADMIN — QUETIAPINE 400 MG: 200 TABLET, FILM COATED ORAL at 21:40

## 2021-10-19 ASSESSMENT — ACTIVITIES OF DAILY LIVING (ADL)
ADLS_ACUITY_SCORE: 6
ORAL_HYGIENE: INDEPENDENT
ADLS_ACUITY_SCORE: 6
DRESS: INDEPENDENT
ADLS_ACUITY_SCORE: 6
HYGIENE/GROOMING: INDEPENDENT
ADLS_ACUITY_SCORE: 6
ADLS_ACUITY_SCORE: 6
DRESS: INDEPENDENT
ADLS_ACUITY_SCORE: 6
HYGIENE/GROOMING: INDEPENDENT
ORAL_HYGIENE: INDEPENDENT
ADLS_ACUITY_SCORE: 6
ADLS_ACUITY_SCORE: 6

## 2021-10-19 NOTE — PROGRESS NOTES
Behavioral Health  Note    Behavioral Health  Spirituality Group Note    [address]    Name: Marissa Youssef YOB: 1960   MRN: [id number] Age: 60 year old      Patient attended -led group, which included discussion of spirituality, coping with illness and spiritual resources.    Patient attended group for 1.0 hrs.    The patient actively participated in group discussion and patient demonstrated an appreciation of topic's application for their personal circumstances.    Prudence Nevarez MDiv  Associate   Pager [phone]  Office [phone]

## 2021-10-19 NOTE — PROGRESS NOTES
"Patient seen, chart reviewed, care discussed with staff.  Blood pressure 114/78, pulse 82, temperature 97.8  F (36.6  C), temperature source Oral, resp. rate 16, height 1.6 m (5' 3\"), weight 88.3 kg (194 lb 9.6 oz), SpO2 98 %, not currently breastfeeding.  Discharge delayed due to risk of overusing meds.  Long term pattern of not using meds as prescribed, taking several at once to \"numb\" and then not using on schedule.  This was better in the past after \"DBT therapy.  She feels unable to be safe if she has access to her meds from this pattern and risk of falls, she was reluctant to ask  to intervene so as not to burden him with taking care of her    This is good insight to realize her unsafety until in therapy    Slept 7.5 hours    General appearance: good  Alert.   Affect: fair, anxious  Mood: fair    Speech:  normal.   Eye contact:  good.    Psychomotor behavior: normal  Gait: normal.    Abnormal movements: none  Delusions: none  Hallucinations:   none  Thoughts: logical  Associations: intact  Judgement: fair  Insight: good  Cognitions: intact in conversation  Memory:  intact in conversation  Orientation: normal    Not suicidal.  IMP:  MDD F33.2, Borderline personality, RACQUEL  Plan: Discuss discharge plans with   2.  Increase Buspar    Hope for discharge tomorrow    Current Facility-Administered Medications   Medication     acetaminophen-codeine (TYLENOL #3) 300-30 MG per tablet 1 tablet     alum & mag hydroxide-simethicone (MAALOX) suspension 30 mL     amLODIPine (NORVASC) tablet 5 mg     busPIRone (BUSPAR) tablet 20 mg     DULoxetine (CYMBALTA) DR capsule 90 mg     hydrOXYzine (ATARAX) tablet 50 mg     Lidocaine (LIDOCARE) 4 % Patch 1 patch     lidocaine patch in PLACE     Melatonin tablet 10 mg     multivitamin, therapeutic (THERA-VIT) tablet 1 tablet     naloxone (NARCAN) injection 0.2 mg    Or     naloxone (NARCAN) injection 0.4 mg    Or     naloxone (NARCAN) injection 0.2 mg    Or     naloxone " (NARCAN) injection 0.4 mg     OLANZapine (zyPREXA) tablet 5 mg    Or     OLANZapine (zyPREXA) injection 5 mg     ondansetron (ZOFRAN) tablet 4 mg     pantoprazole (PROTONIX) EC tablet 40 mg     polyethylene glycol (MIRALAX) Packet 17 g     prazosin (MINIPRESS) capsule 2 mg     QUEtiapine (SEROquel XR) 24 hr tablet 100 mg     QUEtiapine (SEROquel) tablet 400 mg     senna-docusate (SENOKOT-S/PERICOLACE) 8.6-50 MG per tablet 1 tablet     topiramate (TOPAMAX) tablet 50 mg     Vitamin D3 (CHOLECALCIFEROL) tablet 1,000 Units     Recent Results (from the past 168 hour(s))   MRSA MSSA PCR, Nasal Swab    Collection Time: 10/15/21  1:35 PM    Specimen: Nares, Bilateral; Swab   Result Value Ref Range    MRSA Target DNA Negative Negative    SA Target DNA Positive

## 2021-10-19 NOTE — PLAN OF CARE
Problem: Mood Impairment (Anxiety Signs/Symptoms)  Goal: Improved Mood Symptoms (Anxiety Signs/Symptoms)  Outcome: No Change      Woke up at 0445 and reported feeling anxious, rated her anxiety 7/10. Pt claimed she has been anxious the past 24 hours due to upcoming discharge.  0447 Hydroxyzine 50 mg given po.  Slept for 7.5 hours

## 2021-10-19 NOTE — PLAN OF CARE
"Patient alert and oriented to person, place, and time, adequately groomed. Pt out for meals, ate 100% of breakfast, 50% of lunch. Pt is pleasant, good eye contact, cooperative, medication compliant. PRN's given for pain in right shoulder rated 4/10, pt reports was effective at 2/10. Pt on suicidal  precautions with no behaviors observed. Pt denies SI/HI/SIB while at the hospital but reports \"I'm anxious about overdosing on my medications at home,\" rates depression as being \"normal 3/10.\" Pt contracts for safety. Pt did not demonstrate delusional thinking, did not appear to be responding to internal stimuli. Pt engaged in group activities, observed unit milieu, appropriate with peers and staff. Pt communicates and verbalizes needs to staff. No behaviors noted. Will continue to monitor behavior and encourage engagement. Covid specimen collected. Plan to increase Buspar, possible discharge tomorrow, discharge medications are in the med room.    Problem: Behavioral Health Plan of Care  Goal: Plan of Care Review  Outcome: Improving     The patient-specific goals include:  Outcome: Improving     Problem: General Plan of Care (Inpatient Behavioral)  Goal: Discharge Planning  Outcome: Improving     Problem: Activity and Energy Impairment (Depressive Signs/Symptoms)  Goal: Optimized Energy Level (Depressive Signs/Symptoms)  Outcome: Improving     Problem: Sleep Disturbance (Depressive Signs/Symptoms)  Goal: Improved Sleep (Depressive Signs/Symptoms)  Outcome: Improving     Problem: Cognitive Impairment (Anxiety Signs/Symptoms)  Goal: Optimized Cognitive Function (Anxiety Signs/Symptoms)  Outcome: Improving     Problem: Behavioral Health Plan of Care  Goal: Patient-Specific Goal (Individualization)  Outcome: Improving  Note:      Problem: Behavioral Health Plan of Care  Goal: Adheres to Safety Considerations for Self and Others  Outcome: Improving     Problem: Behavioral Health Plan of Care  Goal: Optimized Coping Skills in " Response to Life Stressors  Outcome: Improving     Problem: Adult Inpatient Plan of Care  Goal: Optimal Comfort and Wellbeing  Outcome: Improving     Problem: Adult Inpatient Plan of Care  Goal: Readiness for Transition of Care  Outcome: Improving

## 2021-10-19 NOTE — PLAN OF CARE
"  Problem: Mood Impairment (Anxiety Signs/Symptoms)  Goal: Improved Mood Symptoms (Anxiety Signs/Symptoms)  Outcome: Improving  Flowsheets (Taken 10/18/2021 1913)  Mutually Determined Action Steps (Improved Mood Symptoms): names internal triggers     Problem: Behavioral Health Plan of Care  Goal: Optimized Coping Skills in Response to Life Stressors  Outcome: Improving     Pt is active on the unit.  Pt states she feels better / relieved this evening after speaking to her .   Pt also reports mood improvement while focusing on utilizing coping skills and attending groups.  Pt rates rates depression 3/10 and anxiety 7/10.   She denies active SI on the unit and contracts for safety.  Pt states she does not have a plan to act upon suicidal thoughts at home, but has thought of ways she would use to end her life.  Pt expressed concern that if she goes home and things are not going well, she may resort to an overdose.  Pt is concerned about her  \"being put in a position to be responsible for me\" and fears this will cause more stress at home.  Pt states she will be busy with appointments thurs and fri.  She is concerned about leaving tomorrow and trying to bridge her time until these appointments take place.      After pt had been lying in bed tonight, pt reports her R arm and hand were \"twitching\" and that she thought it may be related to stress.  Pt encouraged to change positions and use her heating pad for comfort.      PRN Tylenol #3 x 1 for R shoulder pain and lower L back pain  PRN Melatonin x 1  PRN Hydroxyzine x 1  "

## 2021-10-19 NOTE — PROGRESS NOTES
"Pt participated in dance/movement therapy (D/MT) listening to the body as a source of how to move safely and expressing what is needed emotionally and mentally.  The group found a way to get \"out of the head\" by either going down into their own bodies or connecting out to others in the group.  Movements then became supportive of the body's needs as well as expressive of emotional needs.  For example, stretching or opening up parts of the body that were tight or tense, or shaking out tension or rolling the shoulders or neck instead of ruminating on anxious thoughts.  Pt initiated symbolic movement of bringing in self-confidence, while letting go of ruminating.  She shared she \"had a meltdown\" when she allowed herself to ruminate out of control, so decided to stay in the hospital until she could discharge directly to a treatment program.       10/19/21 1115   Dance Movement Therapy   Type of Intervention structured groups   Response participates, initiates socially appropriate   Hours 1     "

## 2021-10-19 NOTE — PLAN OF CARE
"Pt has flat affect.  She is active on the unit and attending groups.  Depression rated 3/10, anxiety 4/10.  Pt denies any active SI on the unit and contracts for safety.  When asked about SI at home, pt states \"I don't know - I guess that's up to whatever they decide for me.\"  Pt seems to be eluding to pt's provider and her spouse.  Pt expressed hope that she will discharge on Friday because it is her birthday.    PRN Oxycodone, PRN Hydroxyzine and PRN Melatonin at HS    Pt's spouse Aubrey brought in a list of all pt's medications that he found in the home, as requested by Dr. Hightower.  List placed in front pocket of chart.  Sticky note left for provider.  "

## 2021-10-19 NOTE — PROGRESS NOTES
" 10/18/21 2200   Groups   Details   (Psychotherapy)   Number of patients attending the group:  6  Group Length:  1 Hours    Group Therapy Type: Psychotherapy    Summary of Group / Topics Discussed:      The  Psychotherapy group goal is to promote insight to positive choice and change. Group processing is within a supportive and safe environment. Patients will process emotions using verbal group and expressive psychotherapy interventions including visual art/writing interventions.    Group interventions support patients by: communication/social skills and supports, hope/optimism and mental health management    Modalities to reach these goals include: Narrative psychology    Subjective -patient report of mood today- crappy day    Objective/ Intervention- Goal of group and Therapeutic modality utilized- Process group, discussing hospitalization, mental health management, loneliness, social anxiety etc.    Group Response- very engaged and supportive of one another    Patient Response-Pt was pleasant, cooperative, and  engaged. She spoke about it being a frustrating day. She said she felt like taking a plastic knife and cutting herself because \"no staff were able to talk to me.\" She felt better later in the day when she had the opportunity to speak to her  she reported. Writer asked how she would cope with SIB urges at home and she said her  is always available to talk to her if need be. She also said her two dogs are helpful. She took a break at one point when a male peer irritated her with his commentary which writer was trying to redirect, his oversharing and at times saying comments that can be offensive. She did return to group and he ended up leaving early.     Otis Downs, LINDA, ATR-BC              "

## 2021-10-19 NOTE — PLAN OF CARE
"  Problem: OT General Care Plan  Goal: OT Goal 1  Description: Will consistently attend OT groups and improve coping strategies with increasing repertoire of ideas and understanding of symptoms of when to use the strategies.       Note: Attended 1 of 2 OT groups. She participated in learning an activity of more complex visuospatial concepts in problem solving. She was quick to learn the steps and identify solutions successfully. She initiated comments and booker others into conversation. She stated \"I had a melt down yesterday afternoon, that's why you see me here today. What I really need is to be at home though.\".  she appeared engaged and comfortable. In the afternoon session, she started in participating with 6 pts. Participated in a group focused on the topic of identifying progress noted since admission, coping strategies that were helpful in the past, what is helping currently and setting a goal for the day.she left after approx  20 minutes when talking about the frustrations she experienced yesterday when not being able to speak with staff when upset. \"If I had been able to talk to staff when I was upset, I would be home right now. And now I have to stay another week because I had that melt down. It didn't need to happen.\" When talking about coping and back up ideas when a person is not available to talk with, and a peer offered ideas of what he does, she stated being unable to stay and listen to this and left the group. Staff was notified. Her CM spoke with her and after group, she stated that to be helpful.                "

## 2021-10-20 ENCOUNTER — TELEPHONE (OUTPATIENT)
Dept: BEHAVIORAL HEALTH | Facility: CLINIC | Age: 61
End: 2021-10-20

## 2021-10-20 PROCEDURE — 124N000003 HC R&B MH SENIOR/ADOLESCENT

## 2021-10-20 PROCEDURE — 250N000013 HC RX MED GY IP 250 OP 250 PS 637: Performed by: PSYCHIATRY & NEUROLOGY

## 2021-10-20 PROCEDURE — H2032 ACTIVITY THERAPY, PER 15 MIN: HCPCS

## 2021-10-20 PROCEDURE — 250N000013 HC RX MED GY IP 250 OP 250 PS 637: Performed by: NURSE PRACTITIONER

## 2021-10-20 PROCEDURE — 90853 GROUP PSYCHOTHERAPY: CPT

## 2021-10-20 PROCEDURE — 250N000013 HC RX MED GY IP 250 OP 250 PS 637: Performed by: PHYSICIAN ASSISTANT

## 2021-10-20 PROCEDURE — G0177 OPPS/PHP; TRAIN & EDUC SERV: HCPCS

## 2021-10-20 PROCEDURE — 250N000013 HC RX MED GY IP 250 OP 250 PS 637: Performed by: EMERGENCY MEDICINE

## 2021-10-20 RX ORDER — DIVALPROEX SODIUM 500 MG/1
500 TABLET, DELAYED RELEASE ORAL AT BEDTIME
Status: DISCONTINUED | OUTPATIENT
Start: 2021-10-20 | End: 2021-10-22

## 2021-10-20 RX ADMIN — BUSPIRONE HYDROCHLORIDE 20 MG: 10 TABLET ORAL at 14:37

## 2021-10-20 RX ADMIN — AMLODIPINE BESYLATE 5 MG: 5 TABLET ORAL at 08:07

## 2021-10-20 RX ADMIN — BUSPIRONE HYDROCHLORIDE 20 MG: 10 TABLET ORAL at 08:07

## 2021-10-20 RX ADMIN — TOPIRAMATE 50 MG: 50 TABLET ORAL at 22:16

## 2021-10-20 RX ADMIN — ACETAMINOPHEN AND CODEINE PHOSPHATE 1 TABLET: 300; 30 TABLET ORAL at 07:01

## 2021-10-20 RX ADMIN — TOPIRAMATE 50 MG: 50 TABLET ORAL at 08:07

## 2021-10-20 RX ADMIN — Medication 10 MG: at 22:18

## 2021-10-20 RX ADMIN — HYDROXYZINE HYDROCHLORIDE 50 MG: 50 TABLET, FILM COATED ORAL at 14:06

## 2021-10-20 RX ADMIN — ACETAMINOPHEN AND CODEINE PHOSPHATE 1 TABLET: 300; 30 TABLET ORAL at 22:18

## 2021-10-20 RX ADMIN — QUETIAPINE 400 MG: 200 TABLET, FILM COATED ORAL at 22:17

## 2021-10-20 RX ADMIN — LIDOCAINE PATCH 4% 1 PATCH: 40 PATCH TOPICAL at 08:07

## 2021-10-20 RX ADMIN — PRAZOSIN HYDROCHLORIDE 2 MG: 1 CAPSULE ORAL at 22:16

## 2021-10-20 RX ADMIN — DULOXETINE HYDROCHLORIDE 90 MG: 30 CAPSULE, DELAYED RELEASE ORAL at 08:06

## 2021-10-20 RX ADMIN — PANTOPRAZOLE SODIUM 40 MG: 40 TABLET, DELAYED RELEASE ORAL at 08:06

## 2021-10-20 RX ADMIN — THERA TABS 1 TABLET: TAB at 08:07

## 2021-10-20 RX ADMIN — BUSPIRONE HYDROCHLORIDE 20 MG: 10 TABLET ORAL at 22:17

## 2021-10-20 RX ADMIN — HYDROXYZINE HYDROCHLORIDE 50 MG: 50 TABLET, FILM COATED ORAL at 07:01

## 2021-10-20 RX ADMIN — QUETIAPINE FUMARATE 100 MG: 50 TABLET, EXTENDED RELEASE ORAL at 08:07

## 2021-10-20 RX ADMIN — DIVALPROEX SODIUM 500 MG: 500 TABLET, DELAYED RELEASE ORAL at 22:33

## 2021-10-20 RX ADMIN — Medication 1000 UNITS: at 08:07

## 2021-10-20 ASSESSMENT — ACTIVITIES OF DAILY LIVING (ADL)
ORAL_HYGIENE: INDEPENDENT
HYGIENE/GROOMING: INDEPENDENT
DRESS: INDEPENDENT

## 2021-10-20 NOTE — PLAN OF CARE
"Patient alert and oriented to person, place, and time, adequately groomed. Pt is pleasant, exhibits full range affect, good eye contact, cooperative, medication compliant. Pt updated after team meeting regarding her discharge. Pt appeared happy, observed smiling when she was told her current discharge has been postponed until possibly early next week, closer to the start of her day treatment (Tuesday 10/26). Pt states she feels \"safe here.\" Care conference/meeting scheduled Friday 10/22 with , Dr Hightower, shift RN. Pt has an appointment for the 55+ program assessment Friday at 1000, or earlier if their 0800 appointment cancels. Please have pt ready early just in case. This will be a telephone assessment. Pt on suicidal precautions with no behaviors observed. Pt denies SI/HI/SIB, reports minimal anxiety and depression. Pt contracts for safety. Pt did not demonstrate delusional thinking, did not appear to be responding to internal stimuli. Pt engaged in group activities, observed in unit milieu, appropriate with peers and staff. Pt communicates and verbalizes needs to staff, requested hydroxyzine this afternoon for anxiety. No behaviors noted. Will continue to monitor withdrawal behavior and encourage engagement.      Problem: General Plan of Care (Inpatient Behavioral)  Goal: Individualization/Patient Specific Goal (IP Behavioral)  Description: The patient and/or their representative will achieve their patient-specific goals related to the plan of care.    The patient-specific goals include:  Outcome: Improving     Problem: General Plan of Care (Inpatient Behavioral)  Goal: Discharge Planning  Outcome: Improving     Problem: Activity and Energy Impairment (Depressive Signs/Symptoms)  Goal: Optimized Energy Level (Depressive Signs/Symptoms)  Outcome: Improving     Problem: Sleep Disturbance (Depressive Signs/Symptoms)  Goal: Improved Sleep (Depressive Signs/Symptoms)  Outcome: Improving     Problem: Cognitive " Impairment (Anxiety Signs/Symptoms)  Goal: Optimized Cognitive Function (Anxiety Signs/Symptoms)  Outcome: Improving     Problem: Mood Impairment (Anxiety Signs/Symptoms)  Goal: Improved Mood Symptoms (Anxiety Signs/Symptoms)  Outcome: Improving     Problem: Behavioral Health Plan of Care  Goal: Patient-Specific Goal (Individualization)  Outcome: Improving     Problem: Behavioral Health Plan of Care  Goal: Adheres to Safety Considerations for Self and Others  Outcome: Improving     Problem: Behavioral Health Plan of Care  Goal: Optimized Coping Skills in Response to Life Stressors  Outcome: Improving     Problem: Behavioral Health Plan of Care  Goal: Develops/Participates in Therapeutic [place] to Support Successful Transition  Outcome: Improving     Problem: Adult Inpatient Plan of Care  Goal: Optimal Comfort and Wellbeing  Outcome: Improving

## 2021-10-20 NOTE — PLAN OF CARE
Writer called to try to r/s Marissa's psychiatry appt. They didn't have any regular openings anytime soon, so she transferred me to  of someone named Isabella (nurse?) who can tell us when they can carve out an acute needs/urgent spot to work her in. Writer does not have a direct # for her to call back to, so writer gave her CTC's name and #.

## 2021-10-20 NOTE — TELEPHONE ENCOUNTER
Referral received from [facility] on 3B at [facility]. Patient is scheduled for an intake on 10/21/21 with the  assessment center to determine recommendations for PHP/Day TX. Patient will be discharging on 10/25 and is in need of bridge services until next LOC. Spoke with patient on the unit. Scheduled for 10/27/21 @ 1:00pm and 10/29/21 @ 1:00 pm.     HAYDEE Bradshaw    ----- Message from HAYDEE Bradshaw sent at 10/20/2021  3:56 PM CDT -----  Transition Clinic Referral     Type of Referral:    ___X__Therapy   _____Therapy & Medication    Referring Provider Name: Received referral via email from ELIZABETH Cabrera on 3B, [facility]    Referring Provider Contact Phone Number: [phone]    Reason for Transition Clinic Referral:  Bridge services while patient is waiting for her next level of care to start    Next Level of Care Patient Will Be Transitioned To: Individual therapy scheduled for 11/09/21; Intake for 55+ program is 10/21 and there is an anticipated wait list    Start Date for Next Level of Care: Therapy 11/09/21; Day tx - TBD    Type of Clinical Assessment Completed (Crisis, DA, BRIDGETTE, etc.): Crisis assessment completed on 10/3/21; DA will be completed on 10/21/21 at  assessment center    Date Clinical Assessment was Completed:Crisis assessment completed on 10/3/21; DA will be completed on 10/21/21 at  assessment center      Diagnosis: Bipolar II, F31.81; BPD F60.3    What Would Be Helpful from the Transition Clinic: patient is concerned about discharging and feels she needs between 2-3 sessions per week.      Needs: no    Does Patient Have Access to Technology: yes    Patient E-mail Address: [email or web address]    Current Patient Phone Number: [phone]    Clinician Gender Preference (if applicable): no

## 2021-10-20 NOTE — PROGRESS NOTES
"Patient seen, chart reviewed, care discussed with staff.  Blood pressure 116/69, pulse 86, temperature 97.5  F (36.4  C), temperature source Temporal, resp. rate 16, height 1.6 m (5' 3\"), weight 88.3 kg (194 lb 9.6 oz), SpO2 96 %, not currently breastfeeding.    By report, slept 8 hours.  Phone call to  occurred yesterday.  We discussed mood instability, racing thoughts, impulsive actions and drug overusage.    General appearance: good  Alert.   Affect: fair  Mood: fair    Speech:  normal.   Eye contact:  good.    Psychomotor behavior: normal  Gait: normal.    Abnormal movements: none  Delusions: none  Hallucinations:  none  Thoughts: logical  Associations: intact  Judgement: good  Insight: good  Cognitions: intact in conversation  Memory:  intact in conversation  Orientation: normal    Not suicidal here, at risk for impulsive overdose    Imp:  Bipolar 2 and Borderline personality   Plan:  Depakote added.  Lithium and Lamictal also discussesed    Current Facility-Administered Medications   Medication     acetaminophen-codeine (TYLENOL #3) 300-30 MG per tablet 1 tablet     alum & mag hydroxide-simethicone (MAALOX) suspension 30 mL     amLODIPine (NORVASC) tablet 5 mg     busPIRone (BUSPAR) tablet 20 mg     divalproex sodium delayed-release (DEPAKOTE) DR tablet 500 mg     DULoxetine (CYMBALTA) DR capsule 90 mg     hydrOXYzine (ATARAX) tablet 50 mg     Lidocaine (LIDOCARE) 4 % Patch 1 patch     lidocaine patch in PLACE     Melatonin tablet 10 mg     multivitamin, therapeutic (THERA-VIT) tablet 1 tablet     naloxone (NARCAN) injection 0.2 mg    Or     naloxone (NARCAN) injection 0.4 mg    Or     naloxone (NARCAN) injection 0.2 mg    Or     naloxone (NARCAN) injection 0.4 mg     OLANZapine (zyPREXA) tablet 5 mg    Or     OLANZapine (zyPREXA) injection 5 mg     ondansetron (ZOFRAN) tablet 4 mg     pantoprazole (PROTONIX) EC tablet 40 mg     polyethylene glycol (MIRALAX) Packet 17 g     prazosin (MINIPRESS) capsule " 2 mg     QUEtiapine (SEROquel XR) 24 hr tablet 100 mg     QUEtiapine (SEROquel) tablet 400 mg     senna-docusate (SENOKOT-S/PERICOLACE) 8.6-50 MG per tablet 1 tablet     topiramate (TOPAMAX) tablet 50 mg     Vitamin D3 (CHOLECALCIFEROL) tablet 1,000 Units     Recent Results (from the past 168 hour(s))   MRSA MSSA PCR, Nasal Swab    Collection Time: 10/15/21  1:35 PM    Specimen: Nares, Bilateral; Swab   Result Value Ref Range    MRSA Target DNA Negative Negative    SA Target DNA Positive    Asymptomatic COVID-19 Virus (Coronavirus) by PCR Nasopharyngeal    Collection Time: 10/19/21 12:52 PM    Specimen: Nasopharyngeal; Swab   Result Value Ref Range    SARS CoV2 PCR Negative Negative

## 2021-10-20 NOTE — PLAN OF CARE
Problem: OT General Care Plan  Goal: OT Goal 1  Description: Will consistently attend OT groups and improve coping strategies with increasing repertoire of ideas and understanding of symptoms of when to use the strategies.       Note: Attended 3 of 3 OT groups. She participated for 50 minutes X2 with 5 pts in a goal oriented task group. She initiated work on an activity involving familiar steps, planned and organized her work and offered encouragement to peers. She was attentive to detail in her work. She was social  on assorted topics and booker others into conversation. Affect appeared bright with interactions. She talked about staying through the weekend though also explained understanding the reasoning and is accepting the opportunity to gain additional benefits from extra time. No complaints offered.    Attended the afternoon group for 50 minutes with 4 pts on the topic of positive thinking and identifying self positives. She also participated in an activity focused on Stress and coping ideas. She identified positives in he life and personality. She appeared engaged and interested.

## 2021-10-20 NOTE — CONSULTS
Consult acknowledged. Assessment scheduled for 10am on 10/21/21 pending insurance verification from [facility]. Unclear if pt's insurance will cover our program.  Writer will contact program directly.      MIMI Coello, [facility]  [facility]ental [facility]  Phone: [phone]

## 2021-10-20 NOTE — PLAN OF CARE
"Assessment/Intervention/Current Symptoms and Care Coordination  The patient's care was discussed with the treatment team and chart notes were reviewed. [facility] met with patient to discuss her progress, current symptomology, ongoing cares and discharge plans/outpatient services.      [facility] spoke with the patient's  who voiced concerns regarding patient's inability to contract for safety, discharge plan and her making threats to harm herself/overdose when she leaves the hospital. He requested a safety planning prior to discharge. He also requested a meeting with an RN day before discharge to go over medications and discharge instructions. Aubrey stated that he will not  his wife today and is not okay with his wife discharging from the hospital as he needs more time to \"clean out our house and make sure she doesn't have access to anything to harm herself with.\"     [facility] met with patient and asked if she spoke with her  to tell him where she is hiding the \"stash of harmful things\" she has in her house. She stated she spoke with him and told him about things he needs to put away and she stated that she does not have a stash of harmful things. She stated just made the comment out of frustration. She stated she will  Let her  have control over and administer her medications.     [facility] gave patient a safety planning worksheet and asked her to identify- Problem behaviors, Triggers, warning signs, interventions and things that make her feel unsafe. [facility] asked her to complete the packet and share her responses with her  when he comes to visit her this evening. Patient agreed. [facility] also notified her that I will be making a copy of her safety planning worksheet and will be giving a copy to her  before she discharges. Patient agreed.        AVS: Completed.     Discharge Plan or Goal  Discharge to home when stable.  She has outpatient psychiatry.  plans to increase frequency of therapy. Patient will " be attending the  55+ IOP program.         Barriers to Discharge   Needs further psychiatric Stabilization and disposition planning     Referral Status  CTC made a referral to [facility] (all locations) for DBT. Facility will likely call to make an appointment. Patient was also encouraged to call the agency if she doesn't receive a call.      CTC made a referral to [facility].      PCP - Gerardo Mcmillan PA-C - [facility]     Psychiatry - [facility]  Provider: Jessica Wallace  In person appointment: Thursday October 21st, 2021 at 11 am   Address: [address] [place] [address] [address], [place] [address]  Phone: [phone]     Individual Therapy: [facility]  Therapist - Devi Barajas PsyD   Appointment: November 8th, 2021 at 9 am   Address: [address], [place] [address]  Phone: [phone]     Pt Navigator scheduled  eval for 55+ program with Yamilka Contreras on Tuesday, 10/26/21.  This will be a video visit through [employer].          Legal Status  Vol.

## 2021-10-20 NOTE — PLAN OF CARE
Problem: Sleep Disturbance (Depressive Signs/Symptoms)  Goal: Improved Sleep (Depressive Signs/Symptoms)  Outcome: Improving     Slept well tonight for 8 hours  Woke up at 0650 complaining of shoulder , back and elbow pains as well as anxiety , which she rated at 8.  0701  Tylenol # 3 1 tab and Hydroxyzine 50 mg given po

## 2021-10-21 PROCEDURE — H2032 ACTIVITY THERAPY, PER 15 MIN: HCPCS

## 2021-10-21 PROCEDURE — 250N000013 HC RX MED GY IP 250 OP 250 PS 637: Performed by: EMERGENCY MEDICINE

## 2021-10-21 PROCEDURE — 250N000013 HC RX MED GY IP 250 OP 250 PS 637: Performed by: NURSE PRACTITIONER

## 2021-10-21 PROCEDURE — 250N000013 HC RX MED GY IP 250 OP 250 PS 637: Performed by: PSYCHIATRY & NEUROLOGY

## 2021-10-21 PROCEDURE — 250N000013 HC RX MED GY IP 250 OP 250 PS 637: Performed by: PHYSICIAN ASSISTANT

## 2021-10-21 PROCEDURE — G0177 OPPS/PHP; TRAIN & EDUC SERV: HCPCS

## 2021-10-21 PROCEDURE — 124N000003 HC R&B MH SENIOR/ADOLESCENT

## 2021-10-21 RX ADMIN — QUETIAPINE FUMARATE 100 MG: 50 TABLET, EXTENDED RELEASE ORAL at 08:37

## 2021-10-21 RX ADMIN — DULOXETINE HYDROCHLORIDE 90 MG: 30 CAPSULE, DELAYED RELEASE ORAL at 08:36

## 2021-10-21 RX ADMIN — PANTOPRAZOLE SODIUM 40 MG: 40 TABLET, DELAYED RELEASE ORAL at 08:36

## 2021-10-21 RX ADMIN — Medication 1000 UNITS: at 08:37

## 2021-10-21 RX ADMIN — TOPIRAMATE 50 MG: 50 TABLET ORAL at 20:39

## 2021-10-21 RX ADMIN — HYDROXYZINE HYDROCHLORIDE 50 MG: 50 TABLET, FILM COATED ORAL at 14:04

## 2021-10-21 RX ADMIN — BUSPIRONE HYDROCHLORIDE 20 MG: 10 TABLET ORAL at 20:40

## 2021-10-21 RX ADMIN — BUSPIRONE HYDROCHLORIDE 20 MG: 10 TABLET ORAL at 10:06

## 2021-10-21 RX ADMIN — QUETIAPINE 400 MG: 200 TABLET, FILM COATED ORAL at 20:39

## 2021-10-21 RX ADMIN — TOPIRAMATE 50 MG: 50 TABLET ORAL at 08:36

## 2021-10-21 RX ADMIN — LIDOCAINE PATCH 4% 1 PATCH: 40 PATCH TOPICAL at 08:37

## 2021-10-21 RX ADMIN — PRAZOSIN HYDROCHLORIDE 2 MG: 1 CAPSULE ORAL at 20:39

## 2021-10-21 RX ADMIN — DIVALPROEX SODIUM 500 MG: 500 TABLET, DELAYED RELEASE ORAL at 20:44

## 2021-10-21 RX ADMIN — THERA TABS 1 TABLET: TAB at 08:36

## 2021-10-21 RX ADMIN — Medication 10 MG: at 20:39

## 2021-10-21 RX ADMIN — ACETAMINOPHEN AND CODEINE PHOSPHATE 1 TABLET: 300; 30 TABLET ORAL at 14:04

## 2021-10-21 RX ADMIN — BUSPIRONE HYDROCHLORIDE 20 MG: 10 TABLET ORAL at 14:04

## 2021-10-21 RX ADMIN — AMLODIPINE BESYLATE 5 MG: 5 TABLET ORAL at 08:36

## 2021-10-21 RX ADMIN — ACETAMINOPHEN AND CODEINE PHOSPHATE 1 TABLET: 300; 30 TABLET ORAL at 20:40

## 2021-10-21 RX ADMIN — HYDROXYZINE HYDROCHLORIDE 50 MG: 50 TABLET, FILM COATED ORAL at 20:40

## 2021-10-21 ASSESSMENT — ACTIVITIES OF DAILY LIVING (ADL)
ORAL_HYGIENE: INDEPENDENT
LAUNDRY: WITH SUPERVISION
DRESS: INDEPENDENT
HYGIENE/GROOMING: INDEPENDENT
ORAL_HYGIENE: INDEPENDENT
HYGIENE/GROOMING: INDEPENDENT
DRESS: INDEPENDENT

## 2021-10-21 ASSESSMENT — ANXIETY QUESTIONNAIRES
4. TROUBLE RELAXING: NOT AT ALL
5. BEING SO RESTLESS THAT IT IS HARD TO SIT STILL: NOT AT ALL
2. NOT BEING ABLE TO STOP OR CONTROL WORRYING: SEVERAL DAYS
7. FEELING AFRAID AS IF SOMETHING AWFUL MIGHT HAPPEN: NOT AT ALL
1. FEELING NERVOUS, ANXIOUS, OR ON EDGE: SEVERAL DAYS
6. BECOMING EASILY ANNOYED OR IRRITABLE: NOT AT ALL
GAD7 TOTAL SCORE: 2
3. WORRYING TOO MUCH ABOUT DIFFERENT THINGS: NOT AT ALL

## 2021-10-21 ASSESSMENT — PATIENT HEALTH QUESTIONNAIRE - PHQ9: SUM OF ALL RESPONSES TO PHQ QUESTIONS 1-9: 4

## 2021-10-21 ASSESSMENT — MIFFLIN-ST. JEOR: SCORE: 1429.07

## 2021-10-21 NOTE — PLAN OF CARE
Assessment/Intervention/Current Symptoms and Care Coordination  The patient's care was discussed with the treatment team and chart notes were reviewed. ELIZABETH met with patient to discuss her progress, current symptomology, ongoing cares and discharge plans/outpatient services.      Inpatient 55+ DA assessment scheduled for Thursday 10/21 at 10 am with Ewelina. Ewelina stated that she the last open spot at the program was filled this morning. There will be at least 1 week wait until patient is able to start the program. She recommended writer to refer patient to the transition care clinic so she can get in and they will be able to see her 1-3 times a week until she is able to enrol in the 55+ program. CTC sent the referral.     Ewelina also stated that she checked in with [facility] and the 55+ program regarding the patient's insurance. She confirmed that  patient's insurance is in network and will be covering the treatment.     Transition care clinic appointment:   Tuesday 10/26 at 1 pm and Thursday 10/28 at 1 pm.    CTC notified patient about family meeting, appointments at the [facility] and the plan to complete the MH DA assessment for the 55+ program. Writer notified her about the waiting period before she is able to start the 55+ program. Writer talked to her about the interim plan and the support she will be receiving from the transition care clinic. Patient was understanding and agreeable to this plan.     Patient notified writer she scheduled weekly individual therapy appointment with her outpatient therapist. Patient was previously seeing her therapist once a month. Patient will now see her therapist weekly. First appointment is scheduled for Wednesday October 27th.      A Family meeting with patient, , provider and RN is scheduled for Friday 10/22 at 10 am. The goal is to discuss medications, and discharge plans (outpatient services, how  can support patient and answer partner's questions regarding  "patient's condition/ safety concerns)     Discharge Plan or Goal  Discharge to home when stable.  She has outpatient psychiatry and weekly individual therapy appointments scheduled.Patient will be attending the  55+ IOP program. There is at least 1 week waiting period before the next opening. Patient will receive therapeutic services from the the transitional care clinic in the interim.          Barriers to Discharge   Needs further psychiatric Stabilization and disposition planning     Referral Status  CTC made a referral to [facility] (all locations) for DBT. Facility will likely call to make an appointment. Patient was also encouraged to call the agency if she doesn't receive a call.      CTC made a referral to [facility].      PCP - Gerardo Mcmillan PA-C - [facility]     Psychiatry - [facility]  Provider: Jessica Wallace  In person appointment: Thursday October 21st, 2021 at 11 am   Address: [address] [place] [address] [address], [place] [id number]  Phone: [phone]     Per Mariella Patient Navigator \"Writer called to try to r/s Marissa's psychiatry appt. They didn't have any regular openings anytim'e soon, so she transferred me to  of someone named Isabella (nurse?) who can tell us when they can carve out an acute needs/urgent spot to work her in. Writer does not have a direct # for her to call back to, so writer gave her CTC's name and #.    Individual Therapy: [facility]  Therapist - Devi Barajas PsyD   Appointment: November 8th, 2021 at 9 am   Address: [address], [place] [address]  Phone: [phone]     Pt Navigator scheduled  eval for 55+ program with Yamilka Contreras on Tuesday, 10/26/21.  This will be a video visit through [facility].          Legal Status  Vol.              "

## 2021-10-21 NOTE — PLAN OF CARE
"Patient alert and oriented to person, place, and time, adequately groomed. Pt is pleasant, good eye contact, cooperative, medication compliant.  Pt on suicidal precautions with no behaviors observed. Pt denies SI/HI/SIB, reports \"a little\" increase in anxiety, denies depression. Pt contracts for safety. Pt did not demonstrate delusional thinking, did not appear to be responding to internal stimuli. PRN's given for anxiety, and pain 4/10 shoulder and lower back at 1400. Pt engaged in group activities, observed in unit milieu, appropriate with peers and staff. Pt communicates and verbalizes needs to staff. No behaviors noted. Will continue to monitor behavior and encourage engagement.      Problem: Behavioral Health Plan of Care  Goal: Plan of Care Review  Outcome: Improving     Problem: Behavioral Health Plan of Care  Goal: Patient-Specific Goal (Individualization)  Outcome: Improving  Note:      Problem: Behavioral Health Plan of Care  Goal: Adheres to Safety Considerations for Self and Others  Outcome: Improving     Problem: Behavioral Health Plan of Care  Goal: Develops/Participates in Therapeutic [place] to Support Successful Transition  Outcome: Improving     Problem: Adult Inpatient Plan of Care  Goal: Optimal Comfort and Wellbeing  Outcome: Improving     "

## 2021-10-21 NOTE — PLAN OF CARE
"  Problem: Cognitive Impairment (Anxiety Signs/Symptoms)  Goal: Optimized Cognitive Function (Anxiety Signs/Symptoms)  Outcome: Improving     Problem: Activity and Energy Impairment (Depressive Signs/Symptoms)  Goal: Optimized Energy Level (Depressive Signs/Symptoms)  Outcome: Improving  Intervention: Optimize Energy Level  Recent Flowsheet Documentation  Taken 10/20/2021 2000 by Yana Landa RN  Activity (Behavioral Health): up ad sinai   Pt presents with a full range affect and a calm mood. Pt reported mood as \"good and not so good\". \"Good day because I have my therapy sessions scheduled and my 55+ program\". \"Not so good because I will be spending my birthday in here\". Pt endorsed anxiety and depression as improved. Pt is visible in the milieu social with select peers and staff. Attending groups. Pt denies SI/SIB/wish to be death or any forms of hallucinations.   Pt is eating and drinking well. Medication compliant. Took her first dose of Depakote 500 mg tonight.   PRNs  Tylenol #3  Melatonin  "

## 2021-10-21 NOTE — PROGRESS NOTES
Client requested to use personal phone to text her boss regarding work needs. Client had reported to the writer that the psychiatrist had okayed the request as long as there was a staff there with her. Writer could not find the order regarding phone use but a second staff verified that she witnessed the psychiatrist approve the use of client's personal phone to text her boss. Team will be informed of this request during handoff report.

## 2021-10-21 NOTE — PROGRESS NOTES
"Pt participated in dance/movement therapy (D/MT) with a focus on synchronous upper body reaches that stressed verticality and a sense of strength, confidence, power and hope.  The group began with light and relaxed harp and images of relaxation at the beach with reggae beats, but they were able to leave their anxieties there and rise to being \"superwomen\" in their movements.     Pt led the movement metaphors and noted the \"positive peer pressure\" was encouraging when she felt tired after a hard night.  She reclaimed the adjective \"bossy\" in this context using it as something positive to use our time well.       10/21/21 1115   Dance Movement Therapy   Type of Intervention structured groups   Response participates with encouragement   Hours 1     "

## 2021-10-21 NOTE — PLAN OF CARE
BEHAVIORAL TEAM DISCUSSION    Participants: Yohan Hightower MD, Concha Hickman RN, Geneva Paul, [facility], CTC, Emily linares, OT  Progress: Adequate  Anticipated length of stay: 3-5 DAYS  Continued Stay Criteria/Rationale: needs further psychiatric stabilization  Medical/Physical:    Bipolar 2 and Borderline personality     Precautions:   Behavioral Orders   Procedures     Code 1 - Restrict to Unit     Discontinue 1:1 attendant for suicide risk     Order Specific Question:   I have performed an in person assessment of the patient     Answer:   Based on this assessment the patient no longer requires a one on one attendant at this point in time.     Occupational Therapy     Order Specific Question:   Reason for Consult     Answer:   Eval of thought process, functional skills and behavior     Order Specific Question:   Course of Action:     Answer:   Eval & Treat as indicated     Occupational Therapy on the Unit     Monday is ok     Order Specific Question:   Reason for Consult     Answer:   Eval of thought process, functional skills and behavior     Order Specific Question:   Course of Action:     Answer:   Eval & Treat as indicated     Order Specific Question:   Treatment Prescription:     Answer:   cognitive eval     Routine Programming     As clinically indicated     Status 15     Every 15 minutes.     Suicide precautions     Patients on Suicide Precautions should have a Combination Diet ordered that includes a Diet selection(s) AND a Behavioral Tray selection for Safe Tray - with utensils, or Safe Tray - NO utensils       Plan:  Depakote added.  Lithium and Lamictal also discussesed  Rationale for change in precautions or plan: Continue with Current Plan.

## 2021-10-21 NOTE — PROGRESS NOTES
Patient Active Problem List   Diagnosis     Pain of female symphysis pubis     Pelvic floor dysfunction     Myalgia of pelvic floor     SI (sacroiliac) joint dysfunction     Chronic pelvic pain in female     Suicidal ideation     MDD (major depressive disorder), recurrent severe, without psychosis (H)     Current Facility-Administered Medications   Medication     acetaminophen-codeine (TYLENOL #3) 300-30 MG per tablet 1 tablet     alum & mag hydroxide-simethicone (MAALOX) suspension 30 mL     amLODIPine (NORVASC) tablet 5 mg     busPIRone (BUSPAR) tablet 20 mg     divalproex sodium delayed-release (DEPAKOTE) DR tablet 500 mg     DULoxetine (CYMBALTA) DR capsule 90 mg     hydrOXYzine (ATARAX) tablet 50 mg     Lidocaine (LIDOCARE) 4 % Patch 1 patch     lidocaine patch in PLACE     Melatonin tablet 10 mg     multivitamin, therapeutic (THERA-VIT) tablet 1 tablet     naloxone (NARCAN) injection 0.2 mg    Or     naloxone (NARCAN) injection 0.4 mg    Or     naloxone (NARCAN) injection 0.2 mg    Or     naloxone (NARCAN) injection 0.4 mg     OLANZapine (zyPREXA) tablet 5 mg    Or     OLANZapine (zyPREXA) injection 5 mg     ondansetron (ZOFRAN) tablet 4 mg     pantoprazole (PROTONIX) EC tablet 40 mg     polyethylene glycol (MIRALAX) Packet 17 g     prazosin (MINIPRESS) capsule 2 mg     QUEtiapine (SEROquel XR) 24 hr tablet 100 mg     QUEtiapine (SEROquel) tablet 400 mg     senna-docusate (SENOKOT-S/PERICOLACE) 8.6-50 MG per tablet 1 tablet     topiramate (TOPAMAX) tablet 50 mg     Vitamin D3 (CHOLECALCIFEROL) tablet 1,000 Units     Recent Results (from the past 168 hour(s))   MRSA MSSA PCR, Nasal Swab    Collection Time: 10/15/21  1:35 PM    Specimen: Nares, Bilateral; Swab   Result Value Ref Range    MRSA Target DNA Negative Negative    SA Target DNA Positive    Asymptomatic COVID-19 Virus (Coronavirus) by PCR Nasopharyngeal    Collection Time: 10/19/21 12:52 PM    Specimen: Nasopharyngeal; Swab   Result Value Ref Range     SARS CoV2 PCR Negative Negative   Tolerated starting Depakote  General appearance: good  Alert.   Affect: fair  Mood: fair    Speech:  normal.   Eye contact:  good.    Psychomotor behavior: normal  Gait: normal.    Abnormal movements: none  Delusions: none  Hallucinations:  none  Thoughts: logical  Associations: intact  Judgement: good  Insight: good  Cognitions: intact in conversation  Memory:  intact in conversation  Orientation: normal    Not suicidal.    Plan: same meds, increase Depakote tomorrow if all goes well

## 2021-10-21 NOTE — PLAN OF CARE
Problem: General Rehab Plan of Care  Goal: Therapeutic Recreation/Music Therapy Goal  Description: The patient and/or their representative will achieve their patient-specific goals related to the plan of care.  The patient-specific goals include:  Outcome: Improving     Marissa attended art therapy group for 1 hour (3 patients total). She engaged in group discussion about relationships, talking about what she contributes to others and what she is grateful for in her relationships. Marissa participated in the art directive to make collaborative art. She appeared calm and focused while painting and contributed positive imagery to the group paintings. Marissa engaged in group discussion afterward about the process. She interacted appropriately with peers and made connective comments.

## 2021-10-21 NOTE — PLAN OF CARE
Group length: 1 hour    Number of participants: 5    Summary of Group / Topics Discussed:    The Psychotherapy group goal is to promote insight to positive choice and change. Group processing is within a supportive and safe environment. Patients will process emotions using verbal group and expressive psychotherapy interventions. The specific topic discussed today was types cognitive distortions.     Patient's Response: Patient was alert cooperative and oriented x4, patient was attentive, engaged, made eye contact, asked questions, was future oriented, pt. Verbalized hope and willingness to challenge negative thinking and cognitive distortions.

## 2021-10-21 NOTE — PLAN OF CARE
Problem: Sleep Disturbance (Depressive Signs/Symptoms)  Goal: Improved Sleep (Depressive Signs/Symptoms)  Outcome: Improving     Patient slept soundly the whole night for 8.25 hours. No complaints made.

## 2021-10-21 NOTE — CONSULTS
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    [phone]   [place]    [phone]   [place]    [phone]   [place] [phone]   [place]     [phone]       Call 911 or go to my nearest emergency department.     I helped develop this safety plan and agree to use it when needed.  I have been given a copy of this plan.        Today s date:  10/21/2021  Adapted from Safety Plan Template 2008 Liz Collins and Anirudh Hernandez is reprinted with the express permission of the authors.  No portion of the Safety Plan Template may be reproduced without the express, written permission.  You can contact the authors at [email or web address] or [email or web address]                    LOCUS Worksheet     Name: Marissa Youssef MRN: [id number]    : 1960      Gender:  female    PMI:     Provider Name: Jase   Provider NPI:  [id number]     Actual level of Care Provided:  Therapy    Service(s) receiving or referred to:  Day Treatment     Reason for Variance: for continued stabilization after recent admission      Rating completed by: MIMI Coello, LICSW       I. Risk of " Harm:   2      Low Risk of Harm    II. Functional Status:   3      Moderate Impairment    III. Co-Morbidity:   2      Minor Co-Morbidity    IV - A. Recovery Environment - Level of Stress:   3      Moderately Stress Environment    IV - B. Recovery Environment - Level of Support:   3      Limited Support in Environment    V. Treatment and Recovery History:   3      Moderate to Equivocal Response to Treatment and Recovery Management    VI. Engagement and Recovery Project:   2      Positive Engagement and Recovery       18 Composite Score    Level of Care Recommendation:   17 to 19       High Intensity Community Based Services

## 2021-10-21 NOTE — PLAN OF CARE
Problem: OT General Care Plan  Goal: OT Goal 1  Description: Will consistently attend OT groups and improve coping strategies with increasing repertoire of ideas and understanding of symptoms of when to use the strategies.       Outcome: Improving  Note: Attended 3 of 3 OT groups.  In the a.m. she participated in a goal oriented task group with 7 patients for 50 minutes, working independently on a multi step creative task.  She was social while working.  Affect appeared bright with interactions.  She appears interested in focused.  In the afternoon groups she participated for 50 minutes with 6 patients in a group focused on the topic of stress, managing it, and identifying the difference between positive and negative stress.  She took an active role in discussion that some stress can be positive with clear explanations and insightful ideas about this.  She offered insightful comments and elaborated on her ideas.  In the third group she participated for 50 minutes with 4 patients in an activity requiring creative and quick thought.  She again took an active role, appeared interested and engaged and supported peers.

## 2021-10-22 PROCEDURE — 250N000013 HC RX MED GY IP 250 OP 250 PS 637: Performed by: NURSE PRACTITIONER

## 2021-10-22 PROCEDURE — 124N000003 HC R&B MH SENIOR/ADOLESCENT

## 2021-10-22 PROCEDURE — 250N000013 HC RX MED GY IP 250 OP 250 PS 637: Performed by: PHYSICIAN ASSISTANT

## 2021-10-22 PROCEDURE — 250N000013 HC RX MED GY IP 250 OP 250 PS 637: Performed by: EMERGENCY MEDICINE

## 2021-10-22 PROCEDURE — 90853 GROUP PSYCHOTHERAPY: CPT

## 2021-10-22 PROCEDURE — 250N000013 HC RX MED GY IP 250 OP 250 PS 637: Performed by: PSYCHIATRY & NEUROLOGY

## 2021-10-22 PROCEDURE — G0177 OPPS/PHP; TRAIN & EDUC SERV: HCPCS

## 2021-10-22 RX ORDER — POLYETHYLENE GLYCOL 3350 17 G/17G
17 POWDER, FOR SOLUTION ORAL DAILY PRN
Status: DISCONTINUED | OUTPATIENT
Start: 2021-10-22 | End: 2021-10-25 | Stop reason: HOSPADM

## 2021-10-22 RX ORDER — DIVALPROEX SODIUM 500 MG/1
1000 TABLET, DELAYED RELEASE ORAL AT BEDTIME
Status: DISCONTINUED | OUTPATIENT
Start: 2021-10-22 | End: 2021-10-25

## 2021-10-22 RX ADMIN — BUSPIRONE HYDROCHLORIDE 20 MG: 10 TABLET ORAL at 14:47

## 2021-10-22 RX ADMIN — TOPIRAMATE 50 MG: 50 TABLET ORAL at 08:10

## 2021-10-22 RX ADMIN — QUETIAPINE 400 MG: 200 TABLET, FILM COATED ORAL at 21:14

## 2021-10-22 RX ADMIN — ACETAMINOPHEN AND CODEINE PHOSPHATE 1 TABLET: 300; 30 TABLET ORAL at 21:26

## 2021-10-22 RX ADMIN — PANTOPRAZOLE SODIUM 40 MG: 40 TABLET, DELAYED RELEASE ORAL at 08:10

## 2021-10-22 RX ADMIN — THERA TABS 1 TABLET: TAB at 08:10

## 2021-10-22 RX ADMIN — LIDOCAINE PATCH 4% 1 PATCH: 40 PATCH TOPICAL at 08:15

## 2021-10-22 RX ADMIN — TOPIRAMATE 50 MG: 50 TABLET ORAL at 21:15

## 2021-10-22 RX ADMIN — PRAZOSIN HYDROCHLORIDE 2 MG: 1 CAPSULE ORAL at 21:15

## 2021-10-22 RX ADMIN — BUSPIRONE HYDROCHLORIDE 20 MG: 10 TABLET ORAL at 08:11

## 2021-10-22 RX ADMIN — ACETAMINOPHEN AND CODEINE PHOSPHATE 1 TABLET: 300; 30 TABLET ORAL at 04:11

## 2021-10-22 RX ADMIN — DULOXETINE HYDROCHLORIDE 90 MG: 30 CAPSULE, DELAYED RELEASE ORAL at 08:09

## 2021-10-22 RX ADMIN — AMLODIPINE BESYLATE 5 MG: 5 TABLET ORAL at 08:10

## 2021-10-22 RX ADMIN — QUETIAPINE FUMARATE 100 MG: 50 TABLET, EXTENDED RELEASE ORAL at 08:09

## 2021-10-22 RX ADMIN — Medication 10 MG: at 21:26

## 2021-10-22 RX ADMIN — HYDROXYZINE HYDROCHLORIDE 50 MG: 50 TABLET, FILM COATED ORAL at 04:11

## 2021-10-22 RX ADMIN — HYDROXYZINE HYDROCHLORIDE 50 MG: 50 TABLET, FILM COATED ORAL at 21:26

## 2021-10-22 RX ADMIN — HYDROXYZINE HYDROCHLORIDE 50 MG: 50 TABLET, FILM COATED ORAL at 14:47

## 2021-10-22 RX ADMIN — Medication 1000 UNITS: at 08:49

## 2021-10-22 RX ADMIN — ALUMINUM HYDROXIDE, MAGNESIUM HYDROXIDE, AND SIMETHICONE 30 ML: 200; 200; 20 SUSPENSION ORAL at 21:30

## 2021-10-22 RX ADMIN — ACETAMINOPHEN AND CODEINE PHOSPHATE 1 TABLET: 300; 30 TABLET ORAL at 14:47

## 2021-10-22 RX ADMIN — BUSPIRONE HYDROCHLORIDE 20 MG: 10 TABLET ORAL at 21:15

## 2021-10-22 RX ADMIN — DIVALPROEX SODIUM 1000 MG: 500 TABLET, DELAYED RELEASE ORAL at 21:14

## 2021-10-22 ASSESSMENT — ACTIVITIES OF DAILY LIVING (ADL)
ORAL_HYGIENE: INDEPENDENT
DRESS: INDEPENDENT
HYGIENE/GROOMING: INDEPENDENT
HYGIENE/GROOMING: INDEPENDENT
ORAL_HYGIENE: INDEPENDENT
LAUNDRY: WITH SUPERVISION
LAUNDRY: WITH SUPERVISION
DRESS: INDEPENDENT

## 2021-10-22 ASSESSMENT — ANXIETY QUESTIONNAIRES: GAD7 TOTAL SCORE: 2

## 2021-10-22 NOTE — PLAN OF CARE
"The patient's care was discussed with the treatment team and chart notes were reviewed. CTC met with patient to discuss her progress, current symptomology, ongoing cares and discharge plans/outpatient services.      Inpatient 55+ DA assessment Completed today at 10 am.     CTC notified spouse about family meeting scheduled for tomorrow at 10 am. CTC rescheduled patient's psychiatry appointment.          Discharge Plan or Goal  Discharge to home when stable.  She has outpatient psychiatry and weekly individual therapy appointments scheduled.Patient will be attending the  55+ IOP program. There is at least 1 week waiting period before the next opening. Patient will receive therapeutic services from the the transitional care clinic in the interim.          Barriers to Discharge   Needs further psychiatric Stabilization and disposition planning     Referral Status  CTC made a referral to [facility] [place] (all locations) for DBT. Facility will likely call to make an appointment. Patient was also encouraged to call the agency if she doesn't receive a call.      CTC made a referral to [facility].      PCP - Gerardo Mcmillan PA-C - [facility]     Psychiatry - [facility]  Provider: Jessica Wallace  In person appointment: Thursday October 21st, 2021 at 11 am   Address: [address] [place] [address] [address], [place] [address]  Phone: [phone]     Per Mariella Patient Navigator \"Writer called to try to r/s Marissa's psychiatry appt. They didn't have any regular openings anytim'e soon, so she transferred me to  of someone named Isabella (nurse?) who can tell us when they can carve out an acute needs/urgent spot to work her in. Writer does not have a direct # for her to call back to, so writer gave her CTC's name and #.     Individual Therapy: [facility]  Therapist - Devi Barajas PsyD   Appointment: November 8th, 2021 at 9 am   Address: [id number], [place] [address]  Phone: [phone]     Pt Navigator scheduled  eval for 55+ program with Yamilka Contreras on Tuesday, 10/26/21.  This will be a video visit through [employer].          Legal Status  Vol.

## 2021-10-22 NOTE — PLAN OF CARE
"Assessment:  Marissa was up ad sinai, attended groups today. Pt also had a family conference at 10 am with her  in attendance. Per Husbands request, writer printed Pt's AVS and reviewed it with both Marissa and her . Marissa is scheduled to discharge Monday 10/2521 in the am, and they both were informed that the discharging RN on Monday would send the final updated version of her discharge instructions at that time.      Marissa reported feeling anxious and having Right shoulder pain this afternoon, was given PRN hydroxyzine and Tylenol #3 at 14:47.   Pt's food and fluid intake were very good. Pt declined scheduled  Miralax this am  \"I don't need to have it every day any more, I'm going now\"  Marissa was pleasant on approach, med adherent, cooperative.   "

## 2021-10-22 NOTE — PLAN OF CARE
Number of participants: 5        Summary of Group / Topics Discussed:     The Psychotherapy group goal is to promote insight to Self-Reflection and Planning. Group processing is within a supportive and safe environment. Patients will process they could be better friends, their strength and weaknesses, using verbal expressive psychotherapy interventions. The specific topic discussed today was safety planning      Patient's Response: Patient was alert cooperative and oriented x4, patient was attentive, engaged, made eye contact, and asked questions. Patient was respectful towards other participants and engaged with others appropriately. Patient presented with a great insight when discussing their strength, weaknesses, lesson learned failures and achievements including thins that are important to them. Patient was able to appropriately participate and reflected where things haven't sone so well and what they have learned from those experiences.

## 2021-10-22 NOTE — PLAN OF CARE
Problem: OT General Care Plan  Goal: OT Goal 1  Description: Will consistently attend OT groups and improve coping strategies with increasing repertoire of ideas and understanding of symptoms of when to use the strategies.       Note: Attended 2 of 3 OT groups. She participated for 50 minutes X2 in goal oriented task groups with 8 pts. She worked at a steady pace on a highly detailed activity, was attentive at a constant pace though also social with others. She took an active role in participating in the afternoon session for 50 minutes with 6 patients on the topic of values. She spoke up and offered helpful information with insight for others as well as focused on her own perspective and benefits of the values she holds. She appeared interested and invested and motivated in being actively involved.

## 2021-10-22 NOTE — PLAN OF CARE
"  Problem: General Rehab Plan of Care  Goal: Therapeutic Recreation/Music Therapy Goal (Art Therapy)  Description: The patient and/or their representative will achieve their patient-specific goals related to the plan of care.  The patient-specific goals include:  Outcome: Improving     Art Therapy directive is to create a drawing of self as a landscape using art media of pts choice. Goals of directive: to create a personal self narrative, to identify personal strengths and goals, emotional expression.  Pt was a positive participant, focused on task for the full duration of group. Pt what she described as an abstract image including her eyes in the center of page. Pt said the image may symbolize \"the windows to my soul.\" Pt did not share any additional details with author or group. Pts mood was calm, pleasant participant.          "

## 2021-10-22 NOTE — PLAN OF CARE
Problem: Sleep Disturbance (Depressive Signs/Symptoms)  Goal: Improved Sleep (Depressive Signs/Symptoms)  Outcome: Improving     Patient slept the whole night for 9.75 hours. Nothing unusual noted. No concerns raised.

## 2021-10-22 NOTE — PLAN OF CARE
"  Problem: Cognitive Impairment (Anxiety Signs/Symptoms)  Goal: Optimized Cognitive Function (Anxiety Signs/Symptoms)  Outcome: Improving     Problem: Behavioral Health Plan of Care  Goal: Optimized Coping Skills in Response to Life Stressors  Outcome: Improving     Behavioral  Pt was visible in the milieu and was observed watching TV with peers. Pt denies having any depression but endorses anxiety at 5/10 and got Hydroxyzine. Pt said she had her mind racing thinking about stuff outside of the hospital and the medication helps slow mike her \"brain\" Client mood was calm and affect was flat and blunted sometimes neutral. She  denies SI thoughts or AVH. Pt endorse good appetite ate about 80% of her dinner.  Medical Alerts  Pt complained of R shoulder pain \"I hurt my rotator cuff.\" Pt is currently using lidocaine patch and Tylenol #3 for pain and got a dose PRN at HS.Pt also got PRN Hydroxyzine and melatonin at HS  Plan  Continue to monitor     "

## 2021-10-22 NOTE — PROGRESS NOTES
"Patient seen, chart reviewed, care discussed with staff.  Meeting with Marissa and  \"Keanu\"    Blood pressure 123/80, pulse 87, temperature 97.2  F (36.2  C), temperature source Temporal, resp. rate 16, height 1.6 m (5' 3\"), weight 90 kg (198 lb 6.4 oz), SpO2 95 %, not currently breastfeeding.    By report, slept well, participates    General appearance: good  Alert.   Affect: fair  Mood: fair    Speech:  normal.   Eye contact:  good.    Psychomotor behavior: normal  Gait: normal.    Abnormal movements:  Delusions:  Hallucinations:    Thoughts: logical  Associations: intact  Judgement: good  Insight: good  Cognitions: intact in conversation  Memory:  intact in conversation  Orientation: normal    Not suicidal.    Feeling mood improving, dysregulation decreasing    Plan:  Increase Depakote, depakote labs 10/25    Imp: Bipolar 2    Patient Active Problem List   Diagnosis     Pain of female symphysis pubis     Pelvic floor dysfunction     Myalgia of pelvic floor     SI (sacroiliac) joint dysfunction     Chronic pelvic pain in female     Suicidal ideation     MDD (major depressive disorder), recurrent severe, without psychosis (H)     Current Facility-Administered Medications   Medication     acetaminophen-codeine (TYLENOL #3) 300-30 MG per tablet 1 tablet     alum & mag hydroxide-simethicone (MAALOX) suspension 30 mL     amLODIPine (NORVASC) tablet 5 mg     busPIRone (BUSPAR) tablet 20 mg     divalproex sodium delayed-release (DEPAKOTE) DR tablet 1,000 mg     DULoxetine (CYMBALTA) DR capsule 90 mg     hydrOXYzine (ATARAX) tablet 50 mg     Lidocaine (LIDOCARE) 4 % Patch 1 patch     lidocaine patch in PLACE     Melatonin tablet 10 mg     multivitamin, therapeutic (THERA-VIT) tablet 1 tablet     naloxone (NARCAN) injection 0.2 mg    Or     naloxone (NARCAN) injection 0.4 mg    Or     naloxone (NARCAN) injection 0.2 mg    Or     naloxone (NARCAN) injection 0.4 mg     OLANZapine (zyPREXA) tablet 5 mg    Or     " OLANZapine (zyPREXA) injection 5 mg     ondansetron (ZOFRAN) tablet 4 mg     pantoprazole (PROTONIX) EC tablet 40 mg     polyethylene glycol (MIRALAX) Packet 17 g     prazosin (MINIPRESS) capsule 2 mg     QUEtiapine (SEROquel XR) 24 hr tablet 100 mg     QUEtiapine (SEROquel) tablet 400 mg     senna-docusate (SENOKOT-S/PERICOLACE) 8.6-50 MG per tablet 1 tablet     topiramate (TOPAMAX) tablet 50 mg     Vitamin D3 (CHOLECALCIFEROL) tablet 1,000 Units     Recent Results (from the past 168 hour(s))   Asymptomatic COVID-19 Virus (Coronavirus) by PCR Nasopharyngeal    Collection Time: 10/19/21 12:52 PM    Specimen: Nasopharyngeal; Swab   Result Value Ref Range    SARS CoV2 PCR Negative Negative

## 2021-10-23 PROCEDURE — 124N000003 HC R&B MH SENIOR/ADOLESCENT

## 2021-10-23 PROCEDURE — 250N000013 HC RX MED GY IP 250 OP 250 PS 637: Performed by: PSYCHIATRY & NEUROLOGY

## 2021-10-23 PROCEDURE — H2032 ACTIVITY THERAPY, PER 15 MIN: HCPCS

## 2021-10-23 PROCEDURE — 250N000013 HC RX MED GY IP 250 OP 250 PS 637: Performed by: NURSE PRACTITIONER

## 2021-10-23 PROCEDURE — 250N000013 HC RX MED GY IP 250 OP 250 PS 637: Performed by: PHYSICIAN ASSISTANT

## 2021-10-23 PROCEDURE — 250N000013 HC RX MED GY IP 250 OP 250 PS 637: Performed by: EMERGENCY MEDICINE

## 2021-10-23 RX ADMIN — AMLODIPINE BESYLATE 5 MG: 5 TABLET ORAL at 08:47

## 2021-10-23 RX ADMIN — THERA TABS 1 TABLET: TAB at 08:46

## 2021-10-23 RX ADMIN — Medication 1000 UNITS: at 08:47

## 2021-10-23 RX ADMIN — Medication 10 MG: at 21:21

## 2021-10-23 RX ADMIN — DULOXETINE HYDROCHLORIDE 90 MG: 30 CAPSULE, DELAYED RELEASE ORAL at 08:47

## 2021-10-23 RX ADMIN — LIDOCAINE PATCH 4% 1 PATCH: 40 PATCH TOPICAL at 08:45

## 2021-10-23 RX ADMIN — HYDROXYZINE HYDROCHLORIDE 50 MG: 50 TABLET, FILM COATED ORAL at 21:21

## 2021-10-23 RX ADMIN — BUSPIRONE HYDROCHLORIDE 20 MG: 10 TABLET ORAL at 14:24

## 2021-10-23 RX ADMIN — QUETIAPINE 400 MG: 200 TABLET, FILM COATED ORAL at 21:15

## 2021-10-23 RX ADMIN — HYDROXYZINE HYDROCHLORIDE 50 MG: 50 TABLET, FILM COATED ORAL at 14:24

## 2021-10-23 RX ADMIN — ACETAMINOPHEN AND CODEINE PHOSPHATE 1 TABLET: 300; 30 TABLET ORAL at 21:21

## 2021-10-23 RX ADMIN — DIVALPROEX SODIUM 1000 MG: 500 TABLET, DELAYED RELEASE ORAL at 21:15

## 2021-10-23 RX ADMIN — BUSPIRONE HYDROCHLORIDE 20 MG: 10 TABLET ORAL at 21:15

## 2021-10-23 RX ADMIN — TOPIRAMATE 50 MG: 50 TABLET ORAL at 21:15

## 2021-10-23 RX ADMIN — ACETAMINOPHEN AND CODEINE PHOSPHATE 1 TABLET: 300; 30 TABLET ORAL at 14:24

## 2021-10-23 RX ADMIN — PRAZOSIN HYDROCHLORIDE 2 MG: 1 CAPSULE ORAL at 21:15

## 2021-10-23 RX ADMIN — PANTOPRAZOLE SODIUM 40 MG: 40 TABLET, DELAYED RELEASE ORAL at 08:46

## 2021-10-23 RX ADMIN — QUETIAPINE FUMARATE 100 MG: 50 TABLET, EXTENDED RELEASE ORAL at 08:46

## 2021-10-23 RX ADMIN — ACETAMINOPHEN AND CODEINE PHOSPHATE 1 TABLET: 300; 30 TABLET ORAL at 02:10

## 2021-10-23 RX ADMIN — TOPIRAMATE 50 MG: 50 TABLET ORAL at 08:47

## 2021-10-23 RX ADMIN — BUSPIRONE HYDROCHLORIDE 20 MG: 10 TABLET ORAL at 08:46

## 2021-10-23 ASSESSMENT — ACTIVITIES OF DAILY LIVING (ADL)
DRESS: INDEPENDENT
ORAL_HYGIENE: INDEPENDENT
DRESS: INDEPENDENT
ORAL_HYGIENE: INDEPENDENT
HYGIENE/GROOMING: INDEPENDENT
HYGIENE/GROOMING: INDEPENDENT

## 2021-10-23 NOTE — PLAN OF CARE
Number of patients attending the group:  10          Group Length:  1 Hour     Group Therapy      Summary of Group / Topics Discussed:     The  psychotherapy group goal is to promote insight to positive choice and change. Group processing is within a supportive and safe environment. Patients processed emotions using verbal group and expressive psychotherapy interventions. Group focused on ways to identify strategies to stay engaged and motivated towards symptoms resolution. Each group participant identified a strategy or a challenge for which the patient needs a strategy to cope with. Participants and writer provided positive feedback and motivated participants to stay engaged throughout the weekend.      Assessment: This patient attended and participated fully in group. Pt asked questions of other group members and received feedback.      Patient Response: Pt was engaged throughout the session. Pt was thankful for the session. Reported willingness to practice the skills/strategies discussed.

## 2021-10-23 NOTE — PLAN OF CARE
"Pt observed out in the milieu, social to peers and staff. She also attended group. Presents with full range affect, in a calm mood. Pt claimed she is having adequate sleep and good appetite. Denies having racing thoughts, and depression. Pt endorses having \"baseline\" anxiety, rated it as 4/10. Pt denies SI/SIB/HI. Denies experiencing any type of hallucinations. Pt claimed she is looking forward for her discharge.     Consumed 100% of share of dinner and took approximately 500 ml of fluids.     Due medications given. Denies experiencing side effects.   Later on the shift, pt claimed having increasing anxiety, rated it as 6/10. PRN Hydroxyzine given per request. Pt also complained of right shoulder pain and indigestion. PRN Acetaminophen-Codeine and Maalox given per request.   PRN Melatonin to aid for sleep given per request.     Needs attended to. On suicide precautions. Staff will continue to monitor. No further concerns noted as of this time.   "

## 2021-10-23 NOTE — PLAN OF CARE
Problem: Sleep Disturbance (Depressive Signs/Symptoms)  Goal: Improved Sleep (Depressive Signs/Symptoms)  Outcome: No Change    Patient's sleep was interrupted when suddenly she experienced muscle spasm on her right shoulder radiating to her upper back. Her face was grimacing and moaning in pain. Tylenol # 3 given as prn for moderate pain. Ice packs also applied over the affected area. Patient slept the whole night for 8 hours.

## 2021-10-23 NOTE — PLAN OF CARE
"Patient alert and oriented to person, place, and time, adequately groomed. Pt is pleasant, full rage affect, good eye contact, cooperative, medication compliant. No PRN's given. Pt on suicidal precautions with no behaviors observed. Pt reports pain 2/10 (declined med intervention as she received tylenol 3 during the night). Pt denies SI/HI/SIB, depression, rates anxiety 2/10 stating \"It's just because of going to the new program and whether I can do it. It's not because of the things that were bothering me before.\" Pt contracts for safety. Pt did not demonstrate delusional thinking, did not appear to be responding to internal stimuli. Pt engaged in group activities, observed unit milieu, appropriate with peers and staff. Pt communicates and verbalizes needs to staff. No behaviors noted. Pt later requested hydroxyzine for anxiety 5/10 and prn for pain in neck/shoulder. Will continue to monitor behavior and encourage engagement.      Problem: General Plan of Care (Inpatient Behavioral)  Goal: Individualization/Patient Specific Goal (IP Behavioral)  Description: The patient and/or their representative will achieve their patient-specific goals related to the plan of care.    The patient-specific goals include:  Outcome: Improving     Problem: General Plan of Care (Inpatient Behavioral)  Goal: Plan of Care Review (Adult,OB,Behavioral)  Description: The patient and/or their representative will communicate an understanding of their plan of care.  Outcome: Improving     Problem: Activity and Energy Impairment (Depressive Signs/Symptoms)  Goal: Optimized Energy Level (Depressive Signs/Symptoms)  Outcome: Improving     Problem: Sleep Disturbance (Depressive Signs/Symptoms)  Goal: Improved Sleep (Depressive Signs/Symptoms)  Outcome: Improving     Problem: Mood Impairment (Anxiety Signs/Symptoms)  Goal: Improved Mood Symptoms (Anxiety Signs/Symptoms)  Outcome: Improving     Problem: Behavioral Health Plan of Care  Goal: " Patient-Specific Goal (Individualization)  Outcome: Improving     Problem: Adult Inpatient Plan of Care  Goal: Optimal Comfort and Wellbeing  Outcome: Improving     Problem: Adult Inpatient Plan of Care  Goal: Readiness for Transition of Care  Outcome: Improving

## 2021-10-24 PROCEDURE — 124N000003 HC R&B MH SENIOR/ADOLESCENT

## 2021-10-24 PROCEDURE — 250N000013 HC RX MED GY IP 250 OP 250 PS 637: Performed by: PHYSICIAN ASSISTANT

## 2021-10-24 PROCEDURE — 250N000013 HC RX MED GY IP 250 OP 250 PS 637: Performed by: PSYCHIATRY & NEUROLOGY

## 2021-10-24 PROCEDURE — 250N000013 HC RX MED GY IP 250 OP 250 PS 637: Performed by: EMERGENCY MEDICINE

## 2021-10-24 PROCEDURE — G0177 OPPS/PHP; TRAIN & EDUC SERV: HCPCS

## 2021-10-24 PROCEDURE — 250N000013 HC RX MED GY IP 250 OP 250 PS 637: Performed by: NURSE PRACTITIONER

## 2021-10-24 RX ADMIN — ACETAMINOPHEN AND CODEINE PHOSPHATE 1 TABLET: 300; 30 TABLET ORAL at 08:38

## 2021-10-24 RX ADMIN — PANTOPRAZOLE SODIUM 40 MG: 40 TABLET, DELAYED RELEASE ORAL at 08:37

## 2021-10-24 RX ADMIN — TOPIRAMATE 50 MG: 50 TABLET ORAL at 21:08

## 2021-10-24 RX ADMIN — ACETAMINOPHEN AND CODEINE PHOSPHATE 1 TABLET: 300; 30 TABLET ORAL at 21:09

## 2021-10-24 RX ADMIN — PRAZOSIN HYDROCHLORIDE 2 MG: 1 CAPSULE ORAL at 21:08

## 2021-10-24 RX ADMIN — ACETAMINOPHEN AND CODEINE PHOSPHATE 1 TABLET: 300; 30 TABLET ORAL at 15:15

## 2021-10-24 RX ADMIN — AMLODIPINE BESYLATE 5 MG: 5 TABLET ORAL at 08:37

## 2021-10-24 RX ADMIN — HYDROXYZINE HYDROCHLORIDE 50 MG: 50 TABLET, FILM COATED ORAL at 08:38

## 2021-10-24 RX ADMIN — LIDOCAINE PATCH 4% 1 PATCH: 40 PATCH TOPICAL at 08:38

## 2021-10-24 RX ADMIN — BUSPIRONE HYDROCHLORIDE 20 MG: 10 TABLET ORAL at 21:08

## 2021-10-24 RX ADMIN — QUETIAPINE FUMARATE 100 MG: 50 TABLET, EXTENDED RELEASE ORAL at 08:37

## 2021-10-24 RX ADMIN — HYDROXYZINE HYDROCHLORIDE 50 MG: 50 TABLET, FILM COATED ORAL at 21:09

## 2021-10-24 RX ADMIN — THERA TABS 1 TABLET: TAB at 08:38

## 2021-10-24 RX ADMIN — BUSPIRONE HYDROCHLORIDE 20 MG: 10 TABLET ORAL at 15:13

## 2021-10-24 RX ADMIN — QUETIAPINE 400 MG: 200 TABLET, FILM COATED ORAL at 21:08

## 2021-10-24 RX ADMIN — TOPIRAMATE 50 MG: 50 TABLET ORAL at 08:38

## 2021-10-24 RX ADMIN — Medication 1000 UNITS: at 08:38

## 2021-10-24 RX ADMIN — BUSPIRONE HYDROCHLORIDE 20 MG: 10 TABLET ORAL at 08:37

## 2021-10-24 RX ADMIN — DULOXETINE HYDROCHLORIDE 90 MG: 30 CAPSULE, DELAYED RELEASE ORAL at 08:37

## 2021-10-24 RX ADMIN — DIVALPROEX SODIUM 1000 MG: 500 TABLET, DELAYED RELEASE ORAL at 21:08

## 2021-10-24 ASSESSMENT — MIFFLIN-ST. JEOR: SCORE: 1431.79

## 2021-10-24 ASSESSMENT — ACTIVITIES OF DAILY LIVING (ADL)
HYGIENE/GROOMING: INDEPENDENT
LAUNDRY: UNABLE TO COMPLETE
LAUNDRY: UNABLE TO COMPLETE
ORAL_HYGIENE: INDEPENDENT
HYGIENE/GROOMING: INDEPENDENT
ORAL_HYGIENE: INDEPENDENT
DRESS: INDEPENDENT
DRESS: INDEPENDENT

## 2021-10-24 NOTE — PLAN OF CARE
Problem: Activity and Energy Impairment (Depressive Signs/Symptoms)  Goal: Optimized Energy Level (Depressive Signs/Symptoms)  Outcome: No Change     Problem: Cognitive Impairment (Anxiety Signs/Symptoms)  Goal: Optimized Cognitive Function (Anxiety Signs/Symptoms)  Outcome: No Change     Patient is pleasant, calm, and cooperative.  At initial approach, endorsed anxiety rated at 6/10.  PRN hydroxyzine administered which was effective in decreasing anxiety.  She later rated anxiety at 3/10.  She endorses low depression rated at 2/10.  Denies SI, HI, SIB, and hallucinations.  Patient endorses right shoulder pain.  Lidocaine patch applied and PRN acetaminophen-codeine administered.  Patient states these are ineffective in relieving pain.  Patient is medication complaint and remains safe on unit.  Will continue to monitor. Zeinab Grullon RN

## 2021-10-24 NOTE — PLAN OF CARE
"Assessment/Intervention/Current Symptoms and Care Coordination  The patient's care was discussed with the treatment team and chart notes were reviewed. CTC met with patient to discuss her progress, current symptomology, ongoing cares and discharge plans/outpatient services.      Family meeting was completed. RN went over current medications with patient and spouse. Dr. Hightower met with spouse and patient and answered their questions. CTC went over outpatient appointments and plan with both patient and spouse. Patient's spouse verbalized understanding of the plan and stated that he plans to  the patient on Monday October 25th at 1 pm.      AVS completed. The patient and Spouse are planning for the patient to Discharge Monday 10/25 at 1 pm. CTC will discuss this in team with the provider.      Discharge Plan or Goal  Discharge to home when stable.  She has outpatient psychiatry and weekly individual therapy appointments scheduled.Patient will [facility]. There is at least 1 week waiting period before the next opening. Patient will receive therapeutic services from the the transitional care clinic in the interim.      Tentative Discharge 10/25 at 1 pm.         Barriers to Discharge   Needs further psychiatric Stabilization and disposition planning     Referral Status  CTC made a referral to [facility] [place] (all locations) for DBT. Facility will likely call to make an appointment. Patient was also encouraged to call the agency if she doesn't receive a call.      CTC made a referral to [facility].      PCP - Gerardo Mcmillan PA-C - [facility]     Psychiatry - [facility]  Provider: Jessica Wallace  In person appointment: Thursday October 21st, 2021 at 11 am   Address: [address] [place] [address] [address], [place] [address]  Phone: [phone]     Per Mariella Patient Navigator \"Writer called to try to r/s Marissa's psychiatry " appt. They didn't have any regular openings anytim'e soon, so she transferred me to  of someone named Isabella (nurse?) who can tell us when they can carve out an acute needs/urgent spot to work her in. Writer does not have a direct # for her to call back to, so writer gave her CTC's name and #.     Individual Therapy: [facility]  Therapist - Devi Barajas PsyD   Appointment: November 8th, 2021 at 9 am   Address: [address] [place] [id number]  Phone: [phone]     Pt Navigator scheduled  eval for 55+ program with Yamilka Contreras on Tuesday, 10/26/21.  This will be a video visit through [employer].          Legal Status  Vol.

## 2021-10-24 NOTE — PLAN OF CARE
"  Problem: General Rehab Plan of Care  Goal: Therapeutic Recreation/Music Therapy Goal  Description: The patient and/or their representative will achieve their patient-specific goals related to the plan of care.  The patient-specific goals include:  Outcome: Improving        Marissa attended art therapy group for 1 hour (5 patients total). She reported feeling \"pretty good\" and excited to go home. She participated in the art activity to make paper nesting dolls as a metaphor for parts of her personality. She appeared calm and focused while painting her paper dolls. She engaged in group discussion about parts of self and identified how the work part of her is different than who she is at home.   "

## 2021-10-24 NOTE — PLAN OF CARE
Problem: Sleep Disturbance (Depressive Signs/Symptoms)  Goal: Improved Sleep (Depressive Signs/Symptoms)  Outcome: Improving    Patient slept well the whole night for 8.5 hours. Nothing unusual noted. No complaints raised.

## 2021-10-24 NOTE — PLAN OF CARE
"Pt observed out in the milieu. She attended group this shift. Presents with full range of affect, in a calm mood. Described her day as \"ok\" Pt denies SI/SIB/HI. Denies experiencing any type of hallucinations. Claimed having \"low\" anxiety and depression. Rated both at 4/10.   Pt claimed that her main goal is \"to get better\". Further claimed being excited regarding her upcoming discharge.     Consumed 100% of share of dinner and took approximately 500 ml of fluids.     Due medications given. Denies experiencing side effects. Pt complained of right shoulder pain, rated it as 6. PRN Acetaminophen-Codeine given per request. Pt claimed to have increasing anxiety, rated it as 6/10. PRN Hydroxyzine given per request. PRN Melatonin to aid with sleep given per request.     Needs attended to. On suicide precautions. Staff will continue to monitor. No further concerns noted as of this time.   Problem: Mood Impairment (Anxiety Signs/Symptoms)  Goal: Improved Mood Symptoms (Anxiety Signs/Symptoms)  Outcome: Improving     "

## 2021-10-24 NOTE — DISCHARGE INSTRUCTIONS
Behavioral Discharge Planning and Instructions    Summary: You were admitted on 10/3/2021  due to Depression, Anxiety, Borderline Personality Disorder and Suicidal Ideations.  You were treated by Yohan Hightower MD and discharged on 10/25/2021 from [facility] to Home    Main Diagnosis:   Depression, Anxiety, Borderline Personality Disorder and Suicidal Ideations.     Health Care Follow-up:   PCP - Gerardo Mcmillan PA-C - [facility]    Psychiatry - [facility]  Provider: Jessica Wallace  In person appointment: Tuesday November 16th, 2021 at 8:30 am   Address: [address] [place] [address] [address], [place] [address]  Phone: [phone]     Individual Therapy: [facility]  Therapist - Devi Barajas PsyD   Appointment: October 27th, 2021 at 12 pm   Address: [address] [place] [address], [place] [address]  Phone: [phone]     [place] [address] program:   : Ewelina Adam   Inpt Intake appointment:Tuesday October 10/21/21 at 10 am.       Behavioral Health Providers: [facility]  Virtual Appointment: Tuesday 10/26/2021 at 1 pm and Thursday 10/28/2021 at 1 pm   Therapist: Taisha Ellis.   Phone:  [phone]    Pain Management - Dr. Ramirez - [facility].    An online referral was sent to [facility] in [place] for DBT. [facility] currently does not have a DBT provider who accepts your insurance. Please call them in a 1-2 months from now and ask if there is a provider available.     Attend all scheduled appointments with your outpatient providers. Call at least 24 hours in advance if you need to reschedule an appointment to ensure continued access to your outpatient providers.     Major Treatments, Procedures and Findings:  You were provided with: a psychiatric assessment, assessed for medical stability, medication evaluation and/or management, group therapy and milieu management    Symptoms to Report: feeling more aggressive,  "increased confusion, losing more sleep, mood getting worse or thoughts of suicide    Early warning signs can include: increased depression or anxiety sleep disturbances increased thoughts or behaviors of suicide or self-harm  increased unusual thinking, such as paranoia or hearing voices    Safety and Wellness:  Take all medicines as directed.  Make no changes unless your doctor suggests them.      Follow treatment recommendations.  Refrain from alcohol and non-prescribed drugs.  Ask your support system to help you reduce your access to items that could harm yourself or others. Items could include:  Firearms  Medicines (both prescribed and over-the-counter)  Knives and other sharp objects  Ropes and like materials  Car keys  If there is a concern for safety, call 911. If there is a concern for safety, call 911.    Resources:   Crisis Intervention: 102.495.2488 or 017-595-6956 (TTY: 489.855.3981).  Call anytime for help.  National Suicide Prevention Line (www.mentalhealthmn.org): 389-575-EICW (0967)  Mental Health Association of [place] (www.mentalhealth.org): 441.415.5546 or 745-683-3636  Self- Management and Recovery Training., SMART-- Toll free: 667.334.1838  www.Easydiagnosis.Visualant  Text 4 Life: txt \"LIFE\" to 15385 for immediate support and crisis intervention  Crisis text line: Text \"[place]\" to 205989. Free, confidential, 24/7.  Crisis Intervention: 830.779.9225 or 308-828-9893. Call anytime for help.   [facility] Crisis Response 374 977-2550  [facility] Mental Health & Chemical Dependency: [phone].    General Medication Instructions:   See your medication sheet(s) for instructions.   Take all medicines as directed.  Make no changes unless your doctor suggests them.   Go to all your doctor visits.  Be sure to have all your required lab tests. This way, your medicines can be refilled on time.  Do not use any drugs not prescribed by your doctor.  Avoid alcohol.    Advance Directives:   Scanned document on file with " Jase? No scanned doc  Is document scanned?    Honoring Choices Your Rights Handout:    Was more information offered?      The Treatment team has appreciated the opportunity to work with you. If you have any questions or concerns about your recent admission, you can contact the unit which can receive your call 24 hours a day, 7 days a week. They will be able to get in touch with a Provider if needed. The unit number is [phone] .

## 2021-10-25 ENCOUNTER — MEDICAL CORRESPONDENCE (OUTPATIENT)
Dept: HEALTH INFORMATION MANAGEMENT | Facility: CLINIC | Age: 61
End: 2021-10-25
Payer: COMMERCIAL

## 2021-10-25 VITALS
RESPIRATION RATE: 16 BRPM | TEMPERATURE: 97.3 F | DIASTOLIC BLOOD PRESSURE: 81 MMHG | HEIGHT: 63 IN | BODY MASS INDEX: 35.26 KG/M2 | WEIGHT: 199 LBS | HEART RATE: 86 BPM | SYSTOLIC BLOOD PRESSURE: 132 MMHG | OXYGEN SATURATION: 97 %

## 2021-10-25 LAB
ALBUMIN SERPL-MCNC: 3.1 G/DL (ref 3.4–5)
ALP SERPL-CCNC: 83 U/L (ref 40–150)
ALT SERPL W P-5'-P-CCNC: 23 U/L (ref 0–50)
AMMONIA PLAS-SCNC: 23 UMOL/L (ref 10–50)
ANION GAP SERPL CALCULATED.3IONS-SCNC: 2 MMOL/L (ref 3–14)
AST SERPL W P-5'-P-CCNC: 16 U/L (ref 0–45)
BASOPHILS # BLD AUTO: 0 10E3/UL (ref 0–0.2)
BASOPHILS NFR BLD AUTO: 1 %
BILIRUB SERPL-MCNC: 0.4 MG/DL (ref 0.2–1.3)
BUN SERPL-MCNC: 16 MG/DL (ref 7–30)
CALCIUM SERPL-MCNC: 8.5 MG/DL (ref 8.5–10.1)
CHLORIDE BLD-SCNC: 113 MMOL/L (ref 94–109)
CO2 SERPL-SCNC: 29 MMOL/L (ref 20–32)
CREAT SERPL-MCNC: 0.94 MG/DL (ref 0.52–1.04)
EOSINOPHIL # BLD AUTO: 0.2 10E3/UL (ref 0–0.7)
EOSINOPHIL NFR BLD AUTO: 4 %
ERYTHROCYTE [DISTWIDTH] IN BLOOD BY AUTOMATED COUNT: 12.3 % (ref 10–15)
GFR SERPL CREATININE-BSD FRML MDRD: 65 ML/MIN/1.73M2
GLUCOSE BLD-MCNC: 92 MG/DL (ref 70–99)
HCT VFR BLD AUTO: 39.2 % (ref 35–47)
HGB BLD-MCNC: 12.9 G/DL (ref 11.7–15.7)
IMM GRANULOCYTES # BLD: 0 10E3/UL
IMM GRANULOCYTES NFR BLD: 1 %
LYMPHOCYTES # BLD AUTO: 3.2 10E3/UL (ref 0.8–5.3)
LYMPHOCYTES NFR BLD AUTO: 52 %
MCH RBC QN AUTO: 31.7 PG (ref 26.5–33)
MCHC RBC AUTO-ENTMCNC: 32.9 G/DL (ref 31.5–36.5)
MCV RBC AUTO: 96 FL (ref 78–100)
MONOCYTES # BLD AUTO: 0.7 10E3/UL (ref 0–1.3)
MONOCYTES NFR BLD AUTO: 12 %
NEUTROPHILS # BLD AUTO: 1.9 10E3/UL (ref 1.6–8.3)
NEUTROPHILS NFR BLD AUTO: 30 %
NRBC # BLD AUTO: 0 10E3/UL
NRBC BLD AUTO-RTO: 0 /100
PLATELET # BLD AUTO: 237 10E3/UL (ref 150–450)
POTASSIUM BLD-SCNC: 4.8 MMOL/L (ref 3.4–5.3)
PROT SERPL-MCNC: 5.8 G/DL (ref 6.8–8.8)
RBC # BLD AUTO: 4.07 10E6/UL (ref 3.8–5.2)
SODIUM SERPL-SCNC: 144 MMOL/L (ref 133–144)
VALPROATE SERPL-MCNC: 78 MG/L
WBC # BLD AUTO: 6.1 10E3/UL (ref 4–11)

## 2021-10-25 PROCEDURE — 80164 ASSAY DIPROPYLACETIC ACD TOT: CPT | Performed by: PSYCHIATRY & NEUROLOGY

## 2021-10-25 PROCEDURE — 250N000013 HC RX MED GY IP 250 OP 250 PS 637: Performed by: EMERGENCY MEDICINE

## 2021-10-25 PROCEDURE — 250N000013 HC RX MED GY IP 250 OP 250 PS 637: Performed by: NURSE PRACTITIONER

## 2021-10-25 PROCEDURE — 85025 COMPLETE CBC W/AUTO DIFF WBC: CPT | Performed by: PSYCHIATRY & NEUROLOGY

## 2021-10-25 PROCEDURE — 36415 COLL VENOUS BLD VENIPUNCTURE: CPT | Performed by: PSYCHIATRY & NEUROLOGY

## 2021-10-25 PROCEDURE — 250N000013 HC RX MED GY IP 250 OP 250 PS 637: Performed by: PHYSICIAN ASSISTANT

## 2021-10-25 PROCEDURE — 80053 COMPREHEN METABOLIC PANEL: CPT | Performed by: PSYCHIATRY & NEUROLOGY

## 2021-10-25 PROCEDURE — 250N000013 HC RX MED GY IP 250 OP 250 PS 637: Performed by: PSYCHIATRY & NEUROLOGY

## 2021-10-25 PROCEDURE — 82140 ASSAY OF AMMONIA: CPT | Performed by: PSYCHIATRY & NEUROLOGY

## 2021-10-25 PROCEDURE — G0177 OPPS/PHP; TRAIN & EDUC SERV: HCPCS

## 2021-10-25 RX ORDER — DIVALPROEX SODIUM 250 MG/1
750 TABLET, DELAYED RELEASE ORAL AT BEDTIME
Qty: 90 TABLET | Refills: 3 | Status: SHIPPED | OUTPATIENT
Start: 2021-10-25 | End: 2022-04-12

## 2021-10-25 RX ADMIN — ACETAMINOPHEN AND CODEINE PHOSPHATE 1 TABLET: 300; 30 TABLET ORAL at 05:16

## 2021-10-25 RX ADMIN — THERA TABS 1 TABLET: TAB at 08:28

## 2021-10-25 RX ADMIN — PANTOPRAZOLE SODIUM 40 MG: 40 TABLET, DELAYED RELEASE ORAL at 08:28

## 2021-10-25 RX ADMIN — LIDOCAINE PATCH 4% 1 PATCH: 40 PATCH TOPICAL at 08:28

## 2021-10-25 RX ADMIN — HYDROXYZINE HYDROCHLORIDE 50 MG: 50 TABLET, FILM COATED ORAL at 05:17

## 2021-10-25 RX ADMIN — AMLODIPINE BESYLATE 5 MG: 5 TABLET ORAL at 08:29

## 2021-10-25 RX ADMIN — BUSPIRONE HYDROCHLORIDE 20 MG: 10 TABLET ORAL at 08:28

## 2021-10-25 RX ADMIN — QUETIAPINE FUMARATE 100 MG: 50 TABLET, EXTENDED RELEASE ORAL at 08:28

## 2021-10-25 RX ADMIN — Medication 1000 UNITS: at 08:28

## 2021-10-25 RX ADMIN — DULOXETINE HYDROCHLORIDE 90 MG: 30 CAPSULE, DELAYED RELEASE ORAL at 08:28

## 2021-10-25 RX ADMIN — TOPIRAMATE 50 MG: 50 TABLET ORAL at 08:28

## 2021-10-25 ASSESSMENT — ACTIVITIES OF DAILY LIVING (ADL)
ORAL_HYGIENE: INDEPENDENT
ORAL_HYGIENE: INDEPENDENT
HYGIENE/GROOMING: INDEPENDENT
DRESS: INDEPENDENT
DRESS: INDEPENDENT;STREET CLOTHES
HYGIENE/GROOMING: INDEPENDENT

## 2021-10-25 NOTE — PLAN OF CARE
Problem: Sleep Disturbance (Depressive Signs/Symptoms)  Goal: Improved Sleep (Depressive Signs/Symptoms)  Outcome: Improving     Patient slept comfortably for 9.5 hours the whole night. She complained of pain on her right shoulder. Tylenol # 3 1 tablet given as prn for moderate pain. Hydroxyzine 50 mgs given as prn for anxiety.

## 2021-10-25 NOTE — PLAN OF CARE
Problem: Behavioral Health Plan of Care  Goal: Optimized Coping Skills in Response to Life Stressors  Outcome: No Change     Problem: Behavioral Health Plan of Care  Goal: Develops/Participates in Therapeutic [place] to Support Successful Transition    Patient is pleasant, calm, and cooperative.  Patient is up on the unit and engages with peers.  Continues to endorses anxiety and right shoulder pain.  Endorses some low depression.  Denies SI, HI, SIB, and hallucinations.  PRN acetaminophen-codeine administered and hot pack applied for pain.  PRN hydroxyzine 50 mg administered for anxiety.  Patient is medication compliant and remains safe on unit.  Will continue to monitor.  Zeinab Grullon RN

## 2021-10-25 NOTE — PLAN OF CARE
Pt actively participated in a structured Therapeutic Recreation group with a focus on leisure participation, socializing, and exercise. Pt participated in the guided exercise for the full duration of the group. Pt followed along, engaged in the guided chair exercise routine and often added to the discussion prompts throughout the routine.  Pt was encouraged to use positive imagery with the deep breathing and stretching to foster relaxation, improves focus, and reduce stress.

## 2021-10-25 NOTE — PLAN OF CARE
Problem: OT General Care Plan  Goal: OT Goal 1  Description: Will consistently attend OT groups and improve coping strategies with increasing repertoire of ideas and understanding of symptoms of when to use the strategies.       Outcome: Completed  Note: Attended 1 of the am OT groups, worked at a constant pace with success in a goal oriented task for 50 minutes with 8 pts present. She was social, stated feeling ready for discharge and with noted affect appearing comfortable and bright.
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
## 2021-10-25 NOTE — PLAN OF CARE
Pt pleasant and cooperative this AM. Pt rating anxiety and depression at 3/10. Pt denies SI/SIB or wishes to be dead. Pt feels comfortable with discharge plan and denied any concerns.   Pt's  will be coming around 1 PM to pick  up patient.    Pt attended programming this AM. AVS was reviewed with patient: including medication schedule, follow up appointments and the need to make follow up appointments for GI and PCP for thyroid function. Pt reports that her  will be helping her with her medications after discharge.  All belongings were sent with patient including new prescriptions and AVS.      1325- AVS was reviewed with patient's : Including medication schedule and follow up appointments. All medications including home medications were handed to patient's  prior to leaving the unit. Pt left with  at 1345.

## 2021-10-26 ENCOUNTER — VIRTUAL VISIT (OUTPATIENT)
Dept: BEHAVIORAL HEALTH | Facility: CLINIC | Age: 61
End: 2021-10-26
Payer: MEDICARE

## 2021-10-26 ENCOUNTER — TELEPHONE (OUTPATIENT)
Dept: BEHAVIORAL HEALTH | Facility: CLINIC | Age: 61
End: 2021-10-26

## 2021-10-26 DIAGNOSIS — F41.1 GENERALIZED ANXIETY DISORDER: ICD-10-CM

## 2021-10-26 DIAGNOSIS — F43.10 PTSD (POST-TRAUMATIC STRESS DISORDER): ICD-10-CM

## 2021-10-26 DIAGNOSIS — F60.3 BORDERLINE PERSONALITY DISORDER (H): ICD-10-CM

## 2021-10-26 DIAGNOSIS — F33.9 EPISODE OF RECURRENT MAJOR DEPRESSIVE DISORDER, UNSPECIFIED DEPRESSION EPISODE SEVERITY (H): Primary | ICD-10-CM

## 2021-10-26 ASSESSMENT — ANXIETY QUESTIONNAIRES
6. BECOMING EASILY ANNOYED OR IRRITABLE: SEVERAL DAYS
8. IF YOU CHECKED OFF ANY PROBLEMS, HOW DIFFICULT HAVE THESE MADE IT FOR YOU TO DO YOUR WORK, TAKE CARE OF THINGS AT HOME, OR GET ALONG WITH OTHER PEOPLE?: SOMEWHAT DIFFICULT
GAD7 TOTAL SCORE: 17
GAD7 TOTAL SCORE: 17
4. TROUBLE RELAXING: NEARLY EVERY DAY
5. BEING SO RESTLESS THAT IT IS HARD TO SIT STILL: MORE THAN HALF THE DAYS
3. WORRYING TOO MUCH ABOUT DIFFERENT THINGS: NEARLY EVERY DAY
2. NOT BEING ABLE TO STOP OR CONTROL WORRYING: NEARLY EVERY DAY
7. FEELING AFRAID AS IF SOMETHING AWFUL MIGHT HAPPEN: MORE THAN HALF THE DAYS
GAD7 TOTAL SCORE: 17
1. FEELING NERVOUS, ANXIOUS, OR ON EDGE: NEARLY EVERY DAY
7. FEELING AFRAID AS IF SOMETHING AWFUL MIGHT HAPPEN: MORE THAN HALF THE DAYS

## 2021-10-26 ASSESSMENT — PATIENT HEALTH QUESTIONNAIRE - PHQ9
SUM OF ALL RESPONSES TO PHQ QUESTIONS 1-9: 10
SUM OF ALL RESPONSES TO PHQ QUESTIONS 1-9: 10
10. IF YOU CHECKED OFF ANY PROBLEMS, HOW DIFFICULT HAVE THESE PROBLEMS MADE IT FOR YOU TO DO YOUR WORK, TAKE CARE OF THINGS AT HOME, OR GET ALONG WITH OTHER PEOPLE: VERY DIFFICULT

## 2021-10-26 NOTE — PROGRESS NOTES
Progress Note - Transition Clinic    Patient Name: Marissa Youssef  Date: 10.26.2021         Service Type: Individual      Session Start Time: 1:15 pm Session End Time: 2:05 pm     Session Length: 50 minutes    Session #: 01    Attendees: Client attended alone    Service Modality:  Telephone due to pt video connection issues      Provider verified identity through the following two step process.  Patient provided:  Patient  and Patient address    Telemedicine Visit: The patient's condition can be safely assessed and treated via synchronous audio and visual telemedicine encounter.      Reason for Telemedicine Visit: Services only offered telehealth    Originating Site (Patient Location): Patient's home    Distant Site (Provider Location): [facility]: Transition Clinic, Remote Provider    Consent:  The patient/guardian has verbally consented to: the potential risks and benefits of telemedicine (video visit) versus in person care; bill my insurance or make self-payment for services provided; and responsibility for payment of non-covered services.     Patient would like the video invitation sent by:  Text to cell phone: [phone]    Mode of Communication:  Video Conference via [employer]well    As the provider I attest to compliance with applicable laws and regulations related to telemedicine.     DATA  Interactive Complexity: No  Crisis: No       Progress Since Last Session (Related to Symptoms / Goals / Homework):   Symptoms: N/A; first session    Homework: N/A; first session      Episode of Care Goals: N/A; first session     Current / Ongoing Stressors and Concerns:   Mental health symptoms of depression, anxiety and over-medicating as a soothing and coping strategy.      Treatment Objective(s) Addressed in This Session:   Build rapport, understand the nature of the problem, discuss next steps.     Intervention:   Motivational Interviewing: scaling questions,  "miracle question   Person-centered techniques of empathic responses, genuineness, and positive  regard for pt.      ASSESSMENT: Current Emotional / Mental Status (status of significant symptoms):   Risk status (Self / Other harm or suicidal ideation)   Patient denies current fears or concerns for personal safety.   Patient denies current or recent suicidal ideation or behaviors.   Patient denies current or recent homicidal ideation or behaviors.   Patient denies current or recent self injurious behavior or ideation.   Patient denies other safety concerns. Pt was admitted to hospital on 10/21/2021  for depression and suicidal ideation. Pt reported hospital stay was beneficial and  that she feels much better since coming home on 10/25/2021.   No change in risk or protective factors since last visit; this is pt's first session   Recommended that patient call 911 or go to the local ED should there be a change in any of these risk factors.     Appearance:   Unable to assess over telephone    Eye Contact:   Unable to assess over telephone    Psychomotor Behavior: Normal  Unable to assess over telephone    Attitude:   Cooperative  Interested Guarded  and initially annoyed because of technological challenges    Orientation:   All   Speech    Rate / Production: Normal/ Responsive Normal     Volume:  Normal    Mood:    Sad    Affect:    Flat  and congruent with pt stated feelings of depression    Thought Content:  Clear    Thought Form:  Coherent  Goal Directed  Logical    Insight:    Good      Medication Review:   No changes to current psychiatric medication(s)     Medication Compliance:   Yes     Changes in Health Issues:   None reported     Chemical Use Review:   Substance Use: Pt reported she has a tendency to \"over-medicate\" with  prescription medication but denies using any other substances.     Tobacco Use: No current tobacco use.      Diagnosis:  1. Episode of recurrent major depressive disorder, unspecified " depression episode severity (H)    2. Generalized anxiety disorder    3. Borderline personality disorder (H)    4. PTSD (post-traumatic stress disorder)      Collateral Reports Completed:   Not Applicable    PLAN: (Patient Tasks / Therapist Tasks / Other)  Complete treatment plan with pt. Next session scheduled for     DAMIAN Nolan  ______________________________________________________________________    Treatment Plan    Patient's Name: Marissa Youssef  YOB: 1960    Date: 10.26.2021    DSM5 Diagnoses: 296.20 (F32.9) Major Depressive Disorder, Single Episode, Unspecified _ and With seasonal pattern or 300.02 (F41.1) Generalized Anxiety Disorder    WHODAS:   WHODAS 2.0 Total Score 10/21/2021   Total Score 19       Referral / Collaboration:  Pt already connected to individual therapist and psychiatrist; waiting for intake appointment with 55+  Group.    Anticipated number of session or this episode of care: 8    MeasurableTreatment Goal(s) related to diagnosis / functional impairment(s)  Goal 1: Patient will learn to more regularly utilize DBT coping skills learned through   I will know I've met my goal when I begin to use some of the skills more automatically.      Objective #A (Patient Action)    Patient will identify recurrent fears / thoughts that contribute to feeling anxious and consider alternative thoughts.  Status: Ongoing throughout therapy    Intervention(s)  Therapist will assist pt in identifying causes of fear and alternative thoughts.    Objective #B  Patient will use at least two coping skills for anxiety management until the next session.  Status: Ongoing throughout therapy     Intervention(s)  Therapist will provide coping skills list for pt reference.    Goal 2: Patient will learn to redirect negative self-talk     I will know I've met my goal when I feel my self-esteem improving.      Objective #A (Patient Action)    Status: Ongoing throughout therapy     Patient will Identify  negative self-talk and behaviors: challenge core beliefs, myths, and actions by writing down negative thoughts about self as you encounter them throughout the week.    Intervention(s)  Therapist will provide common thought distortions list and refresher information on CBT    Objective #B  Patient will Identify negative self-talk and behaviors: challenge core beliefs, myths, and actions by identifying and writing down alternative thoughts  Status: Ongoing throughout therapy    Intervention(s)  Therapist will provide common thought distortions list and educational information on CBT.    Objective #C  Patient will Identify negative self-talk and behaviors: challenge core beliefs, myths, and actions by writing down five positive thoughts about self daily and sharing with therapist at next session.  Status: Ongoing throughout therapy    Intervention(s)  Therapist will assist pt in identifying positive aspects of self.    Patient has reviewed and agreed to the above plan.      DAMIAN Nolan  October 26, 2021      Answers for HPI/ROS submitted by the patient on 10/26/2021  If you checked off any problems, how difficult have these problems made it for you to do your work, take care of things at home, or get along with other people?: Very difficult  PHQ9 TOTAL SCORE: 10  RACQUEL 7 TOTAL SCORE: 17

## 2021-10-26 NOTE — TELEPHONE ENCOUNTER
Writer called and spoke to Pt today about starting in the program next week.  She was excited about the prospect of starting.  Admit date tentatively scheduled for Wednesday, 11/3.  She resides in [place] but says she [place] do the group on [place] soil by participating in the parking lot of a coffee shop.  Initially stated would be in a coffee shop but writer explained concerns about confidentiality with that plan.  Writer did express concerns about Pt participating for 12 weeks in her car, but willing to try it.  Will need to verify location of coffee shop for emergency situations during admission call next week. Admission to be completed next week Tuesday.

## 2021-10-26 NOTE — TELEPHONE ENCOUNTER
----- Message from AVINASH Tinajero sent at 10/26/2021  1:44 PM CDT -----  Regarding: new start 55+ A2 on 11/3  Scheduling Request    Patient Name: Marissa Youssef  Location of program[facility]  Start Date:   Group: [facility] A2 on Monday, Wednesday, and Thursday at 9:00 to 12:00  Attending Provider (MD): paolo  Number of visits to be scheduled: 36  Duration of Appointment in minutes: 180 min  Visit Type: Zoom - [id number]    Additional notes:

## 2021-10-27 ENCOUNTER — TELEPHONE (OUTPATIENT)
Dept: PHARMACY | Facility: OTHER | Age: 61
End: 2021-10-27

## 2021-10-27 ASSESSMENT — PATIENT HEALTH QUESTIONNAIRE - PHQ9: SUM OF ALL RESPONSES TO PHQ QUESTIONS 1-9: 10

## 2021-10-27 ASSESSMENT — ANXIETY QUESTIONNAIRES: GAD7 TOTAL SCORE: 17

## 2021-10-27 NOTE — TELEPHONE ENCOUNTER
MTM referral from: Transitions of Care (recent hospital discharge or ED visit)    MTM referral outreach attempt #2 on October 27, 2021 at 1:13 PM      Outcome: Patient not reachable after several attempts, will route to MTM Pharmacist/Provider as an FYI.  [facility] scheduling number is [phone].  Thank you for the referral.    Huy Padilla, MT coordinator

## 2021-10-28 ENCOUNTER — VIRTUAL VISIT (OUTPATIENT)
Dept: BEHAVIORAL HEALTH | Facility: CLINIC | Age: 61
End: 2021-10-28
Payer: COMMERCIAL

## 2021-10-28 DIAGNOSIS — F33.9 EPISODE OF RECURRENT MAJOR DEPRESSIVE DISORDER, UNSPECIFIED DEPRESSION EPISODE SEVERITY (H): Primary | ICD-10-CM

## 2021-10-28 DIAGNOSIS — F43.10 PTSD (POST-TRAUMATIC STRESS DISORDER): ICD-10-CM

## 2021-10-28 DIAGNOSIS — F60.3 BORDERLINE PERSONALITY DISORDER (H): ICD-10-CM

## 2021-10-28 DIAGNOSIS — F41.1 GENERALIZED ANXIETY DISORDER: ICD-10-CM

## 2021-10-28 NOTE — PROGRESS NOTES
"                                           Progress Note: Transition Clinic     Patient Name: Marissa Youssef  Date: 10.28.2021         Service Type: Individual      Session Start Time: 10:03 am  Session End Time: 11:13 am     Session Length: 70 minutes    Session #: 02    Attendees: Client    Service Modality:  Phone Visit:      Provider verified identity through the following two step process.  Patient provided:  Patient     The patient has been notified of the following:      \"We have found that certain health care needs can be provided without the need for a face to face visit.  This service lets us provide the care you need with a phone conversation.       I will have full access to your [facility] medical record during this entire phone call.   I will be taking notes for your medical record.      Since this is like an office visit, we will bill your insurance company for this service.       There are potential benefits and risks of telephone visits (e.g. limits to patient confidentiality) that differ from in-person visits.?Confidentiality still applies for telephone services, and nobody will record the visit.  It is important to be in a quiet, private space that is free of distractions (including cell phone or other devices) during the visit.??      If during the course of the call I believe a telephone visit is not appropriate, you will not be charged for this service\"     Consent has been obtained for this service by care team member: Yes      Treatment Plan Last Reviewed: 10.28.2021    DATA  Interactive Complexity: No  Crisis: No       Progress Since Last Session (Related to Symptoms / Goals / Homework):   Symptoms: Improving Pt reported improvement in overall mood, hopefulness, and personal insight    Homework: Achieved / completed to satisfaction      Episode of Care Goals: Achieved / completed to satisfaction - ACTION (Actively working towards change); Intervened by reinforcing change plan / " affirming steps taken      Current / Ongoing Stressors and Concerns:   Mental health symptoms of depression, anxiety and over-medicating as a soothing and coping strategy. See DA completed on 10.21.2021 by Ewelina Adam for additional details.     Treatment Objective(s) Addressed in This Session:   Decrease frequency and intensity of feeling down, depressed, hopeless  Identify negative self-talk and behaviors: challenge core beliefs, myths, and actions  Socratic Questioning - alternative thoughts     Intervention:     Situation   Fight with      Automatic Thoughts  Cognitive Distortions   He's saying/doing this to hurt me   Feelings   Anger, fear, rejection     Behavior   Blaming, anger outbursts, hurtful words     Questioning Thoughts   Have I considered what he might be experiencing?         Motivational Interviewing    MI Intervention: Expressed Empathy/Understanding, Supported Autonomy, Collaboration, Evocation, Permission to raise concern or advise, Open-ended questions, Reflections: simple and complex and Change talk (evoked)     Change Talk Expressed by the Patient: Desire to change Ability to change Reasons to change Need to change Committment to change    Provider Response to Change Talk: E - Evoked more info from patient about behavior change, A - Affirmed patient's thoughts, decisions, or attempts at behavior change, R - Reflected patient's change talk and S - Summarized patient's change talk statements     Person-centered genuine use of self and positive regard        ASSESSMENT: Current Emotional / Mental Status (status of significant symptoms):   Risk status (Self / Other harm or suicidal ideation)   Patient denies current fears or concerns for personal safety.   Patient denies current or recent suicidal ideation or behaviors.   Patient denies current or recent homicidal ideation or behaviors.   Patient denies current or recent self injurious behavior or ideation.   Patient denies other  safety concerns.   Patient reports there has been no change in risk factors since their last session.     Patient reports there has been no change in protective factors since their last session.     Recommended that patient call 911 or go to the local ED should there be a change in any of these risk factors.     Appearance:   unable to assess over the phone    Eye Contact:   unable to assess over the phone    Psychomotor Behavior: unable to assess over the phone    Attitude:   Cooperative  Interested Friendly Pleasant   Orientation:   All   Speech    Rate / Production: Normal/ Responsive    Volume:  Normal    Mood:    Normal Sad  appropriate saddness regarding the topics discussed   Affect:    Appropriate    Thought Content:  Clear    Thought Form:  Coherent  Logical    Insight:    Good      Medication Review:   No changes to current psychiatric medication(s)     Medication Compliance:   Yes     Changes in Health Issues:   None reported     Chemical Use Review:   Substance Use: Chemical use reviewed, no active concerns identified      Tobacco Use: No current tobacco use.      Diagnosis:  1. Episode of recurrent major depressive disorder, unspecified depression episode severity (H) F32.9   2. Generalized anxiety disorder F41.1   3. Borderline personality disorder (H) F60.3   4. PTSD (post-traumatic stress disorder) F43.10       Collateral Reports Completed:   Not Applicable    PLAN: (Patient Tasks / Therapist Tasks / Other)  Patient to review homework on BPD  Patient to continue to practice socratic questioning and writing down things she likes about herself  Next appointment with this writer scheduled for 11.01.2021 @ 10am.    DAMIAN Nolan 10.28.2021

## 2021-11-01 ENCOUNTER — VIRTUAL VISIT (OUTPATIENT)
Dept: BEHAVIORAL HEALTH | Facility: CLINIC | Age: 61
End: 2021-11-01
Payer: MEDICARE

## 2021-11-01 DIAGNOSIS — F60.3 BORDERLINE PERSONALITY DISORDER (H): ICD-10-CM

## 2021-11-01 DIAGNOSIS — F41.1 GENERALIZED ANXIETY DISORDER: ICD-10-CM

## 2021-11-01 DIAGNOSIS — F33.9 EPISODE OF RECURRENT MAJOR DEPRESSIVE DISORDER, UNSPECIFIED DEPRESSION EPISODE SEVERITY (H): Primary | ICD-10-CM

## 2021-11-01 DIAGNOSIS — F43.10 PTSD (POST-TRAUMATIC STRESS DISORDER): ICD-10-CM

## 2021-11-01 NOTE — PROGRESS NOTES
"                                           Progress Note - Transition Clinic  Patient Name: aMrissa Youssef  Date: 2021         Service Type: Individual      Session Start Time: 10:00 am Session End Time: 10:46 am     Session Length: 46 minutes    Session #: 03    Attendees: Client attended alone    Service Modality:  Phone Visit: [phone]     History of Presenting Problem:  Patient was referred to transition clinic for bridging services after discharge from [facility] on 10.25.2021, where she was admitted on 10.03.2021 for SIB. Patient has history of mental health concerns, including historical diagnoses of depression, anxiety, borderline personality disorder, and trauma, all of which she agrees with. Patient reported she tends to over-medicating as a soothing and coping strategy. See DA completed on 10.21.2021 by Ewelina Adam for additional details.     Provider verified identity through the following two step process.  Patient provided:  Patient     The patient has been notified of the following:      \"We have found that certain health care needs can be provided without the need for a face to face visit.  This service lets us provide the care you need with a phone conversation.       I will have full access to your [facility] medical record during this entire phone call.   I will be taking notes for your medical record.      Since this is like an office visit, we will bill your insurance company for this service.       There are potential benefits and risks of telephone visits (e.g. limits to patient confidentiality) that differ from in-person visits.?Confidentiality still applies for telephone services, and nobody will record the visit.  It is important to be in a quiet, private space that is free of distractions (including cell phone or other devices) during the visit.??      If during the course of the call I believe a telephone visit is not appropriate, you will not be " "charged for this service\"     Consent has been obtained for this service by care team member: Yes      Treatment Plan Last Reviewed: 11.01.2021    DATA  Interactive Complexity: No  Crisis: No       Progress Since Last Session (Related to Symptoms / Goals / Homework):   Symptoms: Improving Patient reported she continues to feel positive and more optimistic    Homework: Patient to read information on attachement styles and discuss with therapist next session      Episode of Care Goals: Satisfactory progress - ACTION (Actively working towards change); Intervened by reinforcing change plan / affirming steps taken     Current / Ongoing Stressors and Concerns:   Patient was referred to transition clinic for bridging services after discharge from [facility] on 10.25.2021, where she was admitted on 10.03.2021 for SIB. Patient has history of mental health concerns, including historical diagnoses of depression, anxiety, borderline personality disorder, and trauma, all of which she agrees with. Patient reported she tends to over-medicating as a soothing and coping strategy. See DA completed on 10.21.2021 by Ewelina Adam for additional details.     Treatment Objective(s) Addressed in This Session:   Discussed patient's fear of abandonment and attachment styles. This writer and patient discussed boundaries.     Intervention:   DBT: Used WISE mind and alternate thought strategies   Humanistic: Genuine use of self and empathic responses to elicit thought        ASSESSMENT: Current Emotional / Mental Status (status of significant symptoms):   Risk status (Self / Other harm or suicidal ideation)   Patient denies current fears or concerns for personal safety.   Patient denies current or recent suicidal ideation or behaviors.   Patient denies current or recent homicidal ideation or behaviors.   Patient denies current or recent self injurious behavior or ideation.   Patient denies other safety concerns.   Patient " reports there has been no change in risk factors since their last session.     Patient reports there has been no change in protective factors since their last session.     Recommended that patient call 911 or go to the local ED should there be a change in any of these risk factors.     Appearance:   Unable to assess over the phone    Eye Contact:   Unable to assess over the phone    Psychomotor Behavior: Unable to assess over the phone    Attitude:   Cooperative  Interested Friendly Pleasant   Orientation:   All   Speech    Rate / Production: Normal/ Responsive Normal     Volume:  Normal    Mood:    Normal   Affect:    Appropriate    Thought Content:  Clear    Thought Form:  Coherent  Logical    Insight:    Good  and Intellectual Insight     Medication Review:   No changes to current psychiatric medication(s)     Medication Compliance:   Yes     Changes in Health Issues:   None reported     Chemical Use Review:   Substance Use: Chemical use reviewed, no active concerns identified      Tobacco Use: No current tobacco use.      Diagnosis:  1. Episode of recurrent major depressive disorder, unspecified depression episode severity (H) F32.9   2. Generalized anxiety disorder F41.1   3. Borderline personality disorder (H) F60.3   4. PTSD (post-traumatic stress disorder) F43.1       Collateral Reports Completed:   Not Applicable    PLAN: (Patient Tasks / Therapist Tasks / Other)  -Patient to read information provided by this writer  -Patient to complete admission for 55+ program tomorrow (11.02.2021) and begin attending on 11.02.2021  -Next session scheduled for 11.05.2021 @12:30pm    DAMIAN Nolan, 11.01.2021

## 2021-11-02 ENCOUNTER — TELEPHONE (OUTPATIENT)
Dept: BEHAVIORAL HEALTH | Facility: CLINIC | Age: 61
End: 2021-11-02

## 2021-11-02 ENCOUNTER — BEH TREATMENT PLAN (OUTPATIENT)
Dept: BEHAVIORAL HEALTH | Facility: CLINIC | Age: 61
End: 2021-11-02
Attending: PSYCHIATRY & NEUROLOGY
Payer: COMMERCIAL

## 2021-11-02 DIAGNOSIS — F33.9 EPISODE OF RECURRENT MAJOR DEPRESSIVE DISORDER, UNSPECIFIED DEPRESSION EPISODE SEVERITY (H): Primary | ICD-10-CM

## 2021-11-02 NOTE — TELEPHONE ENCOUNTER
Writer called Pt today to completed admission for tomorrow.  She was in process of being discharged from a procedure that she had this morning and so requested a return call later today.

## 2021-11-02 NOTE — PROGRESS NOTES
"Outpatient Mental Health Services - Adult     MY COPING PLAN FOR SAFETY     PATIENT'S NAME:    Marissa Youssef  MRN:                           [id number]     SAFETY PLAN:     Step 1: Warning signs / cues (Thoughts, images, mood, situation, behavior) that a crisis may be developing:     ? Thoughts: \"I can't do this anymore\", \"I just want this to end\" and \"Nothing makes it better\"  ? Images: nonbe  ? Thinking Processes: intrusive thoughts (bothersome, unwanted thoughts that come out of nowhere): thoughts to self harm  ? Mood: worsening depression and intense worry  ? Behaviors: not taking care of myself, not taking care of my responsibilities and self harm  ? Situations: changes in symptoms: worsening symptoms   ?    Step 2: Coping strategies - Things I can do to take my mind off of my problems without contacting another person (relaxation technique, physical activity):     ? Distress Tolerance Strategies:   Being outside, I have two dogs and a cat so spending time with them, I like to hike, sometimes just hang out and work and just chat with them for awhile, and I can go on movies on my own or fly to [place] to visit my son.  ? Physical Activities: go for a walk  ? Focus on helpful thoughts:  \"This is temporary\"     Step 3: People and social settings that provide distraction:                 Name: Keanu ()               Step 4: Remind myself of people and things that are important to me and worth living for:  Family     Step 5: When I am in crisis, I can ask these people to help me use my safety plan:                 Name: Keanu ()                    Step 6: Making the environment safe:      ? secure medications:  is going to keep all medications locked up.      Step 7: Professionals or agencies I can contact during a crisis:     ? Suicide Prevention Lifeline: 7-233-920-TALK (4262)  ? Crisis Text Line Service (available 24 hours a day, 7 days a week): Text [place] to 185729  ? Call  **CRISIS " (521154) from a cell phone to talk to a team of professionals who can help you.          Crisis Services By County: Phone Number:   [place]     [phone]   [place]    [phone]   [place]    [phone]   [place]    [phone]   [place]    [phone]   [place] [phone]   [place]     [phone]      ? Call 911 or go to my nearest emergency department.             I helped develop this safety plan and agree to use it when needed.  I have been given a copy of this plan.          Today s date:  10/21/2021  Adapted from Safety Plan Template 2008 Liz Collins and Anirudh Hernandez is reprinted with the express permission of the authors.  No portion of the Safety Plan Template may be reproduced without the express, written permission.  You can contact the authors at [email or web address] or [email or web address]

## 2021-11-02 NOTE — TELEPHONE ENCOUNTER
Completed admission with Marissa today.  Answered program questions.  She will be doing group in [place], [place] since she is a resident of [place].      Pt has a set up at a place called [employer] in [facility].  The address is [address] [place] [address], [place] [address].  She will have a small room (in a turret) in the shop and be able to be there consistently.  Her back will be to the outside and will have headphones on so other patrons of the coffee shop will be able to hear the group.  She will have privacy in this space and will allow her to join the program consistently.      No Safety concerns noted today but she does have a history of self injury and overdosing in order to be hospitalized.  Is wanting to work on additional coping skills to manage these urges and her depressive symptoms.      Ananda will be notified of her start scheduled for tomorrow.

## 2021-11-02 NOTE — PROGRESS NOTES
"Admission Date: 11/2/2021    Confirm patient pronouns: she/her    Why are you seeking treatment/What do you want to focus on during treatment? \"Many years ago diagnosed with borderline personality disorder.  Through a lot of work I got myself to a good functioning place.  I find myself still wanting/making myself sick.  Wanting that validation all the time.  I want to focus on not wanting to have something wrong with me to get help.  Self-esteem issues.In the last year more depressive symptoms and I've gone back to my old MO of overdosing on drugs - careful to not take too much so not at risk for killing self but wanting hospitalization\"    Identify any current concerns with potential impact to admission:     medication/medical concerns: nothing of major concern - colitis     immediate safety concerns:safe today    Does patient have safety plan? yes Note: Please copy safety plan copied into BEH Encounter     Other (insurance/childcare/transportation/housing/planned absences/etc): N/A    Patient's insurance is: [employer] .     Does patient need appointment with provider? yes    Review patient's program schedule and inform them of any variation due to late days or holidays.          Completed by:AVINASH Tinajero      "

## 2021-11-03 ENCOUNTER — HOSPITAL ENCOUNTER (OUTPATIENT)
Dept: BEHAVIORAL HEALTH | Facility: CLINIC | Age: 61
End: 2021-11-03
Attending: PSYCHIATRY & NEUROLOGY
Payer: MEDICARE

## 2021-11-03 PROCEDURE — 90853 GROUP PSYCHOTHERAPY: CPT | Mod: 95

## 2021-11-03 PROCEDURE — 90853 GROUP PSYCHOTHERAPY: CPT | Mod: 95 | Performed by: SOCIAL WORKER

## 2021-11-03 NOTE — GROUP NOTE
Service Modality:  Video Visit     Telemedicine Visit: The patient's condition can be safely assessed and treated via synchronous audio and visual telemedicine encounter.       Reason for Telemedicine Visit: Services only offered telehealth and due to COVID-19.     Originating Site (Patient Location): Patient's home     Distant Site (Provider Location): Provider Remote Setting- Home Office     Consent:  The patient/guardian has verbally consented to: the potential risks and benefits of telemedicine (video visit) versus in person care; bill my insurance or make self-payment for services provided; and responsibility for payment of non-covered services.      Patient would like the video invitation sent by:  My Chart     Mode of Communication:  Video Conference via Medical Zoom     As the provider I attest to compliance with applicable laws and regulations related to telemedicine.    Process Group Note    PATIENT'S NAME: Marissa Youssef  MRN:   [id number]  :   1960  ACCT. NUMBER: [id number]  DATE OF SERVICE: 21  START TIME: 10:00 AM  END TIME: 10:50 AM  FACILITATOR: Suzanne Robles LIC  TOPIC:  Process Group    Diagnoses:  Major depressive disorder, severe, recurrent, without psychosis  Generalized anxiety disorder  PTSD  Borderline personality disorder.       [facility] 55+ Program  TRACK: A2    NUMBER OF PARTICIPANTS: 7          Data:    Session content: At the start of this group, patients were invited to check in by identifying themselves, describing their current emotional status, and identifying issues to address in this group.   Area(s) of treatment focus addressed in this session included Symptom Management, Personal Safety, Community Resources/Discharge Planning, Abstinence/Relapse Prevention, Develop / Improve Independent Living Skills, Develop Socialization / Interpersonal Relationship Skills and Physical Health .    Marissa began the 55+ IOP today. Introductions and pt welcomed in  group. Marissa was able to self disclose in group the mood symptoms leading to her admission. She was recently discharged from inpatient psychiatric unit, 3B and IOP was part of her discharge plan.  She reports having a co-morbid physical health issue. She reports long hx with coping with mental health. She reports being in SSDI. Marissa resides in [place] but is joining the Telemedicine Group at [facility] [employer] in [place], [place].      Therapeutic Interventions/Treatment Strategies:  Psychotherapist offered support, feedback and validation and reinforced use of skills. Treatment modalities used include Cognitive Behavioral Therapy.    Assessment:    Patient response:   Patient responded to session by listening, being attentive and verbalizing understanding    Possible barriers to participation / learning include: and no barriers identified    Health Issues:   Yes: Chronic disease management, Associated Psychological Distress       Substance Use Review:   Substance Use: No active concerns identified.    Mental Status/Behavioral Observations  Appearance:   Appropriate   Eye Contact:   Good   Psychomotor Behavior: Normal   Attitude:   Cooperative  Interested  Orientation:   All  Speech   Rate / Production: Normal    Volume:  Normal   Mood:    Anxious  Depressed   Affect:    Appropriate   Thought Content:   Clear and Safety denies any current safety concerns including suicidal ideation, self-harm, and homicidal ideation  Thought Form:  Coherent  Goal Directed  Logical     Insight:    Good     Plan:     Safety Plan: No current safety concerns identified.  Recommended that patient call 911 or go to the local ED should there be a change in any of these risk factors.     Barriers to treatment: None identified    Patient Contracts (see media tab):  None    Substance Use: Not addressed in session     Continue or Discharge: Patient will continue in 55+ Program (55+) as planned. Patient is likely to benefit from learning and  using skills as they work toward the goals identified in their treatment plan. Marissa will continue for mood stabilization and symptom management education for mental health recovery.       Suzanne Robles, [facility]  November 3, 2021

## 2021-11-03 NOTE — PROGRESS NOTES
55+ Treatment Program:  Individualized Treatment Plan     Date of Plan: 2021    Name: Marissa Youssef MRN: [id number]    : 1960     Program: 55+ Outpatient Program    Clinical Track: A2    DSM5 Diagnosis:  F33.2 Major depressive disorder, severe, recurrent, without psychosis  F41.1 Generalized anxiety disorder  F43.12 Posttraumatic Stress Disorder  F60.3 Borderline personality disorder.     55+ Multidisciplinary Team Members:  Dr. Jaxon Basilio, Cherrie Lucas, RN; Nettie Brian [facility]; ABHIJIT Smith; Madeline Scott LMFT; Jessie Hdz [facility]; MODESTA Mullins    Marissa Youssef will participate in the 55+ Program 3 days per week, 3 hours per day.   Anticipated duration/discharge: 12 weeks    Due to COVID-19, services will be delivered via telemedicine until further notice.     Program Start Date: 11/3/2021  Anticipated Discharge Date: 2022 (pending authorization/clinical changes)    NOTE: Complete CGI     Review Date: Does Marissa Youssef continue to meet criteria to participate in the 55+ Program, 3 days per week; 3 hours per day?   2021 Yes-MIMI Smith, [facility]   2021 staff meeting  yes - Jaxon Basilio MD   2021 yes MODESTA Mullins on 2021 at 1:06 PM     2022 staff meeting  yes - Jaxon Basilio MD on 2022 at 8:24 AM    2022 no MODESTA Mullins on 2022 at 12:43 PM       Client Strengths:  caring, empathetic, employed, goal-focused, good listener, has a previous history of therapy, insightful, intelligent, motivated and open to learning    Client Participation in Plan:  Contributed to goals and plan   Agrees with plan   Received copy of treatment plan   Discussed with staff     Areas of Vulnerability:  Suicidal Ideation   Anxiety  Depressive symptoms     Long-Term Goals:  Knowledge about illness and management of symptoms   Maintenance of personal safety     Abuse Prevention Plan:  Safe, therapeutic environment   Safety  "coping plan as needed   Education regarding illness and skill development   Coordination with care providers     Discharge Criteria:  Satisfactory progress toward treatment goals   Improvement re: identified problems and symptoms   Ability to continue recovery at next level of service   Has a discharge plan in place   Has safety/coping plan in place   Regular attendance as scheduled     Areas of Treatment Focus       Why are you seeking treatment/What do you want to focus on during treatment? \"I would like to learn coping skills to manage mood symptoms and physical pain.\"     Area of Treatment Focus:   Personal Safety  Start Date:    11/3/2021    Goal:  Target Date: 12/29/2021; 1/27/2022 Status: Completed  Client will use coping plan for safety, as needed.      Progress:   12/29/2021 Marissa denies safety concerns, including S/I. AM  1/27/2022 Marissa denies safety concerns, including S/I. AM        Treatment Strategies:   Engage in safety planning when indicated        Area of Treatment Focus:   Symptom Stabilization and Management  Start Date:    11/3/2021    Goal:  Target Date: 12/29/2021; 1/27/2022 Status: Completed  Will learn and practice 1-2 coping skills to help improve anxiety symptoms daily.      Progress:   12/29/2021 Marissa reports a stable mood with no manic or depressive symptoms.  Marissa has been practicing coping ahead and mindfulness to address anxiety. She has also been practicing assertive communication techniques to set boundaries with family and work.  Marissa watches historical fiction shows and completes online puzzles to keep herself busy.    1/27/2022 Marissa continues to report a stable mood.  It is recommended client continues working on this goal in individual therapy.  Writer sent client support group resources as a part of discharge summary. AM        Treatment Strategies:   Facilitate increased self awareness  Teach adaptive coping skills and communication skills        Area of Treatment " Focus:   Develop / Improve Independent Living / Socialization Skills  Start Date:    11/8/2021    Goal:  Target Date: 12/29/2021; 1/27/2022 Status: Completed  Will enlist involvement of support network by asserting needs daily then accepting support.      Progress:  12/29/2021 Marissa has been practicing assertive communication techniques to set boundaries with family and work. She will return to work next week and scheduled a meeting with her manager to plan out the details of her return.  She left a voicemail for her Physical Therapist to ask if she required a lifting restriction at work.    1/27/2022 Marissa continues to practice assertive communication with her family and most recently her coworkers.  Client has reported effectively using assertive communication and conflict resolution strategies when at work.   It is recommended client continues working on this goal in individual therapy.  Writer sent client support group resources as a part of discharge summary. AM        Treatment Strategies:   Assist clients in establishing / strengthening support network          Area of Treatment Focus:   Community Resources / Support and Discharge Planning  Start Date:    11/8/2021    Goal:  Target Date: 12/29/2021; 1/27/2022 Status: Completed  Will develop an aftercare / transition plan by making follow up appointments with outpatient providers: [facility]; [facility] management provider [facility]; and therapist at the [facility] once done with IOP. SHe also consults with a PCP, Miguelito.       Progress:   12/21/2021 Marissa requested a psychiatry referral and a [place] nurse put one in on 12/15/2021.  She received a call from [facility] in [place], [place] to establish care. Due to proximity, she has chosen to see a local psychiatrist Dr. Chalo Skelton in [place], [place]. Appointment is scheduled for March of 2022.    12/29/2021  Marissa has been  "maintaining consistent communication with her care team.  She started physical therapy services in November to address ongoing right shoulder pain.  She established care with an individual therapist Devi Barajas (psychologist at [facility]). She will establish care with psychiatrist Dr. Chalo Skelton in March of 2022.    1/27/2022 Client plans to continue consistent meetings with individual therapist.  She will consult support group resources (in discharge summary) as needed.  She plans on establishing care with Dr. Chalo Skelton in March of 2022.        Treatment Strategies:   Assist with discharge planning  Facilitate increased self awareness         AVINASH Justice LGSW on 12/29/2021 at 1:21 PM  MODESTA Mullins on 1/27/2022 at 12:44 PM          NOTE: Required signatures are completed manually and scanned into the electronic medical record. See \"Media\" tab in epic.    The Individualized Treatment Plan Signature Page has been routed to the provider for co-sign.    I have reviewed the patient's Individualized Treatment Plan and agree with the current goals, interventions and level of care.     Jaxon Basilio MD  12/21/2021, 1/12/2022          "

## 2021-11-03 NOTE — GROUP NOTE
Psychotherapy Group Note    PATIENT'S NAME: Marissa Youssef  MRN:   [id number]  :   1960  ACCT. NUMBER: [id number]  DATE OF SERVICE: 21  START TIME: 11:00 AM  END TIME: 11:50 AM  FACILITATOR: Nettie Brian LICSW  TOPIC: MH EBP Group: Coping Skills  [facility] 55+ Program  TRACK: A2                              Service Modality:  Video Visit     Telemedicine Visit: The patient's condition can be safely assessed and treated via synchronous audio and visual telemedicine encounter.      Reason for Telemedicine Visit: Services only offered telehealth    Originating Site (Patient Location): Patient's home    Distant Site (Provider Location): [facility]ENTAL HE[facility] & ADDI[facility]    Consent:  The patient/guardian has verbally consented to: the potential risks and benefits of telemedicine (video visit) versus in person care; bill my insurance or make self-payment for services provided; and responsibility for payment of non-covered services.     Patient would like the video invitation sent by:  My Chart    Mode of Communication:  Video Conference via Medical Zoom    As the provider I attest to compliance with applicable laws and regulations related to telemedicine.         NUMBER OF PARTICIPANTS: 7  Summary of Group / Topics Discussed:  Foundations of Health: Sleep part 2: Discussed sleep issues and connection with mental illness. Discussed sleep hygiene techniques and ways to improve sleep.     Patient Session Goals / Objectives:  1. Described the effect and purpose of sleep on the brain and identify the amount of sleep needed  2. Identify sleep hygiene techniques  3. Described the connection between sleep disturbances and mental illness  ? Increased knowledge about treatments for sleep disorders         Patient Participation / Response:  Fully participated with the group by sharing personal reflections / insights and openly received / provided feedback with other  participants.    Demonstrated understanding of topics discussed through group discussion and participation, Expressed understanding of the relevance / importance of coping skills at distressing times in life and Identified / Expressed personal readiness to practice new coping skills    Treatment Plan:  Patient has an initial individualized treatment plan that was created as part of their diagnostic assessment / admission process.  A master individualized treatment plan is in the process of being developed with the patient and multi-disciplinary care team.    ABHIJIT DoranSW

## 2021-11-03 NOTE — GROUP NOTE
Psychotherapy Group Note    PATIENT'S NAME: Marissa Youssef  MRN:   [id number]  :   1960  ACCT. NUMBER: [id number]  DATE OF SERVICE: 21  START TIME:  9:00 AM  END TIME:  9:50 AM  FACILITATOR: Nettie Brian LICSW  TOPIC: MH EBP Group: Coping Skills  [facility] 55+ Program  TRACK: A2                              Service Modality:  Video Visit     Telemedicine Visit: The patient's condition can be safely assessed and treated via synchronous audio and visual telemedicine encounter.      Reason for Telemedicine Visit: Services only offered telehealth    Originating Site (Patient Location): Patient's home    Distant Site (Provider Location): [facility] MENTAL HEALTH & ADDICTION SERVICES    Consent:  The patient/guardian has verbally consented to: the potential risks and benefits of telemedicine (video visit) versus in person care; bill my insurance or make self-payment for services provided; and responsibility for payment of non-covered services.     Patient would like the video invitation sent by:  My Chart    Mode of Communication:  Video Conference via Medical Zoom    As the provider I attest to compliance with applicable laws and regulations related to telemedicine.         NUMBER OF PARTICIPANTS: 7    Summary of Group / Topics Discussed:  Coping Skills: Additional Coping Skills:  Patients learned about stress management skills.  Reviewed the benefits of applying the aforementioned coping strategies.  Patients explored how these strategies might be applied to daily stressors or distressing situations.    Patient Session Goals / Objectives:    Understand the purpose and benefits of applying stress management coping strategies    Identified those skills that may be helpful    Address barriers to utilizing coping skills when in distress.        Patient Participation / Response:  Fully participated with the group by sharing personal reflections / insights and openly received / provided  feedback with other participants.    Demonstrated understanding of topics discussed through group discussion and participation, Expressed understanding of the relevance / importance of coping skills at distressing times in life and Identified / Expressed personal readiness to practice new coping skills    Treatment Plan:  Patient has an initial individualized treatment plan that was created as part of their diagnostic assessment / admission process.  A master individualized treatment plan is in the process of being developed with the patient and multi-disciplinary care team.    ABHIJIT DoranSW

## 2021-11-04 ENCOUNTER — HOSPITAL ENCOUNTER (OUTPATIENT)
Dept: BEHAVIORAL HEALTH | Facility: CLINIC | Age: 61
End: 2021-11-04
Attending: PSYCHIATRY & NEUROLOGY
Payer: MEDICARE

## 2021-11-04 PROCEDURE — 90853 GROUP PSYCHOTHERAPY: CPT | Mod: GT

## 2021-11-04 PROCEDURE — 90853 GROUP PSYCHOTHERAPY: CPT | Mod: GT | Performed by: SOCIAL WORKER

## 2021-11-04 NOTE — GROUP NOTE
Service Modality:  Video Visit     Telemedicine Visit: The patient's condition can be safely assessed and treated via synchronous audio and visual telemedicine encounter.      Reason for Telemedicine Visit: Services only offered telehealth    Originating Site (Patient Location): Patient's home    Distant Site (Provider Location): Provider Remote Setting- Home Office    Consent:  The patient/guardian has verbally consented to: the potential risks and benefits of telemedicine (video visit) versus in person care; bill my insurance or make self-payment for services provided; and responsibility for payment of non-covered services.     Patient would like the video invitation sent by:  My Chart    Mode of Communication:  Video Conference via Medical Zoom    As the provider I attest to compliance with applicable laws and regulations related to telemedicine.      Psychotherapy Group Note    PATIENT'S NAME: Marissa Youssef  MRN:   [id number]  :   1960  ACCT. NUMBER: [id number]  DATE OF SERVICE: 21  START TIME: 10:00 AM  END TIME: 10:50 AM  FACILITATOR: Madeline Scott LMFT  TOPIC:  EBP Group: Coping Skills  [facility] 55+ Program  TRACK: A2    NUMBER OF PARTICIPANTS: 7    Summary of Group / Topics Discussed:  Coping Skills: Relapse Planning: Patients discussed and increased understanding of how anticipating and planning for possible increased symptoms is a proactive way to reduce the likelihood of relapse.  Patients shared individualized factors that may lead to increased symptoms and decompensation in functioning.  Each patient developed a relapse prevention plan designed to help them recognize when they may have increasing symptoms requiring them to take action and notify their key supports and care team.      Patient Session Goals / Objectives:    Identify and understand what factors may contribute to symptom relapse.    Identify actions that may be taken to manage  increased symptoms/stressors.    Create an individualized written relapse plan    Problem solve barriers to plan creation and implementation    Share relapse plan with key support people        Patient Participation / Response:  Fully participated with the group by sharing personal reflections / insights and openly received / provided feedback with other participants.    Demonstrated understanding of topics discussed through group discussion and participation, Expressed understanding of the relevance / importance of coping skills at distressing times in life and Identified / Expressed personal readiness to practice new coping skills    Treatment Plan:  Patient has a current master individualized treatment plan.  See Epic treatment plan for more information.    LINDA Dodson

## 2021-11-04 NOTE — GROUP NOTE
Service Modality:  Video Visit     Telemedicine Visit: The patient's condition can be safely assessed and treated via synchronous audio and visual telemedicine encounter.       Reason for Telemedicine Visit: Services only offered telehealth and due to COVID-19.     Originating Site (Patient Location): Pt is in a [place] shop in [place].     Distant Site (Provider Location): Provider Remote Setting- Home Office     Consent:  The patient/guardian has verbally consented to: the potential risks and benefits of telemedicine (video visit) versus in person care; bill my insurance or make self-payment for services provided; and responsibility for payment of non-covered services.      Patient would like the video invitation sent by:  My Chart     Mode of Communication:  Video Conference via Medical Zoom     As the provider I attest to compliance with applicable laws and regulations related to telemedicine.    Process Group Note    PATIENT'S NAME: Marissa Youssef  MRN:   [id number]  :   1960  ACCT. NUMBER: [id number]  DATE OF SERVICE: 21  START TIME:  9:00 AM  END TIME:  9:50 AM  FACILITATOR: Suzanne Robles [facility]  TOPIC:  Process Group    Diagnoses:  Major Depressive Disorder, recurrent, severe   Generalized anxiety disorder   Borderline personality disorder   PTSD (post-traumatic stress disorder)       [facility] 55+ Program  TRACK: A2    NUMBER OF PARTICIPANTS: 8          Data:    Session content: At the start of this group, patients were invited to check in by identifying themselves, describing their current emotional status, and identifying issues to address in this group.   Area(s) of treatment focus addressed in this session included Symptom Management, Personal Safety, Community Resources/Discharge Planning, Abstinence/Relapse Prevention, Develop / Improve Independent Living Skills and Develop Socialization / Interpersonal Relationship Skills.    Marissa reports mood is good. She reports less  depressed and less anxious mood. Denies S/I or safety issues. She disclosed more about her family system. She resides with her  and pets. She has three adult children. Two of them reside in the local area and her son resides in [facility] [place]. She has two grandchildren. Marissa is looking forward to attending the [facility].  She also is looking forward to spending time by visiting her sister n law who had recent back surgery and is bed bound.      Therapeutic Interventions/Treatment Strategies:  Psychotherapist offered support, feedback and validation and reinforced use of skills. Treatment modalities used include Cognitive Behavioral Therapy.    Assessment:    Patient response:   Patient responded to session by accepting feedback, giving feedback, listening, focusing on goals, being attentive, accepting support and verbalizing understanding    Possible barriers to participation / learning include: and no barriers identified    Health Issues:   None reported       Substance Use Review:   Substance Use: No active concerns identified.    Mental Status/Behavioral Observations  Appearance:   Appropriate   Eye Contact:   Good   Psychomotor Behavior: Normal   Attitude:   Cooperative  Interested Pleasant  Orientation:   All  Speech   Rate / Production: Normal    Volume:  Normal   Mood:    Mood is improving-less depressed and less anxious mood  Affect:    Appropriate   Thought Content:   Clear and Safety denies any current safety concerns including suicidal ideation, self-harm, and homicidal ideation  Thought Form:  Coherent  Goal Directed  Logical     Insight:    Good     Plan:     Safety Plan: No current safety concerns identified.  Recommended that patient call 911 or go to the local ED should there be a change in any of these risk factors.     Barriers to treatment: None identified    Patient Contracts (see media tab):  None    Substance Use: Not addressed in session     Continue  or Discharge: Patient will continue in 55+ Program (55+) as planned. Patient is likely to benefit from learning and using skills as they work toward the goals identified in their treatment plan. Marissa will continue for mood stabilization and symptom management education for mental health recovery.      Suzanne Robles, [facility]  November 4, 2021

## 2021-11-04 NOTE — GROUP NOTE
Service Modality:  Video Visit     Telemedicine Visit: The patient's condition can be safely assessed and treated via synchronous audio and visual telemedicine encounter.       Reason for Telemedicine Visit: Services only offered telehealth and due to COVID-19.     Originating Site (Patient Location): Patient's home     Distant Site (Provider Location): Provider Remote Setting- Home Office     Consent:  The patient/guardian has verbally consented to: the potential risks and benefits of telemedicine (video visit) versus in person care; bill my insurance or make self-payment for services provided; and responsibility for payment of non-covered services.      Patient would like the video invitation sent by:  My Chart     Mode of Communication:  Video Conference via Medical Zoom     As the provider I attest to compliance with applicable laws and regulations related to telemedicine.    Psychotherapy Group Note    PATIENT'S NAME: Marissa Youssef  MRN:   [id number]  :   1960  ACCT. NUMBER: [id number]  DATE OF SERVICE: 21  START TIME: 11:00 AM  END TIME: 11:50 AM  FACILITATOR: Suzanne Robles LICSW  TOPIC:  EBP Group: Specialty Awareness  [facility] 55+ Program  TRACK: A2    NUMBER OF PARTICIPANTS: 8    Summary of Group / Topics Discussed:  Specialty Topics: Hope: The topic of hope was presented in order to help patients better understand the symptoms of hopelessness and how to become more hopeful. Patients discussed their current awareness of the topic and relevance to their functioning. Individual experiences with symptoms and treatment options were also discussed. Patients explored options for ongoing/future treatment and symptom management.      Patient Session Goals / Objectives:    Discussed definition of hopelessness    Discussed how hopelessness impacts functioning    Set a plan to utilize skills to reduce hopelessness        Patient Participation / Response:  Fully participated with the  group by sharing personal reflections / insights and openly received / provided feedback with other participants.    Demonstrated understanding of topics discussed through group discussion and participation, Verbalized understanding of ways to proactively manage illness and Practiced skills in session    Treatment Plan:  Patient has a current master individualized treatment plan.  See Epic treatment plan for more information.    Suzanne Robles, LICSW

## 2021-11-05 ENCOUNTER — HOSPITAL ENCOUNTER (OUTPATIENT)
Dept: BEHAVIORAL HEALTH | Facility: CLINIC | Age: 61
End: 2021-11-05
Attending: PSYCHIATRY & NEUROLOGY
Payer: MEDICARE

## 2021-11-05 PROCEDURE — 99204 OFFICE O/P NEW MOD 45 MIN: CPT | Mod: TEL | Performed by: PSYCHIATRY & NEUROLOGY

## 2021-11-05 NOTE — H&P
"[facility]   Adult Mental Health Outpatient Programs  Provider Intake Note    Program: 55+ Intensive Outpatient   Track: A-2    Patient: Marissa Youssef  MRN: [id number]  : 1960  Acct. No.: [id number]  Date of Service:  21  Session Start Time:  1041  Session End Time:  1113    NB: Due to technical difficulties this visit was completed by telephone      Diagnostic Assessment Date: 10/21/2021    Outpatient Providers:  Current Psychiatrist: through [facility]   Current Psychotherapist: through [facility]     Identifying Data:  Marissa Youssef, a 61 year old-year-old female with history of depression, anxiety, PTSD, borderline personality disorder, presents for initial visit with me to provide oversight of programmatic care. Patient attended the phone/video session alone and prefers to be called: \"Marissa.\"    Presenting Concern:  \"I'm really motivated to get this thing under control.\"    History of Present Illness:  Marissa endorses, \"I had my first real blowout 25 years ago,\" eventually resulting in a diagnosis of borderline personality disorder.  She completed dialectical behavioral therapy, found it helpful tremendously.  Notes, \"things got better for a long time after this.    Endorses that, \"in the last 18 mo, things started to slide.\"  Has struggled to put skills into effect, has been taking \"extra medication just to numb\" her feelings.    In general, feels symptoms of been more consistent with depression in the past.  More recently has developed anxiety.  States she is not experiencing anxiety even in response to \"good things,\" e.g. visitng her grandkids.  Finds the anxiety is also interfering with her ability to employ coping skills.    Has outpatient providers through her county [place], and they have had a great deal of turnover/[place]'s physicians so she feels her treatment has been somewhat inconsistent.    Has had a couple of inpatient hospitalizations in the past " "6 months.    Recently discharged (10/25) from [facility] inpatient psychiatry, was admitted for 22 days.  States diagnosis of bipolar 2 was discussed given mood lability, but patient remains unconvinced.    States, \"my MO is to take my drugs not as prescribed,\" particularly hydroxyzine, propranolol, and amlodipine.  This prescribed Tylenol 3 from her pain specialist and given her treatment contract with that clinic, is careful to never abuse that medication or take other than prescribed as she finds it helpful for her back pain and does not want to no longer have access to it. Since home from hospital,  has drugs locked up in a cabinet, keeps weekly medications and a medication box, and will give the patient 1 days worth of medication a time with access to her 3 as-needed hydroxyzine only.    Also noting some new neurological symptoms.    Diagnosed with essential tremor a few years ago, has found it is getting much worse and more noticeable over time    Also poor balance, will fall unexpectedly    Denies dizzyness, vertigo, etc.    Blurrier vision, just got new glasses a year  o When asked, does not see vision improvement covering 1 eye or the other    Has discussed with PCP    Had a neurological workup, was started on topamax for tremor, 1st EMG normal, neurologist would like to a second EMG.  Due to financial difficulties, patient has not scheduled a follow-up and actually weaned herself off Topamax which was only restarted by inpatient providers at her most recent psychiatric hospitalization.  Discussed return to that neurologist for further work-up.    Regarding Current Medications:    Is not interested in making changes at this time    Substance use:    Denies    Safety Assessment:    Suicidal ideation: denies current or recent suicidal ideation or behavior    Thoughts of non-suicidal self-injury: denied    Recent self-injurious behavior: denied    Homicidal ideation: " denied    Other safety concerns: denied    Psychiatric Review of Symptoms:  Review of systems recorded in diagnostic assessment reviewed with patient.  Today notes:    Endorses mood lability still    Medical Review of Systems:  Per above    Recent Screenings:  WHODAS 2.0 Total Score 10/21/2021   Total Score 19       Metrics:  PHQ-9 scores:   PHQ-9 SCORE 10/21/2021 10/26/2021 10/26/2021 11/2/2021   PHQ-9 Total Score MyChart - - 10 (Moderate depression) 14 (Moderate depression)   PHQ-9 Total Score 4 10 10 14       RACQUEL-7 scores:   RACQUEL-7 SCORE 10/26/2021 10/26/2021 11/2/2021   Total Score - 17 (severe anxiety) 17 (severe anxiety)   Total Score 17 17 17       CSSR-S: No flowsheet data found.    Medications:  Current Outpatient Medications   Medication     amLODIPine (NORVASC) 5 MG tablet     busPIRone (BUSPAR) 10 MG tablet     Calcium Carbonate-Vit D-Min (CALCIUM 1200 PO)     divalproex sodium delayed-release (DEPAKOTE) 250 MG DR tablet     DULoxetine (CYMBALTA) 30 MG capsule     hydrOXYzine (ATARAX) 50 MG tablet     Lidocaine (LIDOCARE) 4 % Patch     Melatonin 10 MG TABS tablet     multivitamin, therapeutic (THERA-VIT) TABS tablet     omeprazole 20 MG tablet     ondansetron (ZOFRAN) 4 MG tablet     prazosin (MINIPRESS) 2 MG capsule     QUEtiapine (SEROQUEL XR) 50 MG TB24 24 hr tablet     QUEtiapine (SEROQUEL) 400 MG tablet     topiramate (TOPAMAX) 50 MG tablet     Vitamin D, Cholecalciferol, 25 MCG (1000 UT) TABS     No current facility-administered medications for this encounter.       The above list was reviewed and updated in [facility] with patient today.     Patient is taking medications as prescribed and denies adverse effects.    Psychiatric History:   Outpatient providers listed above.    Otherwise as noted above or in diagnostic assessment.       Substance Use History:  As noted above or in diagnostic assessment.     Past Medical History:  As noted above or in diagnostic assessment.     Vital Signs:  None since this  "is a phone/video visit.     Labs:  Most recent labs reviewed and no new labs since discharge from the hospital.  Last valproate level 78 mg/L on 10/25    Family History:   As noted above or in diagnostic assessment.     Social History:   As noted above or in diagnostic assessment.     Legal History:  As noted above or in diagnostic assessment.     Significant Losses / Trauma / Abuse / Neglect Issues:  As noted above or in diagnostic assessment.       Mental Status Examination (limited due to phone visit format):  Vital Signs: There were no vitals taken for this virtual visit.  Appearance: Unable to assess   Attitude: cooperative   Eye Contact: Unable to assess  Muscle Strength and Tone: Unable to assess  Psychomotor Behavior:  Unable to assess  Gait and Station: Unable to assess  Speech: clear, coherent, normal prosody, regular rate, regular rhythm and fluent  Associations: No loosening of associations  Thought Process: coherent and goal directed  Thought Content: no evidence of suicidal ideation or homicidal ideation, no evidence of psychotic thought, no auditory hallucinations present and no visual hallucinations present  Mood: \"depressed\"  Affect: difficult to assess over the phone, grossly appropriate and in normal range  Insight: good  Judgment: intact, adequate for safety  Impulse Control: intact  Oriented to: time, place, person and situation  Attention Span and Concentration: normal  Language: Intact  Recent and Remote Memory: intact to interview. Not formally assessed. No evidence of amnesia.  Fund of Knowledge: appropriate    DSM5 Diagnosis/es:  296.32 (F33.1) Major Depressive Disorder, Recurrent Episode, Moderate With anxious distress  300.02 (F41.1) Generalized Anxiety Disorder  309.81 (F43.10) Posttraumatic Stress Disorder  301.83 (F60.3) Borderline Personality Disorder      Assessment:  Marissa presents today for initial visit with me as part of program intake, coordination, and supervision.  Patient " attributes much of her recent difficulties to a struggle to implement DBT skills, presents for therapy in part to enhance that practice.       Depression  o Less emphasized by patient today compared to anxiety  o Feels current medication is adequate to her needs  o Has enrolled in 55+ program      Anxiety  o More prevalent per patient  o Present in a variety of spheres  o Curiously, it is impairing enjoyment of even favorable activities  o May be concurrent with other neurological changes  o Again, patient is not interested in medication changes at this time      PTSD, borderline personality disorder  o Treatment of choice is psychotherapy  o Patient is enrolling in 55+ intensive outpatient program  o No change to medications as discussed above      Neurological changes  o Possibly related to anxiety (or vice versa)  o Has outpatient neurology, has not followed up recently  o I encouraged the patient today to schedule that follow-up appointment for further work-up      Risk Assessment  o Today Marissa denied suicidal ideation  o Was recently discharged from inpatient unit, denying safety concerns at that time  o Is future-oriented, engaged in her own treatment and treatment planning, etc.  o Denies any safety concerns  o Based on the above, I do not feel that Marissa meets criteria for a 72-hour involuntary hold and remains appropriate for an outpatient level of care.       Treatment Plan:  Medications:    Continue:   o Buspirone 10 mg by mouth 3 times daily  o Duloxetine 90 mg by mouth once daily  o Hydroxyzine 50 mg by mouth 3 times daily as needed  o Melatonin 10 mg by mouth daily at bedtime as needed for insomnia  o Prazosin 2 mg by mouth daily at bedtime  o Quetiapine extended-release 100 mg by mouth daily (AM)  o Quetiapine (immediate-release) 400 mg by mouth daily at bedtime    Continue all other medications as reviewed per electronic medical record today    Continue therapy as planned:    Enrolled in 55+  Intensive Outpatient    Continue with individual therapist as appropriate    Safety plan reviewed.     To the Emergency Department as needed or call after hours crisis line at [phone] or [phone]. [place] Crisis Text Line: Text [place] to 954837  or  Suicide LifeLine Chat: suicideCollegeZen.org/chat    Follow-up:     schedule an appointment with me or another program provider in approximately 4 weeks or sooner if needed.  Call [facility] at [phone] to schedule.    Follow up with outpatient provider as planned or sooner if needed for acute medical concerns.    Questions or concerns:    Call the psychiatric nurse line with medication questions or concerns at [phone].    BioBeats may be used to communicate with your provider, but this is not intended to be used for emergencies.      Community Resources:    National Suicide Prevention Lifeline: 499.814.9676 (TTY: 990.987.6441). Call anytime for help.  (www.suicidepreventionlifeline.org)  National [place] on Mental Illness (www.[place].org): 672.710.6464 or 312-323-7756.   Mental Health Association (www.mentalhealth.org): 695.530.1242 or 657-935-9732.  [place] Crisis Text Line: Text [place] to 634211  Suicide LifeLine Chat: suicideCollegeZen.org/chat    Treatment Objective(s) Addressed in This Session:  One purpose of today's call is for this writer to provide oversight of patient's care while receiving program services. Specific treatment goals addressed included personal safety, symptoms stabilization and management, wellness and mental health, and community resources/discharge planning.     This author or another program provider will follow up with the patient as noted above.    Patient continues to meet criteria for recommended level of care: 55+ Intensive Outpatient  Patient would be at reasonable risk of requiring a higher level of care in the absence of current services.    Patient agrees with the current plan of  care.    Signed:   Jaxon Basilio MD   November 5, 2021      Visit Details:  Type of service:  Phone Visit    Start/End Time: see above    Originating Location (pt. Location): Home in [place]    Distant Location (provider location): Provider Remote Setting- Home Office    Platform used for Video Visit: Unable to complete video visit; completed by telephone    Physician has received verbal consent for a Video Visit from the patient? Yes    50 minutes spent on the date of the encounter doing chart review, patient visit, documentation and discussion with other provider(s)     This document completed in part using Dragon Medical One dictation software.  Please excuse any inadvertent word or phrase substitutions.

## 2021-11-08 ENCOUNTER — HOSPITAL ENCOUNTER (OUTPATIENT)
Dept: BEHAVIORAL HEALTH | Facility: CLINIC | Age: 61
End: 2021-11-08
Attending: PSYCHIATRY & NEUROLOGY
Payer: MEDICARE

## 2021-11-08 PROCEDURE — 90853 GROUP PSYCHOTHERAPY: CPT | Mod: 95 | Performed by: SOCIAL WORKER

## 2021-11-08 PROCEDURE — 90853 GROUP PSYCHOTHERAPY: CPT | Mod: GT

## 2021-11-08 NOTE — GROUP NOTE
Service Modality:  Video Visit     Telemedicine Visit: The patient's condition can be safely assessed and treated via synchronous audio and visual telemedicine encounter.       Reason for Telemedicine Visit: Services only offered telehealth and due to COVID-19.     Originating Site (Patient Location): Patient's home     Distant Site (Provider Location): Provider Remote Setting- Home Office     Consent:  The patient/guardian has verbally consented to: the potential risks and benefits of telemedicine (video visit) versus in person care; bill my insurance or make self-payment for services provided; and responsibility for payment of non-covered services.      Patient would like the video invitation sent by:  My Chart     Mode of Communication:  Video Conference via Medical Zoom     As the provider I attest to compliance with applicable laws and regulations related to telemedicine.    Psychoeducation Group Note    PATIENT'S NAME: Marissa Youssef  MRN:   [id number]  :   1960  ACCT. NUMBER: [id number]  DATE OF SERVICE: 21  START TIME: 11:00 AM  END TIME: 11:50 AM  FACILITATOR: Suzanne Robles LICSW  TOPIC: MH EBP Group: Health Maintenance  [facility] 55+ Program  TRACK: A2    NUMBER OF PARTICIPANTS: 7    Summary of Group / Topics Discussed:  Health Maintenance: Goal Setting: Meaningful goals can bring a sense of direction and purpose in life.  They also highlight our most important values. Patients were assisted by instructor to identify short term goals to promote their mental health recovery and improve overall health and wellness. Patients were educated on SMART goal setting framework as a strategy to increase outcomes and promote success.    Patient Session Goals / Objectives:  ? Explained the key concepts of SMART goal setting framework  ? Identified three goals successfully using SMART goal setting framework  ? Reviewed concept of balance in wellness as it pertains to goal setting         Patient Participation / Response:  Fully participated with the group by sharing personal reflections / insights and openly received / provided feedback with other participants.    Demonstrated understanding of topics discussed through group discussion and participation and Verbalized understanding of health maintenance topic    Treatment Plan:  Patient has a current master individualized treatment plan.  See Epic treatment plan for more information.    Suzanne Robles, LICSW

## 2021-11-08 NOTE — GROUP NOTE
Psychoeducation Group Note    PATIENT'S NAME: Marissa Youssef  MRN:   [id number]  :   1960  ACCT. NUMBER: [id number]  DATE OF SERVICE: 21  START TIME: 10:00 AM  END TIME: 10:50 AM  FACILITATOR: Nettie Brian LICSW  TOPIC: ANTONIA RN Group: Health Maintenance  [facility] 55+ Program  TRACK: A2                              Service Modality:  Video Visit     Telemedicine Visit: The patient's condition can be safely assessed and treated via synchronous audio and visual telemedicine encounter.      Reason for Telemedicine Visit: Services only offered telehealth    Originating Site (Patient Location): Patient's home    Distant Site (Provider Location): [facility] MENTAL HEALTH & ADDICTION SERVICES    Consent:  The patient/guardian has verbally consented to: the potential risks and benefits of telemedicine (video visit) versus in person care; bill my insurance or make self-payment for services provided; and responsibility for payment of non-covered services.     Patient would like the video invitation sent by:  My Chart    Mode of Communication:  Video Conference via Medical Zoom    As the provider I attest to compliance with applicable laws and regulations related to telemedicine.         NUMBER OF PARTICIPANTS: 6    Summary of Group / Topics Discussed:  Health Maintenance: Discharge planning/Community resources: Patients worked on completing an instructor-facilitated discharge planning activity. Discharge planning begins for all patients after admission. Competent discharge planning promotes a successful transition and decreases the likelihood of mental health relapse. In this group, all dimensions of wellness were reviewed to assess for needs/discharge readiness. These dimensions included: physical, emotional, occupational/productivity, environmental, social, spiritual, intellectual, and financial. Patients worked on completing/updating their discharge planning and identifying their treatment  needs prior to time of discharge.     Patient Session Goals / Objectives:  ? Identified unmet treatment needs to accomplish before discharge  ? Completed all dimensions of the discharge planning packet  ? Participated in the planning process, make phone calls, set up appointments, got connected with community resources, followed up with treatment team as needed         Patient Participation / Response:  Fully participated with the group by sharing personal reflections / insights and openly received / provided feedback with other participants.    Demonstrated understanding of topics discussed through group discussion and participation and Identified / Expressed personal readiness to practice skills    Treatment Plan:  Patient has a current master individualized treatment plan.  See Epic treatment plan for more information.    Nettie Valdes, LICSW

## 2021-11-08 NOTE — GROUP NOTE
Service Modality:  Video Visit     Telemedicine Visit: The patient's condition can be safely assessed and treated via synchronous audio and visual telemedicine encounter.       Reason for Telemedicine Visit: Services only offered telehealth and due to COVID-19.     Originating Site (Patient Location): Patient's home     Distant Site (Provider Location): Provider Remote Setting- Home Office     Consent:  The patient/guardian has verbally consented to: the potential risks and benefits of telemedicine (video visit) versus in person care; bill my insurance or make self-payment for services provided; and responsibility for payment of non-covered services.      Patient would like the video invitation sent by:  My Chart     Mode of Communication:  Video Conference via Medical Zoom     As the provider I attest to compliance with applicable laws and regulations related to telemedicine.    Process Group Note    PATIENT'S NAME: Marissa Youssef  MRN:   [id number]  :   1960  ACCT. NUMBER: [id number]  DATE OF SERVICE: 21  START TIME:  9:00 AM  END TIME:  9:50 AM  FACILITATOR: Suzanne Robles [facility]  TOPIC:  Process Group    Diagnoses:  F 32.9 Major Depression Disorder, recurrent, severe   F41.1 Generalized anxiety disorder F41.1  F60.3 Borderline personality disorder (H) F60.3  F43.1 PTSD (post-traumatic stress disorder) F43.1      [facility] 55+ Program  TRACK: A2    NUMBER OF PARTICIPANTS: 7          Data:    Session content: At the start of this group, patients were invited to check in by identifying themselves, describing their current emotional status, and identifying issues to address in this group.   Area(s) of treatment focus addressed in this session included Symptom Management, Personal Safety, Community Resources/Discharge Planning, Abstinence/Relapse Prevention, Develop / Improve Independent Living Skills, Develop Socialization / Interpersonal Relationship Skills and Physical Health  .    Marissa reports less depressed and less anxious mood. Denies S/I or safety issues. She processed fainting after getting her third COVID-19 booster. She does have a bruise on her right butt cheek and a bump on her head and placed a call into her PCP. She was able to enjoy attending a comedy show with her  at the [employer] on Sunday. Her   Is aware of the fall that she does not remember.    Therapeutic Interventions/Treatment Strategies:  Psychotherapist offered support, feedback and validation and reinforced use of skills. Treatment modalities used include Cognitive Behavioral Therapy.    Assessment:    Patient response:   Patient responded to session by verbalizing understanding    Possible barriers to participation / learning include: and no barriers identified    Health Issues:   Yes: Received third COVID-19 booster. She reports fainting and hitting her head resulting a knot, bruise on her right butt check and soar on back. She placed a call into her PCP.       Substance Use Review:   Substance Use: No active concerns identified.    Mental Status/Behavioral Observations  Appearance:   Appropriate   Eye Contact:   Good   Psychomotor Behavior: Normal   Attitude:   Cooperative  Interested  Orientation:   All  Speech   Rate / Production: Normal    Volume:  Normal   Mood:    Less depressed and less anxious mood  Affect:    Appropriate   Thought Content:   Clear and Safety denies any current safety concerns including suicidal ideation, self-harm, and homicidal ideation  Thought Form:  Coherent  Goal Directed  Logical     Insight:    Good     Plan:     Safety Plan: No current safety concerns identified.  Recommended that patient call 911 or go to the local ED should there be a change in any of these risk factors.     Barriers to treatment: None identified    Patient Contracts (see media tab):  None    Substance Use: Not addressed in session     Continue or Discharge: Patient will continue in 55+ Program (55+)  as planned. Patient is likely to benefit from learning and using skills as they work toward the goals identified in their treatment plan. Marissa will continue for mood stabilization and symptom management education for mental health recovery.      Suzanne Robles, [facility]  November 8, 2021

## 2021-11-09 ENCOUNTER — NURSE TRIAGE (OUTPATIENT)
Dept: NURSING | Facility: CLINIC | Age: 61
End: 2021-11-09
Payer: COMMERCIAL

## 2021-11-09 NOTE — PROGRESS NOTES
Acknowledgement of Current Treatment Plan       I have reviewed my treatment plan with my therapist, MIMI Smith LICSW.   I agree with the plan as it is written in the electronic health record. (A-2 Track)      Name:      Signature:  Marissa Youssef         Unable to sign.    Jaxon Basilio MD  Psychiatrist/Medical Director   Jaxon Basilio MD on 11/10/2021 at 8:08 AM     AVINASH Romeo  Psychotherapist        MIMI Smith LICSW   Psychotherapist   MIMI Smith LICSW

## 2021-11-10 ENCOUNTER — TELEPHONE (OUTPATIENT)
Dept: BEHAVIORAL HEALTH | Facility: CLINIC | Age: 61
End: 2021-11-10
Payer: COMMERCIAL

## 2021-11-10 ENCOUNTER — HOSPITAL ENCOUNTER (OUTPATIENT)
Dept: BEHAVIORAL HEALTH | Facility: CLINIC | Age: 61
End: 2021-11-10
Attending: PSYCHIATRY & NEUROLOGY
Payer: MEDICARE

## 2021-11-10 ENCOUNTER — HOSPITAL ENCOUNTER (EMERGENCY)
Facility: CLINIC | Age: 61
Discharge: HOME | End: 2021-11-10
Attending: EMERGENCY MEDICINE | Admitting: EMERGENCY MEDICINE
Payer: MEDICARE

## 2021-11-10 ENCOUNTER — APPOINTMENT (OUTPATIENT)
Dept: CT IMAGING | Facility: CLINIC | Age: 61
End: 2021-11-10
Attending: EMERGENCY MEDICINE
Payer: MEDICARE

## 2021-11-10 VITALS
TEMPERATURE: 98 F | WEIGHT: 195 LBS | SYSTOLIC BLOOD PRESSURE: 127 MMHG | HEART RATE: 87 BPM | DIASTOLIC BLOOD PRESSURE: 80 MMHG | RESPIRATION RATE: 16 BRPM | BODY MASS INDEX: 34.54 KG/M2 | OXYGEN SATURATION: 97 %

## 2021-11-10 DIAGNOSIS — R41.0 INTERMITTENT CONFUSION: ICD-10-CM

## 2021-11-10 DIAGNOSIS — R55 SYNCOPE, UNSPECIFIED SYNCOPE TYPE: ICD-10-CM

## 2021-11-10 LAB
ALBUMIN SERPL-MCNC: 3.5 G/DL (ref 3.4–5)
ALBUMIN UR-MCNC: 20 MG/DL
ALP SERPL-CCNC: 95 U/L (ref 40–150)
ALT SERPL W P-5'-P-CCNC: 31 U/L (ref 0–50)
AMORPH CRY #/AREA URNS HPF: ABNORMAL /HPF
ANION GAP SERPL CALCULATED.3IONS-SCNC: 3 MMOL/L (ref 3–14)
APPEARANCE UR: ABNORMAL
AST SERPL W P-5'-P-CCNC: 31 U/L (ref 0–45)
ATRIAL RATE - MUSE: 83 BPM
BACTERIA #/AREA URNS HPF: ABNORMAL /HPF
BASOPHILS # BLD AUTO: 0 10E3/UL (ref 0–0.2)
BASOPHILS NFR BLD AUTO: 1 %
BILIRUB SERPL-MCNC: 0.4 MG/DL (ref 0.2–1.3)
BILIRUB UR QL STRIP: NEGATIVE
BUN SERPL-MCNC: 8 MG/DL (ref 7–30)
CALCIUM SERPL-MCNC: 8.8 MG/DL (ref 8.5–10.1)
CHLORIDE BLD-SCNC: 109 MMOL/L (ref 94–109)
CO2 SERPL-SCNC: 29 MMOL/L (ref 20–32)
COLOR UR AUTO: YELLOW
CREAT SERPL-MCNC: 0.89 MG/DL (ref 0.52–1.04)
DIASTOLIC BLOOD PRESSURE - MUSE: NORMAL MMHG
EOSINOPHIL # BLD AUTO: 0.2 10E3/UL (ref 0–0.7)
EOSINOPHIL NFR BLD AUTO: 4 %
ERYTHROCYTE [DISTWIDTH] IN BLOOD BY AUTOMATED COUNT: 11.9 % (ref 10–15)
GFR SERPL CREATININE-BSD FRML MDRD: 70 ML/MIN/1.73M2
GLUCOSE BLD-MCNC: 122 MG/DL (ref 70–99)
GLUCOSE UR STRIP-MCNC: NEGATIVE MG/DL
HCT VFR BLD AUTO: 42.5 % (ref 35–47)
HGB BLD-MCNC: 13.9 G/DL (ref 11.7–15.7)
HGB UR QL STRIP: NEGATIVE
HOLD SPECIMEN: NORMAL
HOLD SPECIMEN: NORMAL
IMM GRANULOCYTES # BLD: 0 10E3/UL
IMM GRANULOCYTES NFR BLD: 0 %
INTERPRETATION ECG - MUSE: NORMAL
KETONES UR STRIP-MCNC: NEGATIVE MG/DL
LEUKOCYTE ESTERASE UR QL STRIP: ABNORMAL
LYMPHOCYTES # BLD AUTO: 2.9 10E3/UL (ref 0.8–5.3)
LYMPHOCYTES NFR BLD AUTO: 47 %
MCH RBC QN AUTO: 30.8 PG (ref 26.5–33)
MCHC RBC AUTO-ENTMCNC: 32.7 G/DL (ref 31.5–36.5)
MCV RBC AUTO: 94 FL (ref 78–100)
MONOCYTES # BLD AUTO: 0.8 10E3/UL (ref 0–1.3)
MONOCYTES NFR BLD AUTO: 13 %
MUCOUS THREADS #/AREA URNS LPF: PRESENT /LPF
NEUTROPHILS # BLD AUTO: 2.2 10E3/UL (ref 1.6–8.3)
NEUTROPHILS NFR BLD AUTO: 35 %
NITRATE UR QL: NEGATIVE
NRBC # BLD AUTO: 0 10E3/UL
NRBC BLD AUTO-RTO: 0 /100
P AXIS - MUSE: 36 DEGREES
PH UR STRIP: 7.5 [PH] (ref 5–7)
PLATELET # BLD AUTO: 252 10E3/UL (ref 150–450)
POTASSIUM BLD-SCNC: 3.3 MMOL/L (ref 3.4–5.3)
PR INTERVAL - MUSE: 140 MS
PROT SERPL-MCNC: 7.2 G/DL (ref 6.8–8.8)
QRS DURATION - MUSE: 82 MS
QT - MUSE: 400 MS
QTC - MUSE: 470 MS
R AXIS - MUSE: 31 DEGREES
RBC # BLD AUTO: 4.51 10E6/UL (ref 3.8–5.2)
RBC URINE: 1 /HPF
SODIUM SERPL-SCNC: 141 MMOL/L (ref 133–144)
SP GR UR STRIP: 1.02 (ref 1–1.03)
SQUAMOUS EPITHELIAL: 2 /HPF
SYSTOLIC BLOOD PRESSURE - MUSE: NORMAL MMHG
T AXIS - MUSE: 57 DEGREES
TROPONIN I SERPL-MCNC: <0.015 UG/L (ref 0–0.04)
UROBILINOGEN UR STRIP-MCNC: NORMAL MG/DL
VENTRICULAR RATE- MUSE: 83 BPM
WBC # BLD AUTO: 6.1 10E3/UL (ref 4–11)
WBC URINE: 6 /HPF

## 2021-11-10 PROCEDURE — 90853 GROUP PSYCHOTHERAPY: CPT | Mod: GT

## 2021-11-10 PROCEDURE — 93005 ELECTROCARDIOGRAM TRACING: CPT

## 2021-11-10 PROCEDURE — 99285 EMERGENCY DEPT VISIT HI MDM: CPT | Mod: 25 | Performed by: EMERGENCY MEDICINE

## 2021-11-10 PROCEDURE — 81001 URINALYSIS AUTO W/SCOPE: CPT | Performed by: EMERGENCY MEDICINE

## 2021-11-10 PROCEDURE — 250N000013 HC RX MED GY IP 250 OP 250 PS 637: Performed by: EMERGENCY MEDICINE

## 2021-11-10 PROCEDURE — 93010 ELECTROCARDIOGRAM REPORT: CPT | Performed by: EMERGENCY MEDICINE

## 2021-11-10 PROCEDURE — 84484 ASSAY OF TROPONIN QUANT: CPT | Performed by: EMERGENCY MEDICINE

## 2021-11-10 PROCEDURE — 85025 COMPLETE CBC W/AUTO DIFF WBC: CPT | Performed by: EMERGENCY MEDICINE

## 2021-11-10 PROCEDURE — 70450 CT HEAD/BRAIN W/O DYE: CPT

## 2021-11-10 PROCEDURE — 90853 GROUP PSYCHOTHERAPY: CPT | Mod: 95 | Performed by: SOCIAL WORKER

## 2021-11-10 PROCEDURE — 80053 COMPREHEN METABOLIC PANEL: CPT | Performed by: EMERGENCY MEDICINE

## 2021-11-10 PROCEDURE — 36415 COLL VENOUS BLD VENIPUNCTURE: CPT | Performed by: EMERGENCY MEDICINE

## 2021-11-10 PROCEDURE — 99285 EMERGENCY DEPT VISIT HI MDM: CPT | Mod: 25

## 2021-11-10 RX ORDER — POTASSIUM CHLORIDE 1.5 G/1.58G
20 POWDER, FOR SOLUTION ORAL ONCE
Status: COMPLETED | OUTPATIENT
Start: 2021-11-10 | End: 2021-11-10

## 2021-11-10 RX ADMIN — POTASSIUM CHLORIDE 20 MEQ: 1.5 FOR SOLUTION ORAL at 20:46

## 2021-11-10 ASSESSMENT — ENCOUNTER SYMPTOMS
FEVER: 0
CONFUSION: 1
BACK PAIN: 1

## 2021-11-10 NOTE — TELEPHONE ENCOUNTER
Called pt at request of .    10/31/21 severe headache, ran out of Imitrex, took alternative (pt not sure of name)  10/6/21 headache persisted, got dizzy in the bathroom, fell and hit head  10/8/21 went to clinic, has uti, started antibiotics  10/9/21 noticed she was having trouble coming up with months/time/phone#    Discussed options:    Preferred: go to UC and relay data above  Least Preferred but most affordable: Call PCP triage line and as for PCP to review symptoms above. Pt stated the confusion and memory recall was noticed shortly after being diagnosed with an infection. It isn't uncommon for older individuals to have confusion with an infection. I strongly recommended she go to UC but ultimately she will decide.    Routed to  and staff therapist.

## 2021-11-10 NOTE — GROUP NOTE
Psychoeducation Group Note    PATIENT'S NAME: Marissa Youssef  MRN:   [id number]  :   1960  ACCT. NUMBER: [id number]  DATE OF SERVICE: 11/10/21  START TIME:  9:00 AM  END TIME:  9:50 AM  FACILITATOR: Nettie Brian LICSW  TOPIC: ANTONIA RN Group: Health Maintenance  [facility] 55+ Program  TRACK: A2                              Service Modality:  Video Visit     Telemedicine Visit: The patient's condition can be safely assessed and treated via synchronous audio and visual telemedicine encounter.      Reason for Telemedicine Visit: Services only offered telehealth    Originating Site (Patient Location): Patient's home    Distant Site (Provider Location): [facility] MENTAL HEALTH & ADDICTION SERVICES    Consent:  The patient/guardian has verbally consented to: the potential risks and benefits of telemedicine (video visit) versus in person care; bill my insurance or make self-payment for services provided; and responsibility for payment of non-covered services.     Patient would like the video invitation sent by:  My Chart    Mode of Communication:  Video Conference via Medical Zoom    As the provider I attest to compliance with applicable laws and regulations related to telemedicine.         NUMBER OF PARTICIPANTS: 8    Summary of Group / Topics Discussed:  Health Maintenance: Discharge planning/Community resources part 2: Patients worked on completing an instructor-facilitated discharge planning activity. Discharge planning begins for all patients after admission. Competent discharge planning promotes a successful transition and decreases the likelihood of mental health relapse. In this group, all dimensions of wellness were reviewed to assess for needs/discharge readiness. These dimensions included: physical, emotional, occupational/productivity, environmental, social, spiritual, intellectual, and financial. Patients worked on completing/updating their discharge planning and identifying their  treatment needs prior to time of discharge.     Patient Session Goals / Objectives:  ? Identified unmet treatment needs to accomplish before discharge  ? Completed all dimensions of the discharge planning packet  ? Participated in the planning process, make phone calls, set up appointments, got connected with community resources, followed up with treatment team as needed         Patient Participation / Response:  Fully participated with the group by sharing personal reflections / insights and openly received / provided feedback with other participants.    Demonstrated understanding of topics discussed through group discussion and participation and Identified / Expressed personal readiness to practice skills    Treatment Plan:  Patient has a current master individualized treatment plan.  See Epic treatment plan for more information.    Nettie Valdes, LICSW

## 2021-11-10 NOTE — TELEPHONE ENCOUNTER
Staff contacted RN via Teams: Copy of message below:    [11:27 AM] Isabella Garcia  Hi to both of you.  Can one of you call this patient for a quick follow-up and encourage to go to ED for assessment?  [11:10 AM] Cristobal Robles-can a nurse with our program connect with Marissa in the A2s. She has a cognitive status changed. I encouraged her to go to the ER.[11:10 AM] Meron Robles was the pt that hit her head after a fall over the weekend.[11:11 AM] Chidi Robles getting her third COVID booster.[11:18 AM] Isabella Garcia Eof course! I will see who can call her this afternoon. Is she available for a PM phone call?[11:19 AM] Augustine Robles phone is down to 10% so it is probably not going to make it through the whole hour this hour. I dont know her schedule but I did enourage to go to the ER. like 1[11:20 AM] Isabella Garcia - will have someone reach out this afternoon but good about encouraging immediate followup[11:21 AM] Meron Robles cant get an appointment with her Neurologist till APril. I did tell her to go with urgency. [11:21 AM] Margaret CatieAra has had some confusion and her family noticed this too.      [11:35 AM] Manjit Cameron  I will call her this afternoon.

## 2021-11-10 NOTE — TELEPHONE ENCOUNTER
Pt is calling.    Friday she had her booster COVID vaccine. Was sick on Saturday and Sunday from this.  Passed out on Saturday night when getting up to go to the bathroom and hit her back, possibly hit her head as well.  Called and spoke to a nurse at [facility] who advised her to follow up with her PCP.  Monday, she was seen, for a UTI. Started on antibiotics.    Today, she noticed some problems with her memory and not being able to find the right words to say, slight confusion.  Tonight, she was trying to remember her phone number and couldn't remember it.  When I spoke to her, she knew her name, date of birth, address.  She is alert and oriented. Able to form full sentences. Aware of person, place, objects.  Pt denies HA now but stated that she did have a severe HA all last week.   She stated that she has had visual changes for the last 5 days, with blurry vision.  This continues today.  Pt does not want to be seen at her home clinic, as she stated that they never believe her, as she was just hospitalized for mental illness.    Care advice reviewed. I advised her that she should be seen in the ED now.  She stated that she will have someone drive her in.      Reason for Disposition    [1] Acting confused (e.g., disoriented, slurred speech) AND [2] brief (now gone)    [1] Blurred vision or visual changes AND [2] present now AND [3] sudden onset or new (e.g., minutes, hours, days)  (Exception: seeing floaters / black specks OR previously diagnosed migraine headaches with same symptoms)    Additional Information    Negative: Fever > 100.4 F (38.0 C)    Negative: Patient sounds very sick or weak to the triager    Negative: Headache or vomiting    Negative: Stiff neck (can't touch chin to chest)    Negative: Very strange or paranoid behavior    Negative: Alcohol use, abuse or dependence: question or problem related to    Negative: Drug abuse or dependence: question or problem related to    Negative: [1] Difficult to awaken  or acting confused (e.g., disoriented, slurred speech) AND [2] present now AND [3] has diabetes (diabetes mellitus)    Negative: [1] Difficult to awaken or acting confused (e.g., disoriented, slurred speech) AND [2] present now AND [3] new onset    Negative: [1] Weakness of the face, arm, or leg on one side of the body AND [2] new onset    Negative: [1] Numbness of the face, arm, or leg on one side of the body AND [2] new onset    Negative: [1] Loss of speech or garbled speech AND [2] new onset    Negative: Difficulty breathing or bluish lips    Negative: Shock suspected (e.g., cold/pale/clammy skin, too weak to stand, low BP, rapid pulse)    Negative: Seeing, hearing, or feeling things that are not there (i.e., visual, auditory, or tactile hallucinations)    Negative: Followed a head injury    Negative: Drug overdose suspected    Negative: Sounds like a life-threatening emergency to the triager    Negative: Weakness of the face, arm or leg on one side of the body    Negative: Followed getting substance in the eye    Negative: Foreign body or object is or was lodged in the eye    Negative: Followed an eye injury    Negative: Followed sun lamp or sun exposure (UV keratitis)    Negative: Yellow or green discharge (pus) in the eye    Negative: Pregnant    Negative: Postpartum (from 0 to 6 weeks after delivery)    Negative: Complete loss of vision in 1 or both eyes    Negative: Severe eye pain    Negative: SEVERE headache    Negative: Double vision    Protocols used: CONFUSION - DELIRIUM-A-AH, VISION LOSS OR CHANGE-A-AH    Esperanza Carias RN  [facility] Nurse Advisor  11/9/2021 at 8:15 PM

## 2021-11-10 NOTE — PROGRESS NOTES
Psychiatry Provider Staffing Note    Met today with treatment team to discuss case, progress.    Marissa Youssef is a 61 year old female enrolled in 55+ Intensive Outpatient track A2.      Medications:   Current Outpatient Medications   Medication     amLODIPine (NORVASC) 5 MG tablet     busPIRone (BUSPAR) 10 MG tablet     Calcium Carbonate-Vit D-Min (CALCIUM 1200 PO)     divalproex sodium delayed-release (DEPAKOTE) 250 MG DR tablet     DULoxetine (CYMBALTA) 30 MG capsule     hydrOXYzine (ATARAX) 50 MG tablet     Lidocaine (LIDOCARE) 4 % Patch     Melatonin 10 MG TABS tablet     multivitamin, therapeutic (THERA-VIT) TABS tablet     omeprazole 20 MG tablet     ondansetron (ZOFRAN) 4 MG tablet     prazosin (MINIPRESS) 2 MG capsule     QUEtiapine (SEROQUEL XR) 50 MG TB24 24 hr tablet     QUEtiapine (SEROQUEL) 400 MG tablet     topiramate (TOPAMAX) 50 MG tablet     Vitamin D, Cholecalciferol, 25 MCG (1000 UT) TABS     No current facility-administered medications for this encounter.         Diagnoses reviewed and include:  Patient Active Problem List   Diagnosis     Pain of female symphysis pubis     Pelvic floor dysfunction     Myalgia of pelvic floor     SI (sacroiliac) joint dysfunction     Chronic pelvic pain in female     Suicidal ideation     MDD (major depressive disorder), recurrent severe, without psychosis (H)     Episode of recurrent major depressive disorder, unspecified depression episode severity (H)     Generalized anxiety disorder     Borderline personality disorder (H)     PTSD (post-traumatic stress disorder)     Major depressive disorder, recurrent, unspecified (H)       Pertinent/updates:    none    Care team notes and discussion:    Has to move to an alternate location as she lives in [place]    Recent fall related possibly to COVID vaccine    Not noting cognitive status changes    Engaged, present with mood symptoms    No acute safety concerns; future-oriented, engaged with family, in  sigrid      Patient continues to meet criteria for program.  Continue plan of care.    Jaxon Basilio MD on 11/10/2021 at 8:39 AM

## 2021-11-10 NOTE — GROUP NOTE
Service Modality:  Video Visit     Telemedicine Visit: The patient's condition can be safely assessed and treated via synchronous audio and visual telemedicine encounter.       Reason for Telemedicine Visit: Services only offered telehealth and due to COVID-19.     Originating Site (Patient Location): Patient's home     Distant Site (Provider Location): Provider Remote Setting- Home Office     Consent:  The patient/guardian has verbally consented to: the potential risks and benefits of telemedicine (video visit) versus in person care; bill my insurance or make self-payment for services provided; and responsibility for payment of non-covered services.      Patient would like the video invitation sent by:  My Chart     Mode of Communication:  Video Conference via Medical Zoom     As the provider I attest to compliance with applicable laws and regulations related to telemedicine.    Process Group Note    PATIENT'S NAME: Marissa Youssef  MRN:   [id number]  :   1960  ACCT. NUMBER: [id number]  DATE OF SERVICE: 11/10/21  START TIME: 10:00 AM  END TIME: 10:50 AM  FACILITATOR: Suzanne Robles [facility]  TOPIC:  Process Group    Diagnoses:  F 32.9 Major Depression Disorder, recurrent, severe   F41.1 Generalized anxiety disorder F41.1  F60.3 Borderline personality disorder (H) F60.3  F43.1 PTSD (post-traumatic stress disorder) F43.1    [facility] 55+ Program  TRACK: A2    NUMBER OF PARTICIPANTS: 8          Data:    Session content: At the start of this group, patients were invited to check in by identifying themselves, describing their current emotional status, and identifying issues to address in this group.   Area(s) of treatment focus addressed in this session included Symptom Management, Personal Safety, Community Resources/Discharge Planning, Abstinence/Relapse Prevention, Develop / Improve Independent Living Skills, Develop Socialization / Interpersonal Relationship Skills and Physical Health .    Marissa  reports less depressed and less anxious mood. Denies S/I or safety issues. She processed her concern for a recent cognitive status change with confusion and recall after a recent fall which she hit her head and after she obtained a COVID booster. She was encouraged to be assessed at ER. Marissa had ambivalence due to potential stigma related to mental health history. She was worried she would be judged.  She did place a call to her Neurologist who does not have appointments till April.  Writer coordinated with team to have program nurse call patient as well.       Therapeutic Interventions/Treatment Strategies:  Psychotherapist offered support, feedback and validation and reinforced use of skills. Treatment modalities used include Cognitive Behavioral Therapy.    Assessment:    Patient response:   Patient responded to session by verbalizing understanding    Possible barriers to participation / learning include: and no barriers identified    Health Issues:   Yes: Pain, Associated Psychological Distress  Reports confusion and difficulty with recall. She was encouraged to go the ER. She reached out to her Neurologist who did not have an appointment till April.         Substance Use Review:   Substance Use: No active concerns identified.    Mental Status/Behavioral Observations  Appearance:   Appropriate   Eye Contact:   Good   Psychomotor Behavior: Normal   Attitude:   Cooperative  Interested  Orientation:   All  Speech   Rate / Production: Normal    Volume:  Normal   Mood:    Less depressed and less anxious mood  Affect:    Appropriate   Thought Content:   Clear and Safety denies any current safety concerns including suicidal ideation, self-harm, and homicidal ideation  Thought Form:  Coherent  Goal Directed  Logical     Insight:    Good     Plan:     Safety Plan: No current safety concerns identified.  Recommended that patient call 911 or go to the local ED should there be a change in any of these risk factors.      Barriers to treatment: None identified    Patient Contracts (see media tab):  None    Substance Use: Not addressed in session     Continue or Discharge: Patient will continue in 55+ Program (55+) as planned. Patient is likely to benefit from learning and using skills as they work toward the goals identified in their treatment plan. Marissa will continue for mood stabilization and symptom management education for mental health recovery. She was encouraged to go to the ER due to recent cognitive status change post fall and COVID booster.       Suzanne Robles, [facility]  November 10, 2021

## 2021-11-10 NOTE — ADDENDUM NOTE
Encounter addended by: Jaxon Basilio MD on: 11/10/2021 8:57 AM   Actions taken: Clinical Note Signed

## 2021-11-11 ENCOUNTER — HOSPITAL ENCOUNTER (OUTPATIENT)
Dept: BEHAVIORAL HEALTH | Facility: CLINIC | Age: 61
End: 2021-11-11
Attending: PSYCHIATRY & NEUROLOGY
Payer: MEDICARE

## 2021-11-11 PROCEDURE — 90853 GROUP PSYCHOTHERAPY: CPT | Mod: GT

## 2021-11-11 PROCEDURE — 90853 GROUP PSYCHOTHERAPY: CPT | Mod: 95 | Performed by: SOCIAL WORKER

## 2021-11-11 NOTE — GROUP NOTE
Psychotherapy Group Note    PATIENT'S NAME: Marissa Youssef  MRN:   [id number]  :   1960  ACCT. NUMBER: [id number]  DATE OF SERVICE: 21  START TIME: 10:00 AM  END TIME: 10:50 AM  FACILITATOR: Nettie Brian LICSW  TOPIC: MH EBP Group: Behavioral Activation  [facility] 55+ Program  TRACK: A2                              Service Modality:  Video Visit     Telemedicine Visit: The patient's condition can be safely assessed and treated via synchronous audio and visual telemedicine encounter.      Reason for Telemedicine Visit: Services only offered telehealth    Originating Site (Patient Location): Patient's home    Distant Site (Provider Location): [facility] MENTAL HEALTH & ADDICTION SERVICES    Consent:  The patient/guardian has verbally consented to: the potential risks and benefits of telemedicine (video visit) versus in person care; bill my insurance or make self-payment for services provided; and responsibility for payment of non-covered services.     Patient would like the video invitation sent by:  My Chart    Mode of Communication:  Video Conference via Medical Zoom    As the provider I attest to compliance with applicable laws and regulations related to telemedicine.         NUMBER OF PARTICIPANTS: 8    Summary of Group / Topics Discussed:  Behavioral Activation: Activity Scheduling/Routines:Patients explored how they currently spend their time, and how specific behaviors impact thoughts and feelings.  The group explored the effect of negative and positive activities on mood states and thought patterns.  Patients identified activities that help to improve mood and thinking patterns, and developed a plan to implement positive activities between sessions.      Patient Session Goals / Objectives:    Identify impact of current behaviors on thoughts and mood    Identify 2-3 behavioral changes that could have a positive impact on thoughts and mood    Prepare to make desired behavioral  change: Create a change plan / activity schedule      Patient Participation / Response:  Fully participated with the group by sharing personal reflections / insights and openly received / provided feedback with other participants.    Demonstrated understanding of topics discussed through group discussion and participation, Expressed understanding of the relationship between behaviors, thoughts, and feelings and Identified / Expressed personal readiness to make behavioral change    Treatment Plan:  Patient has a current master individualized treatment plan.  See Epic treatment plan for more information.    Nettie Valdes, ABHIJITSW

## 2021-11-11 NOTE — ED PROVIDER NOTES
[facility] EMERGENCY DEPARTMENT ([facility])    11/10/21        History     Chief Complaint   Patient presents with     Chest Pain     Dizziness     The history is provided by the patient and medical records.     Marissa Youssef is a 61 year old female with a history of type 2 diabetes mellitus, major depressive disorder, bipolar 2 disorder, anxiety, HTN, and HLD who presents to the Emergency Department with confusion. Patient was recently hospitalized here from 10/3-10/25 with suicidal ideation. Patient is now in intensive outpatient treatment. She states she told her treatment center on Monday, 11/8, that she had an episode of syncope on Saturday, 11/6. She states during this episode she fell and hit her back, sustaining bruising to the midline. Patient reports yesterday she was unable to remember her phone number, did not know what month it was, and did not know how to spell something. She was advised by a nurse at her treatment program to come to the ED to be seen. Patient reports about 1 week ago she had a migraine. She denies history of migraines, states this was abnormal for her. She states this is now better and she has a mild headache. Patient and  note patient has been more confused than normal. Patient reports during admission she was started on Buspar and Depakote. She states the time of her Seroquel was changed and her dose of Topamax was increased as well. Patient reports on the drive down to the ED she began having right sided chest pain. She states she has a history of angina and this pain feels similar. She reports having a previous angiogram done about 3 years ago in [place] and states this was negative. Patient reports right shoulder pain as well but states this has been ongoing for days. She notes history of tendinitis. She denies fevers.    Past Medical History  Past Medical History:   Diagnosis Date     Anxiety      Depressive disorder      Diabetes (H)     Diet controlled.      Eating disorder      Hx of previous reproductive problem 1977     Hyperlipidemia      Hypertension      Pelvic floor instability      PTSD (post-traumatic stress disorder)      Sphincter of Oddi dysfunction      Past Surgical History:   Procedure Laterality Date     ABDOMEN SURGERY  1999     CHOLECYSTECTOMY  2000     COLONOSCOPY  2018     GYN SURGERY  2005    Complete hysterectomy     ORTHOPEDIC SURGERY      right knee replacement 2008     SOFT TISSUE SURGERY  2019    Carpel tunnel, trigger finger release     amLODIPine (NORVASC) 5 MG tablet  busPIRone (BUSPAR) 10 MG tablet  Calcium Carbonate-Vit D-Min (CALCIUM 1200 PO)  divalproex sodium delayed-release (DEPAKOTE) 250 MG DR tablet  DULoxetine (CYMBALTA) 30 MG capsule  hydrOXYzine (ATARAX) 50 MG tablet  Lidocaine (LIDOCARE) 4 % Patch  Melatonin 10 MG TABS tablet  multivitamin, therapeutic (THERA-VIT) TABS tablet  omeprazole 20 MG tablet  ondansetron (ZOFRAN) 4 MG tablet  prazosin (MINIPRESS) 2 MG capsule  QUEtiapine (SEROQUEL XR) 50 MG TB24 24 hr tablet  QUEtiapine (SEROQUEL) 400 MG tablet  topiramate (TOPAMAX) 50 MG tablet  Vitamin D, Cholecalciferol, 25 MCG (1000 UT) TABS      No Known Allergies  Family History  Family History   Problem Relation Age of Onset     Chronic Obstructive Pulmonary Disease Mother      Coronary Artery Disease Father      Myocardial Infarction Father      Hyperlipidemia Sister      Kidney failure Brother      Heart Failure Brother      Social History   Social History     Tobacco Use     Smoking status: Former Smoker     Smokeless tobacco: Never Used     Tobacco comment: 3 cigarettes a day   Substance Use Topics     Alcohol use: No     Drug use: No      Past medical history, past surgical history, medications, allergies, family history, and social history were reviewed with the patient. No additional pertinent items.       Review of Systems   Constitutional: Negative for fever.   Cardiovascular: Positive for chest pain (right-sided).    Musculoskeletal: Positive for back pain.        Positive for right shoulder pain.    Psychiatric/Behavioral: Positive for confusion.   All other systems reviewed and are negative.    A complete review of systems was performed with pertinent positives and negatives noted in the HPI, and all other systems negative.    Physical Exam   BP: 134/83  Pulse: 96  Temp: 98  F (36.7  C)  Resp: 18  Weight: 88.5 kg (195 lb)  SpO2: 96 %  Physical Exam  Constitutional:       General: She is not in acute distress.     Appearance: She is well-developed. She is not ill-appearing, toxic-appearing or diaphoretic.   HENT:      Head: Normocephalic and atraumatic.   Cardiovascular:      Rate and Rhythm: Normal rate and regular rhythm.      Heart sounds: Normal heart sounds.   Pulmonary:      Effort: Pulmonary effort is normal. No respiratory distress.      Breath sounds: Normal breath sounds.   Abdominal:      General: There is no distension.      Palpations: Abdomen is soft.      Tenderness: There is no abdominal tenderness. There is no rebound.   Musculoskeletal:         General: No tenderness.      Cervical back: Normal range of motion and neck supple.   Skin:     General: Skin is warm and dry.   Neurological:      General: No focal deficit present.      Mental Status: She is alert and oriented to person, place, and time.      Cranial Nerves: No cranial nerve deficit.      Sensory: No sensory deficit.      Motor: No weakness.      Coordination: Coordination normal.      Comments: NORMAL STRENGTH All extremities.  No drift.  3/ 3 instant recall. 2/3 5-minute recall.  Able to identify all objects.  Alert and oriented.   Psychiatric:         Behavior: Behavior normal.         Thought Content: Thought content normal.         ED Course   7:15 PM  The patient was seen and examined by Mamie Huertas MD in [facility].      Procedures            EKG Interpretation:      Interpreted by Mamie Huertas MD  Time reviewed: 1937  Symptoms at time  of EKG: none   Rhythm: normal sinus   Rate: normal  Axis: normal  Ectopy: none  Conduction: normal  ST Segments/ T Waves: No ST-T wave changes  Q Waves: none  Comparison to prior: No old EKG available    Clinical Impression: normal EKG        The medical record was reviewed and interpreted.  Current labs reviewed and interpreted.       Results for orders placed or performed during the hospital encounter of 11/10/21   CBC with platelets differential     Status: None ()    Narrative    The following orders were created for panel order CBC with platelets differential.  Procedure                               Abnormality         Status                     ---------                               -----------         ------                     CBC with platelets and d...[[id number]]                                                   Please view results for these tests on the individual orders.     Medications - No data to display     Assessments & Plan (with Medical Decision Making)  Patient is 61-year-old female with a history of multiple psychiatric concerns who is currently going through an intensive outpatient treatment who was sent here as a referral from the outpatient treatment facility.  Patient says that today she was mildly confused and she told them on Monday 3 days prior that she had a syncopal episode on Saturday night.  Patient says that currently she is feeling back to normal.  She does note that she has had multiple medications that have recently been changed including increasing her Seroquel dose, increasing her trazodone and starting Depakote as well as BuSpar.  Patient says that she has ongoing intermittent chest pain that is her normal angina that she has.  Says that she had that currently but nothing new.  Patient was alert oriented here and had a normal neuro exam.  Patient had a CT head which was normal.  Patient had labs that showed mild hypokalemia but were otherwise normal.  Patient's troponin and  EKG were stable.  I do not feel that patient has any acute medical process that requires admission.  Patient's intermittent confusion could have been from her medication change but she has no signs of an acute stroke.  She has no signs of acute ACS.  Plan will be to discharge the patient home with outpatient follow-up.    This part of the medical record was transcribed by Chhaya Henning, Medical Scribe, from a dictation done by Mamie Huertas MD.        I have reviewed the nursing notes. I have reviewed the findings, diagnosis, plan and need for follow up with the patient.    New Prescriptions    No medications on file       Final diagnoses:   Intermittent confusion   Syncope, unspecified syncope type     I, Chhaya Henning, am serving as a trained medical scribe to document services personally performed by Mamie Huertas MD, based on the provider's statements to me.      I, Mamie Huertas MD, was physically present and have reviewed and verified the accuracy of this note documented by Chhaya Henning.     --  Mamie Huertas MD  [facility] EMERGENCY DEPARTMENT  11/10/2021     Mamie Huertas MD  11/11/21 0106

## 2021-11-11 NOTE — GROUP NOTE
Service Modality:  Video Visit     Telemedicine Visit: The patient's condition can be safely assessed and treated via synchronous audio and visual telemedicine encounter.       Reason for Telemedicine Visit: Services only offered telehealth and due to COVID-19.     Originating Site (Patient Location): Patient's home     Distant Site (Provider Location): Provider Remote Setting- Home Office     Consent:  The patient/guardian has verbally consented to: the potential risks and benefits of telemedicine (video visit) versus in person care; bill my insurance or make self-payment for services provided; and responsibility for payment of non-covered services.      Patient would like the video invitation sent by:  My Chart     Mode of Communication:  Video Conference via Medical Zoom     As the provider I attest to compliance with applicable laws and regulations related to telemedicine.    Psychotherapy Group Note    PATIENT'S NAME: Marissa Youssef  MRN:   [id number]  :   1960  ACCT. NUMBER: [id number]  DATE OF SERVICE: 21  START TIME: 11:00 AM  END TIME: 11:50 AM  FACILITATOR: Suzanne Robles [facility]  TOPIC:  EBP Group: Mindfulness  [facility] 55+ Program  TRACK: A2    NUMBER OF PARTICIPANTS: 8    Summary of Group / Topics Discussed:  Mindfulness: What is Mindfulness: Patients received an overview on what mindfulness is and how mindfulness can benefit general health, mental health symptoms, and stressors. The history of mindfulness, its application to mental health therapies, and key concepts were also discussed. Patients discussed current awareness, knowledge, and practice of mindfulness skills. Patients also discussed barriers to mindfulness practice.     Patient Session Goals / Objectives:    Demonstrated understanding of key concepts and application to daily life    Identified when/how to use mindfulness     Resolved barriers to practice    Identified plan to use mindfulness in daily  life      Patient Participation / Response:  Fully participated with the group by sharing personal reflections / insights and openly received / provided feedback with other participants.    Demonstrated understanding of topics discussed through group discussion and participation and Demonstrated understanding of mindfulness skills and benefits of practice    Treatment Plan:  Patient has a current master individualized treatment plan.  See Epic treatment plan for more information.    Suzanne Robles, LICSW

## 2021-11-11 NOTE — GROUP NOTE
Service Modality:  Video Visit     Telemedicine Visit: The patient's condition can be safely assessed and treated via synchronous audio and visual telemedicine encounter.       Reason for Telemedicine Visit: Services only offered telehealth and due to COVID-19.     Originating Site (Patient Location): Patient's home     Distant Site (Provider Location): Provider Remote Setting- Home Office     Consent:  The patient/guardian has verbally consented to: the potential risks and benefits of telemedicine (video visit) versus in person care; bill my insurance or make self-payment for services provided; and responsibility for payment of non-covered services.      Patient would like the video invitation sent by:  My Chart     Mode of Communication:  Video Conference via Medical Zoom     As the provider I attest to compliance with applicable laws and regulations related to telemedicine.    Process Group Note    PATIENT'S NAME: Marissa Youssef  MRN:   [id number]  :   1960  ACCT. NUMBER: [id number]  DATE OF SERVICE: 21  START TIME:  9:00 AM  END TIME:  9:50 AM  FACILITATOR: Suzanne Robles [facility]  TOPIC:  Process Group    Diagnoses:  F 32.9 Major Depression Disorder, recurrent, severe   F41.1 Generalized anxiety disorder F41.1  F60.3 Borderline personality disorder (H) F60.3  F43.1 PTSD (post-traumatic stress disorder) F43.1      [facility] 55+ Program  TRACK: A2    NUMBER OF PARTICIPANTS: 8          Data:    Session content: At the start of this group, patients were invited to check in by identifying themselves, describing their current emotional status, and identifying issues to address in this group.   Area(s) of treatment focus addressed in this session included Symptom Management, Personal Safety, Community Resources/Discharge Planning, Abstinence/Relapse Prevention, Develop / Improve Independent Living Skills, Develop Socialization / Interpersonal Relationship Skills and Physical Health  .    Marissa reports less depressed and less anxious mood. Denies S/I or safety issues. She did go to the ER to be assessed. She reports that she was cleared medically. She reports the fainting could be related to her medications. Writer coordinated a medication management appointment with Dr. Hightower for Monday at 2:20 pm.  She has plans with family.      Therapeutic Interventions/Treatment Strategies:  Psychotherapist offered support, feedback and validation and reinforced use of skills. Treatment modalities used include Cognitive Behavioral Therapy.    Assessment:    Patient response:   Patient responded to session by verbalizing understanding    Possible barriers to participation / learning include: and no barriers identified    Health Issues:   Yes: Pain, Associated Psychological Distress  Medication management issues.       Substance Use Review:   Substance Use: No active concerns identified.    Mental Status/Behavioral Observations  Appearance:   Appropriate   Eye Contact:   Good   Psychomotor Behavior: Normal   Attitude:   Cooperative  Interested Pleasant  Orientation:   All  Speech   Rate / Production: Normal    Volume:  Normal   Mood:    Less depressed and less anxious mood  Affect:    Appropriate   Thought Content:   Clear and Safety denies any current safety concerns including suicidal ideation, self-harm, and homicidal ideation  Thought Form:  Coherent  Goal Directed  Logical     Insight:    Good     Plan:     Safety Plan: No current safety concerns identified.  Recommended that patient call 911 or go to the local ED should there be a change in any of these risk factors.     Barriers to treatment: None identified    Patient Contracts (see media tab):  None    Substance Use: Not addressed in session     Continue or Discharge: Patient will continue in 55+ Program (55+) as planned. Patient is likely to benefit from learning and using skills as they work toward the goals identified in their treatment plan.  Marissa will continue for mood stabilization and symptom management education for mental health recovery.       Suzanne Robles, [facility]  November 11, 2021

## 2021-11-11 NOTE — DISCHARGE INSTRUCTIONS
Your EKG is stable.     Your Head CT is normal.     Your blood work is stable.     Please make an appointment to follow up with Your Primary Care Provider in 3-5 days even if entirely better.    Return to the ER if any other problems/concerns.

## 2021-11-12 ENCOUNTER — TELEPHONE (OUTPATIENT)
Dept: BEHAVIORAL HEALTH | Facility: CLINIC | Age: 61
End: 2021-11-12
Payer: COMMERCIAL

## 2021-11-12 NOTE — TELEPHONE ENCOUNTER
"RN Review of Medical History / Physical Health Screen  Outpatient Mental Health Programs - [facility] 55+ Program    PATIENT'S NAME: Marissa Youssef  MRN:   [id number]  :   1960  ACCT. NUMBER: [id number]  CURRENT AGE:  61 year old    DATE OF DIAGNOSTIC ASSESSMENT: 10/21/21  DATE OF ADMISSION: 11/3/21     Please see Diagnostic Assessment for additional Medical History.     General Health:   Have you had any exposure to any communicable disease in the past 2-3 weeks? no     Are you aware of safe sex practices? yes   Do you have a history of seizures?     If so, do you have a seizure plan? Known triggers?     Notify patient that we will call 911 (if virtual) or a code (if in-person), if we were to witness seizure during group. no    no  no    n/a     Nutrition:    Are you on a special diet? If yes, please explain:  no   Do you have any concerns regarding your nutritional status? If yes, please explain:  yes \"It's bad\" - feels doesn't eat very well; low appetite, inability to buy healthy food, low motivation to cook food; wtr sent resources for [employer] (pt doesn't qualify for SNAP)   Have you had any appetite changes in the last 3 months?  Yes, see above     Have you had any weight loss or weight gain in the last 3 months?  Yes, how much? Gained weight about 7 pounds, pt concerned; thyroid normal     Do you have a history of an eating disorder? Yes; Sx manageable but struggles at times   Do you have a history of being in an eating disorder program? yes in hospital in [place] and [place]     Patient height and weight recorded by RN in epic flowsheet: no No; Unable to measure  Because of temporary in-person programmatic suspension due to COVID-19 pandemic, all pt weights and heights will be collected through patient self-report an recorded in physical health screening progress note upon admission to the program.                            Height/Weight Review:  Patient reported height:     5'3\"   Patient " reports weight:  Date last checked:  190 pounds   Any referrals/needs identified?                BMI Review:  Was the patient informed of BMI? no      Findings See above         Fall Risk:   Have you had any falls in the past 3 months? yes - passed out last Saturday night, got up to use bathroom, hit back; has UTI - on Abx     Do you currently use any assistive devices for mobility?   yes uses a cane      Additional Comments/Assessment: Has concerns about balance; has appt with neurologist, has tremor for a while and it's gotten worse, will stand up and then sit back down; when carrying things, it will tip forward; has psychiatrist in community, but would like to change - will place referral for [facility], has ind therapist     Per completion of the Medical History / Physical Health Screen, is there a recommendation to see / follow up with a primary care physician/clinic or dentist?    No.      Nay Huber RN  11/12/2021

## 2021-11-12 NOTE — TELEPHONE ENCOUNTER
Writer called to conduct RN health screen. Left VM requesting call back. Will reattempt.  Nay Huber RN on 11/12/2021 at 9:12 AM

## 2021-11-15 ENCOUNTER — HOSPITAL ENCOUNTER (OUTPATIENT)
Dept: BEHAVIORAL HEALTH | Facility: CLINIC | Age: 61
End: 2021-11-15
Attending: PSYCHIATRY & NEUROLOGY
Payer: MEDICARE

## 2021-11-15 PROCEDURE — 90853 GROUP PSYCHOTHERAPY: CPT | Mod: 95 | Performed by: SOCIAL WORKER

## 2021-11-15 PROCEDURE — 99214 OFFICE O/P EST MOD 30 MIN: CPT | Mod: 95 | Performed by: PSYCHIATRY & NEUROLOGY

## 2021-11-15 PROCEDURE — 90853 GROUP PSYCHOTHERAPY: CPT | Mod: GT | Performed by: SOCIAL WORKER

## 2021-11-15 PROCEDURE — 90853 GROUP PSYCHOTHERAPY: CPT | Mod: GT

## 2021-11-15 NOTE — PROGRESS NOTES
Telemed visit (telephone due to technical problems.  Seen for eval as part of starting Day Treatment  She saw me in inpatient recently.  HPI:  Recent ER visit for confusion and syncope, still feels off balance.      Current Outpatient Medications:      amLODIPine (NORVASC) 5 MG tablet, Take 1 tablet (5 mg) by mouth daily, Disp: 30 tablet, Rfl: 3     busPIRone (BUSPAR) 10 MG tablet, Take 2 tablets (20 mg) by mouth 3 times daily, Disp: 180 tablet, Rfl: 1     Calcium Carbonate-Vit D-Min (CALCIUM 1200 PO), Take 600 capsules by mouth, Disp: , Rfl:      divalproex sodium delayed-release (DEPAKOTE) 250 MG DR tablet, Take 3 tablets (750 mg) by mouth At Bedtime, Disp: 90 tablet, Rfl: 3     DULoxetine (CYMBALTA) 30 MG capsule, Take 3 capsules (90 mg) by mouth daily, Disp: 90 capsule, Rfl: 3     hydrOXYzine (ATARAX) 50 MG tablet, Take 1 tablet (50 mg) by mouth 3 times daily as needed for itching, Disp: 90 tablet, Rfl: 3     Lidocaine (LIDOCARE) 4 % Patch, Place 1 patch onto the skin every 24 hours To prevent lidocaine toxicity, patient should be patch free for 12 hrs daily., Disp: 30 patch, Rfl: 3     Melatonin 10 MG TABS tablet, Take 1 tablet (10 mg) by mouth nightly as needed for sleep, Disp: 30 tablet, Rfl: 3     multivitamin, therapeutic (THERA-VIT) TABS tablet, Take 1 tablet by mouth daily, Disp: 90 tablet, Rfl: 1     omeprazole 20 MG tablet, Take 2 tablets (40 mg) by mouth daily, Disp: 30 tablet, Rfl: 3     ondansetron (ZOFRAN) 4 MG tablet, Take 1 tablet (4 mg) by mouth every 6 hours as needed for nausea or vomiting, Disp: 60 tablet, Rfl: 1     prazosin (MINIPRESS) 2 MG capsule, Take 1 capsule (2 mg) by mouth At Bedtime, Disp: 30 capsule, Rfl: 3     QUEtiapine (SEROQUEL XR) 50 MG TB24 24 hr tablet, Take 2 tablets (100 mg) by mouth daily, Disp: 30 tablet, Rfl: 3     QUEtiapine (SEROQUEL) 400 MG tablet, Take 1 tablet (400 mg) by mouth At Bedtime, Disp: 30 tablet, Rfl: 3     topiramate (TOPAMAX) 50 MG tablet, Take 1 tablet  (50 mg) by mouth 2 times daily, Disp: 60 tablet, Rfl: 1     Vitamin D, Cholecalciferol, 25 MCG (1000 UT) TABS, Take 1 tablet (1,000 Units) by mouth daily, Disp: 90 tablet, Rfl: 1  No Known Allergies     Patient Active Problem List   Diagnosis     Pain of female symphysis pubis     Pelvic floor dysfunction     Myalgia of pelvic floor     SI (sacroiliac) joint dysfunction     Chronic pelvic pain in female     Suicidal ideation     MDD (major depressive disorder), recurrent severe, without psychosis (H)     Episode of recurrent major depressive disorder, unspecified depression episode severity (H)     Generalized anxiety disorder     Borderline personality disorder (H)     PTSD (post-traumatic stress disorder)     Major depressive disorder, recurrent, unspecified (H)     Recent Results (from the past 1344 hour(s))   HCG qualitative urine    Collection Time: 10/03/21  5:14 AM   Result Value Ref Range    hCG Urine Qualitative Negative Negative   Drug abuse screen 1 urine (ED)    Collection Time: 10/03/21  5:14 AM   Result Value Ref Range    Amphetamines Urine Screen Negative Screen Negative    Barbiturates Urine Screen Negative Screen Negative    Benzodiazepines Urine Screen Negative Screen Negative    Cannabinoids Urine Screen Negative Screen Negative    Cocaine Urine Screen Negative Screen Negative    Opiates Urine Screen Negative Screen Negative   UA with Microscopic reflex to Culture    Collection Time: 10/03/21  5:14 AM    Specimen: Urine, Midstream   Result Value Ref Range    Color Urine Light Yellow Colorless, Straw, Light Yellow, Yellow    Appearance Urine Slightly Cloudy (A) Clear    Glucose Urine Negative Negative mg/dL    Bilirubin Urine Negative Negative    Ketones Urine Negative Negative mg/dL    Specific Gravity Urine 1.016 1.003 - 1.035    Blood Urine Negative Negative    pH Urine 7.0 5.0 - 7.0    Protein Albumin Urine Negative Negative mg/dL    Urobilinogen Urine Normal Normal, 2.0 mg/dL    Nitrite Urine  Negative Negative    Leukocyte Esterase Urine Large (A) Negative    Mucus Urine Present (A) None Seen /LPF    Amorphous Crystals Urine Few (A) None Seen /HPF    Calcium Oxalate Crystals Urine Few (A) None Seen /HPF    RBC Urine <1 <=2 /HPF    WBC Urine 52 (H) <=5 /HPF    Squamous Epithelials Urine 2 (H) <=1 /HPF    Transitional Epithelials Urine 1 <=1 /HPF   Urine Culture    Collection Time: 10/03/21  5:14 AM    Specimen: Urine, Midstream   Result Value Ref Range    Culture 10,000-50,000 CFU/mL Mixture of urogenital kennedy    Asymptomatic COVID-19 Virus (Coronavirus) by PCR Nasopharyngeal    Collection Time: 10/03/21  6:56 AM    Specimen: Nasopharyngeal; Swab   Result Value Ref Range    SARS CoV2 PCR Negative Negative   Comprehensive metabolic panel    Collection Time: 10/03/21  7:03 AM   Result Value Ref Range    Sodium 139 133 - 144 mmol/L    Potassium 3.9 3.4 - 5.3 mmol/L    Chloride 109 94 - 109 mmol/L    Carbon Dioxide (CO2) 27 20 - 32 mmol/L    Anion Gap 3 3 - 14 mmol/L    Urea Nitrogen 22 7 - 30 mg/dL    Creatinine 1.02 0.52 - 1.04 mg/dL    Calcium 9.0 8.5 - 10.1 mg/dL    Glucose 109 (H) 70 - 99 mg/dL    Alkaline Phosphatase 103 40 - 150 U/L    AST 32 0 - 45 U/L    ALT 37 0 - 50 U/L    Protein Total 6.9 6.8 - 8.8 g/dL    Albumin 3.7 3.4 - 5.0 g/dL    Bilirubin Total 0.8 0.2 - 1.3 mg/dL    GFR Estimate 60 (L) >60 mL/min/1.73m2   CBC with platelets and differential    Collection Time: 10/03/21  7:03 AM   Result Value Ref Range    WBC Count 7.5 4.0 - 11.0 10e3/uL    RBC Count 4.44 3.80 - 5.20 10e6/uL    Hemoglobin 13.9 11.7 - 15.7 g/dL    Hematocrit 41.6 35.0 - 47.0 %    MCV 94 78 - 100 fL    MCH 31.3 26.5 - 33.0 pg    MCHC 33.4 31.5 - 36.5 g/dL    RDW 11.9 10.0 - 15.0 %    Platelet Count 248 150 - 450 10e3/uL    % Neutrophils 46 %    % Lymphocytes 39 %    % Monocytes 13 %    % Eosinophils 2 %    % Basophils 0 %    % Immature Granulocytes 0 %    NRBCs per 100 WBC 0 <1 /100    Absolute Neutrophils 3.4 1.6 - 8.3  10e3/uL    Absolute Lymphocytes 2.9 0.8 - 5.3 10e3/uL    Absolute Monocytes 1.0 0.0 - 1.3 10e3/uL    Absolute Eosinophils 0.2 0.0 - 0.7 10e3/uL    Absolute Basophils 0.0 0.0 - 0.2 10e3/uL    Absolute Immature Granulocytes 0.0 <=0.0 10e3/uL    Absolute NRBCs 0.0 10e3/uL   Vitamin D Deficiency    Collection Time: 10/04/21  8:09 AM   Result Value Ref Range    Vitamin D, Total (25-Hydroxy) 49 20 - 75 ug/L   Vitamin B12    Collection Time: 10/04/21  8:09 AM   Result Value Ref Range    Vitamin B12 503 193 - 986 pg/mL   Folate    Collection Time: 10/04/21  8:09 AM   Result Value Ref Range    Folic Acid 28.7 >=5.4 ng/mL   TSH with free T4 reflex    Collection Time: 10/04/21  8:09 AM   Result Value Ref Range    TSH 0.12 (L) 0.40 - 4.00 mU/L   Lipid panel reflex to direct LDL    Collection Time: 10/04/21  8:09 AM   Result Value Ref Range    Cholesterol 252 (H) <200 mg/dL    Triglycerides 220 (H) <150 mg/dL    Direct Measure HDL 61 >=50 mg/dL    LDL Cholesterol Calculated 147 (H) <=100 mg/dL    Non HDL Cholesterol 191 (H) <130 mg/dL   T4 free    Collection Time: 10/04/21  8:09 AM   Result Value Ref Range    Free T4 0.85 0.76 - 1.46 ng/dL   Glucose by meter    Collection Time: 10/04/21  8:16 AM   Result Value Ref Range    GLUCOSE BY METER POCT 118 (H) 70 - 99 mg/dL   Troponin I    Collection Time: 10/04/21 12:05 PM   Result Value Ref Range    Troponin I <0.015 0.000 - 0.045 ug/L   MRSA MSSA PCR, Nasal Swab    Collection Time: 10/08/21  5:46 PM    Specimen: Nares, Bilateral; Swab   Result Value Ref Range    MRSA Target DNA Negative Negative    SA Target DNA Positive    Asymptomatic COVID-19 Virus (Coronavirus) by PCR Nasopharyngeal    Collection Time: 10/12/21 12:36 PM    Specimen: Nasopharyngeal; Swab   Result Value Ref Range    SARS CoV2 PCR Negative Negative   MRSA MSSA PCR, Nasal Swab    Collection Time: 10/15/21  1:35 PM    Specimen: Nares, Bilateral; Swab   Result Value Ref Range    MRSA Target DNA Negative Negative    SA  Target DNA Positive    Asymptomatic COVID-19 Virus (Coronavirus) by PCR Nasopharyngeal    Collection Time: 10/19/21 12:52 PM    Specimen: Nasopharyngeal; Swab   Result Value Ref Range    SARS CoV2 PCR Negative Negative   Valproic acid    Collection Time: 10/25/21  6:55 AM   Result Value Ref Range    Valproic acid 78   mg/L   Ammonia    Collection Time: 10/25/21  6:55 AM   Result Value Ref Range    Ammonia 23 10 - 50 umol/L   Comprehensive metabolic panel    Collection Time: 10/25/21  6:55 AM   Result Value Ref Range    Sodium 144 133 - 144 mmol/L    Potassium 4.8 3.4 - 5.3 mmol/L    Chloride 113 (H) 94 - 109 mmol/L    Carbon Dioxide (CO2) 29 20 - 32 mmol/L    Anion Gap 2 (L) 3 - 14 mmol/L    Urea Nitrogen 16 7 - 30 mg/dL    Creatinine 0.94 0.52 - 1.04 mg/dL    Calcium 8.5 8.5 - 10.1 mg/dL    Glucose 92 70 - 99 mg/dL    Alkaline Phosphatase 83 40 - 150 U/L    AST 16 0 - 45 U/L    ALT 23 0 - 50 U/L    Protein Total 5.8 (L) 6.8 - 8.8 g/dL    Albumin 3.1 (L) 3.4 - 5.0 g/dL    Bilirubin Total 0.4 0.2 - 1.3 mg/dL    GFR Estimate 65 >60 mL/min/1.73m2   CBC with platelets and differential    Collection Time: 10/25/21  6:55 AM   Result Value Ref Range    WBC Count 6.1 4.0 - 11.0 10e3/uL    RBC Count 4.07 3.80 - 5.20 10e6/uL    Hemoglobin 12.9 11.7 - 15.7 g/dL    Hematocrit 39.2 35.0 - 47.0 %    MCV 96 78 - 100 fL    MCH 31.7 26.5 - 33.0 pg    MCHC 32.9 31.5 - 36.5 g/dL    RDW 12.3 10.0 - 15.0 %    Platelet Count 237 150 - 450 10e3/uL    % Neutrophils 30 %    % Lymphocytes 52 %    % Monocytes 12 %    % Eosinophils 4 %    % Basophils 1 %    % Immature Granulocytes 1 %    NRBCs per 100 WBC 0 <1 /100    Absolute Neutrophils 1.9 1.6 - 8.3 10e3/uL    Absolute Lymphocytes 3.2 0.8 - 5.3 10e3/uL    Absolute Monocytes 0.7 0.0 - 1.3 10e3/uL    Absolute Eosinophils 0.2 0.0 - 0.7 10e3/uL    Absolute Basophils 0.0 0.0 - 0.2 10e3/uL    Absolute Immature Granulocytes 0.0 <=0.0 10e3/uL    Absolute NRBCs 0.0 10e3/uL   EKG 12 lead     Collection Time: 11/10/21  6:37 PM   Result Value Ref Range    Systolic Blood Pressure  mmHg    Diastolic Blood Pressure  mmHg    Ventricular Rate 83 BPM    Atrial Rate 83 BPM    NC Interval 140 ms    QRS Duration 82 ms     ms    QTc 470 ms    P Axis 36 degrees    R AXIS 31 degrees    T Axis 57 degrees    Interpretation ECG       Sinus rhythm  Nonspecific T wave abnormality  Prolonged QT  Abnormal ECG  Unconfirmed report - interpretation of this ECG is computer generated - see medical record for final interpretation  Confirmed by - EMERGENCY ROOM, PHYSICIAN ([id number]),  BASIA AKHTAR ([id number]) on 11/10/2021 10:24:21 PM     Extra Blue Top Tube    Collection Time: 11/10/21  7:01 PM   Result Value Ref Range    Hold Specimen JIC    Extra Red Top Tube    Collection Time: 11/10/21  7:01 PM   Result Value Ref Range    Hold Specimen JIC    Comprehensive metabolic panel    Collection Time: 11/10/21  7:09 PM   Result Value Ref Range    Sodium 141 133 - 144 mmol/L    Potassium 3.3 (L) 3.4 - 5.3 mmol/L    Chloride 109 94 - 109 mmol/L    Carbon Dioxide (CO2) 29 20 - 32 mmol/L    Anion Gap 3 3 - 14 mmol/L    Urea Nitrogen 8 7 - 30 mg/dL    Creatinine 0.89 0.52 - 1.04 mg/dL    Calcium 8.8 8.5 - 10.1 mg/dL    Glucose 122 (H) 70 - 99 mg/dL    Alkaline Phosphatase 95 40 - 150 U/L    AST 31 0 - 45 U/L    ALT 31 0 - 50 U/L    Protein Total 7.2 6.8 - 8.8 g/dL    Albumin 3.5 3.4 - 5.0 g/dL    Bilirubin Total 0.4 0.2 - 1.3 mg/dL    GFR Estimate 70 >60 mL/min/1.73m2   Troponin I    Collection Time: 11/10/21  7:09 PM   Result Value Ref Range    Troponin I <0.015 0.000 - 0.045 ug/L   CBC with platelets and differential    Collection Time: 11/10/21  7:09 PM   Result Value Ref Range    WBC Count 6.1 4.0 - 11.0 10e3/uL    RBC Count 4.51 3.80 - 5.20 10e6/uL    Hemoglobin 13.9 11.7 - 15.7 g/dL    Hematocrit 42.5 35.0 - 47.0 %    MCV 94 78 - 100 fL    MCH 30.8 26.5 - 33.0 pg    MCHC 32.7 31.5 - 36.5 g/dL    RDW 11.9 10.0 - 15.0 %     Platelet Count 252 150 - 450 10e3/uL    % Neutrophils 35 %    % Lymphocytes 47 %    % Monocytes 13 %    % Eosinophils 4 %    % Basophils 1 %    % Immature Granulocytes 0 %    NRBCs per 100 WBC 0 <1 /100    Absolute Neutrophils 2.2 1.6 - 8.3 10e3/uL    Absolute Lymphocytes 2.9 0.8 - 5.3 10e3/uL    Absolute Monocytes 0.8 0.0 - 1.3 10e3/uL    Absolute Eosinophils 0.2 0.0 - 0.7 10e3/uL    Absolute Basophils 0.0 0.0 - 0.2 10e3/uL    Absolute Immature Granulocytes 0.0 <=0.0 10e3/uL    Absolute NRBCs 0.0 10e3/uL   UA with Microscopic reflex to Culture    Collection Time: 11/10/21  7:29 PM    Specimen: Urine, Clean Catch   Result Value Ref Range    Color Urine Yellow Colorless, Straw, Light Yellow, Yellow    Appearance Urine Slightly Cloudy (A) Clear    Glucose Urine Negative Negative mg/dL    Bilirubin Urine Negative Negative    Ketones Urine Negative Negative mg/dL    Specific Gravity Urine 1.019 1.003 - 1.035    Blood Urine Negative Negative    pH Urine 7.5 (H) 5.0 - 7.0    Protein Albumin Urine 20  (A) Negative mg/dL    Urobilinogen Urine Normal Normal, 2.0 mg/dL    Nitrite Urine Negative Negative    Leukocyte Esterase Urine Small (A) Negative    Bacteria Urine Few (A) None Seen /HPF    Mucus Urine Present (A) None Seen /LPF    Amorphous Crystals Urine Few (A) None Seen /HPF    RBC Urine 1 <=2 /HPF    WBC Urine 6 (H) <=5 /HPF    Squamous Epithelials Urine 2 (H) <=1 /HPF     General appearance: sounds fair, not visual  Alert.   Affect: fair  Mood: fair    Speech:  normal.   Eye contact:  na  Psychomotor behavior: she reports tremor  Gait: not observed, she feels unsteady  Abnormal movements: none described  Delusions: none  Hallucinations:   none  Thoughts: logical  Associations: intact  Judgement: fair  Insight: fair  Cognitions: intact in conversation  Memory:  intact in conversation  Orientation: normal    Not suicidal.    Imp: Bipolar 2  Possible depakote or Prazosin side effects    Plan: [facility]  2.   Reduce Depakote DR to 500mg, and Prazosin to one mg.  She sees her new provider tomorrow      Video-Visit Details    Type of service:  Video Visit    call Start Time (time video started): 1420    call End Time (time video stopped): 1435    Originating Location (pt. Location): Driving    Distant Location (provider location): Provider remote location    Mode of Communication:  telephone  Physician has received verbal consent for a Video Visit from the patient? Yes      Yohan Hightower MD

## 2021-11-15 NOTE — GROUP NOTE
Psychotherapy Group Note    PATIENT'S NAME: Marissa Youssef  MRN:   [id number]  :   1960  ACCT. NUMBER: [id number]  DATE OF SERVICE: 11/15/21  START TIME: 10:00 AM  END TIME: 10:50 AM  FACILITATOR: Nettie Brian LICSW  TOPIC: MH EBP Group: Symptom Awareness  [facility] 55+ Program  TRACK: A2                              Service Modality:  Video Visit     Telemedicine Visit: The patient's condition can be safely assessed and treated via synchronous audio and visual telemedicine encounter.      Reason for Telemedicine Visit: Services only offered telehealth    Originating Site (Patient Location): Patient's home    Distant Site (Provider Location): [facility] MENTAL HEALTH & ADDICTION SERVICES    Consent:  The patient/guardian has verbally consented to: the potential risks and benefits of telemedicine (video visit) versus in person care; bill my insurance or make self-payment for services provided; and responsibility for payment of non-covered services.     Patient would like the video invitation sent by:  My Chart    Mode of Communication:  Video Conference via Medical Zoom    As the provider I attest to compliance with applicable laws and regulations related to telemedicine.         NUMBER OF PARTICIPANTS: 8    Summary of Group / Topics Discussed:  Symptom Awareness: Mood Disorders: Patients received a general overview of depression and how it relates to their current symptoms. The purpose is to promote understanding of their diagnoses and how it impacts their functioning. Patients reviewed their current awareness of symptoms and diagnoses. Patients received information regarding diagnoses, etiology, cultural, and environmental factors as well as impact on functioning.     Patient Session Goals / Objectives:    Discussed patient individual symptoms and experiences    Reviewed diagnostic criteria and etiology of diagnoses       Patient Participation / Response:  Fully participated with the  group by sharing personal reflections / insights and openly received / provided feedback with other participants.    Demonstrated understanding of topics discussed through group discussion and participation and Demonstrated understanding of how information regarding symptoms can assist in management of symptoms    Treatment Plan:  Patient has a current master individualized treatment plan.  See Epic treatment plan for more information.    Nettie Valdes, LICSW

## 2021-11-15 NOTE — GROUP NOTE
Service Modality:  Video Visit     Telemedicine Visit: The patient's condition can be safely assessed and treated via synchronous audio and visual telemedicine encounter.       Reason for Telemedicine Visit: Services only offered telehealth and due to COVID-19.     Originating Site (Patient Location): [facility] [place]     Distant Site (Provider Location): Provider Remote Setting- Home Office     Consent:  The patient/guardian has verbally consented to: the potential risks and benefits of telemedicine (video visit) versus in person care; bill my insurance or make self-payment for services provided; and responsibility for payment of non-covered services.      Patient would like the video invitation sent by:  My Chart     Mode of Communication:  Video Conference via Medical Zoom     As the provider I attest to compliance with applicable laws and regulations related to telemedicine.    Process Group Note    PATIENT'S NAME: Marissa Youssef  MRN:   [id number]  :   1960  ACCT. NUMBER: [id number]  DATE OF SERVICE: 11/15/21  START TIME:  9:00 AM  END TIME:  9:50 AM  FACILITATOR: Suzanne Robles [facility]  TOPIC:  Process Group    Diagnoses:  F 32.9 Major Depression Disorder, recurrent, severe   F41.1 Generalized anxiety disorder F41.1  F60.3 Borderline personality disorder (H) F60.3  F43.1 PTSD (post-traumatic stress disorder) F43.1      [facility] 55+ Program  TRACK: A2  NUMBER OF PARTICIPANTS: 8          Data:    Session content: At the start of this group, patients were invited to check in by identifying themselves, describing their current emotional status, and identifying issues to address in this group.   Area(s) of treatment focus addressed in this session included Symptom Management, Personal Safety, Community Resources/Discharge Planning, Abstinence/Relapse Prevention, Develop / Improve Independent Living Skills and Develop Socialization / Interpersonal Relationship Skills.    Marissa reports depressed  mood. Denies S/I or safety issues. She processed her weekend. She participated in a  event. She went to support her 's sister. She reported on her feelings of fear about falling. She will consult with Dr. Hightower about medication management and symptom acuity.    Therapeutic Interventions/Treatment Strategies:  Psychotherapist offered support, feedback and validation and reinforced use of skills. Treatment modalities used include Cognitive Behavioral Therapy.    Assessment:    Patient response:   Patient responded to session by verbalizing understanding    Possible barriers to participation / learning include: and no barriers identified    Health Issues:   Yes: worries about falling       Substance Use Review:   Substance Use: No active concerns identified.    Mental Status/Behavioral Observations  Appearance:   Appropriate   Eye Contact:   Good   Psychomotor Behavior: Normal   Attitude:   Cooperative  Interested  Orientation:   All  Speech   Rate / Production: Normal    Volume:  Normal   Mood:    Anxious  Depressed   Affect:    Appropriate  Worrisome   Thought Content:   Clear and Safety denies any current safety concerns including suicidal ideation, self-harm, and homicidal ideation  Thought Form:  Coherent  Goal Directed  Logical     Insight:    Good     Plan:     Safety Plan: No current safety concerns identified.  Recommended that patient call 911 or go to the local ED should there be a change in any of these risk factors.     Barriers to treatment: None identified    Patient Contracts (see media tab):  None    Substance Use: Not addressed in session     Continue or Discharge: Patient will continue in 55+ Program (55+) as planned. Patient is likely to benefit from learning and using skills as they work toward the goals identified in their treatment plan. Marissa will continue for mood stabilization and symptom management education for mental health recovery.       Suzanne Robles, [facility]  November  15, 2021

## 2021-11-17 ENCOUNTER — HOSPITAL ENCOUNTER (OUTPATIENT)
Dept: BEHAVIORAL HEALTH | Facility: CLINIC | Age: 61
End: 2021-11-17
Attending: PSYCHIATRY & NEUROLOGY
Payer: MEDICARE

## 2021-11-17 PROCEDURE — 90853 GROUP PSYCHOTHERAPY: CPT | Mod: GT | Performed by: SOCIAL WORKER

## 2021-11-17 PROCEDURE — 90853 GROUP PSYCHOTHERAPY: CPT | Mod: 95

## 2021-11-17 NOTE — GROUP NOTE
Psychotherapy Group Note    PATIENT'S NAME: Marissa Youssef  MRN:   [id number]  :   1960  ACCT. NUMBER: [id number]  DATE OF SERVICE: 21  START TIME: 11:00 AM  END TIME: 11:50 AM  FACILITATOR: Nettie Brian LICSW  TOPIC: MH EBP Group: Behavioral Activation  [facility] 55+ Program  TRACK: A2                              Service Modality:  Video Visit     Telemedicine Visit: The patient's condition can be safely assessed and treated via synchronous audio and visual telemedicine encounter.      Reason for Telemedicine Visit: Services only offered telehealth    Originating Site (Patient Location): Patient's home    Distant Site (Provider Location): [facility] MENTAL HEALTH & ADDICTION SERVICES    Consent:  The patient/guardian has verbally consented to: the potential risks and benefits of telemedicine (video visit) versus in person care; bill my insurance or make self-payment for services provided; and responsibility for payment of non-covered services.     Patient would like the video invitation sent by:  My Chart    Mode of Communication:  Video Conference via Medical Zoom    As the provider I attest to compliance with applicable laws and regulations related to telemedicine.         NUMBER OF PARTICIPANTS: 9    Summary of Group / Topics Discussed:  Behavioral Activation: Motivation and Procrastination: Patients explored how they currently spend their time, identifying thoughts and feelings that are motivating and serve to increase desired behaviors.  They also examined behaviors that contribute to procrastination.  Different types of procrastination behaviors were identified, and strategies to reduce individual procrastination and increase motivation were explored and practiced.  Patients identified ways to increase goal-directed activities to enhance mood and reduce symptoms.        Patient Session Goals / Objectives:    Identify current patterns of procrastination behavior and how they  influence thoughts and moods, and inhibit motivation.    Identify behaviors that can be implemented that contribute to improving thoughts and feelings, motivation, and reduce symptoms.    Identify and develop a plan to increase activities that promote a sense of accomplishment and competence.    Practice scheduling positive activities / behaviors into daily routines.      Patient Participation / Response:  Fully participated with the group by sharing personal reflections / insights and openly received / provided feedback with other participants.    Demonstrated understanding of topics discussed through group discussion and participation and Expressed understanding of the relationship between behaviors, thoughts, and feelings    Treatment Plan:  Patient has a current master individualized treatment plan.  See Epic treatment plan for more information.    Nettie Valdes, LICSW

## 2021-11-17 NOTE — GROUP NOTE
Psychotherapy Group Note    PATIENT'S NAME: Marissa Youssef  MRN:   [id number]  :   1960  ACCT. NUMBER: [id number]  DATE OF SERVICE: 21  START TIME:  9:00 AM  END TIME:  9:50 AM  FACILITATOR: Nettie Brian LICSW  TOPIC: MH EBP Group: Relationship Skills  [facility] 55+ Program  TRACK: A2                              Service Modality:  Video Visit     Telemedicine Visit: The patient's condition can be safely assessed and treated via synchronous audio and visual telemedicine encounter.      Reason for Telemedicine Visit: Services only offered telehealth    Originating Site (Patient Location): Patient's home    Distant Site (Provider Location): [facility] MENTAL HEALTH & ADDICTION SERVICES    Consent:  The patient/guardian has verbally consented to: the potential risks and benefits of telemedicine (video visit) versus in person care; bill my insurance or make self-payment for services provided; and responsibility for payment of non-covered services.     Patient would like the video invitation sent by:  My Chart    Mode of Communication:  Video Conference via Medical Zoom    As the provider I attest to compliance with applicable laws and regulations related to telemedicine.         NUMBER OF PARTICIPANTS: 9    Summary of Group / Topics Discussed:  Relationship Skills: Conflict Resolution: Topic of conflict resolution in interpersonal communication was discussed. Patients received an overview of how communication skills during times of conflict impact symptoms and functioning. Patients discussed their current communication challenges and how this impacts their functioning. Patients also verbalized understanding of skills learned and practiced in session.     Patient Session Goals / Objectives:    Identified and discussed patient individual challenges with communication    Presented and practiced effective communication skills in session    Assisted patients in implementing more effective  communication skills in their relationships      Patient Participation / Response:  Fully participated with the group by sharing personal reflections / insights and openly received / provided feedback with other participants.    Demonstrated understanding of topics discussed through group discussion and participation and Demonstrated understanding of relationship skills and communication skills    Treatment Plan:  Patient has a current master individualized treatment plan.  See Epic treatment plan for more information.    Nettie Valdes, LICSW

## 2021-11-17 NOTE — GROUP NOTE
Service Modality:  Video Visit     Telemedicine Visit: The patient's condition can be safely assessed and treated via synchronous audio and visual telemedicine encounter.       Reason for Telemedicine Visit: Services only offered telehealth and due to COVID-19.     Originating Site (Patient Location): Patient's home     Distant Site (Provider Location): Provider Remote Setting- Home Office     Consent:  The patient/guardian has verbally consented to: the potential risks and benefits of telemedicine (video visit) versus in person care; bill my insurance or make self-payment for services provided; and responsibility for payment of non-covered services.      Patient would like the video invitation sent by:  My Chart     Mode of Communication:  Video Conference via Medical Zoom     As the provider I attest to compliance with applicable laws and regulations related to telemedicine.    Process Group Note    PATIENT'S NAME: Marissa Youssef  MRN:   [id number]  :   1960  ACCT. NUMBER: [id number]  DATE OF SERVICE: 21  START TIME: 10:00 AM  END TIME: 10:50 AM  FACILITATOR: Suzanne Robles [facility]  TOPIC:  Process Group    Diagnoses:  F 32.9 Major Depression Disorder, recurrent, severe   F41.1 Generalized anxiety disorder F41.1  F60.3 Borderline personality disorder (H) F60.3  F43.1 PTSD (post-traumatic stress disorder) F43.1      [facility] 55+ Program  TRACK: A2    NUMBER OF PARTICIPANTS: 9          Data:    Session content: At the start of this group, patients were invited to check in by identifying themselves, describing their current emotional status, and identifying issues to address in this group.   Area(s) of treatment focus addressed in this session included Symptom Management, Personal Safety, Community Resources/Discharge Planning, Abstinence/Relapse Prevention, Develop / Improve Independent Living Skills and Develop Socialization / Interpersonal Relationship Skills.    Marissa reports falling  again when on stairs. She has balance issues and was encouraged to talk with her PCP. She began to process interpersonal relationship dynamics within her family and how this might impact her holiday plans. She is feeling more calm.       Therapeutic Interventions/Treatment Strategies:  Psychotherapist offered support, feedback and validation and reinforced use of skills. Treatment modalities used include Cognitive Behavioral Therapy.    Assessment:    Patient response:   Patient responded to session by verbalizing understanding    Possible barriers to participation / learning include: and no barriers identified    Health Issues:   Yes: Had a fall down the stairs. Hurt her shoulder. Able to get a Neurology appointment for 12/6/2021. They are ruling out Parkinson's Disease.        Substance Use Review:   Substance Use: No active concerns identified.    Mental Status/Behavioral Observations  Appearance:   Appropriate   Eye Contact:   Good   Psychomotor Behavior: Normal   Attitude:   Cooperative  Interested  Orientation:   All  Speech   Rate / Production: Normal    Volume:  Normal   Mood:    Less depressed and nxious mood today   Affect:    Appropriate  Worrisome   Thought Content:   Clear and Safety denies any current safety concerns including suicidal ideation, self-harm, and homicidal ideation  Thought Form:  Coherent  Goal Directed  Logical     Insight:    Good     Plan:     Safety Plan: No current safety concerns identified.  Recommended that patient call 911 or go to the local ED should there be a change in any of these risk factors.     Barriers to treatment: None identified    Patient Contracts (see media tab):  None    Substance Use: Not addressed in session     Continue or Discharge: Patient will continue in 55+ Program (55+) as planned. Patient is likely to benefit from learning and using skills as they work toward the goals identified in their treatment plan. Marissa will continue for mood stabilization and  symptom management education for mental health recovery.      Suzanne Robles, [facility]  November 17, 2021

## 2021-11-18 ENCOUNTER — HOSPITAL ENCOUNTER (OUTPATIENT)
Dept: BEHAVIORAL HEALTH | Facility: CLINIC | Age: 61
End: 2021-11-18
Attending: PSYCHIATRY & NEUROLOGY
Payer: MEDICARE

## 2021-11-18 PROCEDURE — 90853 GROUP PSYCHOTHERAPY: CPT | Mod: 95 | Performed by: SOCIAL WORKER

## 2021-11-18 PROCEDURE — 90853 GROUP PSYCHOTHERAPY: CPT | Mod: GT

## 2021-11-18 NOTE — GROUP NOTE
Psychotherapy Group Note    PATIENT'S NAME: Marissa Youssef  MRN:   [id number]  :   1960  ACCT. NUMBER: [id number]  DATE OF SERVICE: 21  START TIME: 10:00 AM  END TIME: 10:50 AM  FACILITATOR: Nettie Brian LICSW  TOPIC: MH EBP Group: Self-Awareness  [facility] 55+ Program  TRACK: A2                              Service Modality:  Video Visit     Telemedicine Visit: The patient's condition can be safely assessed and treated via synchronous audio and visual telemedicine encounter.      Reason for Telemedicine Visit: Services only offered telehealth    Originating Site (Patient Location): Patient's home    Distant Site (Provider Location): [facility] MENTAL HEALTH & ADDICTION SERVICES    Consent:  The patient/guardian has verbally consented to: the potential risks and benefits of telemedicine (video visit) versus in person care; bill my insurance or make self-payment for services provided; and responsibility for payment of non-covered services.     Patient would like the video invitation sent by:  My Chart    Mode of Communication:  Video Conference via Medical Zoom    As the provider I attest to compliance with applicable laws and regulations related to telemedicine.         NUMBER OF PARTICIPANTS: 8    Summary of Group / Topics Discussed:  Self-Awareness: Gratitude/GLADLYGO: Topic focused on assisting patients in identifying grover concepts in gratitude and acknowledging progress. Patients discussed the benefits of practicing gratitude and its impact on mood improvement, mindfulness, and perspective. Patients worked to increase time spent on recognition and appreciation of what is positive and working in their lives. Patients discussed the concepts and benefits of feeling grateful. The goal is to reduce rumination and negative thinking resulting in increased mindfulness and resilience. Patients specifically discussed how they can practice and problem solve barriers to daily gratitude  practice.     Patient Session Goals / Objectives:    [place] the concept and benefits of gratitude    Identified ways to practice gratitude in daily life    Problem solved barriers to practicing gratitude      Patient Participation / Response:  Fully participated with the group by sharing personal reflections / insights and openly received / provided feedback with other participants.    Demonstrated understanding of topics discussed through group discussion and participation and Practiced skills in session    Treatment Plan:  Patient has a current master individualized treatment plan.  See Epic treatment plan for more information.    ABHIJIT DoranSW
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
## 2021-11-18 NOTE — GROUP NOTE
Service Modality:  Video Visit     Telemedicine Visit: The patient's condition can be safely assessed and treated via synchronous audio and visual telemedicine encounter.       Reason for Telemedicine Visit: Services only offered telehealth and due to COVID-19.     Originating Site (Patient Location): Patient's home     Distant Site (Provider Location): Provider Remote Setting- Home Office     Consent:  The patient/guardian has verbally consented to: the potential risks and benefits of telemedicine (video visit) versus in person care; bill my insurance or make self-payment for services provided; and responsibility for payment of non-covered services.      Patient would like the video invitation sent by:  My Chart     Mode of Communication:  Video Conference via Medical Zoom     As the provider I attest to compliance with applicable laws and regulations related to telemedicine.    Psychotherapy Group Note    PATIENT'S NAME: Marissa Youssef  MRN:   [id number]  :   1960  ACCT. NUMBER: [id number]  DATE OF SERVICE: 21  START TIME: 11:00 AM  END TIME: 11:50 AM  FACILITATOR: Suzanne Robles [facility]  TOPIC:  EBP Group: Coping Skills  [facility] 55+ Program  TRACK: A2    NUMBER OF PARTICIPANTS: 7    Summary of Group / Topics Discussed:  Coping Skills: Self-Soothe: Patients learned to apply self-soothe as a way to decrease heightened stress in the moment.  Patients identified situations that necessitate self-soothe strategies.  They focused on ways to manage physical symptoms of distress using the senses. They discussed how to distinguish when this can be useful in their lives when other strategies are more relevant or helpful.    Patient Session Goals / Objectives:    Understand the purpose of using the senses to decrease distress    Process what happens in the body when using self-soothe strategies    Demonstrate understanding of when to use self-soothe strategies    Identify and problem solve  barriers to applying self-soothe strategies.    Choose 1-2 self-soothe strategies to apply during times of distress.        Patient Participation / Response:  Fully participated with the group by sharing personal reflections / insights and openly received / provided feedback with other participants.    Demonstrated understanding of topics discussed through group discussion and participation    Treatment Plan:  Patient has a current master individualized treatment plan.  See Epic treatment plan for more information.    Suzanne Robles, ABHIJITSW

## 2021-11-22 ENCOUNTER — HOSPITAL ENCOUNTER (OUTPATIENT)
Dept: BEHAVIORAL HEALTH | Facility: CLINIC | Age: 61
End: 2021-11-22
Attending: PSYCHIATRY & NEUROLOGY
Payer: MEDICARE

## 2021-11-22 PROCEDURE — 90853 GROUP PSYCHOTHERAPY: CPT | Mod: GT

## 2021-11-22 PROCEDURE — 90853 GROUP PSYCHOTHERAPY: CPT | Mod: 95

## 2021-11-22 NOTE — GROUP NOTE
"Psychotherapy Group Note    PATIENT'S NAME: Marissa Youssef  MRN:   [id number]  :   1960  ACCT. NUMBER: [id number]  DATE OF SERVICE: 21  START TIME: 10:00 AM  END TIME: 10:50 AM  FACILITATOR: Nettie Brian LICSW  TOPIC: MH EBP Group: Coping Skills  [facility] 55+ Program  TRACK: A2                              Service Modality:  Video Visit     Telemedicine Visit: The patient's condition can be safely assessed and treated via synchronous audio and visual telemedicine encounter.      Reason for Telemedicine Visit: Services only offered telehealth    Originating Site (Patient Location): Patient's home    Distant Site (Provider Location): [facility] MENTAL HEALTH & ADDICTION SERVICES    Consent:  The patient/guardian has verbally consented to: the potential risks and benefits of telemedicine (video visit) versus in person care; bill my insurance or make self-payment for services provided; and responsibility for payment of non-covered services.     Patient would like the video invitation sent by:  My Chart    Mode of Communication:  Video Conference via Medical Zoom    As the provider I attest to compliance with applicable laws and regulations related to telemedicine.         NUMBER OF PARTICIPANTS: 8    Summary of Group / Topics Discussed:  Coping Skills: Additional Coping Skills:  Patients discussed and practiced \"Do Positive Experiences Stick to Your Ribs\" handout.  Reviewed the benefits of applying the aforementioned coping strategies.  Patients explored how these strategies might be applied to daily stressors or distressing situations.    Patient Session Goals / Objectives:    Understand the purpose and benefits of applying these coping strategies    Practiced noticing positive experiences    Address barriers to utilizing coping skills when in distress.        Patient Participation / Response:  Fully participated with the group by sharing personal reflections / insights and openly " received / provided feedback with other participants.    Demonstrated understanding of topics discussed through group discussion and participation, Expressed understanding of the relevance / importance of coping skills at distressing times in life and Identified / Expressed personal readiness to practice new coping skills    Treatment Plan:  Patient has a current master individualized treatment plan.  See Epic treatment plan for more information.    Nettie Valdes, LICSW

## 2021-11-22 NOTE — GROUP NOTE
Psychotherapy Group Note    PATIENT'S NAME: Marissa Youssef  MRN:   [id number]  :   1960  ACCT. NUMBER: [id number]  DATE OF SERVICE: 21  START TIME: 11:00 AM  END TIME: 11:50 AM  FACILITATOR: Ruth Borges LGSW  TOPIC: MH EBP Group: Behavioral Activation  [facility] 55+ Program  TRACK: A2    NUMBER OF PARTICIPANTS:     Summary of Group / Topics Discussed:  Behavioral Activation: Activity Scheduling:Patients explored how they currently spend their time, and how specific behaviors impact thoughts and feelings.  The group explored the effect of negative and positive activities on mood states and thought patterns.  Patients identified activities that help to improve mood and thinking patterns, and developed a plan to implement positive activities between sessions.      Patient Session Goals / Objectives:    Identify impact of current behaviors on thoughts and mood    Identify 2-3 behavioral changes that could have a positive impact on thoughts and mood    Prepare to make desired behavioral change: Create a change plan / activity schedule                                    Service Modality:  Video Visit     Telemedicine Visit: The patient's condition can be safely assessed and treated via synchronous audio and visual telemedicine encounter.      Reason for Telemedicine Visit: Services only offered telehealth    Originating Site (Patient Location): Patient's home    Distant Site (Provider Location): Provider Remote Setting- Home Office    Consent:  The patient/guardian has verbally consented to: the potential risks and benefits of telemedicine (video visit) versus in person care; bill my insurance or make self-payment for services provided; and responsibility for payment of non-covered services.     Patient would like the video invitation sent by:  My Chart    Mode of Communication:  Video Conference via Medical Zoom    As the provider I attest to compliance with applicable laws and regulations related  to telemedicine.            Patient Participation / Response:  Fully participated with the group by sharing personal reflections / insights and openly received / provided feedback with other participants.    Demonstrated understanding of topics discussed through group discussion and participation and Expressed understanding of the relationship between behaviors, thoughts, and feelings    Treatment Plan:  Patient has a current master individualized treatment plan.  See Epic treatment plan for more information.    Ruth Borges LGSW

## 2021-11-22 NOTE — GROUP NOTE
Process Group Note    PATIENT'S NAME: Marissa Youssef  MRN:   [id number]  :   1960  ACCT. NUMBER: [id number]  DATE OF SERVICE: 21  START TIME:  9:00 AM  END TIME:  9:50 AM  FACILITATOR: Ruth Borges LGSW  TOPIC:  Process Group    Diagnoses:  Major depressive disorder, severe, recurrent, without psychosis  Generalized anxiety disorder  PTSD  Borderline personality disorder.       [facility] 55+ Program  TRACK: A2    NUMBER OF PARTICIPANTS:                                       Service Modality:  Video Visit     Telemedicine Visit: The patient's condition can be safely assessed and treated via synchronous audio and visual telemedicine encounter.      Reason for Telemedicine Visit: Services only offered telehealth    Originating Site (Patient Location): Patient's home    Distant Site (Provider Location): Provider Remote Setting- Home Office    Consent:  The patient/guardian has verbally consented to: the potential risks and benefits of telemedicine (video visit) versus in person care; bill my insurance or make self-payment for services provided; and responsibility for payment of non-covered services.     Patient would like the video invitation sent by:  My Chart    Mode of Communication:  Video Conference via Medical Zoom    As the provider I attest to compliance with applicable laws and regulations related to telemedicine.                Data:    Session content: At the start of this group, patients were invited to check in by identifying themselves, describing their current emotional status, and identifying issues to address in this group.   Area(s) of treatment focus addressed in this session included Symptom Management, Personal Safety and Community Resources/Discharge Planning.  Client reported frustration over increase physical health symptoms, including balance issues, falls, tremors and pain from a dislocated shoulder.  Client reported accomplishing her goal to see her orthopedist.   "Client reports that she will need to do physical therapy instead of surgery in relation to their shoulder injury.  Client reports that \"nobody wants to address\" her imbalance and tremor symptoms explaining that she has gotten inconclusive insight into her symptoms so far.  Writer empathized with client and encouraged her to be transparent with her providers about her needs.  Writer referred client to a Matter of Balance evidenced based class to address issues of imbalance. Client did not report any suicidal ideation, plan or intent.            Therapeutic Interventions/Treatment Strategies:  Psychotherapist offered support, feedback and validation and reinforced use of skills. Treatment modalities used include Cognitive Behavioral Therapy. Interventions include Symptoms Management: Promoted understanding of their diagnoses and how it impacts their functioning.    Assessment:    Patient response:   Patient responded to session by accepting feedback, giving feedback and listening    Possible barriers to participation / learning include: and no barriers identified    Health Issues:   Yes: Pain, Associated Psychological Distress  Neurological Issues, Associated Psychological Distress       Substance Use Review:   Substance Use: No active concerns identified.    Mental Status/Behavioral Observations  Appearance:   Appropriate   Eye Contact:   Good   Psychomotor Behavior: Normal   Attitude:   Cooperative  Interested Pleasant  Orientation:   All  Speech   Rate / Production: Normal    Volume:  Normal   Mood:    Anxious  Depressed   Affect:    Appropriate   Thought Content:   Clear and Safety denies any current safety concerns including suicidal ideation, self-harm, and homicidal ideation  Thought Form:  Coherent  Logical     Insight:    Good     Plan:     Safety Plan: No current safety concerns identified.  Recommended that patient call 911 or go to the local ED should there be a change in any of these risk factors. "     Barriers to treatment: None identified    Patient Contracts (see media tab):  None    Substance Use: Not addressed in session     Continue or Discharge: Patient will continue in 55+ Program (55+) as planned. Patient is likely to benefit from learning and using skills as they work toward the goals identified in their treatment plan.      Ruth Borges, MODESTA  November 22, 2021

## 2021-11-24 ENCOUNTER — HOSPITAL ENCOUNTER (OUTPATIENT)
Dept: BEHAVIORAL HEALTH | Facility: CLINIC | Age: 61
End: 2021-11-24
Attending: PSYCHIATRY & NEUROLOGY
Payer: MEDICARE

## 2021-11-24 PROCEDURE — 90853 GROUP PSYCHOTHERAPY: CPT | Mod: 95

## 2021-11-24 NOTE — GROUP NOTE
Psychotherapy Group Note    PATIENT'S NAME: Marissa Youssef  MRN:   [id number]  :   1960  ACCT. NUMBER: [id number]  DATE OF SERVICE: 21  START TIME: 10:00 AM  END TIME: 10:50 AM  FACILITATOR: Ruth Borges LGSW  TOPIC: MH EBP Group: Relationship Skills  [facility] 55+ Program  TRACK: A2    NUMBER OF PARTICIPANTS: 7    Summary of Group / Topics Discussed:  Relationship Skills: Dependencies: Patients were provided with a general overview of different types of dependencies and how they relate to symptoms and functioning. The purpose is to help patients identify the different types of dependencies, how they may be impacted by them, and to gain awareness and skills to work towards healthier relationships.    Patient Session Goals / Objectives:    Familiarized patients with the concept of interpersonal relationships and their characteristics.      Discussed and practiced strategies to promote healthier understanding of interpersonal relationships with a focus on dependencies    Identified types of dependencies in patient s lives and how they impact their symptoms and functioning                                      Service Modality:  Video Visit     Telemedicine Visit: The patient's condition can be safely assessed and treated via synchronous audio and visual telemedicine encounter.      Reason for Telemedicine Visit: Services only offered telehealth    Originating Site (Patient Location): Patient's home    Distant Site (Provider Location): Provider Remote Setting- Home Office    Consent:  The patient/guardian has verbally consented to: the potential risks and benefits of telemedicine (video visit) versus in person care; bill my insurance or make self-payment for services provided; and responsibility for payment of non-covered services.     Patient would like the video invitation sent by:  My Chart    Mode of Communication:  Video Conference via Medical Zoom    As the provider I attest to compliance  with applicable laws and regulations related to telemedicine.            Patient Participation / Response:  Minimally participated, only when prompted / asked.    Demonstrated understanding of topics discussed through group discussion and participation    Treatment Plan:  Patient has a current master individualized treatment plan.  See Epic treatment plan for more information.    Ruth Borges LGSW

## 2021-11-24 NOTE — GROUP NOTE
Process Group Note    PATIENT'S NAME: Marissa Youssef  MRN:   [id number]  :   1960  ACCT. NUMBER: [id number]  DATE OF SERVICE: 21  START TIME:  9:00 AM  END TIME:  9:50 AM  FACILITATOR: Ruth Borges LGSW  TOPIC:  Process Group    Diagnoses:  Major depressive disorder, severe, recurrent, without psychosis  Generalized anxiety disorder  PTSD  Borderline personality disorder.        [facility] 55+ [facility]  TRACK: A2    NUMBER OF PARTICIPANTS: 7                                      Service Modality:  Video Visit     Telemedicine Visit: The patient's condition can be safely assessed and treated via synchronous audio and visual telemedicine encounter.      Reason for Telemedicine Visit: Services only offered telehealth    Originating Site (Patient Location): Patient's home    Distant Site (Provider Location): Provider Remote Setting- Home Office    Consent:  The patient/guardian has verbally consented to: the potential risks and benefits of telemedicine (video visit) versus in person care; bill my insurance or make self-payment for services provided; and responsibility for payment of non-covered services.     Patient would like the video invitation sent by:  My Chart    Mode of Communication:  Video Conference via Medical Zoom    As the provider I attest to compliance with applicable laws and regulations related to telemedicine.                Data:    Session content: At the start of this group, patients were invited to check in by identifying themselves, describing their current emotional status, and identifying issues to address in this group.   Area(s) of treatment focus addressed in this session included Symptom Management, Personal Safety and Community Resources/Discharge Planning.  Client reported going to [employer] yesterday to get a vegetable tray for thanksgiving at her daughter in law's tomorrow.  Client reported feeling anxiety while shopping and wondered if she was sick.  Client found out  "that they had a UTI which she states has been an ongoing issue for her.  Client reported being prescribed antibiotics by her doctor to treat the UTI.   Client reported feeling proud of completing the task of calling a gym to inquire about membership fees that she planned with her individual therapist.  Client reported talking with her psychiatric nurse in which she decreased her nighttime medications.  Client was frustrated with this change because the medication was helping with her sleep.  Client reported anxiety over spending the holiday at a large family gathering.  Writer assisted client in identifying coping strategies.  Client reported being able go to a different room in the house or \"hang out with the goats\" on the property.  Client denied suicidal ideation, plan or intent.      Therapeutic Interventions/Treatment Strategies:  Psychotherapist offered support, feedback and validation and reinforced use of skills. Treatment modalities used include Cognitive Behavioral Therapy. Interventions include Coping Skills: Reviewed patients current calming practices and discussed a more formal way of practicing and accessing skills and Symptoms Management: Promoted understanding of their diagnoses and how it impacts their functioning.    Assessment:    Patient response:   Patient responded to session by accepting feedback, giving feedback and listening    Possible barriers to participation / learning include: and no barriers identified    Health Issues:   Yes: UTI, No Psychological Distress       Substance Use Review:   Substance Use: No active concerns identified.    Mental Status/Behavioral Observations  Appearance:   Appropriate   Eye Contact:   Good   Psychomotor Behavior: Normal   Attitude:   Cooperative  Interested Friendly Pleasant  Orientation:   All  Speech   Rate / Production: Normal    Volume:  Normal   Mood:    Anxious   Affect:    Appropriate   Thought Content:   Clear and Safety denies any current safety " concerns including suicidal ideation, self-harm, and homicidal ideation  Thought Form:  Coherent  Logical     Insight:    Good     Plan:     Safety Plan: No current safety concerns identified.  Recommended that patient call 911 or go to the local ED should there be a change in any of these risk factors.     Barriers to treatment: None identified    Patient Contracts (see media tab):  None    Substance Use: Not addressed in session     Continue or Discharge: Patient will continue in 55+ Program (55+) as planned. Patient is likely to benefit from learning and using skills as they work toward the goals identified in their treatment plan.      MODESTA Mullins  November 24, 2021

## 2021-11-24 NOTE — GROUP NOTE
Psychoeducation Group Note    PATIENT'S NAME: Marissa Youssef  MRN:   [id number]  :   1960  ACCT. NUMBER: [id number]  DATE OF SERVICE: 21  START TIME: 11:00 AM  END TIME: 11:50 AM  FACILITATOR: Nettie Brian LICSW  TOPIC: ANTONIA RN Group: Health Maintenance  [facility] 55+ Program  TRACK: 7                              Service Modality:  Video Visit     Telemedicine Visit: The patient's condition can be safely assessed and treated via synchronous audio and visual telemedicine encounter.      Reason for Telemedicine Visit: Services only offered telehealth    Originating Site (Patient Location): Patient's home    Distant Site (Provider Location): [facility] MENTAL HEALTH & ADDI[facility]    Consent:  The patient/guardian has verbally consented to: the potential risks and benefits of telemedicine (video visit) versus in person care; bill my insurance or make self-payment for services provided; and responsibility for payment of non-covered services.     Patient would like the video invitation sent by:  My Chart    Mode of Communication:  Video Conference via Medical Zoom    As the provider I attest to compliance with applicable laws and regulations related to telemedicine.         NUMBER OF PARTICIPANTS: 7    Summary of Group / Topics Discussed:  Health Maintenance: Weekend planning: Patients were given time to complete a weekend plan of what they will do to promote wellness and sobriety over the weekend when they do not have the structure of group. Patients were encouraged to review progress on their treatment goals and were challenged to identify ways to work toward meeting them. Patients identified and discussed foreseeable barriers to success over the weekend and then developed a plan to overcome them. Patients reviewed their distress coping skills and social support network and discussed this with the group.       Patient Session Goals / Objectives:    ?    Identified activities to  engage in that promote balance in wellness  ?    Distinguished possible barriers to success over the weekend and created a plan to overcome them  ?    Listed distress coping skills and identified social support network to utilize if in crisis during the weekend        Patient Participation / Response:  Fully participated with the group by sharing personal reflections / insights and openly received / provided feedback with other participants.    Demonstrated understanding of topics discussed through group discussion and participation and Identified / Expressed personal readiness to practice skills    Treatment Plan:  Patient has a current master individualized treatment plan.  See Epic treatment plan for more information.    Nettie Valdes, LICSW

## 2021-11-29 ENCOUNTER — HOSPITAL ENCOUNTER (OUTPATIENT)
Dept: BEHAVIORAL HEALTH | Facility: CLINIC | Age: 61
End: 2021-11-29
Attending: PSYCHIATRY & NEUROLOGY
Payer: MEDICARE

## 2021-11-29 PROCEDURE — 90853 GROUP PSYCHOTHERAPY: CPT | Mod: 95

## 2021-11-29 NOTE — GROUP NOTE
"Process Group Note    PATIENT'S NAME: Marissa Youssef  MRN:   [id number]  :   1960  ACCT. NUMBER: [id number]  DATE OF SERVICE: 21  START TIME:  9:00 AM  END TIME:  9:50 AM  FACILITATOR: Ruth Borges LGSW  TOPIC:  Process Group    Diagnoses:  Major depressive disorder, severe, recurrent, without psychosis  Generalized anxiety disorder  PTSD  Borderline personality disorder.       [facility] 55+ Program  TRACK: A2    NUMBER OF PARTICIPANTS: 7                                      Service Modality:  Video Visit     Telemedicine Visit: The patient's condition can be safely assessed and treated via synchronous audio and visual telemedicine encounter.      Reason for Telemedicine Visit: Services only offered telehealth    Originating Site (Patient Location): Patient's home    Distant Site (Provider Location): Provider Remote Setting- Home Office    Consent:  The patient/guardian has verbally consented to: the potential risks and benefits of telemedicine (video visit) versus in person care; bill my insurance or make self-payment for services provided; and responsibility for payment of non-covered services.     Patient would like the video invitation sent by:  My Chart    Mode of Communication:  Video Conference via Medical Zoom    As the provider I attest to compliance with applicable laws and regulations related to telemedicine.                Data:    Session content: At the start of this group, patients were invited to check in by identifying themselves, describing their current emotional status, and identifying issues to address in this group.   Area(s) of treatment focus addressed in this session included Symptom Management, Personal Safety and Community Resources/Discharge Planning.  Client reflected on her  weekend, citing initial anxiety over her children exchanging \"snarky remarks\" and her daughter being late to the gathering.  Client reported being initially fearful of how family " "and guests would react to her daughter and brendan's attendance, both of whom are transgender.  Client reported anxiety going away once the event started.  \"Everything was wonderful and non stressful\".   Client reported not needing her anti anxiety rescue medication despite bringing it to the gathering in case she needed it.  Writer commended client on planning ahead to cope with anxiety.  Client reported that the rest of her weekend was \"blah\".  Client did not report any suicidal ideation, plan or intent.    Therapeutic Interventions/Treatment Strategies:  Psychotherapist offered support, feedback and validation and reinforced use of skills. Treatment modalities used include Cognitive Behavioral Therapy. Interventions include Coping Skills: Reviewed patients current calming practices and discussed a more formal way of practicing and accessing skills and Emotions Management:  Increased awareness of daily mood patterns/changes.    Assessment:    Patient response:   Patient responded to session by accepting feedback, giving feedback and listening    Possible barriers to participation / learning include: and no barriers identified    Health Issues:   None reported       Substance Use Review:   Substance Use: No active concerns identified.    Mental Status/Behavioral Observations  Appearance:   Appropriate   Eye Contact:   Good   Psychomotor Behavior: Normal   Attitude:   Cooperative  Interested Pleasant  Orientation:   All  Speech   Rate / Production: Normal    Volume:  Normal   Mood:    Anxious   Affect:    Appropriate   Thought Content:   Clear and Safety denies any current safety concerns including suicidal ideation, self-harm, and homicidal ideation  Thought Form:  Coherent  Logical     Insight:    Good     Plan:     Safety Plan: No current safety concerns identified.  Recommended that patient call 911 or go to the local ED should there be a change in any of these risk factors.     Barriers to treatment: None " identified    Patient Contracts (see media tab):  None    Substance Use: Not addressed in session     Continue or Discharge: Patient will continue in 55+ Program (55+) as planned. Patient is likely to benefit from learning and using skills as they work toward the goals identified in their treatment plan.      Ruth Borges, MODESTA  November 29, 2021

## 2021-11-29 NOTE — GROUP NOTE
Psychotherapy Group Note    PATIENT'S NAME: Marissa Youssef  MRN:   [id number]  :   1960  ACCT. NUMBER: [id number]  DATE OF SERVICE: 21  START TIME: 10:00 AM  END TIME: 10:50 AM  FACILITATOR: Nettie Brian LICSW  TOPIC: MH EBP Group: Relationship Skills  [facility] 55+ Program  TRACK: A2                              Service Modality:  Video Visit     Telemedicine Visit: The patient's condition can be safely assessed and treated via synchronous audio and visual telemedicine encounter.      Reason for Telemedicine Visit: Services only offered telehealth    Originating Site (Patient Location): Patient's home    Distant Site (Provider Location): [facility] MENTAL HEALTH & ADDICTION SERVICES    Consent:  The patient/guardian has verbally consented to: the potential risks and benefits of telemedicine (video visit) versus in person care; bill my insurance or make self-payment for services provided; and responsibility for payment of non-covered services.     Patient would like the video invitation sent by:  My Chart    Mode of Communication:  Video Conference via Medical Zoom    As the provider I attest to compliance with applicable laws and regulations related to telemedicine.         NUMBER OF PARTICIPANTS: 7    Summary of Group / Topics Discussed:  Relationship Skills: Assertive Communication: Patients were provided with a general overview of assertive communication skills and how practicing assertive communication skills will assist patients in developing healthier and more effective relationships. Patients reviewed their current awareness on ability to practice assertive communication, ways to increase assertive communication, and identified/problem solved barriers to assertive communication.     Patient Session Goals / Objectives:    Identified and discussed patient individual challenges with communication    Presented and practiced effective communication skills in session    Assisted  patients in implementing more effective communication skills in their relationships      Patient Participation / Response:  Fully participated with the group by sharing personal reflections / insights and openly received / provided feedback with other participants.    Demonstrated understanding of topics discussed through group discussion and participation and Demonstrated understanding of relationship skills and communication skills    Treatment Plan:  Patient has a current master individualized treatment plan.  See Epic treatment plan for more information.    Nettie Valdes, LICSW

## 2021-11-29 NOTE — GROUP NOTE
Psychotherapy Group Note    PATIENT'S NAME: Marissa Youssef  MRN:   [id number]  :   1960  ACCT. NUMBER: [id number]  DATE OF SERVICE: 21   START TIME: 11:00 AM  END TIME: 11:50 AM  FACILITATOR: Ruth Borges LGSW  TOPIC:  EBP Group: Behavioral Activation  [facility] 55+ Program  TRACK: A2    NUMBER OF PARTICIPANTS:     Summary of Group / Topics Discussed:  Behavioral Activation: Activity Scheduling:Patients explored how they currently spend their time, and how specific behaviors impact thoughts and feelings.  The group explored the effect of negative and positive activities on mood states and thought patterns.  Patients identified activities that help to improve mood and thinking patterns, and developed a plan to implement positive activities between sessions.      Patient Session Goals / Objectives:    Identify impact of current behaviors on thoughts and mood    Identify 2-3 behavioral changes that could have a positive impact on thoughts and mood    Prepare to make desired behavioral change: Create a change plan / activity schedule      Patient Participation / Response:  Fully participated with the group by sharing personal reflections / insights and openly received / provided feedback with other participants.    Demonstrated understanding of topics discussed through group discussion and participation and Expressed understanding of the relationship between behaviors, thoughts, and feelings    Treatment Plan:  Patient has a current master individualized treatment plan.  See Epic treatment plan for more information.    MODESTA Mullins

## 2021-12-01 ENCOUNTER — HOSPITAL ENCOUNTER (OUTPATIENT)
Dept: BEHAVIORAL HEALTH | Facility: CLINIC | Age: 61
End: 2021-12-01
Attending: PSYCHIATRY & NEUROLOGY
Payer: MEDICARE

## 2021-12-01 PROCEDURE — 90853 GROUP PSYCHOTHERAPY: CPT | Mod: GT

## 2021-12-01 PROCEDURE — 90853 GROUP PSYCHOTHERAPY: CPT | Mod: GT | Performed by: SOCIAL WORKER

## 2021-12-01 NOTE — GROUP NOTE
Service Modality:  Video Visit     Telemedicine Visit: The patient's condition can be safely assessed and treated via synchronous audio and visual telemedicine encounter.       Reason for Telemedicine Visit: Services only offered telehealth and due to COVID-19.     Originating Site (Patient Location): Patient's home     Distant Site (Provider Location): Provider Remote Setting- Home Office     Consent:  The patient/guardian has verbally consented to: the potential risks and benefits of telemedicine (video visit) versus in person care; bill my insurance or make self-payment for services provided; and responsibility for payment of non-covered services.      Patient would like the video invitation sent by:  My Chart     Mode of Communication:  Video Conference via Medical Zoom     As the provider I attest to compliance with applicable laws and regulations related to telemedicine.    Psychotherapy Group Note    PATIENT'S NAME: Marissa Youssef  MRN:   [id number]  :   1960  ACCT. NUMBER: [id number]  DATE OF SERVICE: 21  START TIME: 11:00 AM  END TIME: 11:50 AM  FACILITATOR: Suzanne Robles [facility]  TOPIC: MH EBP Group: Coping Skills  [facility] 55+ Program  TRACK: A2    NUMBER OF PARTICIPANTS: 7    Summary of Group / Topics Discussed:  Coping Skills: Additional Coping Skills:  Patients discussed and practiced relapse prevention planning which included making coping cards.  Reviewed the benefits of applying the aforementioned coping strategies.  Patients explored how these strategies might be applied to daily stressors or distressing situations as well as mental health management.    Patient Session Goals / Objectives:    Understand the purpose and benefits of applying coping strategies    Discussed coping cards as a clinical tool    Reviewed community resources for aftercare planning        Patient Participation / Response:  Fully participated with the group by sharing personal reflections /  insights and openly received / provided feedback with other participants.    Demonstrated understanding of topics discussed through group discussion and participation and Practiced 2-3 new coping skills in session    Treatment Plan:  Patient has a current master individualized treatment plan.  See Epic treatment plan for more information.    Suzanne Robles, LICSW        bowel sounds normal/nontender/no masses palpable/no distention/soft

## 2021-12-01 NOTE — GROUP NOTE
Process Group Note    PATIENT'S NAME: Marissa Youssef  MRN:   [id number]  :   1960  ACCT. NUMBER: [id number]  DATE OF SERVICE: 21  START TIME:  9:00 AM  END TIME:  9:50 AM  FACILITATOR: Ruth Borges LGSW  TOPIC:  Process Group    Diagnoses:  Major depressive disorder, severe, recurrent, without psychosis  Generalized anxiety disorder  PTSD  Borderline personality disorder.       [facility] 55+ Program  TRACK: A2    NUMBER OF PARTICIPANTS:                                       Service Modality:  Video Visit     Telemedicine Visit: The patient's condition can be safely assessed and treated via synchronous audio and visual telemedicine encounter.      Reason for Telemedicine Visit: Services only offered telehealth    Originating Site (Patient Location): Patient's home    Distant Site (Provider Location): Provider Remote Setting- Home Office    Consent:  The patient/guardian has verbally consented to: the potential risks and benefits of telemedicine (video visit) versus in person care; bill my insurance or make self-payment for services provided; and responsibility for payment of non-covered services.     Patient would like the video invitation sent by:  My Chart    Mode of Communication:  Video Conference via Medical Zoom    As the provider I attest to compliance with applicable laws and regulations related to telemedicine.                Data:    Session content: At the start of this group, patients were invited to check in by identifying themselves, describing their current emotional status, and identifying issues to address in this group.   Area(s) of treatment focus addressed in this session included Symptom Management, Personal Safety and Community Resources/Discharge Planning.  Client reported increased feelings of despair after finding out her granddaughter's friend's mother was found dead after initially being considered missing.  Writer and peers offered supportive feedback and  "validation.    Client reported feeling \"heartbroken\" for her granddaughter after talking with her about the loss.  Client reported taking care of herself by taking her medications, getting sleep and preparing for the Christmas holiday.  Writer encouraged client to give herself space to grieve and commended client for giving space for her granddaughter to process the loss with client.  Client reported planning to make a blanket in memoriam to give to her granddaughter's friend. Client did not report any suicidal ideation, plan or intent.      Therapeutic Interventions/Treatment Strategies:  Psychotherapist offered support, feedback and validation and reinforced use of skills. Treatment modalities used include Cognitive Behavioral Therapy. Interventions include Coping Skills: Discussed use of self-soothe skills to decrease distress in the body and Assisted patient in identifying 1-2 healthy distraction skills to reduce overall distress.    Assessment:    Patient response:   Patient responded to session by accepting feedback, giving feedback and listening    Possible barriers to participation / learning include: and no barriers identified    Health Issues:   None reported       Substance Use Review:   Substance Use: No active concerns identified.    Mental Status/Behavioral Observations  Appearance:   Appropriate   Eye Contact:   Good   Psychomotor Behavior: Normal   Attitude:   Cooperative  Interested Friendly Pleasant  Orientation:   All  Speech   Rate / Production: Normal    Volume:  Normal   Mood:    Depressed  Sad   Affect:    Appropriate   Thought Content:   Clear and Safety denies any current safety concerns including suicidal ideation, self-harm, and homicidal ideation  Thought Form:  Coherent  Logical     Insight:    Good     Plan:     Safety Plan: No current safety concerns identified.  Recommended that patient call 911 or go to the local ED should there be a change in any of these risk factors.     Barriers " to treatment: None identified    Patient Contracts (see media tab):  None    Substance Use: Not addressed in session     Continue or Discharge: Patient will continue in 55+ Program (55+) as planned. Patient is likely to benefit from learning and using skills as they work toward the goals identified in their treatment plan.      MODESTA Mullins  December 1, 2021

## 2021-12-01 NOTE — GROUP NOTE
Psychotherapy Group Note    PATIENT'S NAME: Marissa Youssef  MRN:   [id number]  :   1960  ACCT. NUMBER: [id number]  DATE OF SERVICE: 21  START TIME: 10:00 AM  END TIME: 10:50 AM  FACILITATOR: Ruth Borges LGSW  TOPIC:  EBP Group: Symptom Awareness  [facility] 55+ Program  TRACK: A2    NUMBER OF PARTICIPANTS: 7    Summary of Group / Topics Discussed:  Symptom Awareness: Mood Disorders: Patients received a general overview of mood disorders including depressive disorders, anxiety disorders, and bipolar disorders and how it relates to their current symptoms. The purpose is to promote understanding of their diagnoses and how it impacts their functioning. Patients reviewed their current awareness of symptoms and diagnoses. Patients received information regarding diagnoses, etiology, cultural, and environmental factors as well as impact on functioning.     Patient Session Goals / Objectives:    Discussed patient individual symptoms and experiences    Reviewed diagnostic criteria and etiology of diagnoses                                       Service Modality:  Video Visit     Telemedicine Visit: The patient's condition can be safely assessed and treated via synchronous audio and visual telemedicine encounter.      Reason for Telemedicine Visit: Services only offered telehealth    Originating Site (Patient Location): Patient's home    Distant Site (Provider Location): Provider Remote Setting- Home Office    Consent:  The patient/guardian has verbally consented to: the potential risks and benefits of telemedicine (video visit) versus in person care; bill my insurance or make self-payment for services provided; and responsibility for payment of non-covered services.     Patient would like the video invitation sent by:  My Chart    Mode of Communication:  Video Conference via Medical Zoom    As the provider I attest to compliance with applicable laws and regulations related to telemedicine.             Patient Participation / Response:  Fully participated with the group by sharing personal reflections / insights and openly received / provided feedback with other participants.    Demonstrated understanding of topics discussed through group discussion and participation, Demonstrated understanding of how information regarding symptoms can assist in management of symptoms and Identified / Expressed personal readiness to increase awareness of symptoms and apply skills as necessary    Treatment Plan:  Patient has a current master individualized treatment plan.  See Epic treatment plan for more information.    Ruth Borges LGSW

## 2021-12-02 ENCOUNTER — HOSPITAL ENCOUNTER (OUTPATIENT)
Dept: BEHAVIORAL HEALTH | Facility: CLINIC | Age: 61
End: 2021-12-02
Attending: PSYCHIATRY & NEUROLOGY
Payer: MEDICARE

## 2021-12-02 PROCEDURE — 90853 GROUP PSYCHOTHERAPY: CPT | Mod: 95 | Performed by: SOCIAL WORKER

## 2021-12-02 PROCEDURE — 90853 GROUP PSYCHOTHERAPY: CPT | Mod: GT

## 2021-12-02 NOTE — GROUP NOTE
Psychotherapy Group Note    PATIENT'S NAME: Marissa Youssef  MRN:   [id number]  :   1960  ACCT. NUMBER: [id number]  DATE OF SERVICE: 21  START TIME: 10:00 AM  END TIME: 10:50 AM  FACILITATOR: Ruth Borges LGSW  TOPIC: MH EBP Group: Self-Awareness   [facility] 55+ Program  TRACK: A2    NUMBER OF PARTICIPANTS:     Summary of Group / Topics Discussed:  Self-Awareness: Values: Patients identified personal values by examining development of their current values and how their values influence their daily functioning and life choices. Patients explored the impact of their values on their thoughts, feelings, and actions. Patients discussed definition of personal values and how they develop and change over time. The goal is to help patients reconcile value conflicts and achieve balance and flexibility to improve mood and daily functioning.     Patient Session Goals / Objectives:    Examined development of values and impact of values on functioning    Identified and prioritized important values related to current well-being     Identified strategies to change or enhance values to positively impact symptoms    Assisted patients to find ways to adapt functioning to better fit their values                                        Service Modality:  Video Visit     Telemedicine Visit: The patient's condition can be safely assessed and treated via synchronous audio and visual telemedicine encounter.      Reason for Telemedicine Visit: Services only offered telehealth    Originating Site (Patient Location): Patient's home    Distant Site (Provider Location): Provider Remote Setting- Home Office    Consent:  The patient/guardian has verbally consented to: the potential risks and benefits of telemedicine (video visit) versus in person care; bill my insurance or make self-payment for services provided; and responsibility for payment of non-covered services.     Patient would like the video invitation sent by:  My  Chart    Mode of Communication:  Video Conference via Medical Zoom    As the provider I attest to compliance with applicable laws and regulations related to telemedicine.            Patient Participation / Response:  Fully participated with the group by sharing personal reflections / insights and openly received / provided feedback with other participants.    Demonstrated understanding of topics discussed through group discussion and participation, Demonstrated understanding of values, strengths, and challenges to learn about themselves and Identified / Expressed readiness to act intentionally, increase self-compassion, promote personal growth    Treatment Plan:  Patient has a current master individualized treatment plan.  See Epic treatment plan for more information.    Ruth Borges LGSW

## 2021-12-02 NOTE — GROUP NOTE
Process Group Note    PATIENT'S NAME: Marissa Youssef  MRN:   [id number]  :   1960  ACCT. NUMBER: [id number]  DATE OF SERVICE: 21  START TIME:  9:00 AM  END TIME:  9:50 AM  FACILITATOR: Ruth Borges LGSW  TOPIC:  Process Group    Diagnoses:  Major depressive disorder, severe, recurrent, without psychosis  Generalized anxiety disorder  PTSD  Borderline personality disorder.       [facility] 55+ Program  TRACK: A2    NUMBER OF PARTICIPANTS: 7                                      Service Modality:  Video Visit     Telemedicine Visit: The patient's condition can be safely assessed and treated via synchronous audio and visual telemedicine encounter.      Reason for Telemedicine Visit: Services only offered telehealth    Originating Site (Patient Location): Patient's home    Distant Site (Provider Location): Provider Remote Setting- Home Office    Consent:  The patient/guardian has verbally consented to: the potential risks and benefits of telemedicine (video visit) versus in person care; bill my insurance or make self-payment for services provided; and responsibility for payment of non-covered services.     Patient would like the video invitation sent by:  My Chart    Mode of Communication:  Video Conference via Medical Zoom    As the provider I attest to compliance with applicable laws and regulations related to telemedicine.                Data:    Session content: At the start of this group, patients were invited to check in by identifying themselves, describing their current emotional status, and identifying issues to address in this group.   Area(s) of treatment focus addressed in this session included Symptom Management, Personal Safety and Community Resources/Discharge Planning.  Client reported wanting to check in about a topic that she did not have time for in yesterday's group.  Client reported using assertive communication on Monday night with her son and his wife.  Client explained that  "her granddaughter's cat ran away, prompting her to think of the idea to get a new kitten for her birthday.  Client reported her son and his wife picking out a new cat despite client voicing this idea to her son and his wife.  Client reported being able to voice her feelings and how her son's actions, including his frequent requests to transport her granddaughter, made her feel.  Client reported being proud with \"getting feelings out without becoming angry\".  Client reported her and her son compromising by having client be present when her son gives her granddaughter the cat.  Client reported her feelings of being \"tearful and broken\" minimize after her use of assertive communication.  Client did not report any suicidal ideation, plan or intent.      Therapeutic Interventions/Treatment Strategies:  Psychotherapist offered support, feedback and validation and reinforced use of skills. Treatment modalities used include Cognitive Behavioral Therapy. Interventions include Relationship Skills: Assisted patients in implementing more effective communication skills in their relationships and Discussed relationships and ways to reduce conflict .    Assessment:    Patient response:   Patient responded to session by accepting feedback, giving feedback and listening    Possible barriers to participation / learning include: and no barriers identified    Health Issues:   None reported       Substance Use Review:   Substance Use: No active concerns identified.    Mental Status/Behavioral Observations  Appearance:   Appropriate   Eye Contact:   Good   Psychomotor Behavior: Normal   Attitude:   Cooperative  Interested Friendly Pleasant  Orientation:   All  Speech   Rate / Production: Normal    Volume:  Normal   Mood:    Anxious  Depressed   Affect:    Appropriate   Thought Content:   Clear and Safety denies any current safety concerns including suicidal ideation, self-harm, and homicidal ideation  Thought Form:  Coherent  Logical   "   Insight:    Good     Plan:     Safety Plan: No current safety concerns identified.  Recommended that patient call 911 or go to the local ED should there be a change in any of these risk factors.     Barriers to treatment: None identified    Patient Contracts (see media tab):  None    Substance Use: Not addressed in session     Continue or Discharge: Patient will continue in 55+ Program (55+) as planned. Patient is likely to benefit from learning and using skills as they work toward the goals identified in their treatment plan.      Ruth Borges, MODESTA  December 2, 2021

## 2021-12-02 NOTE — GROUP NOTE
Service Modality:  Video Visit     Telemedicine Visit: The patient's condition can be safely assessed and treated via synchronous audio and visual telemedicine encounter.       Reason for Telemedicine Visit: Services only offered telehealth and due to COVID-19.     Originating Site (Patient Location): Patient's home     Distant Site (Provider Location): Provider Remote Setting- Home Office     Consent:  The patient/guardian has verbally consented to: the potential risks and benefits of telemedicine (video visit) versus in person care; bill my insurance or make self-payment for services provided; and responsibility for payment of non-covered services.      Patient would like the video invitation sent by:  My Chart     Mode of Communication:  Video Conference via Medical Zoom     As the provider I attest to compliance with applicable laws and regulations related to telemedicine.    Psychotherapy Group Note    PATIENT'S NAME: Marissa Youssef  MRN:   [id number]  :   1960  ACCT. NUMBER: [id number]  DATE OF SERVICE: 21  START TIME: 11:00 AM  END TIME: 11:50 AM  FACILITATOR: Suzanne Robles LICSW  TOPIC:  EBP Group: Specialty Awareness  [facility] 55+ Program  TRACK: A2    NUMBER OF PARTICIPANTS: 7    Summary of Group / Topics Discussed:  Specialty Topics: Hope: The topic of hope was presented in order to help patients better understand the symptoms of hopelessness and how to become more hopeful. Patients discussed their current awareness of the topic and relevance to their functioning. Individual experiences with symptoms and treatment options were also discussed. Patients explored options for ongoing/future treatment and symptom management.      Patient Session Goals / Objectives:    Discussed definition of hopelessness    Discussed how hopelessness impacts functioning    Set a plan to utilize skills to reduce hopelessness      Patient Participation / Response:  Fully participated with the  group by sharing personal reflections / insights and openly received / provided feedback with other participants.    Demonstrated understanding of topics discussed through group discussion and participation and Practiced skills in session    Treatment Plan:  Patient has a current master individualized treatment plan.  See Epic treatment plan for more information.    Suzanne Robles, LICSW

## 2021-12-06 ENCOUNTER — HOSPITAL ENCOUNTER (OUTPATIENT)
Dept: BEHAVIORAL HEALTH | Facility: CLINIC | Age: 61
End: 2021-12-06
Attending: PSYCHIATRY & NEUROLOGY
Payer: MEDICARE

## 2021-12-06 DIAGNOSIS — F33.2 MDD (MAJOR DEPRESSIVE DISORDER), RECURRENT SEVERE, WITHOUT PSYCHOSIS (H): Primary | ICD-10-CM

## 2021-12-06 DIAGNOSIS — F60.3 BORDERLINE PERSONALITY DISORDER (H): ICD-10-CM

## 2021-12-06 DIAGNOSIS — F43.10 PTSD (POST-TRAUMATIC STRESS DISORDER): ICD-10-CM

## 2021-12-06 DIAGNOSIS — F41.1 GENERALIZED ANXIETY DISORDER: ICD-10-CM

## 2021-12-06 PROBLEM — R45.851 SUICIDAL IDEATION: Status: RESOLVED | Noted: 2021-10-03 | Resolved: 2021-12-06

## 2021-12-06 PROCEDURE — 90853 GROUP PSYCHOTHERAPY: CPT | Mod: 95

## 2021-12-06 PROCEDURE — 90853 GROUP PSYCHOTHERAPY: CPT | Mod: 95 | Performed by: SOCIAL WORKER

## 2021-12-06 PROCEDURE — 90853 GROUP PSYCHOTHERAPY: CPT | Mod: GT

## 2021-12-06 PROCEDURE — 99214 OFFICE O/P EST MOD 30 MIN: CPT | Mod: GT | Performed by: PSYCHIATRY & NEUROLOGY

## 2021-12-06 NOTE — GROUP NOTE
"Process Group Note    PATIENT'S NAME: Marissa Youssef  MRN:   [id number]  :   1960  ACCT. NUMBER: [id number]  DATE OF SERVICE: 21  START TIME:  9:00 AM  END TIME:  9:50 AM  FACILITATOR: Ruth Borges LGSW  TOPIC:  Process Group    Diagnoses:  Major depressive disorder, severe, recurrent, without psychosis  Generalized anxiety disorder  PTSD  Borderline personality disorder.       [facility] 55+ Program  TRACK: A2    NUMBER OF PARTICIPANTS: 6                                      Service Modality:  Video Visit     Telemedicine Visit: The patient's condition can be safely assessed and treated via synchronous audio and visual telemedicine encounter.      Reason for Telemedicine Visit: Services only offered telehealth    Originating Site (Patient Location): Patient's home    Distant Site (Provider Location): Provider Remote Setting- Home Office    Consent:  The patient/guardian has verbally consented to: the potential risks and benefits of telemedicine (video visit) versus in person care; bill my insurance or make self-payment for services provided; and responsibility for payment of non-covered services.     Patient would like the video invitation sent by:  My Chart    Mode of Communication:  Video Conference via Medical Zoom    As the provider I attest to compliance with applicable laws and regulations related to telemedicine.                Data:    Session content: At the start of this group, patients were invited to check in by identifying themselves, describing their current emotional status, and identifying issues to address in this group.   Area(s) of treatment focus addressed in this session included Symptom Management, Personal Safety and Community Resources/Discharge Planning.  Client reported feeling \"extreme anxiety\" with an upcoming family gathering to celebrate [place].  Client reports the event being on Friday and is at her daughter in law's house.    Client reported her son and " "son's girlfriend flying in from [facility]. to stay with client.  Client reports feeling \"frazzled\" in preparation for hosting her son but reports relief that she is not hosting the event.  Client acknowledged her confidence that the event will be \"fun and fine\" despite her feelings of anxiety.  Client cited her go with the flow nature, flexible family and ability to look forward to the event as evidence that the event would go well.  Client anticipates the \"come down\" on Monday causing her mood to worsen and agreed to attend group on Monday to check in.  Client did not report any suicidal ideation, plan or intent.    During goal setting, client reported increased shoulder pain since she started physical therapy yesterday.  \"I can't think about anything else\".  Client agreed to stop her physical therapy exercises until she could consult with her physical therapist about the pain.        Therapeutic Interventions/Treatment Strategies:  Psychotherapist offered support, feedback and validation and reinforced use of skills. Treatment modalities used include Cognitive Behavioral Therapy. Interventions include Cognitive Restructuring:  Explored impact of ineffective thoughts / distortions on mood and activity and Coping Skills: Assisted patient in identifying 1-2 healthy distraction skills to reduce overall distress.    Assessment:    Patient response:   Patient responded to session by accepting feedback, giving feedback and listening    Possible barriers to participation / learning include: and no barriers identified    Health Issues:   Yes: Pain, Associated Psychological Distress       Substance Use Review:   Substance Use: No active concerns identified.    Mental Status/Behavioral Observations  Appearance:   Appropriate   Eye Contact:   Good   Psychomotor Behavior: Normal   Attitude:   Cooperative  Interested Friendly Pleasant  Orientation:   All  Speech   Rate / Production: Normal    Volume:  Normal "   Mood:    Anxious   Affect:    Appropriate   Thought Content:   Clear and Safety denies any current safety concerns including suicidal ideation, self-harm, and homicidal ideation  Thought Form:  Coherent  Logical     Insight:    Good     Plan:     Safety Plan: No current safety concerns identified.  Recommended that patient call 911 or go to the local ED should there be a change in any of these risk factors.     Barriers to treatment: None identified    Patient Contracts (see media tab):  None    Substance Use: Not addressed in session     Continue or Discharge: Patient will continue in 55+ Program (55+) as planned. Patient is likely to benefit from learning and using skills as they work toward the goals identified in their treatment plan.      MODESTA Mullins  December 6, 2021

## 2021-12-06 NOTE — GROUP NOTE
Psychoeducation Group Note    PATIENT'S NAME: Marissa Youssef  MRN:   [id number]  :   1960  ACCT. NUMBER: [id number]  DATE OF SERVICE: 21  START TIME: 10:00 AM  END TIME: 10:50 AM  FACILITATOR: Ruth Borges LGSW  TOPIC: ANTONIA RN Group: Health Maintenance  [facility] 55+ Program  TRACK: A2    NUMBER OF PARTICIPANTS: 5    Summary of Group / Topics Discussed:  Health Maintenance: Goal Setting: Meaningful goals can bring a sense of direction and purpose in life.  They also highlight our most important values. Patients were assisted by instructor to identify short term goals to promote their mental health recovery and improve overall health and wellness. Patients were educated on SMART goal setting framework as a strategy to increase outcomes and promote success.    Patient Session Goals / Objectives:  ? Explained the key concepts of SMART goal setting framework  ? Identified three goals successfully using SMART goal setting framework  ? Reviewed concept of balance in wellness as it pertains to goal setting        Patient Participation / Response:  Fully participated with the group by sharing personal reflections / insights and openly received / provided feedback with other participants.    Demonstrated understanding of topics discussed through group discussion and participation and Identified / Expressed personal readiness to practice skills    Treatment Plan:  Patient has a current master individualized treatment plan.  See Epic treatment plan for more information.    MODESTA Mullins

## 2021-12-06 NOTE — GROUP NOTE
Service Modality:  Video Visit     Telemedicine Visit: The patient's condition can be safely assessed and treated via synchronous audio and visual telemedicine encounter.       Reason for Telemedicine Visit: Services only offered telehealth and due to COVID-19.     Originating Site (Patient Location): Patient's home     Distant Site (Provider Location): Provider Remote Setting- Home Office     Consent:  The patient/guardian has verbally consented to: the potential risks and benefits of telemedicine (video visit) versus in person care; bill my insurance or make self-payment for services provided; and responsibility for payment of non-covered services.      Patient would like the video invitation sent by:  My Chart     Mode of Communication:  Video Conference via Medical Zoom     As the provider I attest to compliance with applicable laws and regulations related to telemedicine.    Psychotherapy Group Note    PATIENT'S NAME: Marissa Youssef  MRN:   [id number]  :   1960  ACCT. NUMBER: [id number]  DATE OF SERVICE: 21  START TIME: 11:00 AM  END TIME: 11:50 AM  FACILITATOR: Suzanne Robles LICSW  TOPIC:  EBP Group: Emotions Management  [facility] 55+ Program  TRACK: A2    NUMBER OF PARTICIPANTS: 5    Summary of Group / Topics Discussed:  Emotions Management: Understanding Emotions: Patients discussed the purpose of emotions and function they serve in our lives.  Reviewed core emotions, why they happen (triggers), and how they occur. The group assisted one anothers' understanding that: emotional experiences are important; difficult emotions have a place in our lives; and the differences between various emotions.    Patient Session Goals / Objectives:    Demonstrate understanding of types various emotions    Identify and discuss specific emotions and when they occur; understand triggers    Understanding the thought process in relation to emotions    Discuss barriers to emotional  regulation      Patient Participation / Response:  Fully participated with the group by sharing personal reflections / insights and openly received / provided feedback with other participants.    Demonstrated understanding of topics discussed through group discussion and participation    Treatment Plan:  Patient has a current master individualized treatment plan.  See Epic treatment plan for more information.    Suzanne Robles, LICSW

## 2021-12-06 NOTE — PROGRESS NOTES
"[facility]   Adult Mental Health Outpatient Programs  Provider Interval History Note    Program: 55+ Intensive Outpatient   Track: A2    PATIENT'S NAME: Marissa Youssef  MRN:   [id number]  :   1960  ACCT. NUMBER: [id number]  DATE OF SERVICE: 21  CALL/VIDEO START TIME: 907  CALL/VIDEO END TIME: 917    NB: patient's computer did not meet minimum requirements for [employer], so this visit completed by telephone      Interval History:  \"I think it's good.\" Marissa presents for today for follow-up and ongoing program supervision. Endorses feeling program is helpful, working on anxiety in particular. When asked, feels she is developing her skills more.    Symptoms:    Mood \"stable... not all over the place\"    \"I think my biggest problem is still that I want to isolate,\" less out of anxiety or sadness and more out of a combination of it being cold and fighting an urge to just watch TV because it is less effort    Substance use:    none      Risk Assessment:    Suicidal ideation: denies current or recent suicidal ideation or behavior    Thoughts of non-suicidal self-injury: denied    Recent self-injurious behavior: denied    Homicidal ideation: denied    Other safety concerns: denied      Medications:  Medications reviewed with patient today. Patient is taking medications as prescribed.     Adverse effects: No significant side effects    May be cutting quetiapine down to 300 mg with outpatient provider    Laboratory Results:  Most recent labs reviewed. Pertinent updates/findings: None.     Metrics:  PHQ-9 scores:   PHQ-9 SCORE 10/21/2021 10/26/2021 10/26/2021 2021   PHQ-9 Total Score MyChart - - 10 (Moderate depression) 14 (Moderate depression)   PHQ-9 Total Score 4 10 10 14       RACQUEL-7 scores:   RACQUEL-7 SCORE 10/26/2021 10/26/2021 2021   Total Score - 17 (severe anxiety) 17 (severe anxiety)   Total Score 17 17 17       CSSR-S: No flowsheet data found.      Mental " "Status Examination (limited due to phone visit format):  Vital Signs: There were no vitals taken for this virtual visit.  Appearance: Unable to assess   Attitude: cooperative   Eye Contact: Unable to assess  Muscle Strength and Tone: Unable to assess  Psychomotor Behavior:  Unable to assess  Gait and Station: Unable to assess  Speech: clear, coherent, normal prosody, regular rate, regular rhythm and fluent  Associations: No loosening of associations  Thought Process: coherent and goal directed  Thought Content: no evidence of suicidal ideation or homicidal ideation, no evidence of psychotic thought, no auditory hallucinations present and no visual hallucinations present  Mood: \"better\"  Affect: difficult to assess over the phone, grossly mood congruent  Insight: good  Judgment: intact, adequate for safety  Impulse Control: intact  Oriented to: time, place, person and situation  Attention Span and Concentration: normal  Language: Intact  Recent and Remote Memory: intact to interview. Not formally assessed. No evidence of amnesia.  Fund of Knowledge: appropriate        Diagnosis/es:  296.32 (F33.1) Major Depressive Disorder, Recurrent Episode, Moderate With anxious distress  300.02 (F41.1) Generalized Anxiety Disorder  309.81 (F43.10) Posttraumatic Stress Disorder  301.83 (F60.3) Borderline Personality Disorder    Assessment:  Marissa presents today for follow up. Endorses improvements in mood, anxiety since starting program.      Depression   o Overall improved   o Making adjustments to medication without ill effect:  - Has reduced Depakote to 500  - Has effectively reduced quetiapine (IR) to 200 mg by mouth daily at bedtime, repeating perhaps weekly if poor sleep  o Remains stable  o Medications managed by outpatient provider; no changes today      Anxiety   o Overall improved   o Still a focus for patient   o No change to medications today; defer to outpatient  o Continue therapy/program      PTSD, BPD  o Overall " stable   o Working on skills  o Continue therapy  o Continue medications       Ongoing programmatic care:  o Continues to benefit from services  o Continues to meet criteria for this level of care      I feel this patient does not meet criteria for an involuntary hold and is appropriate for treatment at an outpatient level of care.      Treatment Plan:  Medications:    Continue at current dose/schedule:   o Buspirone  o Divalproex  o Duloxetine   o Hydroxyzine (patient using infrequently)  o Melatonin  o Prazosin   o Quetiapine (ER and IR)  o Topiramate    Continue all other medications as reviewed per electronic medical record today    Continue therapy as planned:    Enrolled in 55+ Intensive Outpatient    Continue with individual therapist as appropriate    Safety plan reviewed:    To the Emergency Department as needed or call after hours crisis line at [phone] or [phone]. [place] Crisis Text Line: Text [place] to 454114 or Suicide LifeLine Chat: suicidepreventionUKDN Water[facility].org/chat    Follow-up:     schedule an appointment with me or another program provider in approximately 4 weeks or sooner if needed.  Call [facility] at [phone] to schedule.    Follow up with outpatient provider as planned or sooner if needed for acute medical concerns.    Questions or concerns:    Call main program line with questions or concerns [phone].    [employer] may be used to communicate with your provider, but this is not intended to be used for emergencies.        Treatment Objective(s) Addressed in This Session:  The purpose of today's virtual visit is for this writer to provide oversight of patient's care while receiving program services. Specific treatment goals addressed included personal safety, symptoms stabilization and management, wellness and mental health, and community resources/discharge planning.     This author or another program provider will follow up with the patient as noted above.      Patient continues to meet criteria for recommended level of care.  Patient would be at reasonable risk of requiring a higher level of care in the absence of current services.    Patient agrees with the current plan of care.    Jaxon Basilio MD  12/06/21      Visit Details:  Type of service:  Phone Visit (was scheduled as video)    Start/End Time: see above    Originating Location (pt. Location): Home in [place]    Distant Location (provider location): Provider Remote Setting- Home Office    Platform used for Video Visit: Unable to complete video visit    Physician has received verbal consent for a Video Visit from the patient? Yes    20 minutes spent on the date of the encounter doing chart review, patient visit and documentation     This document completed in part using Dragon Medical One dictation software.  Please excuse any inadvertent word or phrase substitutions.

## 2021-12-08 ENCOUNTER — HOSPITAL ENCOUNTER (OUTPATIENT)
Dept: BEHAVIORAL HEALTH | Facility: CLINIC | Age: 61
End: 2021-12-08
Attending: PSYCHIATRY & NEUROLOGY
Payer: MEDICARE

## 2021-12-08 PROCEDURE — 90853 GROUP PSYCHOTHERAPY: CPT | Mod: 95 | Performed by: SOCIAL WORKER

## 2021-12-08 PROCEDURE — 90853 GROUP PSYCHOTHERAPY: CPT | Mod: 95

## 2021-12-08 NOTE — GROUP NOTE
Service Modality:  Video Visit     Telemedicine Visit: The patient's condition can be safely assessed and treated via synchronous audio and visual telemedicine encounter.       Reason for Telemedicine Visit: Services only offered telehealth and due to COVID-19.     Originating Site (Patient Location): Patient's home     Distant Site (Provider Location): Provider Remote Setting- Home Office     Consent:  The patient/guardian has verbally consented to: the potential risks and benefits of telemedicine (video visit) versus in person care; bill my insurance or make self-payment for services provided; and responsibility for payment of non-covered services.      Patient would like the video invitation sent by:  My Chart     Mode of Communication:  Video Conference via Medical Zoom     As the provider I attest to compliance with applicable laws and regulations related to telemedicine.    Psychotherapy Group Note    PATIENT'S NAME: Marissa Youssef  MRN:   [id number]  :   1960  ACCT. NUMBER: [id number]  DATE OF SERVICE: 21  START TIME: 11:00 AM  END TIME: 11:50 AM  FACILITATOR: Suzanne Robles [facility]  TOPIC:  EBP Group: Coping Skills  [facility] 55+ Program  TRACK: A2    NUMBER OF PARTICIPANTS: 6    Summary of Group / Topics Discussed:  Coping Skills: Additional Coping Skills:  Patients discussed the benefits of engaging in leisure activities to increase symptom management and for lifestyle balance.  Reviewed the benefits of applying the aforementioned coping strategies.  Patients explored how these strategies might be applied to daily stressors or distressing situations.    Patient Session Goals / Objectives:    Understand the purpose and benefits of applying coping strategies    Identifying pleasureable, leisure activities to apply in mental health recovery     Learn the physiological benefits within the nervous system and for physical health        Patient Participation / Response:  Fully  participated with the group by sharing personal reflections / insights and openly received / provided feedback with other participants.    Demonstrated understanding of topics discussed through group discussion and participation    Treatment Plan:  Patient has a current master individualized treatment plan.  See Epic treatment plan for more information.    Suzanne Robles, LICSW
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
## 2021-12-08 NOTE — GROUP NOTE
Psychotherapy Group Note    PATIENT'S NAME: Marissa Youssef  MRN:   [id number]  :   1960  ACCT. NUMBER: [id number]  DATE OF SERVICE: 21  START TIME: 10:00 AM  END TIME: 10:50 AM  FACILITATOR: Ruth Borges LGSW  TOPIC: MH EBP Group: Coping Skills  [facility] 55+ Program  TRACK: A2    NUMBER OF PARTICIPANTS:     Summary of Group / Topics Discussed:  Coping Skills: Grounding: Patients discussed and practiced strategies to increase attachment / presence to the current moment.  Patients identified situations in which using these strategies will help improve emotion regulation sense of calm in the body.  Reviewed the benefits of applying grounding strategies, as well as past / current practices of each member.  Patients identified situations in which using these strategies would reduce stress. They developed the ability to distinguish when these strategies can be useful in their lives for management and stress and psychological well-being.    Patient Session Goals / Objectives:    Understand the purpose of using grounding strategies to reduce stress.    Verbalize understanding of how and when to apply grounding strategies to reduce distress and increase presence in the moment.    Review patients current grounding practices and discuss a more formal way of practicing and accessing skills.    Practice using various calming strategies (e.g. 5-4-3-2-1; mental and body awareness).    Choose 1-2 grounding strategies to apply during times of distress.                                      Service Modality:  Video Visit     Telemedicine Visit: The patient's condition can be safely assessed and treated via synchronous audio and visual telemedicine encounter.      Reason for Telemedicine Visit: Services only offered telehealth    Originating Site (Patient Location): Patient's home    Distant Site (Provider Location): Provider Remote Setting- Home Office    Consent:  The patient/guardian has verbally consented  to: the potential risks and benefits of telemedicine (video visit) versus in person care; bill my insurance or make self-payment for services provided; and responsibility for payment of non-covered services.     Patient would like the video invitation sent by:  My Chart    Mode of Communication:  Video Conference via Medical Zoom    As the provider I attest to compliance with applicable laws and regulations related to telemedicine.              Patient Participation / Response:  Fully participated with the group by sharing personal reflections / insights and openly received / provided feedback with other participants.    Demonstrated understanding of topics discussed through group discussion and participation, Expressed understanding of the relevance / importance of coping skills at distressing times in life and Demonstrated knowledge of when to consider using a variety of coping skills in daily life    Treatment Plan:  Patient has a current master individualized treatment plan.  See Epic treatment plan for more information.    Ruth Borges LGSW

## 2021-12-09 ENCOUNTER — HOSPITAL ENCOUNTER (OUTPATIENT)
Dept: BEHAVIORAL HEALTH | Facility: CLINIC | Age: 61
End: 2021-12-09
Attending: PSYCHIATRY & NEUROLOGY
Payer: MEDICARE

## 2021-12-09 PROCEDURE — 90853 GROUP PSYCHOTHERAPY: CPT | Mod: 95

## 2021-12-09 PROCEDURE — 90853 GROUP PSYCHOTHERAPY: CPT | Mod: 95 | Performed by: SOCIAL WORKER

## 2021-12-09 NOTE — GROUP NOTE
Psychotherapy Group Note    PATIENT'S NAME: Marissa Youssef  MRN:   [id number]  :   1960  ACCT. NUMBER: [id number]  DATE OF SERVICE: 21  START TIME: 10:00 AM  END TIME: 10:50 AM  FACILITATOR: Ruth Borges LGSW  TOPIC: MH EBP Group: Relationship Skills   [facility] 55+ Program  TRACK: A2    NUMBER OF PARTICIPANTS: 6    Summary of Group / Topics Discussed:  Relationship Skills: Basic Communication: Patients were provided with a general overview of interpersonal communication skills and information about how communication skills impact symptoms and functioning. The goal of this topic is to help patients identify communication issues and gain skills to work towards healthier interpersonal communication. Patients reviewed their current awareness of communication issues and how communication skills impact relationships and functioning.      Patient Session Goals / Objectives:    Identified and discussed patients individual challenges with basic communication    Presented and practiced effective communication skills in session    Assisted patients in implementing more effective communication skills in their relationships                                      Service Modality:  Video Visit     Telemedicine Visit: The patient's condition can be safely assessed and treated via synchronous audio and visual telemedicine encounter.      Reason for Telemedicine Visit: Services only offered telehealth    Originating Site (Patient Location): Patient's home    Distant Site (Provider Location): Provider Remote Setting- Home Office    Consent:  The patient/guardian has verbally consented to: the potential risks and benefits of telemedicine (video visit) versus in person care; bill my insurance or make self-payment for services provided; and responsibility for payment of non-covered services.     Patient would like the video invitation sent by:  My Chart    Mode of Communication:  Video Conference via Medical  Zoom    As the provider I attest to compliance with applicable laws and regulations related to telemedicine.            Patient Participation / Response:  Fully participated with the group by sharing personal reflections / insights and openly received / provided feedback with other participants.    Demonstrated understanding of topics discussed through group discussion and participation, Demonstrated understanding of relationship skills and communication skills and Identified plan to address barriers to practicing relationship skills     Treatment Plan:  Patient has a current master individualized treatment plan.  See Epic treatment plan for more information.    Ruth Borges LGSW

## 2021-12-09 NOTE — GROUP NOTE
Service Modality:  Video Visit     Telemedicine Visit: The patient's condition can be safely assessed and treated via synchronous audio and visual telemedicine encounter.       Reason for Telemedicine Visit: Services only offered telehealth and due to COVID-19.     Originating Site (Patient Location): Patient's home     Distant Site (Provider Location): Provider Remote Setting- Home Office     Consent:  The patient/guardian has verbally consented to: the potential risks and benefits of telemedicine (video visit) versus in person care; bill my insurance or make self-payment for services provided; and responsibility for payment of non-covered services.      Patient would like the video invitation sent by:  My Chart     Mode of Communication:  Video Conference via Medical Zoom     As the provider I attest to compliance with applicable laws and regulations related to telemedicine.    Psychotherapy Group Note    PATIENT'S NAME: Marissa Youssef  MRN:   [id number]  :   1960  ACCT. NUMBER: [id number]  DATE OF SERVICE: 21  START TIME: 11:00 AM  END TIME: 11:50 AM  FACILITATOR: Suzanne Robles LICSW  TOPIC:  EBP Group: Self-Awareness  [facility] 55+ Program  TRACK: A2    NUMBER OF PARTICIPANTS: 6    Summary of Group / Topics Discussed:  Self-Awareness: Self-Compassion: Patients received overview of key concepts in developing self-compassion. Patients discussed mindfulness, self-kindness, and finding common humanity. Patients identified their current approach to problems in their lives and learned skills for increasing self-compassion. Patients identified ways they can put self-compassion skills into practice and problem solve barriers to application of skills.     Patient Session Goals / Objectives:    [place] components of self-compassion    Identify ways to practice self-compassion in daily life    Problem solve barriers to self-compassion practice    Identified skills to cultivate the SACRED  SELF      Patient Participation / Response:  Fully participated with the group by sharing personal reflections / insights and openly received / provided feedback with other participants.    Demonstrated understanding of topics discussed through group discussion and participation    Treatment Plan:  Patient has a current master individualized treatment plan.  See Epic treatment plan for more information.    Suzanne Robles, LICSW

## 2021-12-09 NOTE — GROUP NOTE
Process Group Note    PATIENT'S NAME: Marissa Youssef  MRN:   [id number]  :   1960  ACCT. NUMBER: [id number]  DATE OF SERVICE: 21  START TIME:  9:00 AM  END TIME:  9:50 AM  FACILITATOR: Ruth Borges LGSW  TOPIC:  Process Group    Diagnoses:  F33.2 Major depressive disorder, severe, recurrent, without psychosis  F41.1 Generalized anxiety disorder  F43.12 Posttraumatic Stress Disorder  F60.3 Borderline personality disorder.       [facility] 55+ Program  TRACK: A2    NUMBER OF PARTICIPANTS: 6                                      Service Modality:  Video Visit     Telemedicine Visit: The patient's condition can be safely assessed and treated via synchronous audio and visual telemedicine encounter.      Reason for Telemedicine Visit: Services only offered telehealth    Originating Site (Patient Location): Patient's home    Distant Site (Provider Location): Provider Remote Setting- Home Office    Consent:  The patient/guardian has verbally consented to: the potential risks and benefits of telemedicine (video visit) versus in person care; bill my insurance or make self-payment for services provided; and responsibility for payment of non-covered services.     Patient would like the video invitation sent by:  My Chart    Mode of Communication:  Video Conference via Medical Zoom    As the provider I attest to compliance with applicable laws and regulations related to telemedicine.                Data:    Session content: At the start of this group, patients were invited to check in by identifying themselves, describing their current emotional status, and identifying issues to address in this group.   Area(s) of treatment focus addressed in this session included Symptom Management, Personal Safety and Community Resources/Discharge Planning.  Client reported preparing to  his son and son's girlfriend at the airport later today.  Client reported plans to finish wrapping gifts in preparation for  "tomorrow's holiday gathering.  Client expressed excitement with helping surprise her granddaughter with a refurbished dresser.  Client is worried about her granddaughter who got in a car accident earlier this week.  She explained that her granddaughter is hesitant to talk to a doctor about her leg injury, but agreed to attend a doctor's appointment today.  Client is confident that her injury is not \"drastic\" since she still wants to attend school.  Client is looking forward to Jose and to \"get through all this\".  She is anticipating depression after the events are over.  Client reported on plans to talk to her boss next week to return to work as a way to avoid depressive symptoms.  Client denied SI, SIB and chemical use.        Therapeutic Interventions/Treatment Strategies:  Psychotherapist offered support, feedback and validation and reinforced use of skills. Treatment modalities used include Cognitive Behavioral Therapy. Interventions include Behavioral Activation: Encouraged strategies to reduce individual procrastination and increase motivation by increasing goal-directed activities to enhance mood and reduce symptoms. and Cognitive Restructuring:  Discussed ways to find evidence to support facts that can relieve anxiety..    Assessment:    Patient response:   Patient responded to session by accepting feedback, giving feedback and listening    Possible barriers to participation / learning include: and no barriers identified    Health Issues:   None reported       Substance Use Review:   Substance Use: No active concerns identified.    Mental Status/Behavioral Observations  Appearance:   Appropriate   Eye Contact:   Good   Psychomotor Behavior: Normal   Attitude:   Cooperative  Interested Friendly Pleasant  Orientation:   All  Speech   Rate / Production: Normal    Volume:  Normal   Mood:    Anxious   Affect:    Appropriate   Thought Content:   Clear and Safety denies any current safety concerns including " suicidal ideation, self-harm, and homicidal ideation  Thought Form:  Coherent  Logical     Insight:    Good     Plan:     Safety Plan: No current safety concerns identified.  Recommended that patient call 911 or go to the local ED should there be a change in any of these risk factors.     Barriers to treatment: None identified    Patient Contracts (see media tab):  None    Substance Use: Not addressed in session     Continue or Discharge: Patient will continue in 55+ Program (55+) as planned. Patient is likely to benefit from learning and using skills as they work toward the goals identified in their treatment plan.      MODESTA Mullins  December 9, 2021

## 2021-12-13 ENCOUNTER — HOSPITAL ENCOUNTER (OUTPATIENT)
Dept: BEHAVIORAL HEALTH | Facility: CLINIC | Age: 61
End: 2021-12-13
Attending: PSYCHIATRY & NEUROLOGY
Payer: MEDICARE

## 2021-12-13 PROCEDURE — 90853 GROUP PSYCHOTHERAPY: CPT | Mod: 95 | Performed by: COUNSELOR

## 2021-12-13 PROCEDURE — 90853 GROUP PSYCHOTHERAPY: CPT | Mod: 95

## 2021-12-13 PROCEDURE — 90853 GROUP PSYCHOTHERAPY: CPT | Mod: GT

## 2021-12-13 NOTE — GROUP NOTE
"Process Group Note    PATIENT'S NAME: Marissa Youssef  MRN:   [id number]  :   1960  ACCT. NUMBER: [id number]  DATE OF SERVICE: 21  START TIME:  9:00 AM  END TIME:  9:50 AM  FACILITATOR: Ruth Borges LGSW  TOPIC:  Process Group    Diagnoses:  F33.2 Major depressive disorder, severe, recurrent, without psychosis  F41.1 Generalized anxiety disorder  F43.12 Posttraumatic Stress Disorder  F60.3 Borderline personality disorder.       [facility] 55+ Program  TRACK: A2    NUMBER OF PARTICIPANTS: 9                                      Service Modality:  Video Visit     Telemedicine Visit: The patient's condition can be safely assessed and treated via synchronous audio and visual telemedicine encounter.      Reason for Telemedicine Visit: Services only offered telehealth    Originating Site (Patient Location): Patient's home    Distant Site (Provider Location): Provider Remote Setting- Home Office    Consent:  The patient/guardian has verbally consented to: the potential risks and benefits of telemedicine (video visit) versus in person care; bill my insurance or make self-payment for services provided; and responsibility for payment of non-covered services.     Patient would like the video invitation sent by:  My Chart    Mode of Communication:  Video Conference via Medical Zoom    As the provider I attest to compliance with applicable laws and regulations related to telemedicine.                Data:    Session content: At the start of this group, patients were invited to check in by identifying themselves, describing their current emotional status, and identifying issues to address in this group.   Area(s) of treatment focus addressed in this session included Symptom Management, Personal Safety and Community Resources/Discharge Planning.  Client reported having a \"wonderful\" weekend spending time with her family to celebrate Jose. \"We opened gifts, we played games\".  Client spent Saturday taking " "care of her grandkids, taking them to a holiday lights show in the evening.   Client noted that her \"mental letdown\" has not occurred yet due to \"flying on a high still\" from her weekend.  Client reflected positively on her experience surprising her granddaughter with a new cat.  Client did not report any suicidal ideation, plan or intent.      Therapeutic Interventions/Treatment Strategies:  Psychotherapist offered support, feedback and validation and reinforced use of skills. Treatment modalities used include Cognitive Behavioral Therapy. Interventions include Emotions Management:  Increased awareness of daily mood patterns/changes.    Assessment:    Patient response:   Patient responded to session by accepting feedback, giving feedback and listening    Possible barriers to participation / learning include: and no barriers identified    Health Issues:   None reported       Substance Use Review:   Substance Use: No active concerns identified.    Mental Status/Behavioral Observations  Appearance:   Appropriate   Eye Contact:   Good   Psychomotor Behavior: Normal   Attitude:   Cooperative  Interested Friendly Pleasant  Orientation:   All  Speech   Rate / Production: Normal    Volume:  Normal   Mood:    Euthymic  Affect:    Appropriate   Thought Content:   Clear and Safety denies any current safety concerns including suicidal ideation, self-harm, and homicidal ideation  Thought Form:  Coherent  Logical     Insight:    Good     Plan:     Safety Plan: No current safety concerns identified.  Recommended that patient call 911 or go to the local ED should there be a change in any of these risk factors.     Barriers to treatment: None identified    Patient Contracts (see media tab):  None    Substance Use: Not addressed in session     Continue or Discharge: Patient will continue in 55+ Program (55+) as planned. Patient is likely to benefit from learning and using skills as they work toward the goals identified in their " treatment plan.      MODESTA Mullins  December 13, 2021

## 2021-12-13 NOTE — GROUP NOTE
Psychotherapy Group Note    PATIENT'S NAME: Marissa Youssef  MRN:   [id number]  :   1960  ACCT. NUMBER: [id number]  DATE OF SERVICE: 21  START TIME: 10:00 AM  END TIME: 10:50 AM  FACILITATOR: Ruth Borges LGSW  TOPIC:  EBP Group: Coping Skills  [facility] 55+ Program  TRACK: A2    NUMBER OF PARTICIPANTS: 9    Summary of Group / Topics Discussed:  Coping Skills: Grounding: Patients discussed and practiced strategies to increase attachment / presence to the current moment.  Patients identified situations in which using these strategies will help improve emotion regulation sense of calm in the body.  Reviewed the benefits of applying grounding strategies, as well as past / current practices of each member.  Patients identified situations in which using these strategies would reduce stress. They developed the ability to distinguish when these strategies can be useful in their lives for management and stress and psychological well-being.    Patient Session Goals / Objectives:    Understand the purpose of using grounding strategies to reduce stress.    Verbalize understanding of how and when to apply grounding strategies to reduce distress and increase presence in the moment.    Review patients current grounding practices and discuss a more formal way of practicing and accessing skills.    Practice using various calming strategies (e.g. 5-4-3-2-1; mental and body awareness).    Choose 1-2 grounding strategies to apply during times of distress.        Patient Participation / Response:  Fully participated with the group by sharing personal reflections / insights and openly received / provided feedback with other participants.    Demonstrated understanding of topics discussed through group discussion and participation and Practiced 2-3 new coping skills in session    Treatment Plan:  Patient has a current master individualized treatment plan.  See Epic treatment plan for more information.    Ruth  MODESTA Borges

## 2021-12-13 NOTE — GROUP NOTE
Psychoeducation Group Note    PATIENT'S NAME: Marissa Youssef  MRN:   [id number]  :   1960  ACCT. NUMBER: [id number]  DATE OF SERVICE: 21  START TIME: 11:00 AM  END TIME: 11:50 AM  FACILITATOR: Jessie Hdz LPCC  TOPIC: ANTONIA RN Group: Health Maintenance  [facility] 55+ Program  TRACK: A2    NUMBER OF PARTICIPANTS: 9    Summary of Group / Topics Discussed:  Health Maintenance: Goal Setting: Meaningful goals can bring a sense of direction and purpose in life.  They also highlight our most important values. Patients were assisted by instructor to identify short term goals to promote their mental health recovery and improve overall health and wellness. Patients were educated on SMART goal setting framework as a strategy to increase outcomes and promote success.    Patient Session Goals / Objectives:  ? Explained the key concepts of SMART goal setting framework  ? Identified three goals successfully using SMART goal setting framework  ? Reviewed concept of balance in wellness as it pertains to goal setting                                        Service Modality:  Video Visit     Telemedicine Visit: The patient's condition can be safely assessed and treated via synchronous audio and visual telemedicine encounter.      Reason for Telemedicine Visit: Services only offered telehealth    Originating Site (Patient Location): Patient's home    Distant Site (Provider Location): Provider Remote Setting- Home Office    Consent:  The patient/guardian has verbally consented to: the potential risks and benefits of telemedicine (video visit) versus in person care; bill my insurance or make self-payment for services provided; and responsibility for payment of non-covered services.     Patient would like the video invitation sent by:  My Chart    Mode of Communication:  Video Conference via Medical Zoom    As the provider I attest to compliance with applicable laws and regulations related to telemedicine.             Patient Participation / Response:  Fully participated with the group by sharing personal reflections / insights and openly received / provided feedback with other participants.    Demonstrated understanding of topics discussed through group discussion and participation and Identified / Expressed personal readiness to practice skills    Treatment Plan:  Patient has a current master individualized treatment plan.  See Epic treatment plan for more information.    Jessie Hdz, LPCC

## 2021-12-15 ENCOUNTER — HOSPITAL ENCOUNTER (OUTPATIENT)
Dept: BEHAVIORAL HEALTH | Facility: CLINIC | Age: 61
End: 2021-12-15
Attending: PSYCHIATRY & NEUROLOGY
Payer: MEDICARE

## 2021-12-15 PROCEDURE — 90853 GROUP PSYCHOTHERAPY: CPT | Mod: 95

## 2021-12-15 PROCEDURE — 90853 GROUP PSYCHOTHERAPY: CPT | Mod: 95 | Performed by: SOCIAL WORKER

## 2021-12-15 NOTE — GROUP NOTE
Psychotherapy Group Note    PATIENT'S NAME: Marissa Youssef  MRN:   [id number]  :   1960  ACCT. NUMBER: [id number]  DATE OF SERVICE: 12/15/21  START TIME: 10:00 AM  END TIME: 10:50 AM  FACILITATOR: Ruth Borges LGSW  TOPIC: MH EBP Group: Relationship Skills  [facility] 55+ Program  TRACK: A2    NUMBER OF PARTICIPANTS: 8    Summary of Group / Topics Discussed:  Relationship Skills: Assertive Communication: Patients were provided with a general overview of assertive communication skills and how practicing assertive communication skills will assist patients in developing healthier and more effective relationships. Patients reviewed their current awareness on ability to practice assertive communication, ways to increase assertive communication, and identified/problem solved barriers to assertive communication.     Patient Session Goals / Objectives:    Identified and discussed patient individual challenges with communication    Presented and practiced effective communication skills in session    Assisted patients in implementing more effective communication skills in their relationships                                        Service Modality:  Video Visit     Telemedicine Visit: The patient's condition can be safely assessed and treated via synchronous audio and visual telemedicine encounter.      Reason for Telemedicine Visit: Services only offered telehealth    Originating Site (Patient Location): Patient's home    Distant Site (Provider Location): Provider Remote Setting- Home Office    Consent:  The patient/guardian has verbally consented to: the potential risks and benefits of telemedicine (video visit) versus in person care; bill my insurance or make self-payment for services provided; and responsibility for payment of non-covered services.     Patient would like the video invitation sent by:  My Chart    Mode of Communication:  Video Conference via Medical Zoom    As the provider I attest to  compliance with applicable laws and regulations related to telemedicine.            Patient Participation / Response:  Fully participated with the group by sharing personal reflections / insights and openly received / provided feedback with other participants.    Demonstrated understanding of topics discussed through group discussion and participation, Demonstrated understanding of relationship skills and communication skills, Identified / Expressed personal readiness to incorporate effective communication skills and Verbalized understanding of communication skills, communication challenges, and communication strengths    Treatment Plan:  Patient has a current master individualized treatment plan.  See Epic treatment plan for more information.    Ruth Borges LGSW

## 2021-12-15 NOTE — GROUP NOTE
Process Group Note    PATIENT'S NAME: Marissa Youssef  MRN:   [id number]  :   1960  ACCT. NUMBER: [id number]  DATE OF SERVICE: 12/15/21  START TIME:  9:00 AM  END TIME:  9:50 AM  FACILITATOR: Ruth Borges LGSW  TOPIC:  Process Group    Diagnoses:  F33.2 Major depressive disorder, severe, recurrent, without psychosis  F41.1 Generalized anxiety disorder  F43.12 Posttraumatic Stress Disorder  F60.3 Borderline personality disorder.       [facility] 55+ Program  TRACK: A2    NUMBER OF PARTICIPANTS: 6                                      Service Modality:  Video Visit     Telemedicine Visit: The patient's condition can be safely assessed and treated via synchronous audio and visual telemedicine encounter.      Reason for Telemedicine Visit: Services only offered telehealth    Originating Site (Patient Location): Patient's home    Distant Site (Provider Location): Provider Remote Setting- Home Office    Consent:  The patient/guardian has verbally consented to: the potential risks and benefits of telemedicine (video visit) versus in person care; bill my insurance or make self-payment for services provided; and responsibility for payment of non-covered services.     Patient would like the video invitation sent by:  My Chart    Mode of Communication:  Video Conference via Medical Zoom    As the provider I attest to compliance with applicable laws and regulations related to telemedicine.                Data:    Session content: At the start of this group, patients were invited to check in by identifying themselves, describing their current emotional status, and identifying issues to address in this group.   Area(s) of treatment focus addressed in this session included Symptom Management, Personal Safety and Community Resources/Discharge Planning.  Client reported feeling excited about her return to work at a [employer] on .  Client used assertive communication to set up uninterrupted time to meet  "with the manager and .  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  Client's manager expressed willingness to work around her group therapy schedule.  Client spoke with Dr. Basilio yesterday who approved of a recent medication change.  Dr. Basilio will communicate with her psychiatrist about the medication changes.  Client reported readiness to \"cut back on some\" medications. Client dislikes her current psychiatrist.  \"He is condescending and rubs me the wrong way\".  Writer agreed to put in a request for a [place] psychiatrist referral.  Client stated that a referral was requested before but was not completed.  Writer assured client that someone will follow up with her when the referral is placed. Client will attend an MRI appointment today to examine her hips.  Client denies SI, SIB and chemical use.    Due to incoming severe weather and bad road conditions, client will call in to the program line tomorrow morning if she is unable to attend group.    Therapeutic Interventions/Treatment Strategies:  Psychotherapist offered support, feedback and validation and reinforced use of skills. Treatment modalities used include Cognitive Behavioral Therapy. Interventions include Symptoms Management: Promoted understanding of their diagnoses and how it impacts their functioning and Relationship Skills: Assisted patients in implementing more effective communication skills in their relationships.    Assessment:    Patient response:   Patient responded to session by accepting feedback, giving feedback and listening    Possible barriers to participation / learning include: and no barriers identified    Health Issues:   Yes: Pain, Associated Psychological Distress       Substance Use Review:   Substance Use: No active concerns identified.    Mental Status/Behavioral Observations  Appearance:   Appropriate   Eye Contact:   Good   Psychomotor " Behavior: Normal   Attitude:   Cooperative  Interested Friendly Pleasant  Orientation:   All  Speech   Rate / Production: Normal    Volume:  Normal   Mood:    Euthymic  Affect:    Appropriate   Thought Content:   Clear and Safety denies any current safety concerns including suicidal ideation, self-harm, and homicidal ideation  Thought Form:  Coherent  Logical     Insight:    Good     Plan:     Safety Plan: No current safety concerns identified.  Recommended that patient call 911 or go to the local ED should there be a change in any of these risk factors.     Barriers to treatment: None identified    Patient Contracts (see media tab):  None    Substance Use: Not addressed in session     Continue or Discharge: Patient will continue in 55+ Program (55+) as planned. Patient is likely to benefit from learning and using skills as they work toward the goals identified in their treatment plan.      MODESTA Mullins  December 15, 2021

## 2021-12-16 ENCOUNTER — HOSPITAL ENCOUNTER (OUTPATIENT)
Dept: BEHAVIORAL HEALTH | Facility: CLINIC | Age: 61
End: 2021-12-16
Attending: PSYCHIATRY & NEUROLOGY
Payer: MEDICARE

## 2021-12-16 PROCEDURE — 90853 GROUP PSYCHOTHERAPY: CPT | Mod: 95 | Performed by: SOCIAL WORKER

## 2021-12-16 PROCEDURE — 90853 GROUP PSYCHOTHERAPY: CPT | Mod: 95

## 2021-12-16 NOTE — GROUP NOTE
Service Modality:  Video Visit     Telemedicine Visit: The patient's condition can be safely assessed and treated via synchronous audio and visual telemedicine encounter.       Reason for Telemedicine Visit: Services only offered telehealth and due to COVID-19.     Originating Site (Patient Location): Patient's home     Distant Site (Provider Location): Provider Remote Setting- Home Office     Consent:  The patient/guardian has verbally consented to: the potential risks and benefits of telemedicine (video visit) versus in person care; bill my insurance or make self-payment for services provided; and responsibility for payment of non-covered services.      Patient would like the video invitation sent by:  My Chart     Mode of Communication:  Video Conference via Medical Zoom     As the provider I attest to compliance with applicable laws and regulations related to telemedicine.    Psychotherapy Group Note    PATIENT'S NAME: Marissa Youssef  MRN:   [id number]  :   1960  ACCT. NUMBER: [id number]  DATE OF SERVICE: 21  START TIME: 11:00 AM  END TIME: 11:50 AM  FACILITATOR: Suzanne Robles [facility]  TOPIC:  EBP Group: Coping Skills  [facility] 55+ Program  TRACK: A2    NUMBER OF PARTICIPANTS: 8    Summary of Group / Topics Discussed:  Coping Skills: Additional Coping Skills:  Patients discussed and practiced how humor can be used as a stress management strategies.  Reviewed the benefits of applying the aforementioned coping strategies.  Patients explored how these strategies might be applied to daily stressors or distressing situations.    Patient Session Goals / Objectives:    Understand the purpose and benefits of applying  coping strategies    Educated about the physiology of stress in the Nervous System    Created a wellness plan to incorporate humor activites.        Patient Participation / Response:  Fully participated with the group by sharing personal reflections / insights and openly  received / provided feedback with other participants.    Demonstrated understanding of topics discussed through group discussion and participation    Treatment Plan:  Patient has a current master individualized treatment plan.  See Epic treatment plan for more information.    Suzanne Robles, AHBIJITSW

## 2021-12-16 NOTE — GROUP NOTE
Psychoeducation Group Note    PATIENT'S NAME: Marissa Youssef  MRN:   [id number]  :   1960  ACCT. NUMBER: [id number]  DATE OF SERVICE: 21  START TIME: 10:00 AM  END TIME: 10:50 AM  FACILITATOR: Ruth Borges LGSW  TOPIC: ANTONIA RN Group: Mental Health Maintenance  [facility] 55+ Program  TRACK: A2    NUMBER OF PARTICIPANTS: 7    Summary of Group / Topics Discussed:  Mental Health Maintenance:  Vulnerability: In this group, the concept of vulnerability was explored through the viewing, discussion, and self-reflection of the Erica Frank Talk Titled,  The Power of Vulnerability.      Patient Session Goals / Objectives:  ? Defined and described definition of vulnerability   ? Identified 2 or more ways of practicing authenticity                                       Service Modality:  Video Visit     Telemedicine Visit: The patient's condition can be safely assessed and treated via synchronous audio and visual telemedicine encounter.      Reason for Telemedicine Visit: Services only offered telehealth    Originating Site (Patient Location): Patient's home    Distant Site (Provider Location): Provider Remote Setting- Home Office    Consent:  The patient/guardian has verbally consented to: the potential risks and benefits of telemedicine (video visit) versus in person care; bill my insurance or make self-payment for services provided; and responsibility for payment of non-covered services.     Patient would like the video invitation sent by:  My Chart    Mode of Communication:  Video Conference via Medical Zoom    As the provider I attest to compliance with applicable laws and regulations related to telemedicine.              Patient Participation / Response:  Fully participated with the group by sharing personal reflections / insights and openly received / provided feedback with other participants.    Demonstrated understanding of topics discussed through group discussion and participation and  Identified / Expressed personal readiness to practice skills    Treatment Plan:  Patient has a current master individualized treatment plan.  See Epic treatment plan for more information.    Ruth Borges LGSW

## 2021-12-16 NOTE — GROUP NOTE
Service Modality:  Video Visit     Telemedicine Visit: The patient's condition can be safely assessed and treated via synchronous audio and visual telemedicine encounter.       Reason for Telemedicine Visit: Services only offered telehealth and due to COVID-19.     Originating Site (Patient Location): Patient's home     Distant Site (Provider Location): Provider Remote Setting- Home Office     Consent:  The patient/guardian has verbally consented to: the potential risks and benefits of telemedicine (video visit) versus in person care; bill my insurance or make self-payment for services provided; and responsibility for payment of non-covered services.      Patient would like the video invitation sent by:  My Chart     Mode of Communication:  Video Conference via Medical Zoom     As the provider I attest to compliance with applicable laws and regulations related to telemedicine.    Psychotherapy Group Note    PATIENT'S NAME: Marissa Youssef  MRN:   [id number]  :   1960  ACCT. NUMBER: [id number]  DATE OF SERVICE: 12/15/21  START TIME: 11:00 AM  END TIME: 11:50 AM  FACILITATOR: Suzanne Robles [facility]  TOPIC:  EBP Group: Coping Skills  [facility] 55+ Program  TRACK: A2    NUMBER OF PARTICIPANTS: 8    Summary of Group / Topics Discussed:  Coping Skills: Additional Coping Skills:  Patients discussed and practiced stress management strategies.  Reviewed the benefits of applying the aforementioned coping strategies.  Patients explored how these strategies might be applied to daily stressors or distressing situations.    Patient Session Goals / Objectives:    Understand the purpose and benefits of applying  coping strategies    Educated about the physiology of stress in the Nervous System    Address barriers to utilizing coping skills when in distress.        Patient Participation / Response:  Fully participated with the group by sharing personal reflections / insights and openly received / provided  feedback with other participants.    Demonstrated understanding of topics discussed through group discussion and participation    Treatment Plan:  Patient has a current master individualized treatment plan.  See Epic treatment plan for more information.    Suzanne Robles, ABHIJITSW

## 2021-12-16 NOTE — GROUP NOTE
Process Group Note    PATIENT'S NAME: Marissa Youssef  MRN:   [id number]  :   1960  ACCT. NUMBER: [id number]  DATE OF SERVICE: 21  START TIME:  9:00 AM  END TIME:  9:50 AM  FACILITATOR: Ruth Borges LGSW  TOPIC:  Process Group    Diagnoses:  F33.2 Major depressive disorder, severe, recurrent, without psychosis  F41.1 Generalized anxiety disorder  F43.12 Posttraumatic Stress Disorder  F60.3 Borderline personality disorder.       [facility] 55+ Program  TRACK: A2    NUMBER OF PARTICIPANTS: 7                                      Service Modality:  Video Visit     Telemedicine Visit: The patient's condition can be safely assessed and treated via synchronous audio and visual telemedicine encounter.      Reason for Telemedicine Visit: Services only offered telehealth    Originating Site (Patient Location): Patient's home    Distant Site (Provider Location): Provider Remote Setting- Home Office    Consent:  The patient/guardian has verbally consented to: the potential risks and benefits of telemedicine (video visit) versus in person care; bill my insurance or make self-payment for services provided; and responsibility for payment of non-covered services.     Patient would like the video invitation sent by:  My Chart    Mode of Communication:  Video Conference via Medical Zoom    As the provider I attest to compliance with applicable laws and regulations related to telemedicine.                Data:    Session content: At the start of this group, patients were invited to check in by identifying themselves, describing their current emotional status, and identifying issues to address in this group.   Area(s) of treatment focus addressed in this session included Symptom Management, Personal Safety and Community Resources/Discharge Planning.  Client reported getting poor sleep last night due to ongoing tendonitis pain in her right shoulder.  Client recently started physical therapy and has been doing the  "prescribed exercises \"faithfully\" despite her observation that her pain is getting worse.  Client has also started to get left shoulder pain using her right shoulder less. She reports her pain always being a \"4 or 5\" and that taking tylenol occasionally helps.  She has physical therapy this afternoon and will ask how to address the pain with exercising.   Client reported getting an MRI on her hip and lower spine to address her \"weird gait\".  Her doctor told her results came back as signs of \"normal aging\".  Client did not report any suicidal ideation, plan or intent.      Therapeutic Interventions/Treatment Strategies:  Psychotherapist offered support, feedback and validation and reinforced use of skills. Treatment modalities used include Cognitive Behavioral Therapy. Interventions include Symptoms Management: Promoted understanding of their diagnoses and how it impacts their functioning and Relationship Skills: Discussed using assertive communication with health care professionals.    Assessment:    Patient response:   Patient responded to session by accepting feedback, giving feedback and listening    Possible barriers to participation / learning include: and no barriers identified    Health Issues:   Yes: Pain, Associated Psychological Distress       Substance Use Review:   Substance Use: No active concerns identified.    Mental Status/Behavioral Observations  Appearance:   Appropriate   Eye Contact:   Good   Psychomotor Behavior: Normal   Attitude:   Cooperative  Interested Friendly Pleasant  Orientation:   All  Speech   Rate / Production: Normal    Volume:  Normal   Mood:    Anxious   Affect:    Appropriate   Thought Content:   Clear and Safety denies any current safety concerns including suicidal ideation, self-harm, and homicidal ideation  Thought Form:  Coherent  Logical     Insight:    Good     Plan:     Safety Plan: No current safety concerns identified.  Recommended that patient call 911 or go to the " local ED should there be a change in any of these risk factors.     Barriers to treatment: None identified    Patient Contracts (see media tab):  None    Substance Use: Not addressed in session     Continue or Discharge: Patient will continue in 55+ Program (55+) as planned. Patient is likely to benefit from learning and using skills as they work toward the goals identified in their treatment plan.      Ruth Borges, MODESTA  December 16, 2021

## 2021-12-20 ENCOUNTER — TELEPHONE (OUTPATIENT)
Dept: BEHAVIORAL HEALTH | Facility: CLINIC | Age: 61
End: 2021-12-20
Payer: COMMERCIAL

## 2021-12-20 ENCOUNTER — HOSPITAL ENCOUNTER (OUTPATIENT)
Dept: BEHAVIORAL HEALTH | Facility: CLINIC | Age: 61
End: 2021-12-20
Attending: PSYCHIATRY & NEUROLOGY
Payer: MEDICARE

## 2021-12-20 PROCEDURE — 90853 GROUP PSYCHOTHERAPY: CPT | Mod: GT | Performed by: SOCIAL WORKER

## 2021-12-20 PROCEDURE — 90853 GROUP PSYCHOTHERAPY: CPT | Mod: 95

## 2021-12-20 PROCEDURE — 90853 GROUP PSYCHOTHERAPY: CPT | Mod: GT

## 2021-12-20 NOTE — GROUP NOTE
Psychotherapy Group Note    PATIENT'S NAME: Marissa Youssef  MRN:   [id number]  :   1960  ACCT. NUMBER: [id number]  DATE OF SERVICE: 21  START TIME: 10:00 AM  END TIME: 10:50 AM  FACILITATOR: Ruth Borges LGSW  TOPIC: MH EBP Group: Coping Skills  [facility] 55+ Program  TRACK: A2    NUMBER OF PARTICIPANTS: 8    Summary of Group / Topics Discussed:  Coping Skills: Self-Soothe: Patients learned to apply self-soothe as a way to decrease heightened stress in the moment.  Patients identified situations that necessitate self-soothe strategies.  They focused on ways to manage physical symptoms of distress using the senses. They discussed how to distinguish when this can be useful in their lives when other strategies are more relevant or helpful.    Patient Session Goals / Objectives:    Understand the purpose of using the senses to decrease distress    Process what happens in the body when using self-soothe strategies    Demonstrate understanding of when to use self-soothe strategies    Identify and problem solve barriers to applying self-soothe strategies.    Choose 1-2 self-soothe strategies to apply during times of distress.                                      Service Modality:  Video Visit     Telemedicine Visit: The patient's condition can be safely assessed and treated via synchronous audio and visual telemedicine encounter.      Reason for Telemedicine Visit: Services only offered telehealth    Originating Site (Patient Location): Patient's home    Distant Site (Provider Location): Provider Remote Setting- Home Office    Consent:  The patient/guardian has verbally consented to: the potential risks and benefits of telemedicine (video visit) versus in person care; bill my insurance or make self-payment for services provided; and responsibility for payment of non-covered services.     Patient would like the video invitation sent by:  My Chart    Mode of Communication:  Video Conference via Medical  Zoom    As the provider I attest to compliance with applicable laws and regulations related to telemedicine.              Patient Participation / Response:  Fully participated with the group by sharing personal reflections / insights and openly received / provided feedback with other participants.    Demonstrated understanding of topics discussed through group discussion and participation, Expressed understanding of the relevance / importance of coping skills at distressing times in life and Demonstrated knowledge of when to consider using a variety of coping skills in daily life    Treatment Plan:  Patient has a current master individualized treatment plan.  See Epic treatment plan for more information.    Ruth Borges LGSW

## 2021-12-20 NOTE — GROUP NOTE
Service Modality:  Video Visit     Telemedicine Visit: The patient's condition can be safely assessed and treated via synchronous audio and visual telemedicine encounter.       Reason for Telemedicine Visit: Services only offered telehealth and due to COVID-19.     Originating Site (Patient Location): Patient's home     Distant Site (Provider Location): Provider Remote Setting- Home Office     Consent:  The patient/guardian has verbally consented to: the potential risks and benefits of telemedicine (video visit) versus in person care; bill my insurance or make self-payment for services provided; and responsibility for payment of non-covered services.      Patient would like the video invitation sent by:  My Chart     Mode of Communication:  Video Conference via Medical Zoom     As the provider I attest to compliance with applicable laws and regulations related to telemedicine.    Psychoeducation Group Note    PATIENT'S NAME: Marissa Youssef  MRN:   [id number]  :   1960  ACCT. NUMBER: [id number]  DATE OF SERVICE: 21  START TIME: 11:00 AM  END TIME: 12:00 PM  FACILITATOR: Suzanne Robles LICSW  TOPIC: MH EBP Group: Health Maintenance  [facility] 55+ Program  TRACK: A2    NUMBER OF PARTICIPANTS: 7    Summary of Group / Topics Discussed:  Health Maintenance: Goal Setting: Meaningful goals can bring a sense of direction and purpose in life.  They also highlight our most important values. Patients were assisted by instructor to identify short term goals to promote their mental health recovery and improve overall health and wellness. Patients were educated on SMART goal setting framework as a strategy to increase outcomes and promote success.    Patient Session Goals / Objectives:  ? Explained the key concepts of SMART goal setting framework  ? Identified three goals successfully using SMART goal setting framework  ? Reviewed concept of balance in wellness as it pertains to goal setting     ? Educated on barriers to sleep and strategies to promote sleep hygiene.       Patient Participation / Response:  Fully participated with the group by sharing personal reflections / insights and openly received / provided feedback with other participants.    Demonstrated understanding of topics discussed through group discussion and participation and Verbalized understanding of health maintenance topic    Treatment Plan:  Patient has a current master individualized treatment plan.  See Epic treatment plan for more information.    Suzanne Robles, LICSW

## 2021-12-20 NOTE — GROUP NOTE
Process Group Note    PATIENT'S NAME: Marissa Youssef  MRN:   [id number]  :   1960  ACCT. NUMBER: [id number]  DATE OF SERVICE: 21  START TIME:  9:00 AM  END TIME:  9:50 AM  FACILITATOR: Ruth Borges LGSW  TOPIC:  Process Group    Diagnoses:  F33.2 Major depressive disorder, severe, recurrent, without psychosis  F41.1 Generalized anxiety disorder  F43.12 Posttraumatic Stress Disorder  F60.3 Borderline personality disorder.       [facility] 55+ Program  TRACK: A2    NUMBER OF PARTICIPANTS: 8                                      Service Modality:  Video Visit     Telemedicine Visit: The patient's condition can be safely assessed and treated via synchronous audio and visual telemedicine encounter.      Reason for Telemedicine Visit: Services only offered telehealth    Originating Site (Patient Location): Patient's home    Distant Site (Provider Location): Provider Remote Setting- Home Office    Consent:  The patient/guardian has verbally consented to: the potential risks and benefits of telemedicine (video visit) versus in person care; bill my insurance or make self-payment for services provided; and responsibility for payment of non-covered services.     Patient would like the video invitation sent by:  My Chart    Mode of Communication:  Video Conference via Medical Zoom    As the provider I attest to compliance with applicable laws and regulations related to telemedicine.                Data:    Session content: At the start of this group, patients were invited to check in by identifying themselves, describing their current emotional status, and identifying issues to address in this group.   Area(s) of treatment focus addressed in this session included Symptom Management, Personal Safety and Community Resources/Discharge Planning.  Client reported getting a call about psychiatry from [facility] to schedule an appointment.  Client thinks this is likely the follow up from the  "referral that a [facility] nurse made last week.  During the break of group, client called Katie who did not have a record of her appointment.  Writer agreed to message the RN Triage team to get more information.  Client also expressed her intention to lower her dose of Seroquel back to 200mg at bedtime.  Client explained that her current psychiatrist and Dr. Basilio recently approved an increase to 400mg and that due to interrupted sleep side effects, client wants to go back to 200mg.  \"I am constantly waking up and dozing\".  Client expressed the desire to make this dose change without waiting for approval from her prescriber.  Writer encouraged client to call her psychiatrist for approval and agreed to connect with Dr. Basilio to see if he had any concerns with this adjustment.  Client thanked writer and reported that she could only do a phone call with Dr. Basilio if needed.  Client reports a \"good\" mood and celebrated the holidays with the \"other side\" of her family over the weekend.  Client did not report any suicidal ideation, plan or intent.      Therapeutic Interventions/Treatment Strategies:  Psychotherapist offered support, feedback and validation and reinforced use of skills. Treatment modalities used include Cognitive Behavioral Therapy. Interventions include Symptoms Management: Promoted understanding of their diagnoses and how it impacts their functioning and Other: Discussed communication with care team about a medication adjustment.    Assessment:    Patient response:   Patient responded to session by accepting feedback, giving feedback and listening    Possible barriers to participation / learning include: and no barriers identified    Health Issues:   Yes: Pain, No Psychological Distress       Substance Use Review:   Substance Use: No active concerns identified.    Mental Status/Behavioral Observations  Appearance:   Appropriate   Eye Contact:   Good   Psychomotor Behavior: Normal "   Attitude:   Cooperative  Interested Friendly Pleasant  Orientation:   All  Speech   Rate / Production: Normal    Volume:  Normal   Mood:    Euthymic  Affect:    Appropriate   Thought Content:   Clear and Safety denies any current safety concerns including suicidal ideation, self-harm, and homicidal ideation  Thought Form:  Coherent  Logical     Insight:    Good     Plan:     Safety Plan: No current safety concerns identified.  Recommended that patient call 911 or go to the local ED should there be a change in any of these risk factors.     Barriers to treatment: None identified    Patient Contracts (see media tab):  None    Substance Use: Not addressed in session     Continue or Discharge: Patient will continue in 55+ Program (55+) as planned. Patient is likely to benefit from learning and using skills as they work toward the goals identified in their treatment plan.      MODESTA Mullins  December 20, 2021

## 2021-12-21 ENCOUNTER — TELEPHONE (OUTPATIENT)
Dept: BEHAVIORAL HEALTH | Facility: CLINIC | Age: 61
End: 2021-12-21
Payer: COMMERCIAL

## 2021-12-21 NOTE — TELEPHONE ENCOUNTER
Writer left voicemail for client requesting to follow up regarding a medication adjustment request and a psychiatry referral talked about in 55+ A2 Process Group.  Writer will attempt calling client tomorrow.    MODESTA Mullins on 12/21/2021 at 1:32 PM

## 2021-12-21 NOTE — TELEPHONE ENCOUNTER
Writer called client to follow up on a request client made to get a psychiatry referral and consult about a medication adjustment.      Re: Medication adjustment: Writer notified client that medical director of [facility] Dr. Basilio approves of her desire to go back to 200mg at bedtime of Seroquel but asks that she gets final approval from her current psychiatrist before moving forward.  Client reported getting permission from her current psychiatrist's team to make her desired medication adjustment.      Re: Psychiatry referral: Writer notified client that her initial psychiatry referral placed on 12/15/2021 was for [facility] in [place], [place].  Due to proximity, writer has chosen to instead see a local psychiatrist Dr. Chalo Skelton in [place], [place]. Appointment is scheduled for March of 2022.    MODESTA Mullins on 12/21/2021 at 1:41 PM

## 2021-12-22 ENCOUNTER — HOSPITAL ENCOUNTER (OUTPATIENT)
Dept: BEHAVIORAL HEALTH | Facility: CLINIC | Age: 61
End: 2021-12-22
Attending: PSYCHIATRY & NEUROLOGY
Payer: MEDICARE

## 2021-12-22 PROCEDURE — 90853 GROUP PSYCHOTHERAPY: CPT | Mod: GT | Performed by: COUNSELOR

## 2021-12-22 PROCEDURE — 90853 GROUP PSYCHOTHERAPY: CPT | Mod: 95

## 2021-12-22 NOTE — GROUP NOTE
Psychotherapy Group Note    PATIENT'S NAME: Marissa Youssef  MRN:   [id number]  :   1960  ACCT. NUMBER: [id number]  DATE OF SERVICE: 21  START TIME: 11:00 AM  END TIME: 11:50 AM  FACILITATOR: Jessie Hdz LPCC  TOPIC: MH EBP Group: Coping Skills  [facility] 55+ Program  TRACK: A-2    NUMBER OF PARTICIPANTS: 8    Summary of Group / Topics Discussed:  Coping Skills: Distraction: Patients learned to mindfully use distraction as a way to decrease heightened stress in the moment.  Patients will identified situations that necessitate healthy distraction strategies.  They explored ways to manage physical symptoms of distress using distraction. The group began to distinguish when this can be useful in their lives or when other strategies would be more relevant or helpful.    Patient Session Goals / Objectives:    Understand the purpose and benefits of using healthy distraction to decrease distress.    Process what happens in the body when using distraction strategies.    Demonstrate understanding of when to use distraction strategies.    Explore patient s current distraction activities, and how to take a more intentional approach to the use of distraction.    Identify and problem solve barriers to applying distraction strategies.    Choose 1-2 healthy distraction strategies to apply during times of distress.                                      Service Modality:  Video Visit     Telemedicine Visit: The patient's condition can be safely assessed and treated via synchronous audio and visual telemedicine encounter.      Reason for Telemedicine Visit: Services only offered telehealth    Originating Site (Patient Location): Patient's home    Distant Site (Provider Location): Provider Remote Setting- Home Office    Consent:  The patient/guardian has verbally consented to: the potential risks and benefits of telemedicine (video visit) versus in person care; bill my insurance or make self-payment for  services provided; and responsibility for payment of non-covered services.     Patient would like the video invitation sent by:  My Chart    Mode of Communication:  Video Conference via Medical Zoom    As the provider I attest to compliance with applicable laws and regulations related to telemedicine.              Patient Participation / Response:  Fully participated with the group by sharing personal reflections / insights and openly received / provided feedback with other participants.    Demonstrated understanding of topics discussed through group discussion and participation, Expressed understanding of the relevance / importance of coping skills at distressing times in life and Demonstrated knowledge of when to consider using a variety of coping skills in daily life    Treatment Plan:  Patient has a current master individualized treatment plan.  See Epic treatment plan for more information.    Jessie Hdz, [facility]

## 2021-12-22 NOTE — GROUP NOTE
Psychotherapy Group Note    PATIENT'S NAME: Marissa Youssef  MRN:   [id number]  :   1960  ACCT. NUMBER: [id number]  DATE OF SERVICE: 21  START TIME: 10:00 AM  END TIME: 10:50 AM  FACILITATOR: Ruth Borges LGSW  TOPIC: MH EBP Group: Specialty Awareness  [facility] 55+ Program  TRACK: A2    NUMBER OF PARTICIPANTS:     Summary of Group / Topics Discussed:  Specialty Topics: Life Transitions: The topic of life transitions was presented in order to help patients to better understand the challenges presented by life transitions, and how to best navigate them. Exploring the phases of transition and how one works through them was discussed. Patients were provided with information regarding community resources.     Patient Session Goals / Objectives:    Discussed the timing and nature of major life transitions    Explored how life transitions may impact mental health and functioning    Discussed coping strategies to manage symptoms and help with transitioning    Discussed and planned a successful transition                                        Service Modality:  Video Visit     Telemedicine Visit: The patient's condition can be safely assessed and treated via synchronous audio and visual telemedicine encounter.      Reason for Telemedicine Visit: Services only offered telehealth    Originating Site (Patient Location): Patient's home    Distant Site (Provider Location): Provider Remote Setting- Home Office    Consent:  The patient/guardian has verbally consented to: the potential risks and benefits of telemedicine (video visit) versus in person care; bill my insurance or make self-payment for services provided; and responsibility for payment of non-covered services.     Patient would like the video invitation sent by:  My Chart    Mode of Communication:  Video Conference via Medical Zoom    As the provider I attest to compliance with applicable laws and regulations related to telemedicine.             Patient Participation / Response:  Fully participated with the group by sharing personal reflections / insights and openly received / provided feedback with other participants.    Demonstrated understanding of topics discussed through group discussion and participation, Identified / Expressed readiness to act on skill suggestions discussed in topic and Verbalized understanding of ways to proactively manage illness    Treatment Plan:  Patient has a current master individualized treatment plan.  See Epic treatment plan for more information.    Ruth Borges LGSW

## 2021-12-22 NOTE — GROUP NOTE
"Process Group Note    PATIENT'S NAME: Marissa Youssef  MRN:   [id number]  :   1960  ACCT. NUMBER: [id number]  DATE OF SERVICE: 21  START TIME:  9:00 AM  END TIME:  9:50 AM  FACILITATOR: Ruth Borges LGSW  TOPIC:  Process Group    Diagnoses:  F33.2 Major depressive disorder, severe, recurrent, without psychosis  F41.1 Generalized anxiety disorder  F43.12 Posttraumatic Stress Disorder  F60.3 Borderline personality disorder.       [facility] 55+ Program  TRACK: A2    NUMBER OF PARTICIPANTS: 9                                      Service Modality:  Video Visit     Telemedicine Visit: The patient's condition can be safely assessed and treated via synchronous audio and visual telemedicine encounter.      Reason for Telemedicine Visit: Services only offered telehealth    Originating Site (Patient Location): Patient's home    Distant Site (Provider Location): Provider Remote Setting- Home Office    Consent:  The patient/guardian has verbally consented to: the potential risks and benefits of telemedicine (video visit) versus in person care; bill my insurance or make self-payment for services provided; and responsibility for payment of non-covered services.     Patient would like the video invitation sent by:  My Chart    Mode of Communication:  Video Conference via Medical Zoom    As the provider I attest to compliance with applicable laws and regulations related to telemedicine.                Data:    Session content: At the start of this group, patients were invited to check in by identifying themselves, describing their current emotional status, and identifying issues to address in this group.   Area(s) of treatment focus addressed in this session included Symptom Management, Personal Safety and Community Resources/Discharge Planning.  Client reported setting up an appointment with a local psychiatrist Dr. Chalo Skelton.  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  " "She used positive self talk to cope with her anxiety telling her \"it is only 1 hour and 15 minutes\".  She reported feeling \"fine\" once she attended the meeting and enjoyed playing [employer] related games with the [employer].  Client cited anticipation as the main source of anxiety.   She stated that she is sleeping better now that she is back to 200mg of Seroquel at bedtime.  Her shoulder continues to hurt and interrupt her sleep occasionally.  Client will see her physical therapist tomorrow where she will check in about her pain levels.  Client did not report any suicidal ideation, plan or intent.    Therapeutic Interventions/Treatment Strategies:  Psychotherapist offered support, feedback and validation and reinforced use of skills. Treatment modalities used include Cognitive Behavioral Therapy. Interventions include Cognitive Restructuring:  Assisted patient in identifying new neutral/positive core beliefs.    Assessment:    Patient response:   Patient responded to session by accepting feedback, giving feedback and listening    Possible barriers to participation / learning include: and no barriers identified    Health Issues:   Yes: Pain, Associated Psychological Distress       Substance Use Review:   Substance Use: No active concerns identified.    Mental Status/Behavioral Observations  Appearance:   Appropriate   Eye Contact:   Good   Psychomotor Behavior: Normal   Attitude:   Cooperative  Interested Friendly Pleasant  Orientation:   All  Speech   Rate / Production: Normal    Volume:  Normal   Mood:    Euthymic  Affect:    Appropriate   Thought Content:   Clear and Safety denies any current safety concerns including suicidal ideation, self-harm, and homicidal ideation  Thought Form:  Coherent  Logical     Insight:    Good     Plan:     Safety Plan: No current safety concerns identified.  Recommended that patient call 911 or go to the local ED should there be a change in any of these risk factors.     Barriers " to treatment: None identified    Patient Contracts (see media tab):  None    Substance Use: Not addressed in session     Continue or Discharge: Patient will continue in 55+ Program (55+) as planned. Patient is likely to benefit from learning and using skills as they work toward the goals identified in their treatment plan.      Ruth Borges, MODESTA  December 22, 2021

## 2021-12-23 ENCOUNTER — HOSPITAL ENCOUNTER (OUTPATIENT)
Dept: BEHAVIORAL HEALTH | Facility: CLINIC | Age: 61
End: 2021-12-23
Attending: PSYCHIATRY & NEUROLOGY
Payer: MEDICARE

## 2021-12-23 PROCEDURE — 90853 GROUP PSYCHOTHERAPY: CPT | Mod: 95

## 2021-12-23 NOTE — GROUP NOTE
Process Group Note    PATIENT'S NAME: Marissa Youssef  MRN:   [id number]  :   1960  ACCT. NUMBER: [id number]  DATE OF SERVICE: 21  START TIME:  9:00 AM  END TIME:  9:50 AM  FACILITATOR: Ruth Borges LGSW  TOPIC:  Process Group    Diagnoses:  F33.2 Major depressive disorder, severe, recurrent, without psychosis  F41.1 Generalized anxiety disorder  F43.12 Posttraumatic Stress Disorder  F60.3 Borderline personality disorder.       [facility] 55+ Program  TRACK: A2    NUMBER OF PARTICIPANTS: 9                                      Service Modality:  Video Visit     Telemedicine Visit: The patient's condition can be safely assessed and treated via synchronous audio and visual telemedicine encounter.      Reason for Telemedicine Visit: Services only offered telehealth    Originating Site (Patient Location): Patient's home    Distant Site (Provider Location): Provider Remote Setting- Home Office    Consent:  The patient/guardian has verbally consented to: the potential risks and benefits of telemedicine (video visit) versus in person care; bill my insurance or make self-payment for services provided; and responsibility for payment of non-covered services.     Patient would like the video invitation sent by:  My Chart    Mode of Communication:  Video Conference via Medical Zoom    As the provider I attest to compliance with applicable laws and regulations related to telemedicine.                Data:    Session content: At the start of this group, patients were invited to check in by identifying themselves, describing their current emotional status, and identifying issues to address in this group.   Area(s) of treatment focus addressed in this session included Symptom Management, Personal Safety and Community Resources/Discharge Planning.  Client reported texting boss to set up a meeting next Wednesday to plan for her return to work.  Client talked about noises yesterday night  in her house that made  "her think her house might be haunted.  She has plans to elisabeth the house tonight.  She reported \"everything is good\".  Client denied SI, SIB and chemical use.     Therapeutic Interventions/Treatment Strategies:  Psychotherapist offered support, feedback and validation and reinforced use of skills. Treatment modalities used include Cognitive Behavioral Therapy. Interventions include Other: Talked about return to work.    Assessment:    Patient response:   Patient responded to session by accepting feedback, giving feedback and listening    Possible barriers to participation / learning include: and no barriers identified    Health Issues:   None reported       Substance Use Review:   Substance Use: No active concerns identified.    Mental Status/Behavioral Observations  Appearance:   Appropriate   Eye Contact:   Good   Psychomotor Behavior: Normal   Attitude:   Cooperative  Interested Friendly Pleasant  Orientation:   All  Speech   Rate / Production: Normal    Volume:  Normal   Mood:    Euthymic  Affect:    Appropriate   Thought Content:   Clear and Safety denies any current safety concerns including suicidal ideation, self-harm, and homicidal ideation  Thought Form:  Coherent  Logical     Insight:    Good     Plan:     Safety Plan: No current safety concerns identified.  Recommended that patient call 911 or go to the local ED should there be a change in any of these risk factors.     Barriers to treatment: None identified    Patient Contracts (see media tab):  None    Substance Use: Not addressed in session     Continue or Discharge: Patient will continue in 55+ Program (55+) as planned. Patient is likely to benefit from learning and using skills as they work toward the goals identified in their treatment plan.      MODESTA Mullins  December 23, 2021    "

## 2021-12-23 NOTE — GROUP NOTE
Service Modality:  Video Visit     Telemedicine Visit: The patient's condition can be safely assessed and treated via synchronous audio and visual telemedicine encounter.      Reason for Telemedicine Visit: Services only offered telehealth    Originating Site (Patient Location): Patient's home    Distant Site (Provider Location): Provider Remote Setting- Home Office    Consent:  The patient/guardian has verbally consented to: the potential risks and benefits of telemedicine (video visit) versus in person care; bill my insurance or make self-payment for services provided; and responsibility for payment of non-covered services.     Patient would like the video invitation sent by:  My Chart    Mode of Communication:  Video Conference via Medical Zoom    As the provider I attest to compliance with applicable laws and regulations related to telemedicine.      Psychotherapy Group Note    PATIENT'S NAME: Marissa Youssef  MRN:   [id number]  :   1960  ACCT. NUMBER: [id number]  DATE OF SERVICE: 21  START TIME: 11:00 AM  END TIME: 11:50 AM  FACILITATOR: Madeline Scott LMFT  TOPIC:  EBP Group: [place] [address]+ Program  TRACK: A2    NUMBER OF PARTICIPANTS:8    Summary of Group / Topics Discussed:  Mindfulness: Mindfulness Skills: Patients received information on the main components of mindfulness. Patients participated in discussion on how to practice observing, describing, and participating in internal and external environments. Relevance of mindfulness skills to overall mental and physical health was explored.  Patients explored and discussed in group their current awareness and knowledge of mindfulness skills as well as barriers to applying skills.    Patient Session Goals / Objectives:    Demonstrated and verbalized understanding of key mindfulness concepts    Identified when/how to use mindfulness skills    Resolved barriers to practicing mindfulness  skills    Identified plan to use mindfulness skills in daily life       Patient Participation / Response:  Fully participated with the group by sharing personal reflections / insights and openly received / provided feedback with other participants.    Demonstrated understanding of mindfulness skills and benefits of practice and Identified / Expressed personal readiness to practice mindfulness skills    Treatment Plan:  Patient has a current master individualized treatment plan.  See Epic treatment plan for more information.    LINDA Dodson

## 2021-12-23 NOTE — GROUP NOTE
Psychotherapy Group Note    PATIENT'S NAME: Marissa Youssef  MRN:   [id number]  :   1960  ACCT. NUMBER: [id number]  DATE OF SERVICE: 21  START TIME: 10:00 AM  END TIME: 10:50 AM  FACILITATOR: Ruth Borges LGSW  TOPIC: MH EBP Group: Emotions Management  [facility] 55+ Program  TRACK: A2    NUMBER OF PARTICIPANTS: 9    Summary of Group / Topics Discussed:  Emotions Management: Understanding Emotions: Patients discussed the purpose of emotions and function they serve in our lives.  Reviewed core emotions, why they happen (triggers), and how they occur. The group assisted one anothers' understanding that: emotional experiences are important; difficult emotions have a place in our lives; and the differences between various emotions.    Patient Session Goals / Objectives:    Demonstrate understanding of types various emotions    Identify and discuss specific emotions and when they occur; understand triggers    Discuss barriers to emotional regulation                                        Service Modality:  Video Visit     Telemedicine Visit: The patient's condition can be safely assessed and treated via synchronous audio and visual telemedicine encounter.      Reason for Telemedicine Visit: Services only offered telehealth    Originating Site (Patient Location): Patient's home    Distant Site (Provider Location): Provider Remote Setting- Home Office    Consent:  The patient/guardian has verbally consented to: the potential risks and benefits of telemedicine (video visit) versus in person care; bill my insurance or make self-payment for services provided; and responsibility for payment of non-covered services.     Patient would like the video invitation sent by:  My Chart    Mode of Communication:  Video Conference via Medical Zoom    As the provider I attest to compliance with applicable laws and regulations related to telemedicine.            Patient Participation / Response:  Fully participated  with the group by sharing personal reflections / insights and openly received / provided feedback with other participants.    Demonstrated understanding of topics discussed through group discussion and participation, Expressed understanding of the relevance / importance of emotions management skills at distressing times in life and Self-aware of experiences with difficult emotions, and strategies to employ to manage them    Treatment Plan:  Patient has a current master individualized treatment plan.  See Epic treatment plan for more information.    Ruth Borges, LGSW

## 2021-12-27 ENCOUNTER — HOSPITAL ENCOUNTER (OUTPATIENT)
Dept: BEHAVIORAL HEALTH | Facility: CLINIC | Age: 61
End: 2021-12-27
Attending: PSYCHIATRY & NEUROLOGY
Payer: MEDICARE

## 2021-12-27 PROCEDURE — 90853 GROUP PSYCHOTHERAPY: CPT | Mod: 95

## 2021-12-27 PROCEDURE — 90853 GROUP PSYCHOTHERAPY: CPT | Mod: 95 | Performed by: COUNSELOR

## 2021-12-27 NOTE — GROUP NOTE
"Process Group Note    PATIENT'S NAME: Marissa Youssef  MRN:   [id number]  :   1960  ACCT. NUMBER: [id number]  DATE OF SERVICE: 21  START TIME:  9:00 AM  END TIME:  9:50 AM  FACILITATOR: Ruth Borges LGSW  TOPIC:  Process Group    Diagnoses:  F33.2 Major depressive disorder, severe, recurrent, without psychosis  F41.1 Generalized anxiety disorder  F43.12 Posttraumatic Stress Disorder  F60.3 Borderline personality disorder.       [facility] 55+ Program  TRACK: A2    NUMBER OF PARTICIPANTS: 9                                      Service Modality:  Video Visit     Telemedicine Visit: The patient's condition can be safely assessed and treated via synchronous audio and visual telemedicine encounter.      Reason for Telemedicine Visit: Services only offered telehealth    Originating Site (Patient Location): Patient's home    Distant Site (Provider Location): Provider Remote Setting- Home Office    Consent:  The patient/guardian has verbally consented to: the potential risks and benefits of telemedicine (video visit) versus in person care; bill my insurance or make self-payment for services provided; and responsibility for payment of non-covered services.     Patient would like the video invitation sent by:  My Chart    Mode of Communication:  Video Conference via Medical Zoom    As the provider I attest to compliance with applicable laws and regulations related to telemedicine.                Data:    Session content: At the start of this group, patients were invited to check in by identifying themselves, describing their current emotional status, and identifying issues to address in this group.   Area(s) of treatment focus addressed in this session included Symptom Management, Personal Safety and Community Resources/Discharge Planning.  Client reported having an \"uneventful\" weekend due to celebrating the  holiday a couple weeks prior.  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  Client reported completing the \"usual housework\" this weekend.  She also reported watching historical fiction shows on [employer] and doing logic puzzles on her phone.  She reported an \"overall good\" mood.  \"I am not manic, I am not depressed, I am a couch potato\".  Client denied SI, SIB and chemical use.        Therapeutic Interventions/Treatment Strategies:  Psychotherapist offered support, feedback and validation and reinforced use of skills. Treatment modalities used include Cognitive Behavioral Therapy. Interventions include Coping Skills: Assisted patient in identifying 1-2 healthy distraction skills to reduce overall distress.    Assessment:    Patient response:   Patient responded to session by accepting feedback, giving feedback and listening    Possible barriers to participation / learning include: and no barriers identified    Health Issues:   None reported       Substance Use Review:   Substance Use: No active concerns identified.    Mental Status/Behavioral Observations  Appearance:   Appropriate   Eye Contact:   Good   Psychomotor Behavior: Normal   Attitude:   Cooperative  Interested Friendly Pleasant  Orientation:   All  Speech   Rate / Production: Normal    Volume:  Normal   Mood:    Euthymic  Affect:    Appropriate   Thought Content:   Clear and Safety denies any current safety concerns including suicidal ideation, self-harm, and homicidal ideation  Thought Form:  Coherent  Logical     Insight:    Good     Plan:     Safety Plan: No current safety concerns identified.  Recommended that patient call 911 or go to the local ED should there be a change in any of these risk factors.     Barriers to treatment: None identified    Patient Contracts (see media tab):  None    Substance Use: Not addressed in session     Continue or Discharge: Patient will continue in 55+ Program (55+) as planned. Patient is likely to benefit from learning and using skills as " they work toward the goals identified in their treatment plan.      Ruth Borges, MODESTA  December 27, 2021

## 2021-12-27 NOTE — GROUP NOTE
Psychotherapy Group Note    PATIENT'S NAME: Marissa Youssef  MRN:   [id number]  :   1960  ACCT. NUMBER: [id number]  DATE OF SERVICE: 21  START TIME: 11:00 AM  END TIME: 11:50 AM  FACILITATOR: Jessie Hdz LPCC  TOPIC:  EBP Group: Behavioral Activation  [facility] 55+ Program  TRACK: A2    NUMBER OF PARTICIPANTS: 8    Summary of Group / Topics Discussed:  Behavioral Activation: Motivation and Procrastination: Patients explored how they currently spend their time, identifying thoughts and feelings that are motivating and serve to increase desired behaviors.  They also examined behaviors that contribute to procrastination.  Different types of procrastination behaviors were identified, and strategies to reduce individual procrastination and increase motivation were explored and practiced.  Patients identified ways to increase goal-directed activities to enhance mood and reduce symptoms.        Patient Session Goals / Objectives:    Identify current patterns of procrastination behavior and how they influence thoughts and moods, and inhibit motivation.    Identify behaviors that can be implemented that contribute to improving thoughts and feelings, motivation, and reduce symptoms.    Identify and develop a plan to increase activities that promote a sense of accomplishment and competence.    Practice scheduling positive activities / behaviors into daily routines.                                      Service Modality:  Video Visit     Telemedicine Visit: The patient's condition can be safely assessed and treated via synchronous audio and visual telemedicine encounter.      Reason for Telemedicine Visit: Services only offered telehealth    Originating Site (Patient Location): Patient's home    Distant Site (Provider Location): Provider Remote Setting- Home Office    Consent:  The patient/guardian has verbally consented to: the potential risks and benefits of telemedicine (video visit) versus in  person care; bill my insurance or make self-payment for services provided; and responsibility for payment of non-covered services.     Patient would like the video invitation sent by:  My Chart    Mode of Communication:  Video Conference via Medical Zoom    As the provider I attest to compliance with applicable laws and regulations related to telemedicine.            Patient Participation / Response:  Fully participated with the group by sharing personal reflections / insights and openly received / provided feedback with other participants.    Demonstrated understanding of topics discussed through group discussion and participation, Expressed understanding of the relationship between behaviors, thoughts, and feelings and Shared experiences and challenges with making behavioral changes    Treatment Plan:  Patient has a current master individualized treatment plan.  See Epic treatment plan for more information.    Jessie Hdz, [facility]

## 2021-12-27 NOTE — GROUP NOTE
Psychotherapy Group Note    PATIENT'S NAME: Marissa Youssef  MRN:   [id number]  :   1960  ACCT. NUMBER: [id number]  DATE OF SERVICE: 21  START TIME: 10:00 AM  END TIME: 10:50 AM  FACILITATOR: Ruth Borges LGSW  TOPIC:  EBP Group: Specialty Awareness  [facility] 55+ Program  TRACK: A2    NUMBER OF PARTICIPANTS: 9    Summary of Group / Topics Discussed:  Specialty Topics: Grief/Transitions: Patients received an overview of the grief process.  Patients explored their relationship to loss and how that has affected their mental health symptoms.  Strategies for recognizing loss and ideas for engaging in the grief process was presented and discussed.  Patients identified needs for support and coping skills to manage loss.  The purpose of this specialty topic is to help patients identify the aspects of change due to loss that individuals experience in addition to mental health symptoms to better cope with the grief, loss, and life transitions.      Patient Session Goals / Objectives:    Identified grief, loss, and life transitions     Discussed how grief impacts mental health symptoms and disrupts usual functioning    Identified needs for support and coping with grief and planned further action for coping      Patient Participation / Response:  Fully participated with the group by sharing personal reflections / insights and openly received / provided feedback with other participants.    Demonstrated understanding of topics discussed through group discussion and participation    Treatment Plan:  Patient has a current master individualized treatment plan.  See Epic treatment plan for more information.    MODESTA Mullins

## 2021-12-29 ENCOUNTER — HOSPITAL ENCOUNTER (OUTPATIENT)
Dept: BEHAVIORAL HEALTH | Facility: CLINIC | Age: 61
End: 2021-12-29
Attending: PSYCHIATRY & NEUROLOGY
Payer: MEDICARE

## 2021-12-29 PROCEDURE — 90853 GROUP PSYCHOTHERAPY: CPT | Mod: GT

## 2021-12-29 PROCEDURE — 90853 GROUP PSYCHOTHERAPY: CPT | Mod: 95 | Performed by: COUNSELOR

## 2021-12-29 NOTE — GROUP NOTE
Psychoeducation Group Note    PATIENT'S NAME: Marissa Youssef  MRN:   [id number]  :   1960  ACCT. NUMBER: [id number]  DATE OF SERVICE: 21  START TIME: 10:00 AM  END TIME: 10:50 AM  FACILITATOR: Ruth Borges LGSW  TOPIC: ANTONIA RN Group: Mental Health Maintenance  [facility] 55+ Program  TRACK: A2    NUMBER OF PARTICIPANTS: 8    Summary of Group / Topics Discussed:  Mental Health Maintenance:  Vulnerability: In this group, the concept of vulnerability was explored through the viewing, discussion, and self-reflection of the Erica Hernandez [employer] Session titled ,  The Anatomy of Trust.      Patient Session Goals / Objectives:  ? Defined and described the components of trust  ? Identified 2 or more ways of practicing authenticity                                         Service Modality:  Video Visit     Telemedicine Visit: The patient's condition can be safely assessed and treated via synchronous audio and visual telemedicine encounter.      Reason for Telemedicine Visit: Services only offered telehealth    Originating Site (Patient Location): Patient's home    Distant Site (Provider Location): Provider Remote Setting- Home Office    Consent:  The patient/guardian has verbally consented to: the potential risks and benefits of telemedicine (video visit) versus in person care; bill my insurance or make self-payment for services provided; and responsibility for payment of non-covered services.     Patient would like the video invitation sent by:  My Chart    Mode of Communication:  Video Conference via Medical Zoom    As the provider I attest to compliance with applicable laws and regulations related to telemedicine.              Patient Participation / Response:  Fully participated with the group by sharing personal reflections / insights and openly received / provided feedback with other participants.    Demonstrated understanding of topics discussed through group discussion and participation and  Verbalized understanding of mental health maintenance topic    Treatment Plan:  Patient has a current master individualized treatment plan.  See Epic treatment plan for more information.    Ruth Borges LGSW

## 2021-12-29 NOTE — GROUP NOTE
"Process Group Note    PATIENT'S NAME: Marissa Youssef  MRN:   [id number]  :   1960  ACCT. NUMBER: [id number]  DATE OF SERVICE: 21  START TIME:  9:00 AM  END TIME:  9:50 AM  FACILITATOR: Ruth Borges LGSW  TOPIC:  Process Group    Diagnoses:  F33.2 Major depressive disorder, severe, recurrent, without psychosis  F41.1 Generalized anxiety disorder  F43.12 Posttraumatic Stress Disorder  F60.3 Borderline personality disorder.       [facility] 55+ Program  TRACK: A2    NUMBER OF PARTICIPANTS: 8                                      Service Modality:  Video Visit     Telemedicine Visit: The patient's condition can be safely assessed and treated via synchronous audio and visual telemedicine encounter.      Reason for Telemedicine Visit: Services only offered telehealth    Originating Site (Patient Location): Patient's home    Distant Site (Provider Location): Provider Remote Setting- Home Office    Consent:  The patient/guardian has verbally consented to: the potential risks and benefits of telemedicine (video visit) versus in person care; bill my insurance or make self-payment for services provided; and responsibility for payment of non-covered services.     Patient would like the video invitation sent by:  My Chart    Mode of Communication:  Video Conference via Medical Zoom    As the provider I attest to compliance with applicable laws and regulations related to telemedicine.                Data:    Session content: At the start of this group, patients were invited to check in by identifying themselves, describing their current emotional status, and identifying issues to address in this group.   Area(s) of treatment focus addressed in this session included Symptom Management, Personal Safety and Community Resources/Discharge Planning.  Client reported having a \"very good\" mood.  She reported having a meeting this afternoon that she planned with her manager to go over the details of her return to " "work next week.  She left a voicemail for her doctor to see if she had any lifting restrictions to relay to her manager.  She feels hesitant with requesting restrictions.  Peers offered supportive feedback.  Client agreed to reach out to her Physical Therapist with information on how to safely lift items.  Client reported watching and enjoying the movie \"Don't Look Up\".  Client denied suicidal ideation, plan or intent.    Therapeutic Interventions/Treatment Strategies:  Psychotherapist offered support, feedback and validation and reinforced use of skills. Treatment modalities used include Cognitive Behavioral Therapy. Interventions include Other: Discussed return to work and Relationship Skills: Assisted patients in implementing more effective communication skills in their relationships.    Assessment:    Patient response:   Patient responded to session by accepting feedback, giving feedback and listening    Possible barriers to participation / learning include: and no barriers identified    Health Issues:   None reported       Substance Use Review:   Substance Use: No active concerns identified.    Mental Status/Behavioral Observations  Appearance:   Appropriate   Eye Contact:   Good   Psychomotor Behavior: Normal   Attitude:   Cooperative  Interested Friendly Pleasant  Orientation:   All  Speech   Rate / Production: Normal    Volume:  Normal   Mood:    Euthymic  Affect:    Appropriate   Thought Content:   Clear and Safety denies any current safety concerns including suicidal ideation, self-harm, and homicidal ideation  Thought Form:  Coherent  Logical     Insight:    Good     Plan:     Safety Plan: No current safety concerns identified.  Recommended that patient call 911 or go to the local ED should there be a change in any of these risk factors.     Barriers to treatment: None identified    Patient Contracts (see media tab):  None    Substance Use: Not addressed in session     Continue or Discharge: Patient will " continue in 55+ Program (55+) as planned. Patient is likely to benefit from learning and using skills as they work toward the goals identified in their treatment plan.      Ruth Borges, MODESTA  December 29, 2021

## 2021-12-29 NOTE — GROUP NOTE
Psychotherapy Group Note    PATIENT'S NAME: Marissa Youssef  MRN:   [id number]  :   1960  ACCT. NUMBER: [id number]  DATE OF SERVICE: 21  START TIME: 11:00 AM  END TIME: 11:50 AM  FACILITATOR: Jessie Hdz LPCC  TOPIC: MH EBP Group: Relationship Skills  [facility] 55+ Program  TRACK: A-2    NUMBER OF PARTICIPANTS: 8    Summary of Group / Topics Discussed:  Relationship Skills: Relationship Mapping: Patients identified different types of relationships they have in their life and examined if there is conflict or closeness, the degree of conflict or closeness, and the reason for conflict. The goal of this topic is to help patients gain awareness of the relationships they have with others, identify types of conflict in patients  lives and how they impact symptoms/functioning, and identify how they can improve relationships with relationship and interpersonal skills they have learned.     Patient Session Goals / Objectives:    Familiarized patients with awareness to the different types of relationships they may have with different people and substances in their lives    Discussed and practiced strategies to promote healthier understanding of interpersonal relationships with a focus on awareness of conflict, the causes of conflict, and the ways to transform conflict    Discussed the use of substances and its impact on their relationships                                      Service Modality:  Video Visit     Telemedicine Visit: The patient's condition can be safely assessed and treated via synchronous audio and visual telemedicine encounter.      Reason for Telemedicine Visit: Services only offered telehealth    Originating Site (Patient Location): Patient's home    Distant Site (Provider Location): Provider Remote Setting- Home Office    Consent:  The patient/guardian has verbally consented to: the potential risks and benefits of telemedicine (video visit) versus in person care; bill my insurance  or make self-payment for services provided; and responsibility for payment of non-covered services.     Patient would like the video invitation sent by:  My Chart    Mode of Communication:  Video Conference via Medical Zoom    As the provider I attest to compliance with applicable laws and regulations related to telemedicine.            Patient Participation / Response:  Fully participated with the group by sharing personal reflections / insights and openly received / provided feedback with other participants.    Demonstrated understanding of topics discussed through group discussion and participation, Demonstrated understanding of relationship skills and communication skills and Identified / Expressed personal readiness to incorporate effective communication skills    Treatment Plan:  Patient has a current master individualized treatment plan.  See Epic treatment plan for more information.    Jessie Hdz, [facility]

## 2021-12-29 NOTE — PROGRESS NOTES
Acknowledgement of Current Treatment Plan       I have reviewed my treatment plan with my therapist MODESTA Mullins on 12/29/2021 at 1:34 PM   I agree with the plan as it is written in the electronic health record. (A-2 Track)      Name:      Signature:  Marissa Youssef       Unable to sign due to COVID-19.   Jaxon Basilio MD  Psychiatrist/Medical Director      MIMI Phillips, AVINASH  Psychotherapist MIMI Phillips, MIMI Lo LGSW  Psychotherapist   MODESTA Mullins on 12/29/2021 at 1:34 PM

## 2021-12-30 ENCOUNTER — HOSPITAL ENCOUNTER (OUTPATIENT)
Dept: BEHAVIORAL HEALTH | Facility: CLINIC | Age: 61
End: 2021-12-30
Attending: PSYCHIATRY & NEUROLOGY
Payer: MEDICARE

## 2021-12-30 PROCEDURE — 90853 GROUP PSYCHOTHERAPY: CPT | Mod: 95

## 2021-12-30 PROCEDURE — 90853 GROUP PSYCHOTHERAPY: CPT | Mod: GT | Performed by: COUNSELOR

## 2021-12-30 NOTE — GROUP NOTE
Psychoeducation Group Note    PATIENT'S NAME: Marissa Youssef  MRN:   [id number]  :   1960  ACCT. NUMBER: [id number]  DATE OF SERVICE: 21  START TIME: 11:00 AM  END TIME: 11:50 AM  FACILITATOR: Jessie Hdz LPCC  TOPIC: ANTONIA RN Group: Health Maintenance  [facility] 55+ Program  TRACK: A2    NUMBER OF PARTICIPANTS: 7    Summary of Group / Topics Discussed:  Health Maintenance: Weekend planning: Patients were given time to complete a weekend plan of what they will do to promote wellness and sobriety over the weekend when they do not have the structure of group. Patients were encouraged to review progress on their treatment goals and were challenged to identify ways to work toward meeting them. Patients identified and discussed foreseeable barriers to success over the weekend and then developed a plan to overcome them. Patients reviewed their distress coping skills and social support network and discussed this with the group.       Patient Session Goals / Objectives:    ?    Identified activities to engage in that promote balance in wellness  ?    Distinguished possible barriers to success over the weekend and created a plan to overcome them  ?    Listed distress coping skills and identified social support network to utilize if in crisis during the weekend                                        Service Modality:  Video Visit     Telemedicine Visit: The patient's condition can be safely assessed and treated via synchronous audio and visual telemedicine encounter.      Reason for Telemedicine Visit: Services only offered telehealth    Originating Site (Patient Location): Patient's home    Distant Site (Provider Location): Provider Remote Setting- Home Office    Consent:  The patient/guardian has verbally consented to: the potential risks and benefits of telemedicine (video visit) versus in person care; bill my insurance or make self-payment for services provided; and responsibility for payment of  non-covered services.     Patient would like the video invitation sent by:  My Chart    Mode of Communication:  Video Conference via Medical Zoom    As the provider I attest to compliance with applicable laws and regulations related to telemedicine.            Patient Participation / Response:  Fully participated with the group by sharing personal reflections / insights and openly received / provided feedback with other participants.    Demonstrated understanding of topics discussed through group discussion and participation and Identified / Expressed personal readiness to practice skills    Treatment Plan:  Patient has a current master individualized treatment plan.  See Epic treatment plan for more information.    Jessie Hdz, [facility]

## 2021-12-30 NOTE — GROUP NOTE
Psychoeducation Group Note    PATIENT'S NAME: Marissa Youssef  MRN:   [id number]  :   1960  ACCT. NUMBER: [id number]  DATE OF SERVICE: 21  START TIME: 10:00 AM  END TIME: 10:50 AM  FACILITATOR: Ruth Borges LGSW  TOPIC: ANTONIA RN Group: Mental Health Maintenance  [facility] 55+ Program  TRACK: A2    NUMBER OF PARTICIPANTS: 8    Summary of Group / Topics Discussed:  Mental Health Maintenance:  Trust: In this group, the concept of vulnerability was explored through the viewing, discussion, and self-reflection of the Erica Hernandez [employer] Session titled  The Anatomy of Trust.      Patient Session Goals / Objectives:  ? Defined and described definition of trust through the acronym BRAVING  ? Identified 2 or more ways of practicing authenticity                                       Service Modality:  Video Visit     Telemedicine Visit: The patient's condition can be safely assessed and treated via synchronous audio and visual telemedicine encounter.      Reason for Telemedicine Visit: Services only offered telehealth    Originating Site (Patient Location): Patient's home    Distant Site (Provider Location): Provider Remote Setting- Home Office    Consent:  The patient/guardian has verbally consented to: the potential risks and benefits of telemedicine (video visit) versus in person care; bill my insurance or make self-payment for services provided; and responsibility for payment of non-covered services.     Patient would like the video invitation sent by:  My Chart    Mode of Communication:  Video Conference via Medical Zoom    As the provider I attest to compliance with applicable laws and regulations related to telemedicine.              Patient Participation / Response:  Fully participated with the group by sharing personal reflections / insights and openly received / provided feedback with other participants.    Demonstrated understanding of topics discussed through group discussion and  participation and Verbalized understanding of mental health maintenance topic    Treatment Plan:  Patient has a current master individualized treatment plan.  See Epic treatment plan for more information.    Ruth Borges LGSW

## 2021-12-30 NOTE — GROUP NOTE
"Process Group Note    PATIENT'S NAME: Marissa Youssef  MRN:   [id number]  :   1960  ACCT. NUMBER: [id number]  DATE OF SERVICE: 21  START TIME:  9:00 AM  END TIME:  9:50 AM  FACILITATOR: Ruth Borges LGSW  TOPIC:  Process Group    Diagnoses:  F33.2 Major depressive disorder, severe, recurrent, without psychosis  F41.1 Generalized anxiety disorder  F43.12 Posttraumatic Stress Disorder  F60.3 Borderline personality disorder.       [facility] 55+ Program  TRACK: A2    NUMBER OF PARTICIPANTS: 8                                      Service Modality:  Video Visit     Telemedicine Visit: The patient's condition can be safely assessed and treated via synchronous audio and visual telemedicine encounter.      Reason for Telemedicine Visit: Services only offered telehealth    Originating Site (Patient Location): Patient's home    Distant Site (Provider Location): Provider Remote Setting- Home Office    Consent:  The patient/guardian has verbally consented to: the potential risks and benefits of telemedicine (video visit) versus in person care; bill my insurance or make self-payment for services provided; and responsibility for payment of non-covered services.     Patient would like the video invitation sent by:  My Chart    Mode of Communication:  Video Conference via Medical Zoom    As the provider I attest to compliance with applicable laws and regulations related to telemedicine.                Data:    Session content: At the start of this group, patients were invited to check in by identifying themselves, describing their current emotional status, and identifying issues to address in this group.   Area(s) of treatment focus addressed in this session included Symptom Management, Personal Safety and Community Resources/Discharge Planning.  Client reported a stable mood and consistent sleep.  \"I still wake up in the middle of the night but I am able to fall back asleep\".  She reported her dogs " occasionally waking her up by laying next to her in her bed.  Client reported positively on her meeting with her boss yesterday and plans on returning to work the second week of January.  Client agreed to work 4 hour shifts with a start time of 2pm. Client is proud of herself for not overscheduling herself to avoid burnout.  Writer reflected her use of setting limits and assertive communication.  Client reported not getting a response from her PCP or Physical therapist on any lifting restrictions.  She expects to hear back from them before her start date.  Client did not report any suicidal ideation, plan or intent.    Writer met with client in between groups to complete her treatment plan review.    Therapeutic Interventions/Treatment Strategies:  Psychotherapist offered support, feedback and validation and reinforced use of skills. Treatment modalities used include Cognitive Behavioral Therapy. Interventions include Relationship Skills: Assisted patients in implementing more effective communication skills in their relationships and Encouraged development and maintenance  of healthy boundaries.    Assessment:    Patient response:   Patient responded to session by accepting feedback, giving feedback and listening    Possible barriers to participation / learning include: and no barriers identified    Health Issues:   None reported       Substance Use Review:   Substance Use: No active concerns identified.    Mental Status/Behavioral Observations  Appearance:   Appropriate   Eye Contact:   Good   Psychomotor Behavior: Normal   Attitude:   Cooperative  Interested Friendly  Orientation:   All  Speech   Rate / Production: Normal    Volume:  Normal   Mood:    Euthymic  Affect:    Appropriate   Thought Content:   Clear and Safety denies any current safety concerns including suicidal ideation, self-harm, and homicidal ideation  Thought Form:  Coherent  Logical     Insight:    Good     Plan:     Safety Plan: No current  safety concerns identified.  Recommended that patient call 911 or go to the local ED should there be a change in any of these risk factors.     Barriers to treatment: None identified    Patient Contracts (see media tab):  None    Substance Use: Not addressed in session     Continue or Discharge: Patient will continue in 55+ Program (55+) as planned. Patient is likely to benefit from learning and using skills as they work toward the goals identified in their treatment plan.      MODESTA Mullins  December 30, 2021

## 2022-01-03 ENCOUNTER — HOSPITAL ENCOUNTER (OUTPATIENT)
Dept: BEHAVIORAL HEALTH | Facility: CLINIC | Age: 62
End: 2022-01-03
Attending: PSYCHIATRY & NEUROLOGY
Payer: MEDICARE

## 2022-01-03 PROCEDURE — 90853 GROUP PSYCHOTHERAPY: CPT | Mod: GT

## 2022-01-03 PROCEDURE — 90853 GROUP PSYCHOTHERAPY: CPT | Mod: 95

## 2022-01-03 NOTE — GROUP NOTE
Psychotherapy Group Note    PATIENT'S NAME: Marissa Youssef  MRN:   [id number]  :   1960  ACCT. NUMBER: [id number]  DATE OF SERVICE: 22  START TIME: 11:00 AM  END TIME: 11:50 AM  FACILITATOR: Nettie Brian LICSW  TOPIC: MH EBP Group: Coping Skills  [facility] 55+ Program  TRACK: A2                              Service Modality:  Video Visit     Telemedicine Visit: The patient's condition can be safely assessed and treated via synchronous audio and visual telemedicine encounter.      Reason for Telemedicine Visit: Services only offered telehealth    Originating Site (Patient Location): Patient's home    Distant Site (Provider Location): [facility] MENTAL HE[facility] & [facility]    Consent:  The patient/guardian has verbally consented to: the potential risks and benefits of telemedicine (video visit) versus in person care; bill my insurance or make self-payment for services provided; and responsibility for payment of non-covered services.     Patient would like the video invitation sent by:  My Chart    Mode of Communication:  Video Conference via Medical Zoom    As the provider I attest to compliance with applicable laws and regulations related to telemedicine.         NUMBER OF PARTICIPANTS: 7    Summary of Group / Topics Discussed:  Coping Skills: Additional Coping Skills:  Patients discussed and practiced TIP skills.  Reviewed the benefits of applying the aforementioned coping strategies.  Patients explored how these strategies might be applied to daily stressors or distressing situations.    Patient Session Goals / Objectives:    Understand the purpose and benefits of applying TIP coping strategies    Address barriers to utilizing coping skills when in distress.        Patient Participation / Response:  Fully participated with the group by sharing personal reflections / insights and openly received / provided feedback with other participants.    Demonstrated understanding of  topics discussed through group discussion and participation and Expressed understanding of the relevance / importance of coping skills at distressing times in life    Treatment Plan:  Patient has a current master individualized treatment plan.  See Epic treatment plan for more information.    ABHIJIT DoranSW

## 2022-01-03 NOTE — GROUP NOTE
Process Group Note    PATIENT'S NAME: Marissa Youssef  MRN:   [id number]  :   1960  ACCT. NUMBER: [id number]  DATE OF SERVICE: 22  START TIME:  9:00 AM  END TIME:  9:50 AM  FACILITATOR: Ruth Borges LGSW  TOPIC:  Process Group    Diagnoses:  F33.2 Major depressive disorder, severe, recurrent, without psychosis  F41.1 Generalized anxiety disorder  F43.12 Posttraumatic Stress Disorder  F60.3 Borderline personality disorder.       [facility] 55+ Program  TRACK: A2    NUMBER OF PARTICIPANTS: 8                                      Service Modality:  Video Visit     Telemedicine Visit: The patient's condition can be safely assessed and treated via synchronous audio and visual telemedicine encounter.      Reason for Telemedicine Visit: Services only offered telehealth    Originating Site (Patient Location): Patient's home    Distant Site (Provider Location): Provider Remote Setting- Home Office    Consent:  The patient/guardian has verbally consented to: the potential risks and benefits of telemedicine (video visit) versus in person care; bill my insurance or make self-payment for services provided; and responsibility for payment of non-covered services.     Patient would like the video invitation sent by:  My Chart    Mode of Communication:  Video Conference via Medical Zoom    As the provider I attest to compliance with applicable laws and regulations related to telemedicine.                Data:    Session content: At the start of this group, patients were invited to check in by identifying themselves, describing their current emotional status, and identifying issues to address in this group.   Area(s) of treatment focus addressed in this session included Symptom Management, Personal Safety and Community Resources/Discharge Planning.  Client reported returning to work on Friday (2-8pm) and  (5-10pm) this weekend.  She reported effectively working through two mistakes that her new coworkers  "made by calling her manager for support.  Client reported having follow up conversations where she apologized to her coworkers for coming off \"overbearing\" or \"snapping\".  Client reported one of her coworkers not remembering times when she snapped.  Writer suggested that she may have been practicing assertive communication that can often times feel \"aggressive\".  Writer reported \"minimal\" shoulder pain while working and that keeping her shoulder \"in motion\" is helpful.  She reports increased range of motion after practicing physical therapy exercises.  Client reported stable mood.  Client denied suicidal ideation, plan or intent.      Therapeutic Interventions/Treatment Strategies:  Psychotherapist offered support, feedback and validation and reinforced use of skills. Treatment modalities used include Cognitive Behavioral Therapy. Interventions include Relationship Skills: Assisted patients in implementing more effective communication skills in their relationships and Discussed relationships and ways to reduce conflict .    Assessment:    Patient response:   Patient responded to session by accepting feedback, giving feedback and listening    Possible barriers to participation / learning include: and no barriers identified    Health Issues:   None reported       Substance Use Review:   Substance Use: No active concerns identified.    Mental Status/Behavioral Observations  Appearance:   Appropriate   Eye Contact:   Good   Psychomotor Behavior: Normal   Attitude:   Cooperative  Interested Friendly Pleasant  Orientation:   All  Speech   Rate / Production: Normal    Volume:  Normal   Mood:    Euthymic  Affect:    Appropriate   Thought Content:   Clear and Safety denies any current safety concerns including suicidal ideation, self-harm, and homicidal ideation  Thought Form:  Coherent  Logical     Insight:    Good     Plan:     Safety Plan: No current safety concerns identified.  Recommended that patient call 911 or go to " the local ED should there be a change in any of these risk factors.     Barriers to treatment: None identified    Patient Contracts (see media tab):  None    Substance Use: Not addressed in session     Continue or Discharge: Patient will continue in 55+ Program (55+) as planned. Patient is likely to benefit from learning and using skills as they work toward the goals identified in their treatment plan.      Ruth Broges, MODESTA  January 3, 2022

## 2022-01-03 NOTE — GROUP NOTE
Psychotherapy Group Note    PATIENT'S NAME: Marissa Youssef  MRN:   [id number]  :   1960  ACCT. NUMBER: [id number]  DATE OF SERVICE: 22  START TIME: 10:00 AM  END TIME: 10:50 AM  FACILITATOR: Ruth Borges LGSW  TOPIC: MH EBP Group: Coping Skills  [facility] 55+ Program  TRACK: A2    NUMBER OF PARTICIPANTS: 7    Summary of Group / Topics Discussed:  Coping Skills: Radical Acceptance: Patients learned radical acceptance as a way to tolerate heightened stress in the moment.  Patients identified situations that necessitate radical acceptance.  They focused on applying acceptance of the moment to tolerate difficult emotions and events. Patients discussed how to distinguish when this can be useful in their lives and when other skills are more relevant or helpful.    Patient Session Goals / Objectives:    Discussed Distress Tolerance Handout 13 - Willingness    Understand that some amount of pain exists for each of us in our lives    Process the difficulty of acceptance in our lives and benefits of radical acceptance to mood and functioning.    Demonstrate understanding of when to use the radical acceptance to tolerate distress by providing examples of situations where this could be applied.    Identify barriers to applying radical acceptance to difficult situations and explore strategies to overcome them        Patient Participation / Response:  Fully participated with the group by sharing personal reflections / insights and openly received / provided feedback with other participants.    Demonstrated understanding of topics discussed through group discussion and participation and Demonstrated knowledge of when to consider using a variety of coping skills in daily life    Treatment Plan:  Patient has a current master individualized treatment plan.  See Epic treatment plan for more information.    MODESTA Mullins

## 2022-01-04 ENCOUNTER — HOSPITAL ENCOUNTER (OUTPATIENT)
Dept: BEHAVIORAL HEALTH | Facility: CLINIC | Age: 62
End: 2022-01-04
Attending: PSYCHIATRY & NEUROLOGY
Payer: MEDICARE

## 2022-01-04 DIAGNOSIS — F43.10 PTSD (POST-TRAUMATIC STRESS DISORDER): ICD-10-CM

## 2022-01-04 DIAGNOSIS — F60.3 BORDERLINE PERSONALITY DISORDER (H): ICD-10-CM

## 2022-01-04 DIAGNOSIS — F33.1 MODERATE EPISODE OF RECURRENT MAJOR DEPRESSIVE DISORDER (H): Primary | ICD-10-CM

## 2022-01-04 DIAGNOSIS — F41.1 GENERALIZED ANXIETY DISORDER: ICD-10-CM

## 2022-01-04 PROBLEM — F33.9 MAJOR DEPRESSIVE DISORDER, RECURRENT, UNSPECIFIED (H): Status: ACTIVE | Noted: 2021-11-03

## 2022-01-04 PROCEDURE — 99214 OFFICE O/P EST MOD 30 MIN: CPT | Mod: 95 | Performed by: PSYCHIATRY & NEUROLOGY

## 2022-01-04 NOTE — PROGRESS NOTES
"[facility]   Adult Mental Health Outpatient Programs  Provider Interval History Note    Program: 55+ Intensive Outpatient    Track: A2    PATIENT'S NAME: Marissa Youssef  MRN:   [id number]  :   1960  ACCT. NUMBER: [id number]  DATE OF SERVICE: 22  CALL/VIDEO START TIME: 1102  CALL/VIDEO END TIME: 1109      Interval History:  \"I'm good.\" Marissa presents for today for follow-up and ongoing program supervision. Endorses she had forgotten the appointment, is in [employer]. Visit was therefore abbreviated and more focused on yes/no questions.    Interview conducted by telephone per patient request.    Patient did endorse, \"I'd like to cut back on some of my medications.\" Feels she is on many, suggested propranolol or buspirone in particular. Was at the neurologist yesterday, believes her blood pressure was 120s/70s.    Confirmed she is taking 2 tabs of buspirone thrice daily.    Symptoms:    I don't have anxiety all the time,\" more \"situational,\" finds that hydroxyzine is very effective in those cases    Substance use:    None endorsed    Reactions/thoughts about program:    Still feels groups are helpful      Risk Assessment:    Suicidal ideation: denies current or recent suicidal ideation or behavior    Thoughts of non-suicidal self-injury: denied    Recent self-injurious behavior: denied    Homicidal ideation: denied    Other safety concerns: denied      Medications:  Medications discussed briefly with patient today. Patient is taking medications as prescribed.     Adverse effects: No significant side effects endorsed      Laboratory Results:  Most recent labs reviewed. Pertinent updates/findings: None.     Metrics:  PHQ-9 scores:   PHQ-9 SCORE 10/21/2021 10/26/2021 10/26/2021 2021   PHQ-9 Total Score MyChart - - 10 (Moderate depression) 14 (Moderate depression)   PHQ-9 Total Score 4 10 10 14       RACQUEL-7 scores:   RACQUEL-7 SCORE 10/26/2021 10/26/2021 2021   Total " "Score - 17 (severe anxiety) 17 (severe anxiety)   Total Score 17 17 17       CSSR-S: No flowsheet data found.      Mental Status Examination (limited due to phone visit format):  Vital Signs: There were no vitals taken for this virtual visit.  Appearance: Unable to assess   Attitude: cooperative   Eye Contact: Unable to assess  Muscle Strength and Tone: Unable to assess  Psychomotor Behavior:  Unable to assess  Gait and Station: Unable to assess  Speech: clear, coherent, normal prosody, regular rate, regular rhythm and fluent  Associations: No loosening of associations  Thought Process: coherent and goal directed  Thought Content: no evidence of suicidal ideation or homicidal ideation, no evidence of psychotic thought, no auditory hallucinations present and no visual hallucinations present  Mood: \"better\"  Affect: difficult to assess over the phone, grossly mood congruent  Insight: good  Judgment: intact, adequate for safety  Impulse Control: intact  Oriented to: time, place, person and situation  Attention Span and Concentration: normal  Language: Intact  Recent and Remote Memory: intact to interview. Not formally assessed. No evidence of amnesia.  Fund of Knowledge: appropriate        Diagnosis/es:  296.32 (F33.1) Major Depressive Disorder, Recurrent Episode, Moderate With anxious distress  300.02 (F41.1) Generalized Anxiety Disorder  309.81 (F43.10) Posttraumatic Stress Disorder  301.83 (F60.3) Borderline Personality Disorder      Assessment/Plan:  Marissa presents today for follow up. Endorses improvement overall, particularly with anxiety, which is now described as only episodic. Is on many medications, so exploration of taper is indicated. Given normal blood pressure, would change buspirone first. Advised patient to wean one tab at a time to eventually arrive at 10 mg by mouth three times daily. Advised at least 2-3 days between changes, longer if she notes any increase in anxiety.    Follow up in one " month.    (F33.1) Moderate episode of recurrent major depressive disorder (H)  (primary encounter diagnosis)  Comment:     Overall improved    Continues to benefit from program  Plan:     Taper buspirone per above    Continue valproate 500 mg by mouth daily at bedtime    Continue duloxetine 90 mg by mouth once daily    Continue quetiapine 100 mg by mouth twice daily and 400 mg by mouth daily at bedtime     (F43.10) PTSD (post-traumatic stress disorder)  Comment:     Stable    Continues to benefit from program  Plan:     Continue prazosin 2 mg by mouth daily at bedtime    Continue duloxetine     Continue quetiapine     (F60.3) Borderline personality disorder (H)  Comment:     Stable    Overall improved; patient endorses making use of skills  Plan:     Continue therapy    Continue current medication     (F41.1) Generalized anxiety disorder  Comment:     Improved overall    Now episodic/situational anxiety only per patient    Groups have been helpful  Plan:     Continue program    Continue medications per above; taper buspirone per above    Continue all other medications as reviewed per electronic medical record today    Continue therapy as planned:    Enrolled in 55+ Intensive Outpatient     Continues to benefit from services    Continues to meet criteria for this level of care    Patient would be at reasonable risk of requiring a higher level of care in the absence of current services.    I feel this patient does not meet criteria for an involuntary hold and is appropriate for treatment at an outpatient level of care.    Continue with individual therapist as appropriate    Safety plan reviewed:    To the Emergency Department as needed or call after hours crisis line at [phone] or [phone]. [place] Crisis Text Line: Text [place] to 651863 or Suicide LifeLine Chat: suicidepreventionlifeline.org/chat    Follow-up:     schedule an appointment with me or another program provider in approximately 4 weeks or sooner if  needed.  Call [facility] at [phone] to schedule.    Follow up with outpatient provider as planned or sooner if needed for acute medical concerns.    Questions or concerns:    Call main program line with questions or concerns [phone].    [employer] may be used to communicate with your provider, but this is not intended to be used for emergencies.        Treatment Objective(s) Addressed in This Session:  The purpose of today's virtual visit is for this writer to provide oversight of patient's care while receiving program services. Specific treatment goals addressed included personal safety, symptoms stabilization and management, wellness and mental health, and community resources/discharge planning.     This author or another program provider will follow up with the patient as noted above.     Patient agrees with the current plan of care.    Jaxon Basilio MD  1/04/22      Visit Details:  Type of service:  Telephone Visit    Start/End Time: see above    Originating Location (pt. Location): [facility]    Distant Location (provider location): Provider Remote Setting- Home Office    Platform used for Video Visit: Unable to complete video visit; completed by telephone at patient request    Physician has received verbal consent for a Video Visit from the patient? n/a    20 minutes spent on the date of the encounter doing chart review, patient visit and documentation     This document completed in part using Dragon Medical One dictation software.  Please excuse any inadvertent word or phrase substitutions.

## 2022-01-05 ENCOUNTER — HOSPITAL ENCOUNTER (OUTPATIENT)
Dept: BEHAVIORAL HEALTH | Facility: CLINIC | Age: 62
End: 2022-01-05
Attending: PSYCHIATRY & NEUROLOGY
Payer: MEDICARE

## 2022-01-05 DIAGNOSIS — F33.1 MODERATE EPISODE OF RECURRENT MAJOR DEPRESSIVE DISORDER (H): ICD-10-CM

## 2022-01-05 PROCEDURE — 90853 GROUP PSYCHOTHERAPY: CPT | Mod: GT

## 2022-01-05 PROCEDURE — 90853 GROUP PSYCHOTHERAPY: CPT | Mod: 95

## 2022-01-05 PROCEDURE — 90853 GROUP PSYCHOTHERAPY: CPT | Mod: 95 | Performed by: SOCIAL WORKER

## 2022-01-05 NOTE — GROUP NOTE
Psychotherapy Group Note    PATIENT'S NAME: Marissa Youssef  MRN:   [id number]  :   1960  ACCT. NUMBER: [id number]  DATE OF SERVICE: 22  START TIME: 10:00 AM  END TIME: 10:50 AM  FACILITATOR: Ruth Borges LGSW  TOPIC:  EBP Group: Emotions Management  [facility] 55+ Program  TRACK: A2    NUMBER OF PARTICIPANTS: 8    Summary of Group / Topics Discussed:  Emotions Management: Guilt and Shame: Patients explored and shared personal experiences associated with feelings of guilt and shame.  Group explored how these feelings develop, what they mean to each individual, and how to increase acceptance and usefulness of these feelings.  Group members assisted one another to contextualize these concepts and promote healing.     Patient Session Goals / Objectives:    Discuss and review definitions and personal views/experiences with guilt and shame    Understand the differences between guilt and shame    Explore how feelings of guilt and shame impact functioning    Understand and practice strategies to manage difficult emotions and move towards healing    Understand and normalize difficult emotions through group discussion    Understand the utility of guilt and shame    Target  unwanted  emotions for change                                        Service Modality:  Video Visit     Telemedicine Visit: The patient's condition can be safely assessed and treated via synchronous audio and visual telemedicine encounter.      Reason for Telemedicine Visit: Services only offered telehealth    Originating Site (Patient Location): Patient's home    Distant Site (Provider Location): Provider Remote Setting- Home Office    Consent:  The patient/guardian has verbally consented to: the potential risks and benefits of telemedicine (video visit) versus in person care; bill my insurance or make self-payment for services provided; and responsibility for payment of non-covered services.     Patient would like the video  invitation sent by:  My Chart    Mode of Communication:  Video Conference via Medical Zoom    As the provider I attest to compliance with applicable laws and regulations related to telemedicine.            Patient Participation / Response:  Fully participated with the group by sharing personal reflections / insights and openly received / provided feedback with other participants.    Demonstrated understanding of topics discussed through group discussion and participation and Self-aware of experiences with difficult emotions, and strategies to employ to manage them    Treatment Plan:  Patient has a current master individualized treatment plan.  See Epic treatment plan for more information.    Ruth Borges LGSW

## 2022-01-05 NOTE — GROUP NOTE
Process Group Note    PATIENT'S NAME: Marissa Youssef  MRN:   [id number]  :   1960  ACCT. NUMBER: [id number]  DATE OF SERVICE: 22  START TIME:  9:00 AM  END TIME:  9:50 AM  FACILITATOR: Ruth Borges LGSW  TOPIC:  Process Group    Diagnoses:  F33.2 Major depressive disorder, severe, recurrent, without psychosis  F41.1 Generalized anxiety disorder  F43.12 Posttraumatic Stress Disorder  F60.3 Borderline personality disorder.       [facility] 55+ Program  TRACK: A2    NUMBER OF PARTICIPANTS: 8                                      Service Modality:  Video Visit     Telemedicine Visit: The patient's condition can be safely assessed and treated via synchronous audio and visual telemedicine encounter.      Reason for Telemedicine Visit: Services only offered telehealth    Originating Site (Patient Location): Patient's home    Distant Site (Provider Location): Provider Remote Setting- Home Office    Consent:  The patient/guardian has verbally consented to: the potential risks and benefits of telemedicine (video visit) versus in person care; bill my insurance or make self-payment for services provided; and responsibility for payment of non-covered services.     Patient would like the video invitation sent by:  My Chart    Mode of Communication:  Video Conference via Medical Zoom    As the provider I attest to compliance with applicable laws and regulations related to telemedicine.                Data:    Session content: At the start of this group, patients were invited to check in by identifying themselves, describing their current emotional status, and identifying issues to address in this group.   Area(s) of treatment focus addressed in this session included Symptom Management, Personal Safety and Community Resources/Discharge Planning.  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  She reported having a phone meeting with Dr. Basilio yesterday  "in an \"aisle at [facility]\" after initially forgetting about the appointment.  She reflected on chronic pain she experiences with fibromyalgia, sciatica, low back pain and shoulder pain and how it negatively affects her mood.  Writer validated client's experience.  Client reports using a heating pad to relieve the pain.  Client did not report any suicidal ideation, plan or intent.    Therapeutic Interventions/Treatment Strategies:  Psychotherapist offered support, feedback and validation and reinforced use of skills. Treatment modalities used include Cognitive Behavioral Therapy. Interventions include Coping Skills: Discussed use of self-soothe skills to decrease distress in the body and Symptoms Management: Promoted understanding of their diagnoses and how it impacts their functioning.    Assessment:    Patient response:   Patient responded to session by accepting feedback, giving feedback and listening    Possible barriers to participation / learning include: and no barriers identified    Health Issues:   Yes: Pain, Associated Psychological Distress  Chronic disease management, No Psychological Distress       Substance Use Review:   Substance Use: No active concerns identified.    Mental Status/Behavioral Observations  Appearance:   Appropriate   Eye Contact:   Good   Psychomotor Behavior: Normal   Attitude:   Cooperative  Interested Friendly Pleasant  Orientation:   All  Speech   Rate / Production: Normal    Volume:  Normal   Mood:    Euthymic  Affect:    Appropriate   Thought Content:   Clear and Safety denies any current safety concerns including suicidal ideation, self-harm, and homicidal ideation  Thought Form:  Coherent  Logical     Insight:    Good     Plan:     Safety Plan: No current safety concerns identified.  Recommended that patient call 911 or go to the local ED should there be a change in any of these risk factors.     Barriers to treatment: None identified    Patient Contracts (see media tab):  " None    Substance Use: Not addressed in session     Continue or Discharge: Patient will continue in 55+ Program (55+) as planned. Patient is likely to benefit from learning and using skills as they work toward the goals identified in their treatment plan.      MODESTA Mullins  January 5, 2022

## 2022-01-05 NOTE — GROUP NOTE
Service Modality:  Video Visit     Telemedicine Visit: The patient's condition can be safely assessed and treated via synchronous audio and visual telemedicine encounter.       Reason for Telemedicine Visit: Services only offered telehealth and due to COVID-19.     Originating Site (Patient Location): Patient's home     Distant Site (Provider Location): Provider Remote Setting- Home Office     Consent:  The patient/guardian has verbally consented to: the potential risks and benefits of telemedicine (video visit) versus in person care; bill my insurance or make self-payment for services provided; and responsibility for payment of non-covered services.      Patient would like the video invitation sent by:  My Chart     Mode of Communication:  Video Conference via Medical Zoom     As the provider I attest to compliance with applicable laws and regulations related to telemedicine.    Psychotherapy Group Note    PATIENT'S NAME: Marissa Youssef  MRN:   [id number]  :   1960  ACCT. NUMBER: [id number]  DATE OF SERVICE: 22  START TIME: 11:00 AM  END TIME: 11:50 AM  FACILITATOR: Suzanne Robles LICSW  TOPIC:  EBP Group: Emotions Management  [facility] 55+ Program  TRACK: A2    NUMBER OF PARTICIPANTS: 7    Summary of Group / Topics Discussed:  Emotions Management: Model of Emotions: Patients were introduced to the cyclical model of emotions.  Explored emotions are shaped by different events and one s interpretation of events.  Group discussed how emotions begin with an event, followed by one s interpretation, followed by associated feelings.  Discussion included a review of personal urges and actions that can/do follow an emotional experience in the patient s life, and the end results.    Patient Session Goals / Objectives:    Demonstrate understanding of types various emotions.    Identify and discuss specific emotions and when they occur; understand triggers.    Identify individual emotions and  physical sensations that accompany them.    Discuss urges and actions, and how to influence the intensity of emotional reactions and disrupt the cycle.      Discuss barriers to emotional regulation.    Choose 1-2 strategies to assist with emotional response to potentially distressing situations.      Patient Participation / Response:  Fully participated with the group by sharing personal reflections / insights and openly received / provided feedback with other participants.    Demonstrated understanding of topics discussed through group discussion and participation and Self-aware of experiences with difficult emotions, and strategies to employ to manage them    Treatment Plan:  Patient has a current master individualized treatment plan.  See Epic treatment plan for more information.    Suzanne Robles, LICSW

## 2022-01-06 ENCOUNTER — HOSPITAL ENCOUNTER (OUTPATIENT)
Dept: BEHAVIORAL HEALTH | Facility: CLINIC | Age: 62
End: 2022-01-06
Attending: PSYCHIATRY & NEUROLOGY
Payer: MEDICARE

## 2022-01-06 DIAGNOSIS — F33.1 MODERATE EPISODE OF RECURRENT MAJOR DEPRESSIVE DISORDER (H): ICD-10-CM

## 2022-01-06 PROCEDURE — 90853 GROUP PSYCHOTHERAPY: CPT | Mod: 95 | Performed by: SOCIAL WORKER

## 2022-01-06 PROCEDURE — 90853 GROUP PSYCHOTHERAPY: CPT | Mod: GT

## 2022-01-06 NOTE — GROUP NOTE
Psychotherapy Group Note    PATIENT'S NAME: Marissa Youssef  MRN:   [id number]  :   1960  ACCT. NUMBER: [id number]  DATE OF SERVICE: 22  START TIME: 10:00 AM  END TIME: 10:50 AM  FACILITATOR: Ruth Borges LGSW  TOPIC:  EBP Group: Emotions Management  [facility] 55+ Program  TRACK: A2    NUMBER OF PARTICIPANTS: 7    Summary of Group / Topics Discussed:  Emotions Management: Guilt and Shame: Patients explored and shared personal experiences associated with feelings of guilt and shame.  Group explored how these feelings develop, what they mean to each individual, and how to increase acceptance and usefulness of these feelings.  Group members assisted one another to contextualize these concepts and promote healing.     Patient Session Goals / Objectives:    Discuss and review definitions and personal views/experiences with guilt and shame    Understand the differences between guilt and shame    Explore how feelings of guilt and shame impact functioning    Understand and practice strategies to manage difficult emotions and move towards healing    Understand and normalize difficult emotions through group discussion    Understand the utility of guilt and shame    Target  unwanted  emotions for change                                      Service Modality:  Video Visit     Telemedicine Visit: The patient's condition can be safely assessed and treated via synchronous audio and visual telemedicine encounter.      Reason for Telemedicine Visit: Services only offered telehealth    Originating Site (Patient Location): Patient's home    Distant Site (Provider Location): Provider Remote Setting- Home Office    Consent:  The patient/guardian has verbally consented to: the potential risks and benefits of telemedicine (video visit) versus in person care; bill my insurance or make self-payment for services provided; and responsibility for payment of non-covered services.     Patient would like the video invitation  sent by:  My Chart    Mode of Communication:  Video Conference via Medical Zoom    As the provider I attest to compliance with applicable laws and regulations related to telemedicine.            Patient Participation / Response:  Fully participated with the group by sharing personal reflections / insights and openly received / provided feedback with other participants.    Demonstrated understanding of topics discussed through group discussion and participation, Self-aware of experiences with difficult emotions, and strategies to employ to manage them, Identified barriers to applying emotions management strategies and Identified strategies to overcome barriers to use of emotions management skills    Treatment Plan:  Patient has a current master individualized treatment plan.  See Epic treatment plan for more information.    Ruth Borges LGSW

## 2022-01-06 NOTE — GROUP NOTE
Service Modality:  Video Visit     Telemedicine Visit: The patient's condition can be safely assessed and treated via synchronous audio and visual telemedicine encounter.       Reason for Telemedicine Visit: Services only offered telehealth and due to COVID-19.     Originating Site (Patient Location): Patient's home     Distant Site (Provider Location): Provider Remote Setting- Home Office     Consent:  The patient/guardian has verbally consented to: the potential risks and benefits of telemedicine (video visit) versus in person care; bill my insurance or make self-payment for services provided; and responsibility for payment of non-covered services.      Patient would like the video invitation sent by:  My Chart     Mode of Communication:  Video Conference via Medical Zoom     As the provider I attest to compliance with applicable laws and regulations related to telemedicine.    Psychotherapy Group Note    PATIENT'S NAME: Marissa Youssef  MRN:   [id number]  :   1960  ACCT. NUMBER: [id number]  DATE OF SERVICE: 22  START TIME: 11:00 AM  END TIME: 11:55 AM  FACILITATOR: Suzanne Robles [facility]  TOPIC:  EBP Group: Coping Skills  [facility] 55+ Program  TRACK: A2    NUMBER OF PARTICIPANTS: 7    Summary of Group / Topics Discussed:  Coping Skills: Relapse Planning: Patients discussed and increased understanding of how anticipating and planning for possible increased symptoms is a proactive way to reduce the likelihood of relapse.  Patients shared individualized factors that may lead to increased symptoms and decompensation in functioning.  Each patient developed a relapse prevention plan designed to help them recognize when they may have increasing symptoms requiring them to take action and notify their key supports and care team.      Patient Session Goals / Objectives:    Identify and understand what factors may contribute to symptom relapse.    Identify actions that may be taken to manage  increased symptoms/stressors.    Create an individualized written relapse plan    Problem solve barriers to plan creation and implementation    Share relapse plan with key support people        Patient Participation / Response:  Fully participated with the group by sharing personal reflections / insights and openly received / provided feedback with other participants.    Demonstrated understanding of topics discussed through group discussion and participation    Treatment Plan:  Patient has a current master individualized treatment plan.  See Epic treatment plan for more information.    Suzanne Robles, ABHIJITSW

## 2022-01-06 NOTE — GROUP NOTE
"Process Group Note    PATIENT'S NAME: Marissa Youssef  MRN:   [id number]  :   1960  ACCT. NUMBER: [id number]  DATE OF SERVICE: 22  START TIME:  9:00 AM  END TIME:  9:50 AM  FACILITATOR: Ruth Borges LGSW  TOPIC:  Process Group    Diagnoses:  F33.2 Major depressive disorder, severe, recurrent, without psychosis  F41.1 Generalized anxiety disorder  F43.12 Posttraumatic Stress Disorder  F60.3 Borderline personality disorder.       [facility] 55+ Program  TRACK: A2    NUMBER OF PARTICIPANTS: 7                                      Service Modality:  Video Visit     Telemedicine Visit: The patient's condition can be safely assessed and treated via synchronous audio and visual telemedicine encounter.      Reason for Telemedicine Visit: Services only offered telehealth    Originating Site (Patient Location): Patient's home    Distant Site (Provider Location): Provider Remote Setting- Home Office    Consent:  The patient/guardian has verbally consented to: the potential risks and benefits of telemedicine (video visit) versus in person care; bill my insurance or make self-payment for services provided; and responsibility for payment of non-covered services.     Patient would like the video invitation sent by:  My Chart    Mode of Communication:  Video Conference via Medical Zoom    As the provider I attest to compliance with applicable laws and regulations related to telemedicine.                Data:    Session content: At the start of this group, patients were invited to check in by identifying themselves, describing their current emotional status, and identifying issues to address in this group.   Area(s) of treatment focus addressed in this session included Symptom Management, Personal Safety and Community Resources/Discharge Planning.  Client reported having an \"incident\" with her  related to the shared responsibility of laundry.  She reflected on how her \"old self\" would react out of anger.  " "\"I would have choked him to death\".  Client explained that on Tuesday night, her  washed and dried her clothes without folding them or putting them away.  As a result, her clothes wrinkled and she had to rewash them.  \"It made me livid\".  Client endorsed using MILI skills to express her concern and assert what she needed him to do in the future.  She reported \"staying calm\" by practicing willingness and acknowledging what behavior would be most effective to get her needs met.  Client did not report any suicidal ideation, plan or intent.    Therapeutic Interventions/Treatment Strategies:  Psychotherapist offered support, feedback and validation and reinforced use of skills. Treatment modalities used include Cognitive Behavioral Therapy. Interventions include Coping Skills: Practiced DBT skill Willingness and Relationship Skills: Assisted patients in implementing more effective communication skills in their relationships and Discussed relationships and ways to reduce conflict .    Assessment:    Patient response:   Patient responded to session by accepting feedback, giving feedback and listening    Possible barriers to participation / learning include: and no barriers identified    Health Issues:   Yes: Pain, No Psychological Distress  Chronic disease management, No Psychological Distress       Substance Use Review:   Substance Use: No active concerns identified.    Mental Status/Behavioral Observations  Appearance:   Appropriate   Eye Contact:   Good   Psychomotor Behavior: Normal   Attitude:   Cooperative  Interested Friendly Pleasant  Orientation:   All  Speech   Rate / Production: Normal    Volume:  Normal   Mood:    Euthymic  Affect:    Appropriate   Thought Content:   Clear and Safety denies any current safety concerns including suicidal ideation, self-harm, and homicidal ideation  Thought Form:  Coherent  Logical     Insight:    Good     Plan:     Safety Plan: No current safety concerns identified.  " Recommended that patient call 911 or go to the local ED should there be a change in any of these risk factors.     Barriers to treatment: None identified    Patient Contracts (see media tab):  None    Substance Use: Not addressed in session     Continue or Discharge: Patient will continue in 55+ Program (55+) as planned. Patient is likely to benefit from learning and using skills as they work toward the goals identified in their treatment plan.      MODESTA Mullins  January 6, 2022

## 2022-01-10 ENCOUNTER — HOSPITAL ENCOUNTER (OUTPATIENT)
Dept: BEHAVIORAL HEALTH | Facility: CLINIC | Age: 62
End: 2022-01-10
Attending: PSYCHIATRY & NEUROLOGY
Payer: MEDICARE

## 2022-01-10 DIAGNOSIS — F33.1 MODERATE EPISODE OF RECURRENT MAJOR DEPRESSIVE DISORDER (H): ICD-10-CM

## 2022-01-10 PROCEDURE — 90853 GROUP PSYCHOTHERAPY: CPT | Mod: GT | Performed by: SOCIAL WORKER

## 2022-01-10 PROCEDURE — 90853 GROUP PSYCHOTHERAPY: CPT | Mod: GT

## 2022-01-10 NOTE — GROUP NOTE
Service Modality:  Video Visit     Telemedicine Visit: The patient's condition can be safely assessed and treated via synchronous audio and visual telemedicine encounter.       Reason for Telemedicine Visit: Services only offered telehealth and due to COVID-19.     Originating Site (Patient Location): Patient's home     Distant Site (Provider Location): Provider Remote Setting- Home Office     Consent:  The patient/guardian has verbally consented to: the potential risks and benefits of telemedicine (video visit) versus in person care; bill my insurance or make self-payment for services provided; and responsibility for payment of non-covered services.      Patient would like the video invitation sent by:  My Chart     Mode of Communication:  Video Conference via Medical Zoom     As the provider I attest to compliance with applicable laws and regulations related to telemedicine.    Psychotherapy Group Note    PATIENT'S NAME: Marissa Youssef  MRN:   [id number]  :   1960  ACCT. NUMBER: [id number]  DATE OF SERVICE: 1/10/22  START TIME: 11:00 AM  END TIME: 11:50 AM  FACILITATOR: Suzanne Robles LICSW  TOPIC:  EBP Group: Symptom Awareness  [facility] 55+ Program  TRACK: A2    NUMBER OF PARTICIPANTS: 6    Summary of Group / Topics Discussed:  Symptom Awareness: Mood Disorders: Patients received a general overview of mood disorders including depressive disorders, anxiety disorders, and bipolar disorders and how it relates to their current symptoms. The purpose is to promote understanding of their diagnoses and how it impacts their functioning. Patients reviewed their current awareness of symptoms and diagnoses. Patients received information regarding diagnoses, etiology, cultural, and environmental factors as well as impact on functioning.     Patient Session Goals / Objectives:    Discussed patient individual symptoms and experiences    Reviewed diagnostic criteria and etiology of diagnoses        Patient Participation / Response:  Fully participated with the group by sharing personal reflections / insights and openly received / provided feedback with other participants.    Demonstrated understanding of topics discussed through group discussion and participation and Demonstrated understanding of how information regarding symptoms can assist in management of symptoms    Treatment Plan:  Patient has a current master individualized treatment plan.  See Epic treatment plan for more information.    Suzanne Robles, LICSW

## 2022-01-10 NOTE — GROUP NOTE
Psychotherapy Group Note    PATIENT'S NAME: Marissa Youssef  MRN:   [id number]  :   1960  ACCT. NUMBER: [id number]  DATE OF SERVICE: 1/10/22  START TIME: 10:00 AM  END TIME: 10:50 AM  FACILITATOR: Ruth Borges LGSW  TOPIC: MH EBP Group: Coping Skills  [facility] 55+ Program  TRACK: A2    NUMBER OF PARTICIPANTS: 6    Summary of Group / Topics Discussed:  Coping Skills: Radical Acceptance: Patients learned radical acceptance as a way to tolerate heightened stress in the moment.  Patients identified situations that necessitate radical acceptance.  They focused on applying acceptance of the moment to tolerate difficult emotions and events. Patients discussed how to distinguish when this can be useful in their lives and when other skills are more relevant or helpful.    Patient Session Goals / Objectives:    Understand that some amount of pain exists for each of us in our lives    Process the difficulty of acceptance in our lives and benefits of radical acceptance to mood and functioning.    Demonstrate understanding of when to use the radical acceptance to tolerate distress by providing examples of situations where this could be applied.    Identify barriers to applying radical acceptance to difficult situations and explore strategies to overcome them                                        Service Modality:  Video Visit     Telemedicine Visit: The patient's condition can be safely assessed and treated via synchronous audio and visual telemedicine encounter.      Reason for Telemedicine Visit: Services only offered telehealth    Originating Site (Patient Location): Patient's home    Distant Site (Provider Location): Provider Remote Setting- Home Office    Consent:  The patient/guardian has verbally consented to: the potential risks and benefits of telemedicine (video visit) versus in person care; bill my insurance or make self-payment for services provided; and responsibility for payment of non-covered  services.     Patient would like the video invitation sent by:  My Chart    Mode of Communication:  Video Conference via Medical Zoom    As the provider I attest to compliance with applicable laws and regulations related to telemedicine.              Patient Participation / Response:  Fully participated with the group by sharing personal reflections / insights and openly received / provided feedback with other participants.    Demonstrated understanding of topics discussed through group discussion and participation, Expressed understanding of the relevance / importance of coping skills at distressing times in life and Demonstrated knowledge of when to consider using a variety of coping skills in daily life    Treatment Plan:  Patient has a current master individualized treatment plan.  See Epic treatment plan for more information.    Ruth Borges LGSW

## 2022-01-10 NOTE — GROUP NOTE
Process Group Note    PATIENT'S NAME: Marissa Youssef  MRN:   [id number]  :   1960  ACCT. NUMBER: [id number]  DATE OF SERVICE: 1/10/22  START TIME:  9:00 AM  END TIME:  9:50 AM  FACILITATOR: Ruth Borges LGSW  TOPIC:  Process Group    Diagnoses:  F33.2 Major depressive disorder, severe, recurrent, without psychosis  F41.1 Generalized anxiety disorder  F43.12 Posttraumatic Stress Disorder  F60.3 Borderline personality disorder.       [facility] 55+ Program  TRACK: A2    NUMBER OF PARTICIPANTS: 6                                      Service Modality:  Video Visit     Telemedicine Visit: The patient's condition can be safely assessed and treated via synchronous audio and visual telemedicine encounter.      Reason for Telemedicine Visit: Services only offered telehealth    Originating Site (Patient Location): Patient's home    Distant Site (Provider Location): Provider Remote Setting- Home Office    Consent:  The patient/guardian has verbally consented to: the potential risks and benefits of telemedicine (video visit) versus in person care; bill my insurance or make self-payment for services provided; and responsibility for payment of non-covered services.     Patient would like the video invitation sent by:  My Chart    Mode of Communication:  Video Conference via Medical Zoom    As the provider I attest to compliance with applicable laws and regulations related to telemedicine.                Data:    Session content: At the start of this group, patients were invited to check in by identifying themselves, describing their current emotional status, and identifying issues to address in this group.   Area(s) of treatment focus addressed in this session included Symptom Management, Personal Safety and Community Resources/Discharge Planning.  Client reported a stable mood.  She reported Dr. Basilio reducing her [place] when they met last week.  Client reflected positively on an interaction she had  "between her and a coworker.  Client explained that she mistook a car at the gas station for doing a \"drive off\".  Once the coworker corrected her, client reported using radical acceptance to acknowledge her mistake, see it as \"a learning opportunity\" and avoid \"acting defensive\".  Client explained that in the past, she would have \"played the victim\".  Client was happy that her coworker talked to her with respect as she has had incidents in the past where her coworker acted \"condescending\".  Writer encouraged client to use the [place] Ahead skill to prepare for times when her coworker might respond more negatively.  Client did not report any suicidal ideation, plan or intent.      Therapeutic Interventions/Treatment Strategies:  Psychotherapist offered support, feedback and validation and reinforced use of skills. Treatment modalities used include Cognitive Behavioral Therapy. Interventions include Coping Skills: Facilitated discussion on learning and applying radical acceptance skill and Discussed how the use of intentional \"in the moment\" actions can help reduce distress and Relationship Skills: Assisted patients in implementing more effective communication skills in their relationships.    Assessment:    Patient response:   Patient responded to session by accepting feedback, giving feedback and listening    Possible barriers to participation / learning include: and no barriers identified    Health Issues:   Yes: Pain, No Psychological Distress  Chronic disease management, No Psychological Distress       Substance Use Review:   Substance Use: No active concerns identified.    Mental Status/Behavioral Observations  Appearance:   Appropriate   Eye Contact:   Good   Psychomotor Behavior: Normal   Attitude:   Cooperative  Interested Friendly Pleasant  Orientation:   All  Speech   Rate / Production: Normal    Volume:  Normal   Mood:    Euthymic  Affect:    Appropriate   Thought Content:   Clear and Safety denies any " current safety concerns including suicidal ideation, self-harm, and homicidal ideation  Thought Form:  Coherent  Logical     Insight:    Good     Plan:     Safety Plan: No current safety concerns identified.  Recommended that patient call 911 or go to the local ED should there be a change in any of these risk factors.     Barriers to treatment: None identified    Patient Contracts (see media tab):  None    Substance Use: Not addressed in session     Continue or Discharge: Patient will continue in 55+ Program (55+) as planned. Patient is likely to benefit from learning and using skills as they work toward the goals identified in their treatment plan.      MODESTA Mullins  January 10, 2022

## 2022-01-12 ENCOUNTER — HOSPITAL ENCOUNTER (OUTPATIENT)
Dept: BEHAVIORAL HEALTH | Facility: CLINIC | Age: 62
End: 2022-01-12
Attending: PSYCHIATRY & NEUROLOGY
Payer: MEDICARE

## 2022-01-12 DIAGNOSIS — F33.1 MODERATE EPISODE OF RECURRENT MAJOR DEPRESSIVE DISORDER (H): ICD-10-CM

## 2022-01-12 PROCEDURE — 90853 GROUP PSYCHOTHERAPY: CPT | Mod: 95 | Performed by: SOCIAL WORKER

## 2022-01-12 PROCEDURE — 90853 GROUP PSYCHOTHERAPY: CPT | Mod: GT

## 2022-01-12 NOTE — GROUP NOTE
Service Modality:  Video Visit     Telemedicine Visit: The patient's condition can be safely assessed and treated via synchronous audio and visual telemedicine encounter.       Reason for Telemedicine Visit: Services only offered telehealth and due to COVID-19.     Originating Site (Patient Location): Patient's home     Distant Site (Provider Location): Provider Remote Setting- Home Office     Consent:  The patient/guardian has verbally consented to: the potential risks and benefits of telemedicine (video visit) versus in person care; bill my insurance or make self-payment for services provided; and responsibility for payment of non-covered services.      Patient would like the video invitation sent by:  My Chart     Mode of Communication:  Video Conference via Medical Zoom     As the provider I attest to compliance with applicable laws and regulations related to telemedicine.    Psychotherapy Group Note    PATIENT'S NAME: Marissa Youssef  MRN:   [id number]  :   1960  ACCT. NUMBER: [id number]  DATE OF SERVICE: 22  START TIME: 11:00 AM  END TIME: 11:50 AM  FACILITATOR: Suzanne Robles [facility]  TOPIC: MH EBP Group: Coping Skills  [facility] 55+ Program  TRACK: A2    NUMBER OF PARTICIPANTS: 5    Summary of Group / Topics Discussed:  Coping Skills: Building Positive Experiences: Patients discussed the importance of planning and engaging in positive experiences, as strategies to increase positive thinking, hope, and self-worth.  Explored the benefits of planning / creating positive experiences, including recognizing and reducing negativity bias by focusing on and building positive experiences.   Several approaches to building positive experiences were presented and discussed relevant to each patient.      Patient Session Goals / Objectives:    Understand the purpose of planning / creating / participating / sharing in positive experiences.    Explore patient s experiences related to negative  thinking and how it influences activities and moodIdentify current positive events in patient s life.     Set goals to increase a variety of positive experiences.    Address barriers to planning / engaging in positive experiences.        Patient Participation / Response:  Fully participated with the group by sharing personal reflections / insights and openly received / provided feedback with other participants.    Demonstrated understanding of topics discussed through group discussion and participation, Identified barriers to applying coping skills and Practiced 2-3 new coping skills in session    Treatment Plan:  Patient has a current master individualized treatment plan.  See Epic treatment plan for more information.    Suzanne Robles, LICSW

## 2022-01-12 NOTE — GROUP NOTE
Process Group Note    PATIENT'S NAME: Marissa Youssef  MRN:   [id number]  :   1960  ACCT. NUMBER: [id number]  DATE OF SERVICE: 22  START TIME:  9:00 AM  END TIME:  9:50 AM  FACILITATOR: Ruth Borges LGSW  TOPIC:  Process Group    Diagnoses:  F33.2 Major depressive disorder, severe, recurrent, without psychosis  F41.1 Generalized anxiety disorder  F43.12 Posttraumatic Stress Disorder  F60.3 Borderline personality disorder.       [facility] 55+ Program  TRACK: A2    NUMBER OF PARTICIPANTS: 5                                      Service Modality:  Video Visit     Telemedicine Visit: The patient's condition can be safely assessed and treated via synchronous audio and visual telemedicine encounter.      Reason for Telemedicine Visit: Services only offered telehealth    Originating Site (Patient Location): Patient's home    Distant Site (Provider Location): Provider Remote Setting- Home Office    Consent:  The patient/guardian has verbally consented to: the potential risks and benefits of telemedicine (video visit) versus in person care; bill my insurance or make self-payment for services provided; and responsibility for payment of non-covered services.     Patient would like the video invitation sent by:  My Chart    Mode of Communication:  Video Conference via Medical Zoom    As the provider I attest to compliance with applicable laws and regulations related to telemedicine.                Data:    Session content: At the start of this group, patients were invited to check in by identifying themselves, describing their current emotional status, and identifying issues to address in this group.   Area(s) of treatment focus addressed in this session included Symptom Management, Personal Safety and Community Resources/Discharge Planning.  Client reported a stable mood. Client reflected on the busy day she had on Monday going to physical therapy, picking up her grandkids from school, dropping her  granddaughter off at gymnastics.  Client reported being away from home on Monday from 8:30am-7pm.   She worked from 2-6pm on Tuesday and noticed lower back pain.  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  She reported helping her coworkers to take necessary safety precautions after the customer left the store.  She reported on her progress with her shoulder pain and that her physical therapist noted an 84% gain.  Client reported being able to  decompress  before work on Tuesday.  Client briefly talked about her participation in a Bipolar support group on Facebook.  Client did not report any suicidal ideation, plan or intent.      Therapeutic Interventions/Treatment Strategies:  Psychotherapist offered support, feedback and validation and reinforced use of skills. Treatment modalities used include Cognitive Behavioral Therapy. Interventions include Coping Skills: Discussed use of self-soothe skills to decrease distress in the body, Symptoms Management: Talked about physical pain levels and Relationship Skills: Assisted patients in implementing more effective communication skills in their relationships.    Assessment:    Patient response:   Patient responded to session by accepting feedback, giving feedback and listening    Possible barriers to participation / learning include: and no barriers identified    Health Issues:   Yes: Pain, No Psychological Distress       Substance Use Review:   Substance Use: No active concerns identified.    Mental Status/Behavioral Observations  Appearance:   Appropriate   Eye Contact:   Good   Psychomotor Behavior: Normal   Attitude:   Cooperative  Interested Friendly Pleasant  Orientation:   All  Speech   Rate / Production: Normal    Volume:  Normal   Mood:    Euthymic  Affect:    Appropriate   Thought Content:   Clear and Safety denies any current safety  concerns including suicidal ideation, self-harm, and homicidal ideation  Thought Form:  Coherent  Logical     Insight:    Good     Plan:     Safety Plan: No current safety concerns identified.  Recommended that patient call 911 or go to the local ED should there be a change in any of these risk factors.     Barriers to treatment: None identified    Patient Contracts (see media tab):  None    Substance Use: Not addressed in session     Continue or Discharge: Patient will continue in 55+ Program (55+) as planned. Patient is likely to benefit from learning and using skills as they work toward the goals identified in their treatment plan.      MODESTA Mullins  January 12, 2022

## 2022-01-12 NOTE — GROUP NOTE
Psychotherapy Group Note    PATIENT'S NAME: Marissa Youssef  MRN:   [id number]  :   1960  ACCT. NUMBER: [id number]  DATE OF SERVICE: 22  START TIME: 10:00 AM  END TIME: 10:50 AM  FACILITATOR: Ruth Borges LGSW  TOPIC: MH EBP Group: Specialty Awareness  [facility] 55+ Program  TRACK: A2    NUMBER OF PARTICIPANTS: 5    Summary of Group / Topics Discussed:  Specialty Topics: Grief/Transitions: Patients received an overview of the grief process.  Patients explored their relationship to loss and how that has affected their mental health symptoms.  Strategies for recognizing loss and ideas for engaging in the grief process was presented and discussed.  Patients identified needs for support and coping skills to manage loss.  The purpose of this specialty topic is to help patients identify the aspects of change due to loss that individuals experience in addition to mental health symptoms to better cope with the grief, loss, and life transitions.      Patient Session Goals / Objectives:    Identified grief, loss, and life transitions     Discussed how grief impacts mental health symptoms and disrupts usual functioning    Identified needs for support and coping with grief and planned further action for coping                                      Service Modality:  Video Visit     Telemedicine Visit: The patient's condition can be safely assessed and treated via synchronous audio and visual telemedicine encounter.      Reason for Telemedicine Visit: Services only offered telehealth    Originating Site (Patient Location): Patient's home    Distant Site (Provider Location): Provider Remote Setting- Home Office    Consent:  The patient/guardian has verbally consented to: the potential risks and benefits of telemedicine (video visit) versus in person care; bill my insurance or make self-payment for services provided; and responsibility for payment of non-covered services.     Patient would like the video  invitation sent by:  My Chart    Mode of Communication:  Video Conference via Medical Zoom    As the provider I attest to compliance with applicable laws and regulations related to telemedicine.            Patient Participation / Response:  Fully participated with the group by sharing personal reflections / insights and openly received / provided feedback with other participants.    Demonstrated understanding of topics discussed through group discussion and participation and Identified / Expressed readiness to act on skill suggestions discussed in topic    Treatment Plan:  Patient has a current master individualized treatment plan.  See Epic treatment plan for more information.    Ruth Borges LGSW

## 2022-01-13 ENCOUNTER — HOSPITAL ENCOUNTER (OUTPATIENT)
Dept: BEHAVIORAL HEALTH | Facility: CLINIC | Age: 62
End: 2022-01-13
Attending: PSYCHIATRY & NEUROLOGY
Payer: MEDICARE

## 2022-01-13 DIAGNOSIS — F33.1 MODERATE EPISODE OF RECURRENT MAJOR DEPRESSIVE DISORDER (H): ICD-10-CM

## 2022-01-13 PROCEDURE — 90853 GROUP PSYCHOTHERAPY: CPT | Mod: GT

## 2022-01-13 PROCEDURE — 90853 GROUP PSYCHOTHERAPY: CPT | Mod: 95

## 2022-01-13 PROCEDURE — 90853 GROUP PSYCHOTHERAPY: CPT | Mod: GT | Performed by: SOCIAL WORKER

## 2022-01-13 NOTE — GROUP NOTE
Psychotherapy Group Note    PATIENT'S NAME: Marissa Youssef  MRN:   [id number]  :   1960  ACCT. NUMBER: [id number]  DATE OF SERVICE: 22  START TIME: 10:00 AM  END TIME: 10:50 AM  FACILITATOR: Ruth Borges LGSW  TOPIC: MH EBP Group: Specialty Awareness  [facility] 55+ Program  TRACK: A2    NUMBER OF PARTICIPANTS: 5    Summary of Group / Topics Discussed:  Specialty Topics: Grief/Transitions: Patients received an overview of the grief process.  Patients explored their relationship to loss and how that has affected their mental health symptoms.  Strategies for recognizing loss and ideas for engaging in the grief process was presented and discussed.  Patients identified needs for support and coping skills to manage loss.  The purpose of this specialty topic is to help patients identify the aspects of change due to loss that individuals experience in addition to mental health symptoms to better cope with the grief, loss, and life transitions.      Patient Session Goals / Objectives:    Identified grief, loss, and life transitions     Discussed how grief impacts mental health symptoms and disrupts usual functioning    Identified needs for support and coping with grief and planned further action for coping                                      Service Modality:  Video Visit     Telemedicine Visit: The patient's condition can be safely assessed and treated via synchronous audio and visual telemedicine encounter.      Reason for Telemedicine Visit: Services only offered telehealth    Originating Site (Patient Location): Patient's home    Distant Site (Provider Location): Provider Remote Setting- Home Office    Consent:  The patient/guardian has verbally consented to: the potential risks and benefits of telemedicine (video visit) versus in person care; bill my insurance or make self-payment for services provided; and responsibility for payment of non-covered services.     Patient would like the video  invitation sent by:  My Chart    Mode of Communication:  Video Conference via Medical Zoom    As the provider I attest to compliance with applicable laws and regulations related to telemedicine.            Patient Participation / Response:  Fully participated with the group by sharing personal reflections / insights and openly received / provided feedback with other participants.    Demonstrated understanding of topics discussed through group discussion and participation, Identified / Expressed readiness to act on skill suggestions discussed in topic and Verbalized understanding of ways to proactively manage illness    Treatment Plan:  Patient has a current master individualized treatment plan.  See Epic treatment plan for more information.    Ruth Borges LGSW

## 2022-01-13 NOTE — GROUP NOTE
"Process Group Note    PATIENT'S NAME: Marissa Youssef  MRN:   [id number]  :   1960  ACCT. NUMBER: [id number]  DATE OF SERVICE: 22  START TIME:  9:00 AM  END TIME:  9:50 AM  FACILITATOR: Ruth Borges LGSW  TOPIC:  Process Group    Diagnoses:  F33.2 Major depressive disorder, severe, recurrent, without psychosis  F41.1 Generalized anxiety disorder  F43.12 Posttraumatic Stress Disorder  F60.3 Borderline personality disorder.       [facility] 55+ Program  TRACK: A2    NUMBER OF PARTICIPANTS: 5                                      Service Modality:  Video Visit     Telemedicine Visit: The patient's condition can be safely assessed and treated via synchronous audio and visual telemedicine encounter.      Reason for Telemedicine Visit: Services only offered telehealth    Originating Site (Patient Location): Patient's home    Distant Site (Provider Location): Provider Remote Setting- Home Office    Consent:  The patient/guardian has verbally consented to: the potential risks and benefits of telemedicine (video visit) versus in person care; bill my insurance or make self-payment for services provided; and responsibility for payment of non-covered services.     Patient would like the video invitation sent by:  My Chart    Mode of Communication:  Video Conference via Medical Zoom    As the provider I attest to compliance with applicable laws and regulations related to telemedicine.                Data:    Session content: At the start of this group, patients were invited to check in by identifying themselves, describing their current emotional status, and identifying issues to address in this group.   Area(s) of treatment focus addressed in this session included Symptom Management, Personal Safety and Community Resources/Discharge Planning.  Client started her check in with reporting on a \"major trauma\" that occurred the morning .  She explained that this morning her cat jumped onto her and scratched her " "face near her temple.  Writer assessed for need for medical intervention.  She reported having a tetanus shot within 10 years and putting antibiotic cream on the injury.  She reports being in a good mood currently.  She is looking forward to babysitting her grandkids tonight.  She has plans to meet with her current psychiatrist next Friday and ask about decreasing another psychiatric medication.  \"I want to work on going back to the medications I was on before I was hospitalized\".  She reported taking a one mile walk yesterday with her dogs.  Client did not report any suicidal ideation, plan or intent.      Therapeutic Interventions/Treatment Strategies:  Psychotherapist offered support, feedback and validation and reinforced use of skills. Treatment modalities used include Cognitive Behavioral Therapy. Interventions include Coping Skills: Discussed ways to cope when physical injuries occur. and Symptoms Management: Talked about her goals with psychiatric medications.    Assessment:    Patient response:   Patient responded to session by accepting feedback, giving feedback and listening    Possible barriers to participation / learning include: and no barriers identified    Health Issues:   Yes: Scratch from Cat, No Psychological Distress       Substance Use Review:   Substance Use: No active concerns identified.    Mental Status/Behavioral Observations  Appearance:   Appropriate   Eye Contact:   Good   Psychomotor Behavior: Normal   Attitude:   Cooperative  Interested Friendly Pleasant  Orientation:   All  Speech   Rate / Production: Normal    Volume:  Normal   Mood:    Euthymic  Affect:    Appropriate   Thought Content:   Clear and Safety denies any current safety concerns including suicidal ideation, self-harm, and homicidal ideation  Thought Form:  Coherent  Logical     Insight:    Good     Plan:     Safety Plan: No current safety concerns identified.  Recommended that patient call 911 or go to the local ED should " there be a change in any of these risk factors.     Barriers to treatment: None identified    Patient Contracts (see media tab):  None    Substance Use: Not addressed in session     Continue or Discharge: Patient will continue in 55+ Program (55+) as planned. Patient is likely to benefit from learning and using skills as they work toward the goals identified in their treatment plan.      MODESTA Mullins  January 13, 2022

## 2022-01-13 NOTE — GROUP NOTE
Service Modality:  Video Visit     Telemedicine Visit: The patient's condition can be safely assessed and treated via synchronous audio and visual telemedicine encounter.       Reason for Telemedicine Visit: Services only offered telehealth and due to COVID-19.     Originating Site (Patient Location): Patient's home     Distant Site (Provider Location): Provider Remote Setting- Home Office     Consent:  The patient/guardian has verbally consented to: the potential risks and benefits of telemedicine (video visit) versus in person care; bill my insurance or make self-payment for services provided; and responsibility for payment of non-covered services.      Patient would like the video invitation sent by:  My Chart     Mode of Communication:  Video Conference via Medical Zoom     As the provider I attest to compliance with applicable laws and regulations related to telemedicine.    Psychotherapy Group Note    PATIENT'S NAME: Marissa Youssef  MRN:   [id number]  :   1960  ACCT. NUMBER: [id number]  DATE OF SERVICE: 22  START TIME: 11:10 AM  END TIME: 12:00 PM  FACILITATOR: Suzanne Robles [facility]  TOPIC:  EBP Group: Coping Skills  [facility] 55+ Program  TRACK: A2    NUMBER OF PARTICIPANTS: 5    Summary of Group / Topics Discussed:  Coping Skills: Improve the Moment by using Humor: Patients learned how to apply humor as a strategies to effect positive change in the present moment as well as a symptom management tool.  Patients will identified specific ways to improve the moment and reduce distress.     Patient Session Goals / Objectives:    Discuss how the use of intentional  in the moment  actions can help reduce distress.    Review patients current practices and discuss a more formal way of practicing and accessing skills.    Increase ability to decide when to use improve the moment strategies    Choose 1-2 in the moment actions to apply during times of distress.    Educated about the  biological benefits when using humor and laughter as a coping tool.      Patient Participation / Response:  Fully participated with the group by sharing personal reflections / insights and openly received / provided feedback with other participants.    Demonstrated understanding of topics discussed through group discussion and participation and Practiced 2-3 new coping skills in session    Treatment Plan:  Patient has a current master individualized treatment plan.  See Epic treatment plan for more information.    Suzanne Robles, ABHIJITSW

## 2022-01-17 ENCOUNTER — HOSPITAL ENCOUNTER (OUTPATIENT)
Dept: BEHAVIORAL HEALTH | Facility: CLINIC | Age: 62
End: 2022-01-17
Attending: PSYCHIATRY & NEUROLOGY
Payer: MEDICARE

## 2022-01-17 DIAGNOSIS — F33.1 MODERATE EPISODE OF RECURRENT MAJOR DEPRESSIVE DISORDER (H): ICD-10-CM

## 2022-01-17 PROCEDURE — 90853 GROUP PSYCHOTHERAPY: CPT | Mod: 95 | Performed by: SOCIAL WORKER

## 2022-01-17 PROCEDURE — 90853 GROUP PSYCHOTHERAPY: CPT | Mod: 95

## 2022-01-17 NOTE — GROUP NOTE
Psychotherapy Group Note    PATIENT'S NAME: Marissa Youssef  MRN:   [id number]  :   1960  ACCT. NUMBER: [id number]  DATE OF SERVICE: 22  START TIME: 10:00 AM  END TIME: 10:50 AM  FACILITATOR: Ruth Borges LGSW  TOPIC: MH EBP Group: Cognitive Restructuring   [facility] 55+ Program  TRACK: A2    NUMBER OF PARTICIPANTS: 7    Summary of Group / Topics Discussed:  Cognitive Restructuring: Distortions: Patients received an overview of how to identify common cognitive distortions. Patients will explore alternatives to cognitive distortions and practice challenging their negative thought patterns. The goal is to help patients target modify ineffective thought patterns.     Patient Session Goals / Objectives:    Familiarized self with ineffective / unhealthy thoughts and how they develop.      Explored impact of ineffective thoughts / distortions on mood and activity    Formulated new neutral/positive alternatives to challenge less helpful / ineffective thoughts.    Practiced and plan to apply in daily life                                      Service Modality:  Video Visit     Telemedicine Visit: The patient's condition can be safely assessed and treated via synchronous audio and visual telemedicine encounter.      Reason for Telemedicine Visit: Services only offered telehealth    Originating Site (Patient Location): Patient's home    Distant Site (Provider Location): Provider Remote Setting- Home Office    Consent:  The patient/guardian has verbally consented to: the potential risks and benefits of telemedicine (video visit) versus in person care; bill my insurance or make self-payment for services provided; and responsibility for payment of non-covered services.     Patient would like the video invitation sent by:  My Chart    Mode of Communication:  Video Conference via Medical Zoom    As the provider I attest to compliance with applicable laws and regulations related to telemedicine.                      Patient Participation / Response:  Fully participated with the group by sharing personal reflections / insights and openly received / provided feedback with other participants.    Demonstrated understanding of topics discussed through group discussion and participation, Expressed understanding of the relationship between behaviors, thoughts, and feelings, Identified barriers to changing thought patterns and Verbalized understanding of patient's own thought patterns and how they impact their mood and behaviors    Treatment Plan:  Patient has a current master individualized treatment plan.  See Epic treatment plan for more information.    Ruth Borges LGSW

## 2022-01-17 NOTE — GROUP NOTE
"Process Group Note    PATIENT'S NAME: Marissa Youssef  MRN:   [id number]  :   1960  ACCT. NUMBER: [id number]  DATE OF SERVICE: 22  START TIME:  9:00 AM  END TIME:  9:50 AM  FACILITATOR: Ruth Borges LGSW  TOPIC:  Process Group    Diagnoses:  F33.2 Major depressive disorder, severe, recurrent, without psychosis  F41.1 Generalized anxiety disorder  F43.12 Posttraumatic Stress Disorder  F60.3 Borderline personality disorder.       [facility] 55+ Program  TRACK: A2    NUMBER OF PARTICIPANTS: 7                                      Service Modality:  Video Visit     Telemedicine Visit: The patient's condition can be safely assessed and treated via synchronous audio and visual telemedicine encounter.      Reason for Telemedicine Visit: Services only offered telehealth    Originating Site (Patient Location): Patient's home    Distant Site (Provider Location): Provider Remote Setting- Home Office    Consent:  The patient/guardian has verbally consented to: the potential risks and benefits of telemedicine (video visit) versus in person care; bill my insurance or make self-payment for services provided; and responsibility for payment of non-covered services.     Patient would like the video invitation sent by:  My Chart    Mode of Communication:  Video Conference via Medical Zoom    As the provider I attest to compliance with applicable laws and regulations related to telemedicine.                Data:    Session content: At the start of this group, patients were invited to check in by identifying themselves, describing their current emotional status, and identifying issues to address in this group.   Area(s) of treatment focus addressed in this session included Symptom Management, Personal Safety and Community Resources/Discharge Planning.  Client reported having an \"uneventful\" weekend, doing \"nothing\" on Saturday and working yesterday.  She reports a stable mood.  She reported mild anxiety yesterday " "after her sister left her a message to call her.  Client wondered if her sister's  had .  Client found out that her cousin's  has .  Client reported feeling \"sad but not devastated\" over this loss.  She reported feeling \"teed off\" after not getting paid on Friday.  Client explained that her boss needed a letter of release from her psychiatrist in order for client to start getting paid again.  Peers offered supportive feedback and validation.  Client reported unsuccessfully calling her psychiatrist on Thursday and Friday.  Client's psychiatrist called her shortly before group.  Client coordinated with her psychiatrist to call back at a later time today.  Writer encouraged client to update writer when she got a response from her psychiatrist. Client did not report any suicidal ideation, plan or intent.    Therapeutic Interventions/Treatment Strategies:  Psychotherapist offered support, feedback and validation and reinforced use of skills. Treatment modalities used include Cognitive Behavioral Therapy. Interventions include Symptoms Management: Promoted understanding of their diagnoses and how it impacts their functioning and Relationship Skills: Discussed communication with psychiatrist in relation to return to work.    Assessment:    Patient response:   Patient responded to session by accepting feedback, giving feedback and listening    Possible barriers to participation / learning include: and no barriers identified    Health Issues:   Yes: Pain, No Psychological Distress       Substance Use Review:   Substance Use: No active concerns identified.    Mental Status/Behavioral Observations  Appearance:   Appropriate   Eye Contact:   Good   Psychomotor Behavior: Normal   Attitude:   Cooperative  Interested Pleasant  Orientation:   All  Speech   Rate / Production: Normal    Volume:  Normal   Mood:    Irritable   Affect:    Appropriate   Thought Content:   Clear and Safety denies any current safety " concerns including suicidal ideation, self-harm, and homicidal ideation  Thought Form:  Coherent  Logical     Insight:    Good     Plan:     Safety Plan: No current safety concerns identified.  Recommended that patient call 911 or go to the local ED should there be a change in any of these risk factors.     Barriers to treatment: None identified    Patient Contracts (see media tab):  None    Substance Use: Not addressed in session     Continue or Discharge: Patient will continue in 55+ Program (55+) as planned. Patient is likely to benefit from learning and using skills as they work toward the goals identified in their treatment plan.      MODESTA Mullins  January 17, 2022

## 2022-01-17 NOTE — GROUP NOTE
Service Modality:  Video Visit     Telemedicine Visit: The patient's condition can be safely assessed and treated via synchronous audio and visual telemedicine encounter.       Reason for Telemedicine Visit: Services only offered telehealth and due to COVID-19.     Originating Site (Patient Location): Patient's home     Distant Site (Provider Location): Provider Remote Setting- Home Office     Consent:  The patient/guardian has verbally consented to: the potential risks and benefits of telemedicine (video visit) versus in person care; bill my insurance or make self-payment for services provided; and responsibility for payment of non-covered services.      Patient would like the video invitation sent by:  My Chart     Mode of Communication:  Video Conference via Medical Zoom     As the provider I attest to compliance with applicable laws and regulations related to telemedicine.    Psychotherapy Group Note    PATIENT'S NAME: Marissa Youssef  MRN:   [id number]  :   1960  ACCT. NUMBER: [id number]  DATE OF SERVICE: 22  START TIME: 11:00 AM  END TIME: 11:50 AM  FACILITATOR: Suzanne Robles LICSW  TOPIC:  EBP Group: Self-Awareness   [facility] 55+ Program  TRACK: A2    NUMBER OF PARTICIPANTS: 7    Summary of Group / Topics Discussed:  Self-Awareness: Gratitude: Topic focused on assisting patients in identifying key concepts in gratitude. Patients discussed the benefits of practicing gratitude and its impact on mood improvement, mindfulness, and perspective. Patients worked to increase time spent on recognition and appreciation of what is positive and working in their lives. Patients discussed the concepts and benefits of feeling grateful. The goal is to reduce rumination and negative thinking resulting in increased mindfulness and resilience. Patients specifically discussed how they can practice and problem solve barriers to daily gratitude practice.     Patient Session Goals /  Objectives:    Rosser the concept and benefits of gratitude    Identified ways to practice gratitude in daily life    Problem solved barriers to practicing gratitude      Patient Participation / Response:  Fully participated with the group by sharing personal reflections / insights and openly received / provided feedback with other participants.    Demonstrated understanding of topics discussed through group discussion and participation and Practiced skills in session    Treatment Plan:  Patient has a current master individualized treatment plan.  See Epic treatment plan for more information.    Suzanne Robles, LICSW

## 2022-01-19 ENCOUNTER — HOSPITAL ENCOUNTER (OUTPATIENT)
Dept: BEHAVIORAL HEALTH | Facility: CLINIC | Age: 62
End: 2022-01-19
Attending: PSYCHIATRY & NEUROLOGY
Payer: MEDICARE

## 2022-01-19 DIAGNOSIS — F33.1 MODERATE EPISODE OF RECURRENT MAJOR DEPRESSIVE DISORDER (H): ICD-10-CM

## 2022-01-19 PROCEDURE — 90853 GROUP PSYCHOTHERAPY: CPT | Mod: 95

## 2022-01-19 PROCEDURE — 90853 GROUP PSYCHOTHERAPY: CPT | Mod: 95 | Performed by: SOCIAL WORKER

## 2022-01-19 PROCEDURE — 90853 GROUP PSYCHOTHERAPY: CPT | Mod: GT

## 2022-01-19 NOTE — GROUP NOTE
"Process Group Note    PATIENT'S NAME: Marissa Youssef  MRN:   [id number]  :   1960  ACCT. NUMBER: [id number]  DATE OF SERVICE: 22  START TIME:  9:00 AM  END TIME:  9:50 AM  FACILITATOR: Ruth Borges LGSW  TOPIC:  Process Group    Diagnoses:  F33.2 Major depressive disorder, severe, recurrent, without psychosis  F41.1 Generalized anxiety disorder  F43.12 Posttraumatic Stress Disorder  F60.3 Borderline personality disorder.       [facility] 55+ Program  TRACK: A2    NUMBER OF PARTICIPANTS: 7                                      Service Modality:  Video Visit     Telemedicine Visit: The patient's condition can be safely assessed and treated via synchronous audio and visual telemedicine encounter.      Reason for Telemedicine Visit: Services only offered telehealth    Originating Site (Patient Location): Patient's home    Distant Site (Provider Location): Provider Remote Setting- Home Office    Consent:  The patient/guardian has verbally consented to: the potential risks and benefits of telemedicine (video visit) versus in person care; bill my insurance or make self-payment for services provided; and responsibility for payment of non-covered services.     Patient would like the video invitation sent by:  My Chart    Mode of Communication:  Video Conference via Medical Zoom    As the provider I attest to compliance with applicable laws and regulations related to telemedicine.                Data:    Session content: At the start of this group, patients were invited to check in by identifying themselves, describing their current emotional status, and identifying issues to address in this group.   Area(s) of treatment focus addressed in this session included Symptom Management, Personal Safety and Community Resources/Discharge Planning.  Client reported a stable mood.  \"Everything is fine\".  Client has plans to meet with her psychiatrist tomorrow where she will ask about cutting back on her " medications.  Client reported her psychiatrist waiting to write her a work release letter until they met on Thursday.  Client was able to get a letter from her PCP that she will  later today.  She hopes to get paid on Friday.  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  She reports that once the event occurs, her anxiety lifts.  Client reflected positively on celebrating her granddaughter's birthday yesterday.  Client hopes to visit her son in [facility] soon.  Client did not report any suicidal ideation, plan or intent.    Therapeutic Interventions/Treatment Strategies:  Psychotherapist offered support, feedback and validation and reinforced use of skills. Treatment modalities used include Cognitive Behavioral Therapy. Interventions include Symptoms Management: Promoted understanding of their diagnoses and how it impacts their functioning and Other: Discussed care coordination in relation to returning to work.    Assessment:    Patient response:   Patient responded to session by accepting feedback, giving feedback and listening    Possible barriers to participation / learning include: and no barriers identified    Health Issues:   None reported       Substance Use Review:   Substance Use: No active concerns identified.    Mental Status/Behavioral Observations  Appearance:   Appropriate   Eye Contact:   Good   Psychomotor Behavior: Normal   Attitude:   Cooperative  Interested Friendly Pleasant  Orientation:   All  Speech   Rate / Production: Normal    Volume:  Normal   Mood:    Anxious   Affect:    Appropriate   Thought Content:   Clear and Safety denies any current safety concerns including suicidal ideation, self-harm, and homicidal ideation  Thought Form:  Coherent  Logical     Insight:    Good     Plan:     Safety Plan: No current safety concerns identified.  Recommended that patient call 911 or go to the local ED should there be a change  in any of these risk factors.     Barriers to treatment: None identified    Patient Contracts (see media tab):  None    Substance Use: Not addressed in session     Continue or Discharge: Patient will continue in 55+ Program (55+) as planned. Patient is likely to benefit from learning and using skills as they work toward the goals identified in their treatment plan.      MODESTA Mullins  January 19, 2022

## 2022-01-19 NOTE — GROUP NOTE
Psychotherapy Group Note    PATIENT'S NAME: Marissa Youssef  MRN:   [id number]  :   1960  ACCT. NUMBER: [id number]  DATE OF SERVICE: 22  START TIME: 10:00 AM  END TIME: 10:50 AM  FACILITATOR: Ruth Borges LGSW  TOPIC: MH EBP Group: Cognitive Restructuring   [employer] [facility] 55+ Program  TRACK: A2    NUMBER OF PARTICIPANTS: 7    Summary of Group / Topics Discussed:  Cognitive Restructuring: Distortions: Patients received an overview of how to identify common cognitive distortions. Patients will explore alternatives to cognitive distortions and practice challenging their negative thought patterns. The goal is to help patients target modify ineffective thought patterns.     Patient Session Goals / Objectives:    Familiarized self with ineffective / unhealthy thoughts and how they develop.      Explored impact of ineffective thoughts / distortions on mood and activity    Formulated new neutral/positive alternatives to challenge less helpful / ineffective thoughts.    Practiced and plan to apply in daily life                                        Service Modality:  Video Visit     Telemedicine Visit: The patient's condition can be safely assessed and treated via synchronous audio and visual telemedicine encounter.      Reason for Telemedicine Visit: Services only offered telehealth    Originating Site (Patient Location): Patient's home    Distant Site (Provider Location): Provider Remote Setting- Home Office    Consent:  The patient/guardian has verbally consented to: the potential risks and benefits of telemedicine (video visit) versus in person care; bill my insurance or make self-payment for services provided; and responsibility for payment of non-covered services.     Patient would like the video invitation sent by:  My Chart    Mode of Communication:  Video Conference via Medical Zoom    As the provider I attest to compliance with applicable laws and regulations related to telemedicine.                      Patient Participation / Response:  Fully participated with the group by sharing personal reflections / insights and openly received / provided feedback with other participants.    Demonstrated understanding of topics discussed through group discussion and participation, Expressed understanding of the relationship between behaviors, thoughts, and feelings, Identified barriers to changing thought patterns and Verbalized understanding of patient's own thought patterns and how they impact their mood and behaviors    Treatment Plan:  Patient has a current master individualized treatment plan.  See Epic treatment plan for more information.    Ruth Borges LGSW

## 2022-01-19 NOTE — GROUP NOTE
Service Modality:  Video Visit     Telemedicine Visit: The patient's condition can be safely assessed and treated via synchronous audio and visual telemedicine encounter.       Reason for Telemedicine Visit: Services only offered telehealth and due to COVID-19.     Originating Site (Patient Location): Patient's home     Distant Site (Provider Location): Provider Remote Setting- Home Office     Consent:  The patient/guardian has verbally consented to: the potential risks and benefits of telemedicine (video visit) versus in person care; bill my insurance or make self-payment for services provided; and responsibility for payment of non-covered services.      Patient would like the video invitation sent by:  My Chart     Mode of Communication:  Video Conference via Medical Zoom     As the provider I attest to compliance with applicable laws and regulations related to telemedicine.    Psychotherapy Group Note    PATIENT'S NAME: Marissa Youssef  MRN:   [id number]  :   1960  ACCT. NUMBER: [id number]  DATE OF SERVICE: 22  START TIME: 11:00 AM  END TIME: 11:50 AM  FACILITATOR: Suzanne Robles [facility]  TOPIC:  EBP Group: Coping Skills  [facility] 55+ Program  TRACK: A2    NUMBER OF PARTICIPANTS: 7    Summary of Group / Topics Discussed:  Coping Skills: Meditation: Patients learned about meditation and explored how and when to utilize it to increase focus, reduce mental health symptoms, decrease physical tension, and improve mental well-being.  Approaches to meditation were presented as a means of increasing self-awareness. The benefits of various meditation practices were discussed, as well as barriers to utilization of this coping strategy.     Patient Session Goals / Objectives:    Understand the purpose and efficacy of using meditation modalities to reduce stress / symptoms.    Review / discuss situations in daily life that cause distress, where establishing a meditation routine or meditating as  needed may improve functioning.      Verbalize understanding of how and when to apply grounding strategies to reduce distress and increase presence in the moment.    Practice meditation and address barriers to use in daily life.        Patient Participation / Response:  Fully participated with the group by sharing personal reflections / insights and openly received / provided feedback with other participants.    Demonstrated understanding of topics discussed through group discussion and participation    Treatment Plan:  Patient has a current master individualized treatment plan.  See Epic treatment plan for more information.    Suzanne Robles, LICSW

## 2022-01-20 ENCOUNTER — HOSPITAL ENCOUNTER (OUTPATIENT)
Dept: BEHAVIORAL HEALTH | Facility: CLINIC | Age: 62
End: 2022-01-20
Attending: PSYCHIATRY & NEUROLOGY
Payer: MEDICARE

## 2022-01-20 DIAGNOSIS — F33.1 MODERATE EPISODE OF RECURRENT MAJOR DEPRESSIVE DISORDER (H): ICD-10-CM

## 2022-01-20 PROCEDURE — 90853 GROUP PSYCHOTHERAPY: CPT | Mod: GT

## 2022-01-20 PROCEDURE — 90853 GROUP PSYCHOTHERAPY: CPT | Mod: 95

## 2022-01-20 PROCEDURE — 90853 GROUP PSYCHOTHERAPY: CPT | Mod: 95 | Performed by: SOCIAL WORKER

## 2022-01-20 PROCEDURE — 90853 GROUP PSYCHOTHERAPY: CPT | Mod: GT | Performed by: SOCIAL WORKER

## 2022-01-20 NOTE — GROUP NOTE
Process Group Note    PATIENT'S NAME: Marissa oYussef  MRN:   [id number]  :   1960  ACCT. NUMBER: [id number]  DATE OF SERVICE: 22  START TIME:  9:00 AM  END TIME:  9:50 AM  FACILITATOR: Ruth Borges LGSW  TOPIC:  Process Group    Diagnoses:  F33.2 Major depressive disorder, severe, recurrent, without psychosis  F41.1 Generalized anxiety disorder  F43.12 Posttraumatic Stress Disorder  F60.3 Borderline personality disorder.       [facility] 55+ Program  TRACK: A2    NUMBER OF PARTICIPANTS: 7                                      Service Modality:  Video Visit     Telemedicine Visit: The patient's condition can be safely assessed and treated via synchronous audio and visual telemedicine encounter.      Reason for Telemedicine Visit: Services only offered telehealth    Originating Site (Patient Location): Patient's home    Distant Site (Provider Location): Provider Remote Setting- Home Office    Consent:  The patient/guardian has verbally consented to: the potential risks and benefits of telemedicine (video visit) versus in person care; bill my insurance or make self-payment for services provided; and responsibility for payment of non-covered services.     Patient would like the video invitation sent by:  My Chart    Mode of Communication:  Video Conference via Medical Zoom    As the provider I attest to compliance with applicable laws and regulations related to telemedicine.                Data:    Session content: At the start of this group, patients were invited to check in by identifying themselves, describing their current emotional status, and identifying issues to address in this group.   Area(s) of treatment focus addressed in this session included Symptom Management, Personal Safety and Community Resources/Discharge Planning.  Client reported a stable mood.  She reported binge watching a television show and wondered if that was healthy.  She noticed that binge watching causes her to  procrastinate on completing household tasks.  She reports meeting with her therapist today and her psychiatrist tomorrow.  She reports receiving a letter from her PCP approving her to return to work.  She turned in this letter to her supervisor yesterday.  She is hopeful that she can get paid on Friday.  Client did not report any suicidal ideation, plan or intent.      Therapeutic Interventions/Treatment Strategies:  Psychotherapist offered support, feedback and validation and reinforced use of skills. Treatment modalities used include Cognitive Behavioral Therapy. Interventions include Behavioral Activation: Explored how behaviors effect mood and interact with thoughts and feelings and Coping Skills: Assisted patient in identifying 1-2 healthy distraction skills to reduce overall distress.    Assessment:    Patient response:   Patient responded to session by accepting feedback, giving feedback and listening    Possible barriers to participation / learning include: and no barriers identified    Health Issues:   None reported       Substance Use Review:   Substance Use: No active concerns identified.    Mental Status/Behavioral Observations  Appearance:   Appropriate   Eye Contact:   Good   Psychomotor Behavior: Normal   Attitude:   Cooperative  Interested Friendly Pleasant  Orientation:   All  Speech   Rate / Production: Normal    Volume:  Normal   Mood:    Euthymic  Affect:    Appropriate   Thought Content:   Clear and Safety denies any current safety concerns including suicidal ideation, self-harm, and homicidal ideation  Thought Form:  Coherent  Logical     Insight:    Good     Plan:     Safety Plan: No current safety concerns identified.  Recommended that patient call 911 or go to the local ED should there be a change in any of these risk factors.     Barriers to treatment: None identified    Patient Contracts (see media tab):  None    Substance Use: Not addressed in session     Continue or Discharge: Patient  will continue in 55+ Program (55+) as planned. Patient is likely to benefit from learning and using skills as they work toward the goals identified in their treatment plan.      Ruth Borges, MODESTA  January 20, 2022

## 2022-01-20 NOTE — GROUP NOTE
Service Modality:  Video Visit     Telemedicine Visit: The patient's condition can be safely assessed and treated via synchronous audio and visual telemedicine encounter.       Reason for Telemedicine Visit: Services only offered telehealth and due to COVID-19.     Originating Site (Patient Location): Patient's home     Distant Site (Provider Location): Provider Remote Setting- Home Office     Consent:  The patient/guardian has verbally consented to: the potential risks and benefits of telemedicine (video visit) versus in person care; bill my insurance or make self-payment for services provided; and responsibility for payment of non-covered services.      Patient would like the video invitation sent by:  My Chart     Mode of Communication:  Video Conference via Medical Zoom     As the provider I attest to compliance with applicable laws and regulations related to telemedicine.    Psychoeducation Group Note    PATIENT'S NAME: Marissa Youssef  MRN:   [id number]  :   1960  ACCT. NUMBER: [id number]  DATE OF SERVICE: 22  START TIME: 11:00 AM  END TIME: 11:50 AM  FACILITATOR: Suzanne Robles LICSW  TOPIC:  EBP Group: Brain Health  [facility] 55+ Program  TRACK: A2    NUMBER OF PARTICIPANTS: 8    Summary of Group / Topics Discussed:  Brain Health:  Healthy aging: Patients were educated on understanding wellness concepts as we age and incorporating holistic and healthful habits may improve outlook and enjoyment of life. An open conversation among group member was facilitated to explore topics related to changes in life that aging brings and offer feedback and wisdom.  Group members were challenged to identify personal warning signs, members of their support system, safety considerations, wellness strategies, and personal interests/hobbies.    Patient Session Goals / Objectives:  ? Described the normal process of aging and discussed the expected changes that it brings within the group  ? Listed  ideas for incorporating holistic healthy habits in daily routine  ? Identified important warning signs, member of support system, pertinent safety considerations, wellness strategies, and interest/hobbies       Patient Participation / Response:  Fully participated with the group by sharing personal reflections / insights and openly received / provided feedback with other participants.    Demonstrated understanding of topics discussed through group discussion and participation and Verbalized understanding of brain health topic    Treatment Plan:  Patient has a current master individualized treatment plan.  See Epic treatment plan for more information.    Suzanne Robles, ABHIJITSW

## 2022-01-20 NOTE — GROUP NOTE
Psychotherapy Group Note    PATIENT'S NAME: Marissa Youssef  MRN:   [id number]  :   1960  ACCT. NUMBER: [id number]  DATE OF SERVICE: 22  START TIME: 10:00 AM  END TIME: 10:50 AM  FACILITATOR: Ruth Borges LGSW  TOPIC: MH EBP Group: Coping Skills  [facility] 55+ Program  TRACK: A2    NUMBER OF PARTICIPANTS: 8    Summary of Group / Topics Discussed:  Coping Skills: Grounding: Patients discussed and practiced strategies to increase attachment / presence to the current moment.  Patients identified situations in which using these strategies will help improve emotion regulation sense of calm in the body.  Reviewed the benefits of applying grounding strategies, as well as past / current practices of each member.  Patients identified situations in which using these strategies would reduce stress. They developed the ability to distinguish when these strategies can be useful in their lives for management and stress and psychological well-being.    Patient Session Goals / Objectives:    Understand the purpose of using grounding strategies to reduce stress.    Verbalize understanding of how and when to apply grounding strategies to reduce distress and increase presence in the moment.    Review patients current grounding practices and discuss a more formal way of practicing and accessing skills.    Practice using various calming strategies (e.g. 5-4-3-2-1; mental and body awareness).    Choose 1-2 grounding strategies to apply during times of distress.                                      Service Modality:  Video Visit     Telemedicine Visit: The patient's condition can be safely assessed and treated via synchronous audio and visual telemedicine encounter.      Reason for Telemedicine Visit: Services only offered telehealth    Originating Site (Patient Location): Patient's home    Distant Site (Provider Location): Provider Remote Setting- Home Office    Consent:  The patient/guardian has verbally consented  to: the potential risks and benefits of telemedicine (video visit) versus in person care; bill my insurance or make self-payment for services provided; and responsibility for payment of non-covered services.     Patient would like the video invitation sent by:  My Chart    Mode of Communication:  Video Conference via Medical Zoom    As the provider I attest to compliance with applicable laws and regulations related to telemedicine.              Patient Participation / Response:  Fully participated with the group by sharing personal reflections / insights and openly received / provided feedback with other participants.    Demonstrated understanding of topics discussed through group discussion and participation, Expressed understanding of the relevance / importance of coping skills at distressing times in life and Identified 2-3 positive coping strategies that have helped maintain / improve symptoms in the past    Treatment Plan:  Patient has a current master individualized treatment plan.  See Epic treatment plan for more information.    Ruth Borges LGSW

## 2022-01-24 ENCOUNTER — HOSPITAL ENCOUNTER (OUTPATIENT)
Dept: BEHAVIORAL HEALTH | Facility: CLINIC | Age: 62
End: 2022-01-24
Attending: PSYCHIATRY & NEUROLOGY
Payer: MEDICARE

## 2022-01-24 DIAGNOSIS — F33.1 MODERATE EPISODE OF RECURRENT MAJOR DEPRESSIVE DISORDER (H): ICD-10-CM

## 2022-01-24 PROCEDURE — 90853 GROUP PSYCHOTHERAPY: CPT | Mod: GT

## 2022-01-24 PROCEDURE — 90853 GROUP PSYCHOTHERAPY: CPT | Mod: 95 | Performed by: SOCIAL WORKER

## 2022-01-24 PROCEDURE — 90853 GROUP PSYCHOTHERAPY: CPT | Mod: 95

## 2022-01-24 NOTE — GROUP NOTE
Psychoeducation Group Note    PATIENT'S NAME: Marissa Youssef  MRN:   [id number]  :   1960  [id number]  DATE OF SERVICE: 22  START TIME: 10:00 AM  END TIME: 10:50 AM  FACILITATOR: Ruth Borges LGSW  TOPIC: MH Life Skills Group: Lifestyle Balance and Structure  [facility] 55+ Program  TRACK: A2    NUMBER OF PARTICIPANTS: 9    Summary of Group / Topics Discussed:  Lifestyle Balance and Strucure:  Time Management: Time for Tips and Tips for Time: Patients were introduced to how effective time management is beneficial to self esteem, relationships with others, life balance, and other aspects of daily life.   Patients were taught strategies on how to improve time management skills.    Patient Session Goals / Objectives:    Facilitated the discussion on challenges/barriers and personal situations with time management     Identified specific techniques and strategies to improve time management to support mental health recovery       Identified a plan to implement strategies into daily life to support improved time management                                       Service Modality:  Video Visit     Telemedicine Visit: The patient's condition can be safely assessed and treated via synchronous audio and visual telemedicine encounter.      Reason for Telemedicine Visit: Services only offered telehealth    Originating Site (Patient Location): Patient's home    Distant Site (Provider Location): Provider Remote Setting- Home Office    Consent:  The patient/guardian has verbally consented to: the potential risks and benefits of telemedicine (video visit) versus in person care; bill my insurance or make self-payment for services provided; and responsibility for payment of non-covered services.     Patient would like the video invitation sent by:  My Chart    Mode of Communication:  Video Conference via Medical Zoom    As the provider I attest to compliance with applicable laws and regulations  related to telemedicine.            Patient Participation / Response:  Fully participated with the group by sharing personal reflections / insights and openly received / provided feedback with other participants.    Demonstrated understanding of content through discussion on personal strategies to manage time well.     Treatment Plan:  Patient has a current master individualized treatment plan.  See Epic treatment plan for more information.    MODESTA Mullins

## 2022-01-24 NOTE — GROUP NOTE
"Process Group Note    PATIENT'S NAME: Marissa Youssef  MRN:   [id number]  :   1960  ACCT. NUMBER: [id number]  DATE OF SERVICE: 22  START TIME:  9:00 AM  END TIME:  9:50 AM  FACILITATOR: Ruth Borges LGSW  TOPIC:  Process Group    Diagnoses:  F33.2 Major depressive disorder, severe, recurrent, without psychosis  F41.1 Generalized anxiety disorder  F43.12 Posttraumatic Stress Disorder  F60.3 Borderline personality disorder.       [facility] 55+ Program  TRACK: A2    NUMBER OF PARTICIPANTS: 9                                      Service Modality:  Video Visit     Telemedicine Visit: The patient's condition can be safely assessed and treated via synchronous audio and visual telemedicine encounter.      Reason for Telemedicine Visit: Services only offered telehealth    Originating Site (Patient Location): Patient's home    Distant Site (Provider Location): Provider Remote Setting- Home Office    Consent:  The patient/guardian has verbally consented to: the potential risks and benefits of telemedicine (video visit) versus in person care; bill my insurance or make self-payment for services provided; and responsibility for payment of non-covered services.     Patient would like the video invitation sent by:  My Chart    Mode of Communication:  Video Conference via Medical Zoom    As the provider I attest to compliance with applicable laws and regulations related to telemedicine.                Data:    Session content: At the start of this group, patients were invited to check in by identifying themselves, describing their current emotional status, and identifying issues to address in this group.   Area(s) of treatment focus addressed in this session included Symptom Management, Personal Safety and Community Resources/Discharge Planning.  Client reported having a \"horrible\" weekend after a meeting with her psychiatrist on Friday.  She explained that her appointment was delayed 1.5 hours due to client " "not receiving a video link in time for her 10am appointment.  Client reported her psychiatrist \"chewing her out\" for consulting on medication changes with Dr. Basilio.  Client reported her psychiatrist stating \"who gave you permission, I will drop you right now\".  She reported this conversation causing her to be \"in tears\" and develop SI.  \"I thought I had no reason to live and would be better off dead\".   Client reported using support of her spouse and friends and distraction strategies to cope with her negative emotions.  Writer and peers empathized with client and offered supportive feedback and validation.  Writer reminded client that she is establishing care with a new psychiatrist in March of 2022.  Client denies current safety concerns, including SI.  Client asked about her discharge date and writer let her know her last day was 1/27/2022.  She reports feeling sad and excited about ending group.    Therapeutic Interventions/Treatment Strategies:  Psychotherapist offered support, feedback and validation and reinforced use of skills. Treatment modalities used include Cognitive Behavioral Therapy. Interventions include Coping Skills: Assisted patient in identifying 1-2 healthy distraction skills to reduce overall distress and Discussed how the use of intentional \"in the moment\" actions can help reduce distress and Emotions Management:  Discussed barriers to emotional regulation.    Assessment:    Patient response:   Patient responded to session by accepting feedback, giving feedback, listening and focusing on goals    Possible barriers to participation / learning include: and no barriers identified    Health Issues:   None reported       Substance Use Review:   Substance Use: No active concerns identified.    Mental Status/Behavioral Observations  Appearance:   Appropriate   Eye Contact:   Good   Psychomotor Behavior: Normal   Attitude:   Cooperative  Interested Pleasant  Orientation:   All  Speech   Rate / " Production: Normal    Volume:  Normal   Mood:    Depressed   Affect:    Appropriate   Thought Content:   Clear and Safety denies any current safety concerns including suicidal ideation, self-harm, and homicidal ideation  Thought Form:  Coherent  Logical     Insight:    Good     Plan:     Safety Plan: No current safety concerns identified.  Recommended that patient call 911 or go to the local ED should there be a change in any of these risk factors.     Barriers to treatment: None identified    Patient Contracts (see media tab):  None    Substance Use: Not addressed in session     Continue or Discharge: Patient will continue in 55+ Program (55+) as planned. Patient is likely to benefit from learning and using skills as they work toward the goals identified in their treatment plan.      MODESTA Mullins  January 24, 2022

## 2022-01-24 NOTE — GROUP NOTE
Service Modality:  Video Visit     Telemedicine Visit: The patient's condition can be safely assessed and treated via synchronous audio and visual telemedicine encounter.       Reason for Telemedicine Visit: Services only offered telehealth and due to COVID-19.     Originating Site (Patient Location): Patient's home     Distant Site (Provider Location): Provider Remote Setting- Home Office     Consent:  The patient/guardian has verbally consented to: the potential risks and benefits of telemedicine (video visit) versus in person care; bill my insurance or make self-payment for services provided; and responsibility for payment of non-covered services.      Patient would like the video invitation sent by:  My Chart     Mode of Communication:  Video Conference via Medical Zoom     As the provider I attest to compliance with applicable laws and regulations related to telemedicine.    Psychotherapy Group Note    PATIENT'S NAME: Marissa Youssef  MRN:   [id number]  :   1960  ACCT. NUMBER: [id number]  DATE OF SERVICE: 22  START TIME: 11:00 AM  END TIME: 11:50 AM  FACILITATOR: Suzanne Robles [facility]  TOPIC:  EBP Group: Emotions Management  [facility] 55+ Program  TRACK: A2    NUMBER OF PARTICIPANTS: 9    Summary of Group / Topics Discussed:  Emotions Management: Opposite to Emotion: Patients discussed past and present struggles with knowing how to make changes in their lives due to difficult emotional experiences.  Explored desires to experience and feel less anger, sadness, guilt, and fear.  Reviewed the therapeutic skill of opposite action and patients explored opportunities to use their behaviors as a tool to reduce an emotion that they want to change.     Patient Session Goals / Objectives:    Review DBT concepts and focus on patient s experiences of distress and difficult emotional experiences.    Learn how to do the opposite of what an emotion makes us want to do in an effort to decrease an  unwanted emotional experience.    Demonstrate understanding of the skill of opposite action by sharing experiences where the technique could be useful in past / present situations.      Patient Participation / Response:  Fully participated with the group by sharing personal reflections / insights and openly received / provided feedback with other participants.    Demonstrated understanding of topics discussed through group discussion and participation and Self-aware of experiences with difficult emotions, and strategies to employ to manage them    Treatment Plan:  Patient has a current master individualized treatment plan.  See Epic treatment plan for more information.    Suzanne Robles, LICSW

## 2022-01-25 NOTE — PROGRESS NOTES
Adult Mental Health Intensive Outpatient Discharge Summary/Instructions      Patient: Marissa Youssef MRN: [id number]   : 1960 Age: 61 year old Sex: female     Admission Date: 11/3/2021  Discharge Date: 2022  Diagnosis: F33.2 Major depressive disorder, severe, recurrent, without psychosis  F41.1 Generalized anxiety disorder  F43.12 Posttraumatic Stress Disorder  F60.3 Borderline personality disorder.       Focus of Treatment / Progress    Personal Safety: Client denied safety concerns at time of discharge.     * Follow your safety plan     * Call crisis lines as needed:    [facility] [phone] [facility] [phone]  [facility] [phone] Crisis Connection [phone]  [facility] [phone] [facility] [phone]  [facility] [phone] National Suicide Prevention [phone]  [facility] [phone] Suicide Prevention [phone]  [facility] [phone]    Managing symptoms of:  Anxiety, Depression, Trauma, Work Stressors    Community support/health:  Lives with spouse; returned to work in early January; regularly sees son, daughter in law, and grandchildren; 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     Managing Symptoms and Preventing Relapse    * Go to all of your appointments    * Take all medications as directed.      * Carry a current list if medication with you    * Do not use illicit (street) drugs.  Avoid alcohol    * Report these symptoms to your care team. These are early signs of relapse:   Thoughts of suicide   Losing more sleep   Increased confusion   Mood getting worse   Feeling more aggressive   Other:  Decreased ability to cope with anxiety    *Use these skills daily:  Continue using mindfulness to address anxiety. Practice assertive communication techniques to set boundaries with family and work.  Use distraction strategies when needed (client reports watching fiction shows and completing online puzzles). Use [employer], talk to someone you  "trust at least one time weekly, set boundaries and say \"no\", be assertive, act opposite of negative feelings, accept challenges with a positive attitude, exercise at least three times per week for 30 minutes,  get enough sleep, eat healthy foods, get into a good routine.    Copy of summary sent to: Client via [employer]    Follow up with psychiatrist / main caregiver: Client will establish care with a new psychiatrist Dr. Chalo Skelton in [place], [place].    Next visit: March 2022    Follow up with your therapist: Devi Barajas (psychologist at [facility])   Next visit: Client will schedule.    Go to group therapy and / or support groups at:     [facility] support groups: see listings at https://mentalhealthpolk.org/support-groups/     [facility] support groups ([place]).   Listings for the free virtual and in person support groups are at https://[facility].org/support/[facility]-support-groups/ (844) 341-1398    [facility] Support Groups  Listings for the free support groups are at https://www.faceitfoundation.org/support-offerings/support-groups/  (860) 293-3142    [facility] [facility] Workshops/Classes: see listings of free classes at  https://[employer]org/programs-classes/all-programs/     Please contact program line at [phone] if interested in returning to programming     See your medical doctor about:  Annual physical exams, chronic pain, chronic disease management.    Other:  Your treatment team appreciates having the opportunity to work with you and wishes you the best.    Client Signature: Unable to sign due to COVID-19  Staff Signature: MODESTA Mullins on 1/27/2022 at 12:35 PM        "

## 2022-01-26 ENCOUNTER — HOSPITAL ENCOUNTER (OUTPATIENT)
Dept: BEHAVIORAL HEALTH | Facility: CLINIC | Age: 62
End: 2022-01-26
Attending: PSYCHIATRY & NEUROLOGY
Payer: MEDICARE

## 2022-01-26 DIAGNOSIS — F33.1 MODERATE EPISODE OF RECURRENT MAJOR DEPRESSIVE DISORDER (H): ICD-10-CM

## 2022-01-26 PROCEDURE — 90853 GROUP PSYCHOTHERAPY: CPT | Mod: 95

## 2022-01-26 PROCEDURE — 90853 GROUP PSYCHOTHERAPY: CPT | Mod: 95 | Performed by: SOCIAL WORKER

## 2022-01-26 NOTE — GROUP NOTE
"Process Group Note    PATIENT'S NAME: Marissa Youssef  MRN:   [id number]  :   1960  ACCT. NUMBER: [id number]  DATE OF SERVICE: 22  START TIME:  9:00 AM  END TIME:  9:50 AM  FACILITATOR: Ruth Borges LGSW  TOPIC:  Process Group    Diagnoses:  F33.2 Major depressive disorder, severe, recurrent, without psychosis  F41.1 Generalized anxiety disorder  F43.12 Posttraumatic Stress Disorder  F60.3 Borderline personality disorder.       [facility] 55+ Program  TRACK: A2    NUMBER OF PARTICIPANTS: 7                                      Service Modality:  Video Visit     Telemedicine Visit: The patient's condition can be safely assessed and treated via synchronous audio and visual telemedicine encounter.      Reason for Telemedicine Visit: Services only offered telehealth    Originating Site (Patient Location): Patient's home    Distant Site (Provider Location): Provider Remote Setting- Home Office    Consent:  The patient/guardian has verbally consented to: the potential risks and benefits of telemedicine (video visit) versus in person care; bill my insurance or make self-payment for services provided; and responsibility for payment of non-covered services.     Patient would like the video invitation sent by:  My Chart    Mode of Communication:  Video Conference via Medical Zoom    As the provider I attest to compliance with applicable laws and regulations related to telemedicine.                Data:    Session content: At the start of this group, patients were invited to check in by identifying themselves, describing their current emotional status, and identifying issues to address in this group.   Area(s) of treatment focus addressed in this session included Symptom Management, Personal Safety and Community Resources/Discharge Planning.  Client reported feeling \"good\" today.  She reflected on her five hour work shift last night.  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  Client feels hopeless and \"downtrodden\" about adjusting to the 'new normal\" with COVID. Writer validated client's feelings. She reported taking her granddaughter to gymnastics where her granddaughter reported a sore throat.  Client double checked with her daughter in law to confirm that the granddaughter tested negative for COVID and strep throat.   Client did not report any suicidal ideation, plan or intent.    Therapeutic Interventions/Treatment Strategies:  Psychotherapist offered support, feedback and validation and reinforced use of skills. Treatment modalities used include Cognitive Behavioral Therapy. Interventions include Other: Discussed grief and loss associated with COVID. and Relationship Skills: Assisted patients in implementing more effective communication skills in their relationships.    Assessment:    Patient response:   Patient responded to session by accepting feedback, giving feedback, listening and being attentive    Possible barriers to participation / learning include: and no barriers identified    Health Issues:   None reported       Substance Use Review:   Substance Use: No active concerns identified.    Mental Status/Behavioral Observations  Appearance:   Appropriate   Eye Contact:   Good   Psychomotor Behavior: Normal   Attitude:   Cooperative  Interested Friendly Pleasant  Orientation:   All  Speech   Rate / Production: Normal    Volume:  Normal   Mood:    Depressed   Affect:    Appropriate   Thought Content:   Clear and Safety denies any current safety concerns including suicidal ideation, self-harm, and homicidal ideation  Thought Form:  Coherent  Logical     Insight:    Good     Plan:     Safety Plan: No current safety concerns identified.  Recommended that patient call 911 or go to the local ED should there be a change in any " of these risk factors.     Barriers to treatment: None identified    Patient Contracts (see media tab):  None    Substance Use: Not addressed in session     Continue or Discharge: Patient will continue in 55+ Program (55+) as planned. Patient is likely to benefit from learning and using skills as they work toward the goals identified in their treatment plan.      MODESTA Mullins  January 26, 2022

## 2022-01-26 NOTE — GROUP NOTE
Psychoeducation Group Note    PATIENT'S NAME: Marissa Youssef  MRN:   [id number]  :   1960  ACCT. NUMBER: [id number]  DATE OF SERVICE: 22  START TIME: 10:00 AM  END TIME: 10:50 AM  FACILITATOR: Ruth Borges LGSW  TOPIC: MH Life Skills Group: Lifestyle Balance and Structure  [facility] 55+ Program  TRACK: A2    NUMBER OF PARTICIPANTS: 7    Summary of Group / Topics Discussed:  Lifestyle Balance and Strucure:  Time Management: Time for Tips and Tips for Time: Patients were introduced to how effective time management is beneficial to self esteem, relationships with others, life balance, and other aspects of daily life.   Patients were taught strategies on how to improve time management skills.    Patient Session Goals / Objectives:    Facilitated the discussion on challenges/barriers and personal situations with time management     Identified specific techniques and strategies to improve time management to support mental health recovery       Identified a plan to implement strategies into daily life to support improved time management                                       Service Modality:  Video Visit     Telemedicine Visit: The patient's condition can be safely assessed and treated via synchronous audio and visual telemedicine encounter.      Reason for Telemedicine Visit: Services only offered telehealth    Originating Site (Patient Location): Patient's home    Distant Site (Provider Location): Provider Remote Setting- Home Office    Consent:  The patient/guardian has verbally consented to: the potential risks and benefits of telemedicine (video visit) versus in person care; bill my insurance or make self-payment for services provided; and responsibility for payment of non-covered services.     Patient would like the video invitation sent by:  My Chart    Mode of Communication:  Video Conference via Medical Zoom    As the provider I attest to compliance with applicable laws and regulations  related to telemedicine.            Patient Participation / Response:  Fully participated with the group by sharing personal reflections / insights and openly received / provided feedback with other participants.    Demonstrated understanding of content through discussion about how to implement time management strategies     Treatment Plan:  Patient has a current master individualized treatment plan.  See Epic treatment plan for more information.    Ruth Borges LGSW

## 2022-01-26 NOTE — GROUP NOTE
Service Modality:  Video Visit     Telemedicine Visit: The patient's condition can be safely assessed and treated via synchronous audio and visual telemedicine encounter.       Reason for Telemedicine Visit: Services only offered telehealth and due to COVID-19.     Originating Site (Patient Location): Patient's home     Distant Site (Provider Location): Provider Remote Setting- Home Office     Consent:  The patient/guardian has verbally consented to: the potential risks and benefits of telemedicine (video visit) versus in person care; bill my insurance or make self-payment for services provided; and responsibility for payment of non-covered services.      Patient would like the video invitation sent by:  My Chart     Mode of Communication:  Video Conference via Medical Zoom     As the provider I attest to compliance with applicable laws and regulations related to telemedicine.    Psychotherapy Group Note    PATIENT'S NAME: Marissa Youssef  MRN:   [id number]  :   1960  ACCT. NUMBER: [id number]  DATE OF SERVICE: 22  START TIME: 11:00 AM  END TIME: 11:50 AM  FACILITATOR: Suzanne Robles [facility]  TOPIC:  EBP Group: Behavioral Activation  [facility] 55+ Program  TRACK: A2    NUMBER OF PARTICIPANTS: 7    Summary of Group / Topics Discussed:  Behavioral Activation: Behavior Chain Analysis: Patients explored the steps leading up to identified unwanted behaviors, and ways to replace them with more effective behaviors. Patients examined factors contributing to unwanted behaviors, with a goal of determining ways to interrupt the unhealthy patterns.    Patient Session Goals / Objectives:    Identify thoughts, feelings, and actions that contribute to unwanted behaviors    Identify thoughts, feelings, and actions that contribute to more effective/healthy and desired behaviors    Make plans to track and implement changes and share experiences in group    Identify personal barriers to change      Patient  Participation / Response:  Fully participated with the group by sharing personal reflections / insights and openly received / provided feedback with other participants.    Demonstrated understanding of topics discussed through group discussion and participation and Expressed understanding of the relationship between behaviors, thoughts, and feelings    Treatment Plan:  Patient has a current master individualized treatment plan.  See Epic treatment plan for more information.    Suzanne Robles, LICSW

## 2022-01-27 ENCOUNTER — HOSPITAL ENCOUNTER (OUTPATIENT)
Dept: BEHAVIORAL HEALTH | Facility: CLINIC | Age: 62
End: 2022-01-27
Attending: PSYCHIATRY & NEUROLOGY
Payer: MEDICARE

## 2022-01-27 ENCOUNTER — MYC MEDICAL ADVICE (OUTPATIENT)
Dept: BEHAVIORAL HEALTH | Facility: CLINIC | Age: 62
End: 2022-01-27
Payer: COMMERCIAL

## 2022-01-27 DIAGNOSIS — F33.1 MODERATE EPISODE OF RECURRENT MAJOR DEPRESSIVE DISORDER (H): ICD-10-CM

## 2022-01-27 PROCEDURE — 90853 GROUP PSYCHOTHERAPY: CPT | Mod: 95

## 2022-01-27 PROCEDURE — 90853 GROUP PSYCHOTHERAPY: CPT | Mod: 95 | Performed by: SOCIAL WORKER

## 2022-01-27 NOTE — GROUP NOTE
Psychotherapy Group Note    PATIENT'S NAME: Marissa Youssef  MRN:   [id number]  :   1960  ACCT. NUMBER: [id number]  DATE OF SERVICE: 22  START TIME: 10:00 AM  END TIME: 10:50 AM  FACILITATOR: Ruth Borges LGSW  TOPIC: MH EBP Group: Coping Skills  [facility] 55+ Program  TRACK: A2    NUMBER OF PARTICIPANTS: 9    Summary of Group / Topics Discussed:  Coping Skills: Improve the Moment: Patients learned to tolerate distress, by applying strategies to effect positive change in the present moment.  Patients will identified situations where they would benefit from applying strategies to improve the moment and reduce distress. Patients discussed how to distinguish when this can be useful in their lives or when other strategies would be more relevant or helpful.    Patient Session Goals / Objectives:    Discuss how the use of intentional  in the moment  actions can help reduce distress.    Review patients current practices and discuss a more formal way of practicing and accessing skills.    Increase ability to decide when to use improve the moment strategies    Choose 1-2 in the moment actions to apply during times of distress.                                      Service Modality:  Video Visit     Telemedicine Visit: The patient's condition can be safely assessed and treated via synchronous audio and visual telemedicine encounter.      Reason for Telemedicine Visit: Services only offered telehealth    Originating Site (Patient Location): Patient's home    Distant Site (Provider Location): Provider Remote Setting- Home Office    Consent:  The patient/guardian has verbally consented to: the potential risks and benefits of telemedicine (video visit) versus in person care; bill my insurance or make self-payment for services provided; and responsibility for payment of non-covered services.     Patient would like the video invitation sent by:  My Chart    Mode of Communication:  Video Conference via Medical  Zoom    As the provider I attest to compliance with applicable laws and regulations related to telemedicine.              Patient Participation / Response:  Fully participated with the group by sharing personal reflections / insights and openly received / provided feedback with other participants.    Demonstrated understanding of topics discussed through group discussion and participation and Expressed understanding of the relevance / importance of coping skills at distressing times in life    Treatment Plan:  Patient has See Epic Treatment Plan - Patient is discharging.    Ruth Borges LGSW

## 2022-01-27 NOTE — GROUP NOTE
Service Modality:  Video Visit     Telemedicine Visit: The patient's condition can be safely assessed and treated via synchronous audio and visual telemedicine encounter.       Reason for Telemedicine Visit: Services only offered telehealth and due to COVID-19.     Originating Site (Patient Location): Patient's home     Distant Site (Provider Location): Provider Remote Setting- Home Office     Consent:  The patient/guardian has verbally consented to: the potential risks and benefits of telemedicine (video visit) versus in person care; bill my insurance or make self-payment for services provided; and responsibility for payment of non-covered services.      Patient would like the video invitation sent by:  My Chart     Mode of Communication:  Video Conference via Medical Zoom     As the provider I attest to compliance with applicable laws and regulations related to telemedicine.    Psychotherapy Group Note    PATIENT'S NAME: Marissa Youssef  MRN:   [id number]  :   1960  ACCT. NUMBER: [id number]  DATE OF SERVICE: 22  START TIME: 11:00 AM  END TIME: 11:50 AM  FACILITATOR: Suzanne Robles [facility]  TOPIC:  EBP Group: Mindfulness  [facility] 55+ Program  TRACK: A2    NUMBER OF PARTICIPANTS: 8    Summary of Group / Topics Discussed:  Mindfulness: Mindfulness Experiential: Patients received an overview on what mindfulness is and how mindfulness can benefit general health, mental health symptoms, and stressors. Patients discussed current awareness, knowledge, and practice of mindfulness skills.     Patient Session Goals / Objectives:    Demonstrated and verbalized understanding of key mindfulness concepts    Identified when/how to use mindfulness skills    Identified plan to use mindfulness skills in daily life       Patient Participation / Response:  Fully participated with the group by sharing personal reflections / insights and openly received / provided feedback with other  participants.    Demonstrated understanding of topics discussed through group discussion and participation and Practiced skills in session    Treatment Plan:  Patient has a current master individualized treatment plan.  See Epic treatment plan for more information.    Suzanne Robles, ABHIJITSW

## 2022-01-27 NOTE — GROUP NOTE
"Process Group Note    PATIENT'S NAME: Marissa Youssef  MRN:   [id number]  :   1960  ACCT. NUMBER: [id number]  DATE OF SERVICE: 22  START TIME:  9:00 AM  END TIME:  9:50 AM  FACILITATOR: Ruth Borges LGSW  TOPIC:  Process Group    Diagnoses:  F33.2 Major depressive disorder, severe, recurrent, without psychosis  F41.1 Generalized anxiety disorder  F43.12 Posttraumatic Stress Disorder  F60.3 Borderline personality disorder.       [facility] 55+ Program  TRACK: A2    NUMBER OF PARTICIPANTS: 8                                      Service Modality:  Video Visit     Telemedicine Visit: The patient's condition can be safely assessed and treated via synchronous audio and visual telemedicine encounter.      Reason for Telemedicine Visit: Services only offered telehealth    Originating Site (Patient Location): Patient's home    Distant Site (Provider Location): Provider Remote Setting- Home Office    Consent:  The patient/guardian has verbally consented to: the potential risks and benefits of telemedicine (video visit) versus in person care; bill my insurance or make self-payment for services provided; and responsibility for payment of non-covered services.     Patient would like the video invitation sent by:  My Chart    Mode of Communication:  Video Conference via Medical Zoom    As the provider I attest to compliance with applicable laws and regulations related to telemedicine.                Data:    Session content: At the start of this group, patients were invited to check in by identifying themselves, describing their current emotional status, and identifying issues to address in this group.   Area(s) of treatment focus addressed in this session included Symptom Management, Personal Safety and Community Resources/Discharge Planning.  Client reported feeling \"excited\" and \"sad\" to be on her last day of group.  Client reported her work schedule changing to mornings starting next week.  Writer " "encouraged client to maintain work boundaries so that she did not take on too much.  She reported feeling confident that her boss would allow her to leave work if needed.  She also reported her boss knowing that she can only work 5 hours per shift.    Client was relieved to get a paycheck last Friday.  Client continues to report on plans to go off of medications that were originally prescribed to her during a hospitalization.  She will check in with her current psychiatrist as needed and establish care with a new one in March.  Client has plans to walk her dogs later today and find's today weather \"joyful\".  Peers offered best wishes and lessons learned to client.  Client did not report any suicidal ideation, plan or intent.        Therapeutic Interventions/Treatment Strategies:  Psychotherapist offered support, feedback and validation and reinforced use of skills. Treatment modalities used include Cognitive Behavioral Therapy. Interventions include Behavioral Activation: Explored how behaviors effect mood and interact with thoughts and feelings and Relationship Skills: Encouraged development and maintenance  of healthy boundaries.    Assessment:    Patient response:   Patient responded to session by accepting feedback, giving feedback, listening, focusing on goals and being attentive    Possible barriers to participation / learning include: and no barriers identified    Health Issues:   None reported       Substance Use Review:   Substance Use: No active concerns identified.    Mental Status/Behavioral Observations  Appearance:   Appropriate   Eye Contact:   Good   Psychomotor Behavior: Normal   Attitude:   Cooperative  Interested Pleasant  Orientation:   All  Speech   Rate / Production: Normal    Volume:  Normal   Mood:    Euthymic  Affect:    Appropriate   Thought Content:   Clear and Safety denies any current safety concerns including suicidal ideation, self-harm, and homicidal ideation  Thought Form:  Coherent  " Logical     Insight:    Good     Plan:     Safety Plan: No current safety concerns identified.  Recommended that patient call 911 or go to the local ED should there be a change in any of these risk factors.     Barriers to treatment: None identified    Patient Contracts (see media tab):  None    Substance Use: Not addressed in session     Continue or Discharge: Patient is being discharged today. See Treatment Plan and Discharge Summary.       MODESTA Mullins  January 27, 2022

## 2022-01-27 NOTE — TELEPHONE ENCOUNTER
Writer sent client a (PHI Secure) email on 2022 at 12:40pm:    Tanya Ann,    Please see below for a copy of your discharge summary (note support group resources highlighted in yellow).  If anything looks wrong, feel free to let me know.  It has been a pleasure working with you!      Adult Mental Health Intensive Outpatient Discharge Summary/Instructions      Patient: Marissa Youssef         MRN: [id number]   : 1960        Age: 61 year old         Sex: female      Admission Date: 11/3/2021  Discharge Date: 2022  Diagnosis: F33.2 Major depressive disorder, severe, recurrent, without psychosis  F41.1 Generalized anxiety disorder  F43.12 Posttraumatic Stress Disorder  F60.3 Borderline personality disorder.         Focus of Treatment / Progress     Personal Safety: Client denied safety concerns at time of discharge.                 * Follow your safety plan                 * Call crisis lines as needed:     [facility] [phone]                 [facility] [phone]  [facility] [phone]                 [facility] [phone]  [facility] [phone]                [facility] [phone]  [facility] [phone]            National Suicide Prevention [phone]  [facility] [phone]               Suicide Prevention [phone]  [facility] [phone]     Managing symptoms of:  Anxiety, Depression, Trauma, Work Stressors     Community support/health:  Lives with spouse; returned to work in early January; regularly sees son, daughter in law, and grandchildren; 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      Managing Symptoms and Preventing Relapse     * Go to all of your appointments     * Take all medications as directed.      * Carry a current list if medication with you     * Do not use illicit (street) drugs.  Avoid alcohol     * Report these symptoms to your care team. These are early signs of relapse:              Thoughts of suicide              Losing  "more sleep              Increased confusion              Mood getting worse              Feeling more aggressive              Other:  Decreased ability to cope with anxiety     *Use these skills daily:  Continue using mindfulness to address anxiety. Practice assertive communication techniques to set boundaries with family and work.  Use distraction strategies when needed (client reports watching fiction shows and completing online puzzles). Use [place] [employer], talk to someone you trust at least one time weekly, set boundaries and say \"no\", be assertive, act opposite of negative feelings, accept challenges with a positive attitude, exercise at least three times per week for 30 minutes,  get enough sleep, eat healthy foods, get into a good routine.     Copy of summary sent to: Client via [employer]     Follow up with psychiatrist / main caregiver: Client will establish care with a new psychiatrist Dr. Chalo Skelton in [place], [place].    Next visit: March 2022     Follow up with your therapist: Devi Barajas (psychologist at [facility])   Next visit: Client will schedule.     Go to group therapy and / or support groups at:     [facility] support groups: see listings at https://mentalhealthpolk.org/support-groups/      Consider [facility] support groups ([place]).   Listings for the free virtual and in person support groups are at https://[facility].org/support/[facility]-support-groups/ (996) 985-1866    [facility] Support Groups  Listings for the free support groups are at https://www.faceitfoundation.org/support-offerings/support-groups/  (378) 547-3780    [facility] Workshops/Classes: see listings of free classes at  https://[employer].org/programs-classes/all-programs/     Please contact program line at [phone] if interested in returning to programming      See your medical doctor about:  Annual physical exams, chronic pain, chronic disease management.     Other:  Your treatment team " appreciates having the opportunity to work with you and wishes you the best.     Client Signature: Unable to sign due to COVID-19  Staff Signature: MODESTA Mullins on 1/27/2022 at 12:35 PM               Ruth Borges  (she/her), MS, [facility]   Mental Health Outpatient Programs  Licensed Psychotherapist  Phone: [phone]    Fax: [phone]  [email or web address]   Employed by [employer]  Schedule: Monday - Thursday 8:00am -4:30pm

## 2022-02-19 ENCOUNTER — HEALTH MAINTENANCE LETTER (OUTPATIENT)
Age: 62
End: 2022-02-19

## 2022-04-12 ENCOUNTER — HOSPITAL ENCOUNTER (EMERGENCY)
Facility: CLINIC | Age: 62
Discharge: PSYCHIATRIC OR SUBSTANCE USE FACILITY | End: 2022-04-13
Attending: EMERGENCY MEDICINE | Admitting: EMERGENCY MEDICINE
Payer: MEDICARE

## 2022-04-12 ENCOUNTER — TELEPHONE (OUTPATIENT)
Dept: BEHAVIORAL HEALTH | Facility: CLINIC | Age: 62
End: 2022-04-12
Payer: COMMERCIAL

## 2022-04-12 DIAGNOSIS — F31.4 BIPOLAR DISORDER, CURRENT EPISODE DEPRESSED, SEVERE, WITHOUT PSYCHOTIC FEATURES (H): ICD-10-CM

## 2022-04-12 DIAGNOSIS — R45.851 SUICIDE IDEATION: ICD-10-CM

## 2022-04-12 DIAGNOSIS — Z11.52 ENCOUNTER FOR SCREENING LABORATORY TESTING FOR SEVERE ACUTE RESPIRATORY SYNDROME CORONAVIRUS 2 (SARS-COV-2): ICD-10-CM

## 2022-04-12 LAB
ALBUMIN SERPL-MCNC: 3.2 G/DL (ref 3.4–5)
ALBUMIN UR-MCNC: 30 MG/DL
ALP SERPL-CCNC: 127 U/L (ref 40–150)
ALT SERPL W P-5'-P-CCNC: 55 U/L (ref 0–50)
AMPHETAMINES UR QL SCN: ABNORMAL
ANION GAP SERPL CALCULATED.3IONS-SCNC: 6 MMOL/L (ref 3–14)
APAP SERPL-MCNC: <2 MG/L (ref 10–30)
APPEARANCE UR: CLEAR
AST SERPL W P-5'-P-CCNC: ABNORMAL U/L
BACTERIA #/AREA URNS HPF: ABNORMAL /HPF
BARBITURATES UR QL: ABNORMAL
BASOPHILS # BLD AUTO: 0 10E3/UL (ref 0–0.2)
BASOPHILS NFR BLD AUTO: 0 %
BENZODIAZ UR QL: ABNORMAL
BILIRUB SERPL-MCNC: 0.4 MG/DL (ref 0.2–1.3)
BILIRUB UR QL STRIP: NEGATIVE
BUN SERPL-MCNC: 18 MG/DL (ref 7–30)
CALCIUM SERPL-MCNC: 9 MG/DL (ref 8.5–10.1)
CANNABINOIDS UR QL SCN: ABNORMAL
CHLORIDE BLD-SCNC: 107 MMOL/L (ref 94–109)
CO2 SERPL-SCNC: 26 MMOL/L (ref 20–32)
COCAINE UR QL: ABNORMAL
COLOR UR AUTO: YELLOW
CREAT SERPL-MCNC: 0.78 MG/DL (ref 0.52–1.04)
EOSINOPHIL # BLD AUTO: 0.1 10E3/UL (ref 0–0.7)
EOSINOPHIL NFR BLD AUTO: 3 %
ERYTHROCYTE [DISTWIDTH] IN BLOOD BY AUTOMATED COUNT: 11.9 % (ref 10–15)
GFR SERPL CREATININE-BSD FRML MDRD: 85 ML/MIN/1.73M2
GLUCOSE BLD-MCNC: 196 MG/DL (ref 70–99)
GLUCOSE UR STRIP-MCNC: NEGATIVE MG/DL
HCT VFR BLD AUTO: 38.9 % (ref 35–47)
HGB BLD-MCNC: 13 G/DL (ref 11.7–15.7)
HGB UR QL STRIP: NEGATIVE
IMM GRANULOCYTES # BLD: 0 10E3/UL
IMM GRANULOCYTES NFR BLD: 0 %
KETONES UR STRIP-MCNC: NEGATIVE MG/DL
LEUKOCYTE ESTERASE UR QL STRIP: ABNORMAL
LYMPHOCYTES # BLD AUTO: 2.2 10E3/UL (ref 0.8–5.3)
LYMPHOCYTES NFR BLD AUTO: 39 %
MCH RBC QN AUTO: 30.9 PG (ref 26.5–33)
MCHC RBC AUTO-ENTMCNC: 33.4 G/DL (ref 31.5–36.5)
MCV RBC AUTO: 92 FL (ref 78–100)
MONOCYTES # BLD AUTO: 0.4 10E3/UL (ref 0–1.3)
MONOCYTES NFR BLD AUTO: 7 %
MUCOUS THREADS #/AREA URNS LPF: PRESENT /LPF
NEUTROPHILS # BLD AUTO: 2.8 10E3/UL (ref 1.6–8.3)
NEUTROPHILS NFR BLD AUTO: 51 %
NITRATE UR QL: NEGATIVE
NRBC # BLD AUTO: 0 10E3/UL
NRBC BLD AUTO-RTO: 0 /100
OPIATES UR QL SCN: ABNORMAL
PH UR STRIP: 5.5 [PH] (ref 5–7)
PLATELET # BLD AUTO: 283 10E3/UL (ref 150–450)
POTASSIUM BLD-SCNC: 3.5 MMOL/L (ref 3.4–5.3)
PROT SERPL-MCNC: 6.7 G/DL (ref 6.8–8.8)
RBC # BLD AUTO: 4.21 10E6/UL (ref 3.8–5.2)
RBC URINE: 1 /HPF
SALICYLATES SERPL-MCNC: <2 MG/DL
SARS-COV-2 RNA RESP QL NAA+PROBE: NEGATIVE
SODIUM SERPL-SCNC: 139 MMOL/L (ref 133–144)
SP GR UR STRIP: 1.02 (ref 1–1.03)
SQUAMOUS EPITHELIAL: 5 /HPF
TRANSITIONAL EPI: 1 /HPF
UROBILINOGEN UR STRIP-MCNC: NORMAL MG/DL
WBC # BLD AUTO: 5.5 10E3/UL (ref 4–11)
WBC URINE: 21 /HPF

## 2022-04-12 PROCEDURE — 93010 ELECTROCARDIOGRAM REPORT: CPT | Performed by: EMERGENCY MEDICINE

## 2022-04-12 PROCEDURE — 81001 URINALYSIS AUTO W/SCOPE: CPT | Performed by: EMERGENCY MEDICINE

## 2022-04-12 PROCEDURE — 93005 ELECTROCARDIOGRAM TRACING: CPT

## 2022-04-12 PROCEDURE — 85025 COMPLETE CBC W/AUTO DIFF WBC: CPT | Performed by: EMERGENCY MEDICINE

## 2022-04-12 PROCEDURE — 85610 PROTHROMBIN TIME: CPT | Performed by: EMERGENCY MEDICINE

## 2022-04-12 PROCEDURE — 36415 COLL VENOUS BLD VENIPUNCTURE: CPT | Performed by: EMERGENCY MEDICINE

## 2022-04-12 PROCEDURE — 90791 PSYCH DIAGNOSTIC EVALUATION: CPT

## 2022-04-12 PROCEDURE — 99285 EMERGENCY DEPT VISIT HI MDM: CPT | Mod: 25 | Performed by: EMERGENCY MEDICINE

## 2022-04-12 PROCEDURE — 84132 ASSAY OF SERUM POTASSIUM: CPT | Mod: 91 | Performed by: EMERGENCY MEDICINE

## 2022-04-12 PROCEDURE — 99285 EMERGENCY DEPT VISIT HI MDM: CPT | Mod: 25

## 2022-04-12 PROCEDURE — U0005 INFEC AGEN DETEC AMPLI PROBE: HCPCS | Performed by: EMERGENCY MEDICINE

## 2022-04-12 PROCEDURE — 84155 ASSAY OF PROTEIN SERUM: CPT | Performed by: EMERGENCY MEDICINE

## 2022-04-12 PROCEDURE — 250N000013 HC RX MED GY IP 250 OP 250 PS 637: Performed by: EMERGENCY MEDICINE

## 2022-04-12 PROCEDURE — 87086 URINE CULTURE/COLONY COUNT: CPT | Performed by: EMERGENCY MEDICINE

## 2022-04-12 PROCEDURE — 80179 DRUG ASSAY SALICYLATE: CPT | Performed by: EMERGENCY MEDICINE

## 2022-04-12 PROCEDURE — 80143 DRUG ASSAY ACETAMINOPHEN: CPT | Performed by: EMERGENCY MEDICINE

## 2022-04-12 PROCEDURE — 80307 DRUG TEST PRSMV CHEM ANLYZR: CPT | Performed by: EMERGENCY MEDICINE

## 2022-04-12 PROCEDURE — C9803 HOPD COVID-19 SPEC COLLECT: HCPCS

## 2022-04-12 RX ORDER — CYCLOBENZAPRINE HCL 10 MG
10 TABLET ORAL 3 TIMES DAILY PRN
COMMUNITY

## 2022-04-12 RX ORDER — SULFAMETHOXAZOLE/TRIMETHOPRIM 800-160 MG
1 TABLET ORAL 2 TIMES DAILY
Status: ON HOLD | COMMUNITY
Start: 2022-04-12 | End: 2022-04-20

## 2022-04-12 RX ORDER — SULFAMETHOXAZOLE/TRIMETHOPRIM 800-160 MG
1 TABLET ORAL 2 TIMES DAILY
Status: DISCONTINUED | OUTPATIENT
Start: 2022-04-12 | End: 2022-04-13 | Stop reason: HOSPADM

## 2022-04-12 RX ORDER — QUETIAPINE FUMARATE 100 MG/1
400 TABLET, FILM COATED ORAL AT BEDTIME
Status: DISCONTINUED | OUTPATIENT
Start: 2022-04-12 | End: 2022-04-13 | Stop reason: HOSPADM

## 2022-04-12 RX ORDER — AMLODIPINE BESYLATE 5 MG/1
5 TABLET ORAL DAILY
Status: DISCONTINUED | OUTPATIENT
Start: 2022-04-13 | End: 2022-04-13 | Stop reason: HOSPADM

## 2022-04-12 RX ORDER — ONDANSETRON 4 MG/1
4 TABLET, FILM COATED ORAL EVERY 6 HOURS PRN
Status: DISCONTINUED | OUTPATIENT
Start: 2022-04-12 | End: 2022-04-13

## 2022-04-12 RX ORDER — PANTOPRAZOLE SODIUM 40 MG/1
40 TABLET, DELAYED RELEASE ORAL 2 TIMES DAILY
Status: DISCONTINUED | OUTPATIENT
Start: 2022-04-13 | End: 2022-04-13 | Stop reason: HOSPADM

## 2022-04-12 RX ORDER — DULOXETIN HYDROCHLORIDE 30 MG/1
90 CAPSULE, DELAYED RELEASE ORAL DAILY
Status: DISCONTINUED | OUTPATIENT
Start: 2022-04-13 | End: 2022-04-13 | Stop reason: HOSPADM

## 2022-04-12 RX ORDER — VITAMIN B COMPLEX
1000 TABLET ORAL DAILY
Status: DISCONTINUED | OUTPATIENT
Start: 2022-04-13 | End: 2022-04-13 | Stop reason: HOSPADM

## 2022-04-12 RX ADMIN — QUETIAPINE FUMARATE 400 MG: 100 TABLET ORAL at 23:45

## 2022-04-12 RX ADMIN — SULFAMETHOXAZOLE AND TRIMETHOPRIM 1 TABLET: 800; 160 TABLET ORAL at 23:46

## 2022-04-12 ASSESSMENT — ENCOUNTER SYMPTOMS
NAUSEA: 0
VOMITING: 0
SLEEP DISTURBANCE: 0
COUGH: 0
NECK PAIN: 0
SHORTNESS OF BREATH: 0
ABDOMINAL PAIN: 0
DIFFICULTY URINATING: 0
DYSURIA: 1
FEVER: 0
BACK PAIN: 0
DYSPHORIC MOOD: 1
SORE THROAT: 0
HEADACHES: 0
EYE REDNESS: 0
FLANK PAIN: 1

## 2022-04-13 ENCOUNTER — TELEPHONE (OUTPATIENT)
Dept: BEHAVIORAL HEALTH | Facility: CLINIC | Age: 62
End: 2022-04-13
Payer: COMMERCIAL

## 2022-04-13 VITALS
RESPIRATION RATE: 18 BRPM | SYSTOLIC BLOOD PRESSURE: 103 MMHG | BODY MASS INDEX: 34.01 KG/M2 | DIASTOLIC BLOOD PRESSURE: 69 MMHG | HEART RATE: 95 BPM | OXYGEN SATURATION: 96 % | WEIGHT: 192 LBS | TEMPERATURE: 98.8 F

## 2022-04-13 LAB
AST SERPL W P-5'-P-CCNC: 47 U/L (ref 0–45)
ATRIAL RATE - MUSE: 88 BPM
DIASTOLIC BLOOD PRESSURE - MUSE: NORMAL MMHG
INR PPP: 1.04 (ref 0.86–1.14)
INTERPRETATION ECG - MUSE: NORMAL
P AXIS - MUSE: 60 DEGREES
POTASSIUM BLD-SCNC: 3.6 MMOL/L (ref 3.4–5.3)
PR INTERVAL - MUSE: 148 MS
QRS DURATION - MUSE: 82 MS
QT - MUSE: 386 MS
QTC - MUSE: 467 MS
R AXIS - MUSE: 53 DEGREES
SYSTOLIC BLOOD PRESSURE - MUSE: NORMAL MMHG
T AXIS - MUSE: 69 DEGREES
VENTRICULAR RATE- MUSE: 88 BPM

## 2022-04-13 PROCEDURE — 250N000013 HC RX MED GY IP 250 OP 250 PS 637: Performed by: EMERGENCY MEDICINE

## 2022-04-13 PROCEDURE — 250N000011 HC RX IP 250 OP 636: Performed by: EMERGENCY MEDICINE

## 2022-04-13 PROCEDURE — 250N000013 HC RX MED GY IP 250 OP 250 PS 637: Performed by: FAMILY MEDICINE

## 2022-04-13 RX ORDER — ONDANSETRON 4 MG/1
4 TABLET, ORALLY DISINTEGRATING ORAL EVERY 8 HOURS PRN
Status: DISCONTINUED | OUTPATIENT
Start: 2022-04-13 | End: 2022-04-13 | Stop reason: HOSPADM

## 2022-04-13 RX ORDER — DULOXETIN HYDROCHLORIDE 60 MG/1
1 CAPSULE, DELAYED RELEASE ORAL EVERY 24 HOURS
Status: ON HOLD | COMMUNITY
Start: 2021-10-27 | End: 2022-04-14

## 2022-04-13 RX ORDER — ACETAMINOPHEN 500 MG
1000 TABLET ORAL ONCE
Status: COMPLETED | OUTPATIENT
Start: 2022-04-13 | End: 2022-04-13

## 2022-04-13 RX ORDER — ACETAMINOPHEN 325 MG/1
650 TABLET ORAL ONCE
Status: COMPLETED | OUTPATIENT
Start: 2022-04-13 | End: 2022-04-13

## 2022-04-13 RX ORDER — IBUPROFEN 200 MG
400 TABLET ORAL ONCE
Status: DISCONTINUED | OUTPATIENT
Start: 2022-04-13 | End: 2022-04-13

## 2022-04-13 RX ADMIN — ONDANSETRON 4 MG: 4 TABLET, ORALLY DISINTEGRATING ORAL at 19:05

## 2022-04-13 RX ADMIN — PANTOPRAZOLE SODIUM 40 MG: 40 TABLET, DELAYED RELEASE ORAL at 19:05

## 2022-04-13 RX ADMIN — ACETAMINOPHEN 1000 MG: 500 TABLET ORAL at 03:04

## 2022-04-13 RX ADMIN — QUETIAPINE FUMARATE 400 MG: 100 TABLET ORAL at 21:31

## 2022-04-13 RX ADMIN — Medication 1 TABLET: at 09:00

## 2022-04-13 RX ADMIN — ACETAMINOPHEN 1000 MG: 500 TABLET ORAL at 09:18

## 2022-04-13 RX ADMIN — AMLODIPINE BESYLATE 5 MG: 5 TABLET ORAL at 07:52

## 2022-04-13 RX ADMIN — SULFAMETHOXAZOLE AND TRIMETHOPRIM 1 TABLET: 800; 160 TABLET ORAL at 19:05

## 2022-04-13 RX ADMIN — SULFAMETHOXAZOLE AND TRIMETHOPRIM 1 TABLET: 800; 160 TABLET ORAL at 07:52

## 2022-04-13 RX ADMIN — Medication 1000 UNITS: at 07:52

## 2022-04-13 RX ADMIN — PANTOPRAZOLE SODIUM 40 MG: 40 TABLET, DELAYED RELEASE ORAL at 07:52

## 2022-04-13 RX ADMIN — ACETAMINOPHEN 650 MG: 325 TABLET ORAL at 21:31

## 2022-04-13 RX ADMIN — DULOXETINE HYDROCHLORIDE 90 MG: 30 CAPSULE, DELAYED RELEASE ORAL at 07:53

## 2022-04-13 NOTE — ED NOTES
Pt givens writer consent to give her  updates on her care in the ER, except for her overdose last week on medications.

## 2022-04-13 NOTE — ED TRIAGE NOTES
Patient presents with spouse present today. Patient reports seeing primary today, who told her to be seen at the ED for increased suicidal thoughts.

## 2022-04-13 NOTE — ED NOTES
Writer tried calling [facility] to give report, hospital unable to take report at this time due to a pt coming back positive for covid-19 on the unit.

## 2022-04-13 NOTE — TELEPHONE ENCOUNTER
[id number] Charge reviewing.  Awaiting callback.    0339 Unit called to say pt would be appropriate for milieu.     Ongoing depression with suicide ideations.  Plan to overdose.  She reports that she did overdose on her amlodipine, Flexeril, and hydroxyzine 5 days ago.  Patient really did not develop any symptoms despite a relatively significant reported ingestion.  She denies any acetaminophen ingestion.  She has no symptoms of ingestion here in the emergency department.  She does have some UTI symptoms for which she is already being treated with Bactrim.  Her urinalysis consistent with a urinary tract infection.  There is no hematuria.  Do not suspect ureteral stone/infected stone.    Her acetaminophen level is negative.  She has mild ALT elevation which she has had before.  The chemistry panel was hemolyzed so potassium and AST are pending redraw.  Her ALT is not significantly different than previous levels.  With no history of acetaminophen ingestion, do not feel that this is related to acetaminophen induced hepatotoxicity.  Recheck of AST and potassium are pending.   Recheck of AST, potassium, and INR signed out at change of shift.  Patient has normal vital signs and appears clinically well.  She appears medically stable for psychiatric admission.    ALT 55    Potassium redraw 3.6  AST 47  INR within normal range    Negative for COVID.  Drug screen positive for opiates.     0346 Provider review requested.  Awaiting callback.    0614 Provider accepts.  5500/Mike.  Placed in queue and unit notified at 0618.  Unit will call ED for report on days.  ED notified at 0621.
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
## 2022-04-13 NOTE — ED NOTES
Writer called extended care, pt is requesting to leave. Writer updated extended care to see if she can get another mental health assessment completed.

## 2022-04-13 NOTE — ED NOTES
[facility] Crisis Reassessment      Marissa Youssef was reassessed at the request of medical staff for the following reasons: pt was expressing a desire to go home. Pt was first seen on 4/12/2022 by Salas Vinson, remotely; see the initial assessment note for details.      Patient Presentation    Initial ED presentation details: Pt was stressed by hearing music for 90 minutes, reported increased depression and anxiety over the past month.  SI with current plan to overdose.  Reported overdose on meds last Thursday, and pt got sick to her stomach.    Current patient presentation: Pt slept over night in the ED and is waiting for admission.  She reports that her back hurts from the fold out cot in ED 16A and she says she is getting more frustrated the longer she has to wait.  She says her frustration needs to come out, but she doesn't want to get angry at anyone so instead she focuses it inward and thus continues to have thoughts of wanting to end her life.  She admits that she doesn't think it is a 'safe' idea to discharged home, but part of her still wants to leave.  She was to be admitted at [facility] however there was a covid exposure so that placement was put on hold.      Changes observed since initial assessment: Pt expressed desire to be discharged home, however she continues to have si and her medication is not helping enough since she stopped taking her topamax, buspar and depakote.  She agrees to stay a bit longer and nursing staff is trying to get her a room change so she can be on a regular cot/ bed to help with back discomfort.  Denies any psychotic sx at this time. Reports that she tries to sleep to 'pass the time', has low motivation to do anything, feels she needs hydroxyzine to leave the house.  Being around people is exhausting, thus hates going out for basic things like groceries.      Risk of Harm    Is the patient experiencing current suicidal ideation: Yes. Plan: overdose on medication Intent was considering  yesterday and the more she waits the more she has urges to go home and try again    Does the patient have thoughts of harming others? No      Mental Status Exam   Affect: Appropriate   Appearance: Appropriate and Other: neatly dressed, somewhat messy hair considering she's been laying down here    Attention Span/Concentration: Attentive?    Eye Contact: Engaged   Fund of Knowledge: Appropriate    Language /Speech Content: Fluent   Language /Speech Volume: Normal    Language /Speech Rate/Productions: Normal    Recent Memory: Intact   Remote Memory: Intact   Mood: Anxious and Depressed    Orientation to Person: Yes    Orientation to Place: Yes   Orientation to Time of Day: Yes    Orientation to Date: Yes    Situation (Do they understand why they are here?): Yes    Psychomotor Behavior: Normal    Thought Content: Suicidal   Thought Form: Intact       Additional Collateral Information     Spoke with  Keanu on the phone who also agrees to pt's safety risks and agrees that admission would be safer.  He reports that pt has seen a pain management doctor and is also prescribed a tylenol #3 prn, so wonders if that might help.  Informed Afwn in Intake that pt would prefer the SR unit at [facility] but would also consider [facility] if no other [facility] beds are available.  Status on [facility] unit still pending.    Therapeutic Intervention  The following therapeutic methodologies were employed when working with the patient: Active listening, Motivational Interviewing, Brief Supportive Therapy and Safety planning. Patient response to intervention: Pt agrees that discharging from ED today would not be safe, seems to have some benefit from expressing frustrations, venting.  She remains willing to be admitted to a psych unit at this time, and was made aware of option for 72 hour hold if necessary.  .      Disposition  Recommended disposition: Inpatient Mental Health      Reviewed case and recommendations with attending  provider. Attending Name: Dr Esteban Madera    Attending concurs with disposition: Yes      Patient concurs with disposition: Yes, at this time pt remains voluntary, MD aware that a hold may need to be considered if pt changes her mind      Final disposition: Inpatient mental health .       Clinical Substantiation of Recommendations  Disposition not changed, recommend admission as pt continues to have si.  She reported an attempted   overdose on pills 6 days ago, which just made her 'sick'.  She continues to want to internalize her frustrations with being her, thus continues to have si.   agrees that being home does not feel safe at this time.        Assessment Details  Total duration spent on the patient case in minutes: .50 hrs     CPT code(s) utilized: 40415 - Psychotherapy for Crisis - 60 (30-74*) min       AVINASH Euceda

## 2022-04-13 NOTE — TELEPHONE ENCOUNTER
311pm -  called and reports pt is now willing to be reviewed for placement at [facility] too, continues to report SI    325pm - Intake called [facility], per Radha they don't have appropriate beds avail at this time     Pt is in queue for 5500/[facility], awaiting to hear if the unit is closed to admission or not.

## 2022-04-13 NOTE — ED PROVIDER NOTES
Emergency Department Patient Sign-out       Brief HPI:  This is a 61 year old female signed out to me by Dr. Hutchins .  See initial ED Provider note for details of the presentation.          Patient is medically cleared for admission to a Behavioral Health unit.      The patient is not on a hold.      The patient has not required medication for agitation.    Awaiting Transfer to Mental Health Facility        Significant Events prior to my assuming care: Patient with a history of major depression who presented due to to worsening depression and suicidal thoughts.  Was evaluated due to some urinary symptoms and cleared medically prior shift.  Has been placed in the queue for voluntary admission and is awaiting bed placement.      Exam:   Patient Vitals for the past 24 hrs:   BP Temp Temp src Pulse Resp SpO2 Weight   04/13/22 0717 134/78 98.8  F (37.1  C) Oral 111 18 94 % --   04/13/22 0012 131/78 98.5  F (36.9  C) Oral 92 18 96 % --   04/12/22 1922 138/80 98.6  F (37  C) Oral 101 18 98 % 87.1 kg (192 lb)           ED RESULTS:   Results for orders placed or performed during the hospital encounter of 04/12/22 (from the past 24 hour(s))   EKG 12-lead, tracing only     Status: None (Preliminary result)    Collection Time: 04/12/22  8:52 PM   Result Value Ref Range    Systolic Blood Pressure  mmHg    Diastolic Blood Pressure  mmHg    Ventricular Rate 88 BPM    Atrial Rate 88 BPM    NH Interval 148 ms    QRS Duration 82 ms     ms    QTc 467 ms    P Axis 60 degrees    R AXIS 53 degrees    T Axis 69 degrees    Interpretation ECG       Sinus rhythm  Low voltage QRS  Nonspecific T wave abnormality  Abnormal ECG     CBC with platelets differential     Status: None    Collection Time: 04/12/22  9:18 PM    Narrative    The following orders were created for panel order CBC with platelets differential.  Procedure                               Abnormality         Status                     ---------                                -----------         ------                     CBC with platelets and d...[[id number]]                      Final result                 Please view results for these tests on the individual orders.   Comprehensive metabolic panel     Status: Abnormal    Collection Time: 04/12/22  9:18 PM   Result Value Ref Range    Sodium 139 133 - 144 mmol/L    Potassium 3.5 3.4 - 5.3 mmol/L    Chloride 107 94 - 109 mmol/L    Carbon Dioxide (CO2) 26 20 - 32 mmol/L    Anion Gap 6 3 - 14 mmol/L    Urea Nitrogen 18 7 - 30 mg/dL    Creatinine 0.78 0.52 - 1.04 mg/dL    Calcium 9.0 8.5 - 10.1 mg/dL    Glucose 196 (H) 70 - 99 mg/dL    Alkaline Phosphatase 127 40 - 150 U/L    AST      ALT 55 (H) 0 - 50 U/L    Protein Total 6.7 (L) 6.8 - 8.8 g/dL    Albumin 3.2 (L) 3.4 - 5.0 g/dL    Bilirubin Total 0.4 0.2 - 1.3 mg/dL    GFR Estimate 85 >60 mL/min/1.73m2   Acetaminophen level     Status: Abnormal    Collection Time: 04/12/22  9:18 PM   Result Value Ref Range    Acetaminophen <2 (L) 10 - 30 mg/L   Salicylate level     Status: Normal    Collection Time: 04/12/22  9:18 PM   Result Value Ref Range    Salicylate <2 <20 mg/dL   CBC with platelets and differential     Status: None    Collection Time: 04/12/22  9:18 PM   Result Value Ref Range    WBC Count 5.5 4.0 - 11.0 10e3/uL    RBC Count 4.21 3.80 - 5.20 10e6/uL    Hemoglobin 13.0 11.7 - 15.7 g/dL    Hematocrit 38.9 35.0 - 47.0 %    MCV 92 78 - 100 fL    MCH 30.9 26.5 - 33.0 pg    MCHC 33.4 31.5 - 36.5 g/dL    RDW 11.9 10.0 - 15.0 %    Platelet Count 283 150 - 450 10e3/uL    % Neutrophils 51 %    % Lymphocytes 39 %    % Monocytes 7 %    % Eosinophils 3 %    % Basophils 0 %    % Immature Granulocytes 0 %    NRBCs per 100 WBC 0 <1 /100    Absolute Neutrophils 2.8 1.6 - 8.3 10e3/uL    Absolute Lymphocytes 2.2 0.8 - 5.3 10e3/uL    Absolute Monocytes 0.4 0.0 - 1.3 10e3/uL    Absolute Eosinophils 0.1 0.0 - 0.7 10e3/uL    Absolute Basophils 0.0 0.0 - 0.2 10e3/uL    Absolute Immature Granulocytes  0.0 <=0.4 10e3/uL    Absolute NRBCs 0.0 10e3/uL   Asymptomatic COVID-19 Virus (Coronavirus) by PCR Nose     Status: Normal    Collection Time: 04/12/22  9:19 PM    Specimen: Nose; Swab   Result Value Ref Range    SARS CoV2 PCR Negative Negative    Narrative    Testing was performed using the [employer]  SARS-CoV-2 & Influenza A/B Assay on the [employer]  System.  This test should be ordered for the detection of SARS-COV-2 in individuals who meet SARS-CoV-2 clinical and/or epidemiological criteria. Test performance is unknown in asymptomatic patients.  This test is for in vitro diagnostic use under the FDA EUA for laboratories certified under CLIA to perform moderate and/or high complexity testing. This test has not been FDA cleared or approved.  A negative test does not rule out the presence of PCR inhibitors in the specimen or target RNA in concentration below the limit of detection for the assay. The possibility of a false negative should be considered if the patient's recent exposure or clinical presentation suggests COVID-19.  [facility] are certified under the Clinical Laboratory Improvement Amendments of 1988 (CLIA-88) as qualified to perform moderate and/or high complexity laboratory testing.   UA with Microscopic reflex to Culture     Status: Abnormal    Collection Time: 04/12/22  9:20 PM    Specimen: Urine, Clean Catch   Result Value Ref Range    Color Urine Yellow Colorless, Straw, Light Yellow, Yellow    Appearance Urine Clear Clear    Glucose Urine Negative Negative mg/dL    Bilirubin Urine Negative Negative    Ketones Urine Negative Negative mg/dL    Specific Gravity Urine 1.021 1.003 - 1.035    Blood Urine Negative Negative    pH Urine 5.5 5.0 - 7.0    Protein Albumin Urine 30  (A) Negative mg/dL    Urobilinogen Urine Normal Normal, 2.0 mg/dL    Nitrite Urine Negative Negative    Leukocyte Esterase Urine Moderate (A) Negative    Bacteria Urine Few (A) None Seen /HPF    Mucus Urine  Present (A) None Seen /LPF    RBC Urine 1 <=2 /HPF    WBC Urine 21 (H) <=5 /HPF    Squamous Epithelials Urine 5 (H) <=1 /HPF    Transitional Epithelials Urine 1 <=1 /HPF    Narrative    Urine Culture ordered based on laboratory criteria   Urine Drugs of Abuse Screen     Status: Abnormal    Collection Time: 04/12/22  9:20 PM    Narrative    The following orders were created for panel order Urine Drugs of Abuse Screen.  Procedure                               Abnormality         Status                     ---------                               -----------         ------                     Drug abuse screen 1 urin...[[id number]]  Abnormal            Final result                 Please view results for these tests on the individual orders.   Drug abuse screen 1 urine (ED)     Status: Abnormal    Collection Time: 04/12/22  9:20 PM   Result Value Ref Range    Amphetamines Urine Screen Negative Screen Negative    Barbiturates Urine Screen Negative Screen Negative    Benzodiazepines Urine Screen Negative Screen Negative    Cannabinoids Urine Screen Negative Screen Negative    Cocaine Urine Screen Negative Screen Negative    Opiates Urine Screen Positive (A) Screen Negative   Potassium     Status: Normal    Collection Time: 04/12/22 11:58 PM   Result Value Ref Range    Potassium 3.6 3.4 - 5.3 mmol/L   AST     Status: Abnormal    Collection Time: 04/12/22 11:58 PM   Result Value Ref Range    AST 47 (H) 0 - 45 U/L   INR     Status: Normal    Collection Time: 04/12/22 11:58 PM   Result Value Ref Range    INR 1.04 0.86 - 1.14       ED MEDICATIONS:   Medications   amLODIPine (NORVASC) tablet 5 mg (5 mg Oral Given 4/13/22 0752)   DULoxetine (CYMBALTA) DR capsule 90 mg (90 mg Oral Given 4/13/22 4953)   pantoprazole (PROTONIX) EC tablet 40 mg (40 mg Oral Given 4/13/22 0752)   ondansetron (ZOFRAN) tablet 4 mg (has no administration in time range)   QUEtiapine (SEROquel) tablet 400 mg (400 mg Oral Given 4/12/22 1169)    sulfamethoxazole-trimethoprim (BACTRIM DS) 800-160 MG per tablet 1 tablet (1 tablet Oral Given 4/13/22 0752)   Vitamin D3 (CHOLECALCIFEROL) tablet 1,000 Units (1,000 Units Oral Given 4/13/22 0752)   calcium carbonate-vitamin D (OS-ELLI with D) per tablet 1 tablet (has no administration in time range)   acetaminophen (TYLENOL) tablet 1,000 mg (1,000 mg Oral Given 4/13/22 0304)         Impression:    ICD-10-CM    1. Suicide ideation  R45.851    2. Bipolar disorder, current episode depressed, severe, without psychotic features (H)  F31.4        Plan:    Pending studies include none.  Awaiting mental health bed placement.        MD Santy Ramirez David, MD  04/13/22 9278

## 2022-04-13 NOTE — SAFE
Marissa HENSLEY Mikael  April 12, 2022    SAFE Note    Critical Safety Issues: suicidal ideation with a plan to overdose; patient reports suicide attempt last Thursday by overdosing- she has not disclosed this to anyone until this evening.      Current Suicidal Ideation/Self-Injurious Concerns/Methods: Ingestion various medications at home.      Current or Historical Inappropriate Sexual Behavior: No      Current or Historical Aggression/Homicidal Ideation: None - N/A      Triggers: The patient cannot identify any specific triggers. She does note that she was taken off of Buspar and Depakote at the end of February- this could be a contributing factor.    Updated care team: Yes: Dr. Jimenez in agreement to admission. Central Intake notified and patient is on wait list for placement.    For additional details see full [facility] assessment.       Salas Vinson, [facility]  4/12/2022  9:14 PM

## 2022-04-13 NOTE — ED PROVIDER NOTES
ED Provider Note  [facility]      History     Chief Complaint   Patient presents with     Suicidal     Patient presents today due to increased suicidal thoughts over the past 3 weeks. Patient reports changes to medications and needing to have medication evaluation. Patient denies plan at this time.     ROXI  Marissa Youssef is a 61 year old female who presents emergency department for evaluation of increasing depression and suicidal ideations.  She been feeling increasingly suicidal over the past 2 weeks.  She has an ongoing plan to overdose.  Patient states that she did overdose on some of her medications on Thursday.  She states that she took  30 amlodipine, Flexeril, and hydroxyzine.  Patient states that she felt nauseous but did not vomit.  Patient states that her heart rate was fast but her blood pressure never dropped.  Patient states that she did not fall asleep or have any other symptoms.  The patient denies any acetaminophen ingestion.  The next day, the patient was having some dysuria, right flank pain, and hematuria.  She presented for evaluation in urgent care and was prescribed with a urinary tract infection.  She was placed on amoxicillin.  She saw her primary care doctor today and was switched to Bactrim.  Urine culture was not obtained at the initial visit but was apparently obtained today.  None of these records are available for review.  The patient was taken off BuSpar and Depakote at the end of February because she was feeling better and wanted to get off some of the medications that she is on.  Patient states that she did have a temperature of 104 days ago.  She has not had a fever since.  She states that her dysuria and flank pain are improving.  The patient states that she is COVID vaccinated denies any respiratory symptoms including fever, cough, dyspnea.    Past Medical History  Past Medical History:   Diagnosis Date     Anxiety      Depressive disorder       Diabetes (H)     Diet controlled.     Eating disorder      Hx of previous reproductive problem 1977     Hyperlipidemia      Hypertension      Pelvic floor instability      PTSD (post-traumatic stress disorder)      Sphincter of Oddi dysfunction      Past Surgical History:   Procedure Laterality Date     ABDOMEN SURGERY  1999     CHOLECYSTECTOMY  2000     COLONOSCOPY  2018     GYN SURGERY  2005    Complete hysterectomy     ORTHOPEDIC SURGERY      right knee replacement 2008     SOFT TISSUE SURGERY  2019    Carpel tunnel, trigger finger release     acetaminophen-codeine (TYLENOL #3) 300-30 MG tablet  amLODIPine (NORVASC) 5 MG tablet  Calcium Carbonate-Vit D-Min (CALCIUM 1200 PO)  cyclobenzaprine (FLEXERIL) 10 MG tablet  Diclofenac Sodium 1 % CREA  DULoxetine (CYMBALTA) 30 MG capsule  hydrOXYzine (ATARAX) 50 MG tablet  Lidocaine (LIDOCARE) 4 % Patch  Melatonin 10 MG TABS tablet  multivitamin, therapeutic (THERA-VIT) TABS tablet  omeprazole 20 MG tablet  ondansetron (ZOFRAN) 4 MG tablet  QUEtiapine (SEROQUEL) 400 MG tablet  sulfamethoxazole-trimethoprim (BACTRIM DS) 800-160 MG tablet  Vitamin D, Cholecalciferol, 25 MCG (1000 UT) TABS      No Known Allergies  Family History  Family History   Problem Relation Age of Onset     Chronic Obstructive Pulmonary Disease Mother      Coronary Artery Disease Father      Myocardial Infarction Father      Hyperlipidemia Sister      Kidney failure Brother      Heart Failure Brother      Social History   Social History     Tobacco Use     Smoking status: Former Smoker     Smokeless tobacco: Never Used     Tobacco comment: 3 cigarettes a day   Substance Use Topics     Alcohol use: No     Drug use: No      Past medical history, past surgical history, medications, allergies, family history, and social history were reviewed with the patient. No additional pertinent items.       Review of Systems   Constitutional: Negative for fever.   HENT: Negative for sore throat.    Eyes: Negative for  redness.   Respiratory: Negative for cough and shortness of breath.    Cardiovascular: Negative for chest pain.   Gastrointestinal: Negative for abdominal pain, nausea and vomiting.   Genitourinary: Positive for dysuria and flank pain. Negative for difficulty urinating.   Musculoskeletal: Negative for back pain and neck pain.   Skin: Negative for rash.   Neurological: Negative for headaches.   Psychiatric/Behavioral: Positive for dysphoric mood and suicidal ideas. Negative for sleep disturbance.   All other systems reviewed and are negative.        Physical Exam   BP: 138/80  Pulse: 101  Temp: 98.6  F (37  C)  Resp: 18  Weight: 87.1 kg (192 lb)  SpO2: 98 %  Physical Exam  Vitals and nursing note reviewed.   Constitutional:       General: She is not in acute distress.     Appearance: Normal appearance. She is not diaphoretic.   HENT:      Head: Atraumatic.      Mouth/Throat:      Pharynx: No oropharyngeal exudate.   Eyes:      General: No scleral icterus.     Pupils: Pupils are equal, round, and reactive to light.   Cardiovascular:      Rate and Rhythm: Normal rate and regular rhythm.      Pulses: Normal pulses.      Heart sounds: Normal heart sounds.   Pulmonary:      Effort: No respiratory distress.      Breath sounds: Normal breath sounds.   Abdominal:      General: Bowel sounds are normal.      Palpations: Abdomen is soft.      Tenderness: There is no abdominal tenderness.   Musculoskeletal:         General: No tenderness. Normal range of motion.   Skin:     General: Skin is warm.      Findings: No rash.   Neurological:      General: No focal deficit present.      Mental Status: She is alert.      Cranial Nerves: No cranial nerve deficit.      Motor: No weakness.      Coordination: Coordination normal.      Gait: Gait normal.   Psychiatric:         Mood and Affect: Mood is depressed. Affect is blunt.         Speech: Speech normal.         Behavior: Behavior normal.         Thought Content: Thought content  includes suicidal ideation. Thought content includes suicidal plan.         ED Course      Procedures            EKG Interpretation:      Interpreted by ROMAINE LOCKHART MD, MD  Time reviewed: 2054  Symptoms at time of EKG: None   Rhythm: normal sinus   Rate: 88  Axis: Normal  Ectopy: none  Conduction: normal  ST Segments/ T Waves: Nonspecific T wave flattening.  Q Waves: none  Comparison to prior: No old EKG available    Clinical Impression: non-specific EKG      Results for orders placed or performed during the hospital encounter of 04/12/22   Comprehensive metabolic panel     Status: Abnormal   Result Value Ref Range    Sodium 139 133 - 144 mmol/L    Potassium 3.5 3.4 - 5.3 mmol/L    Chloride 107 94 - 109 mmol/L    Carbon Dioxide (CO2) 26 20 - 32 mmol/L    Anion Gap 6 3 - 14 mmol/L    Urea Nitrogen 18 7 - 30 mg/dL    Creatinine 0.78 0.52 - 1.04 mg/dL    Calcium 9.0 8.5 - 10.1 mg/dL    Glucose 196 (H) 70 - 99 mg/dL    Alkaline Phosphatase 127 40 - 150 U/L    AST      ALT 55 (H) 0 - 50 U/L    Protein Total 6.7 (L) 6.8 - 8.8 g/dL    Albumin 3.2 (L) 3.4 - 5.0 g/dL    Bilirubin Total 0.4 0.2 - 1.3 mg/dL    GFR Estimate 85 >60 mL/min/1.73m2   Acetaminophen level     Status: Abnormal   Result Value Ref Range    Acetaminophen <2 (L) 10 - 30 mg/L   Salicylate level     Status: Normal   Result Value Ref Range    Salicylate <2 <20 mg/dL   UA with Microscopic reflex to Culture     Status: Abnormal    Specimen: Urine, Clean Catch   Result Value Ref Range    Color Urine Yellow Colorless, Straw, Light Yellow, Yellow    Appearance Urine Clear Clear    Glucose Urine Negative Negative mg/dL    Bilirubin Urine Negative Negative    Ketones Urine Negative Negative mg/dL    Specific Gravity Urine 1.021 1.003 - 1.035    Blood Urine Negative Negative    pH Urine 5.5 5.0 - 7.0    Protein Albumin Urine 30  (A) Negative mg/dL    Urobilinogen Urine Normal Normal, 2.0 mg/dL    Nitrite Urine Negative Negative    Leukocyte Esterase Urine Moderate  (A) Negative    Bacteria Urine Few (A) None Seen /HPF    Mucus Urine Present (A) None Seen /LPF    RBC Urine 1 <=2 /HPF    WBC Urine 21 (H) <=5 /HPF    Squamous Epithelials Urine 5 (H) <=1 /HPF    Transitional Epithelials Urine 1 <=1 /HPF    Narrative    Urine Culture ordered based on laboratory criteria   CBC with platelets and differential     Status: None   Result Value Ref Range    WBC Count 5.5 4.0 - 11.0 10e3/uL    RBC Count 4.21 3.80 - 5.20 10e6/uL    Hemoglobin 13.0 11.7 - 15.7 g/dL    Hematocrit 38.9 35.0 - 47.0 %    MCV 92 78 - 100 fL    MCH 30.9 26.5 - 33.0 pg    MCHC 33.4 31.5 - 36.5 g/dL    RDW 11.9 10.0 - 15.0 %    Platelet Count 283 150 - 450 10e3/uL    % Neutrophils 51 %    % Lymphocytes 39 %    % Monocytes 7 %    % Eosinophils 3 %    % Basophils 0 %    % Immature Granulocytes 0 %    NRBCs per 100 WBC 0 <1 /100    Absolute Neutrophils 2.8 1.6 - 8.3 10e3/uL    Absolute Lymphocytes 2.2 0.8 - 5.3 10e3/uL    Absolute Monocytes 0.4 0.0 - 1.3 10e3/uL    Absolute Eosinophils 0.1 0.0 - 0.7 10e3/uL    Absolute Basophils 0.0 0.0 - 0.2 10e3/uL    Absolute Immature Granulocytes 0.0 <=0.4 10e3/uL    Absolute NRBCs 0.0 10e3/uL   Drug abuse screen 1 urine (ED)     Status: Abnormal   Result Value Ref Range    Amphetamines Urine Screen Negative Screen Negative    Barbiturates Urine Screen Negative Screen Negative    Benzodiazepines Urine Screen Negative Screen Negative    Cannabinoids Urine Screen Negative Screen Negative    Cocaine Urine Screen Negative Screen Negative    Opiates Urine Screen Positive (A) Screen Negative   EKG 12-lead, tracing only     Status: None (Preliminary result)   Result Value Ref Range    Systolic Blood Pressure  mmHg    Diastolic Blood Pressure  mmHg    Ventricular Rate 88 BPM    Atrial Rate 88 BPM    W[place] Interval 148 ms    QRS Duration 82 ms     ms    QTc 467 ms    P Axis 60 degrees    R AXIS 53 degrees    T Axis 69 degrees    Interpretation ECG       Sinus rhythm  Low voltage  QRS  Nonspecific T wave abnormality  Abnormal ECG     CBC with platelets differential     Status: None    Narrative    The following orders were created for panel order CBC with platelets differential.  Procedure                               Abnormality         Status                     ---------                               -----------         ------                     CBC with platelets and d...[[id number]]                      Final result                 Please view results for these tests on the individual orders.   Urine Drugs of Abuse Screen     Status: Abnormal    Narrative    The following orders were created for panel order Urine Drugs of Abuse Screen.  Procedure                               Abnormality         Status                     ---------                               -----------         ------                     Drug abuse screen 1 urin...[[id number]]  Abnormal            Final result                 Please view results for these tests on the individual orders.        Mental Health Risk Assessment      PSS-3    Date and Time Over the past 2 weeks have you felt down, depressed, or hopeless? Over the past 2 weeks have you had thoughts of killing yourself? Have you ever attempted to kill yourself? When did this last happen? User   04/12/22 1925 yes yes yes more than 6 months ago TMW      C-SSRS ([place])    Date and Time Q1 Wished to be Dead (Past Month) Q2 Suicidal Thoughts (Past Month) Q3 Suicidal Thought Method Q4 Suicidal Intent without Specific Plan Q5 Suicide Intent with Specific Plan Q6 Suicide Behavior (Lifetime) Within the Past 3 Months? RETIRED: Level of Risk per Screen Screening Not Complete User   04/12/22 1925 yes yes yes no no yes -- -- -- TMW              Suicide assessment completed by mental health (D.E.C., LCSW, etc.)       Results for orders placed or performed during the hospital encounter of 04/12/22   Comprehensive metabolic panel     Status: Abnormal   Result Value Ref  Range    Sodium 139 133 - 144 mmol/L    Potassium 3.5 3.4 - 5.3 mmol/L    Chloride 107 94 - 109 mmol/L    Carbon Dioxide (CO2) 26 20 - 32 mmol/L    Anion Gap 6 3 - 14 mmol/L    Urea Nitrogen 18 7 - 30 mg/dL    Creatinine 0.78 0.52 - 1.04 mg/dL    Calcium 9.0 8.5 - 10.1 mg/dL    Glucose 196 (H) 70 - 99 mg/dL    Alkaline Phosphatase 127 40 - 150 U/L    AST      ALT 55 (H) 0 - 50 U/L    Protein Total 6.7 (L) 6.8 - 8.8 g/dL    Albumin 3.2 (L) 3.4 - 5.0 g/dL    Bilirubin Total 0.4 0.2 - 1.3 mg/dL    GFR Estimate 85 >60 mL/min/1.73m2   Acetaminophen level     Status: Abnormal   Result Value Ref Range    Acetaminophen <2 (L) 10 - 30 mg/L   Salicylate level     Status: Normal   Result Value Ref Range    Salicylate <2 <20 mg/dL   UA with Microscopic reflex to Culture     Status: Abnormal    Specimen: Urine, Clean Catch   Result Value Ref Range    Color Urine Yellow Colorless, Straw, Light Yellow, Yellow    Appearance Urine Clear Clear    Glucose Urine Negative Negative mg/dL    Bilirubin Urine Negative Negative    Ketones Urine Negative Negative mg/dL    Specific Gravity Urine 1.021 1.003 - 1.035    Blood Urine Negative Negative    pH Urine 5.5 5.0 - 7.0    Protein Albumin Urine 30  (A) Negative mg/dL    Urobilinogen Urine Normal Normal, 2.0 mg/dL    Nitrite Urine Negative Negative    Leukocyte Esterase Urine Moderate (A) Negative    Bacteria Urine Few (A) None Seen /HPF    Mucus Urine Present (A) None Seen /LPF    RBC Urine 1 <=2 /HPF    WBC Urine 21 (H) <=5 /HPF    Squamous Epithelials Urine 5 (H) <=1 /HPF    Transitional Epithelials Urine 1 <=1 /HPF    Narrative    Urine Culture ordered based on laboratory criteria   CBC with platelets and differential     Status: None   Result Value Ref Range    WBC Count 5.5 4.0 - 11.0 10e3/uL    RBC Count 4.21 3.80 - 5.20 10e6/uL    Hemoglobin 13.0 11.7 - 15.7 g/dL    Hematocrit 38.9 35.0 - 47.0 %    MCV 92 78 - 100 fL    MCH 30.9 26.5 - 33.0 pg    MCHC 33.4 31.5 - 36.5 g/dL    RDW  11.9 10.0 - 15.0 %    Platelet Count 283 150 - 450 10e3/uL    % Neutrophils 51 %    % Lymphocytes 39 %    % Monocytes 7 %    % Eosinophils 3 %    % Basophils 0 %    % Immature Granulocytes 0 %    NRBCs per 100 WBC 0 <1 /100    Absolute Neutrophils 2.8 1.6 - 8.3 10e3/uL    Absolute Lymphocytes 2.2 0.8 - 5.3 10e3/uL    Absolute Monocytes 0.4 0.0 - 1.3 10e3/uL    Absolute Eosinophils 0.1 0.0 - 0.7 10e3/uL    Absolute Basophils 0.0 0.0 - 0.2 10e3/uL    Absolute Immature Granulocytes 0.0 <=0.4 10e3/uL    Absolute NRBCs 0.0 10e3/uL   Drug abuse screen 1 urine (ED)     Status: Abnormal   Result Value Ref Range    Amphetamines Urine Screen Negative Screen Negative    Barbiturates Urine Screen Negative Screen Negative    Benzodiazepines Urine Screen Negative Screen Negative    Cannabinoids Urine Screen Negative Screen Negative    Cocaine Urine Screen Negative Screen Negative    Opiates Urine Screen Positive (A) Screen Negative   EKG 12-lead, tracing only     Status: None (Preliminary result)   Result Value Ref Range    Systolic Blood Pressure  mmHg    Diastolic Blood Pressure  mmHg    Ventricular Rate 88 BPM    Atrial Rate 88 BPM    MI Interval 148 ms    QRS Duration 82 ms     ms    QTc 467 ms    P Axis 60 degrees    R AXIS 53 degrees    T Axis 69 degrees    Interpretation ECG       Sinus rhythm  Low voltage QRS  Nonspecific T wave abnormality  Abnormal ECG     CBC with platelets differential     Status: None    Narrative    The following orders were created for panel order CBC with platelets differential.  Procedure                               Abnormality         Status                     ---------                               -----------         ------                     CBC with platelets and d...[[id number]]                      Final result                 Please view results for these tests on the individual orders.   Urine Drugs of Abuse Screen     Status: Abnormal    Narrative    The following orders were  created for panel order Urine Drugs of Abuse Screen.  Procedure                               Abnormality         Status                     ---------                               -----------         ------                     Drug abuse screen 1 urin...[[id number]]  Abnormal            Final result                 Please view results for these tests on the individual orders.     Medications   amLODIPine (NORVASC) tablet 5 mg (has no administration in time range)   DULoxetine (CYMBALTA) DR capsule 90 mg (has no administration in time range)   omeprazole TBEC 40 mg (has no administration in time range)   ondansetron (ZOFRAN) tablet 4 mg (has no administration in time range)   QUEtiapine (SEROquel) tablet 400 mg (has no administration in time range)   sulfamethoxazole-trimethoprim (BACTRIM DS) 800-160 MG per tablet 1 tablet (has no administration in time range)   Vitamin D (Cholecalciferol) TABS 1,000 Units (has no administration in time range)   calcium carbonate-vitamin D (OS-ELLI with D) per tablet 1 tablet (has no administration in time range)        Assessments & Plan (with Medical Decision Making)   61 year old female to the emergency department with ongoing depression with suicide ideations.  She has a plan to overdose.  She reports that she did overdose on her amlodipine, Flexeril, and hydroxyzine 5 days ago.  Patient really did not develop any symptoms despite a relatively significant reported ingestion.  She denies any acetaminophen ingestion.  She has no symptoms of ingestion here in the emergency department.  She does have some UTI symptoms for which she is already being treated with Bactrim.  Her urinalysis consistent with a urinary tract infection.  There is no hematuria.  Do not suspect ureteral stone/infected stone.  The patient did have laboratory evaluation performed.  Her acetaminophen level is negative.  She has mild ALT elevation which she has had before.  The chemistry panel was hemolyzed so  potassium and AST are pending redraw.  Her ALT is not significantly different than previous levels.  With no history of acetaminophen ingestion, do not feel that this is related to acetaminophen induced hepatotoxicity.  Recheck of AST and potassium are pending.  INR also pending.  If INR is normal, would have low suspicion for hepatotoxicity due to acetaminophen.  Recheck of AST, potassium, and INR signed out at change of shift.  Patient has normal vital signs and appears clinically well.  She appears medically stable for psychiatric admission.    I have reviewed the nursing notes. I have reviewed the findings, diagnosis, plan and need for follow up with the patient.    New Prescriptions    No medications on file       Final diagnoses:   Suicide ideation   Bipolar disorder, current episode depressed, severe, without psychotic features (H)     Chart documentation was completed with Dragon voice-recognition software. Even though reviewed, this chart may still contain some grammatical, spelling, and word errors.     --  Wagner Jimenez Md  [facility] EMERGENCY DEPARTMENT  4/12/2022     Wagner Jimenez MD  04/12/22 2330       Wagner Jimenez MD  04/13/22 0000

## 2022-04-13 NOTE — ED NOTES
"Pt being reassessed by mental health .  called for an update on pt.  updated, all questions answered.  concerned about pt discharging. Pt wanted to be reassessed to go home.  told writer the main reason for that is because pt is in pain, she has chronic R shoulder pain. Pt at home takes prescribed tylenol 3, 2x a day, uses lidocaine patches and icy hot. Pt would be more comfortable on a gurney bed, instead of a pull out chair. During this conversation with pt, trying to figure out how writer could make pt more comfortable, she made a statement, \"would it help if I just started yelling and screaming, acting out, because I am really upset and frustrated\". Writer thanked pt for her patience and being cooperative, and remaining cooperative and calm would be the best.   "

## 2022-04-13 NOTE — TELEPHONE ENCOUNTER
S DEC called to give clinical on 61/F in [place] er    B HX of bipolar type 2, PTSD, RACQUEL, BPD. Taken off of buspar and depakote at end of February . Last few weeks increase in depression, difficulty sleeping, SI thoughts. Had an attempt last week of overdose and still planning of overdose on medications. Prev [facility] stay. OP provider of therapy and psychiatry, medication compliant. No concern for HI or aggression. Antibiotic for Kidney disorder, fibromyalgia. Ambulatory, eating, drinking. All labs needed    A Vol    R In [facility] ER awaiting placement.

## 2022-04-13 NOTE — ED NOTES
Pt cooperative throughout this writers shift. Pt ate breakfast and lunch. Has been mostly resting on the chair, in a lying position. Pt has been uncomfortable/in pain. Pt was given tylenol. Pt was reassessed by mental health  a few hours ago. Pt can make her needs known, but is labile and withdrawn.

## 2022-04-14 ENCOUNTER — HOSPITAL ENCOUNTER (INPATIENT)
Facility: CLINIC | Age: 62
LOS: 8 days | Discharge: HOME | DRG: 885 | End: 2022-04-22
Attending: PSYCHIATRY & NEUROLOGY | Admitting: PSYCHIATRY & NEUROLOGY
Payer: MEDICARE

## 2022-04-14 DIAGNOSIS — I10 ESSENTIAL HYPERTENSION: ICD-10-CM

## 2022-04-14 DIAGNOSIS — E78.5 HYPERLIPIDEMIA LDL GOAL <100: ICD-10-CM

## 2022-04-14 DIAGNOSIS — F33.1 MODERATE EPISODE OF RECURRENT MAJOR DEPRESSIVE DISORDER (H): ICD-10-CM

## 2022-04-14 DIAGNOSIS — F41.1 GENERALIZED ANXIETY DISORDER: ICD-10-CM

## 2022-04-14 DIAGNOSIS — F41.1 GAD (GENERALIZED ANXIETY DISORDER): ICD-10-CM

## 2022-04-14 DIAGNOSIS — F33.2 MDD (MAJOR DEPRESSIVE DISORDER), RECURRENT SEVERE, WITHOUT PSYCHOSIS (H): ICD-10-CM

## 2022-04-14 DIAGNOSIS — F31.81 BIPOLAR II DISORDER (H): ICD-10-CM

## 2022-04-14 DIAGNOSIS — M25.511 RIGHT SHOULDER PAIN, UNSPECIFIED CHRONICITY: ICD-10-CM

## 2022-04-14 DIAGNOSIS — Z00.00 PREVENTATIVE HEALTH CARE: Primary | ICD-10-CM

## 2022-04-14 PROBLEM — R45.851 SUICIDAL IDEATION: Status: ACTIVE | Noted: 2022-04-14

## 2022-04-14 LAB
BACTERIA UR CULT: NO GROWTH
GLUCOSE BLDC GLUCOMTR-MCNC: 124 MG/DL (ref 70–99)
GLUCOSE BLDC GLUCOMTR-MCNC: 134 MG/DL (ref 70–99)

## 2022-04-14 PROCEDURE — 250N000013 HC RX MED GY IP 250 OP 250 PS 637: Performed by: PSYCHIATRY & NEUROLOGY

## 2022-04-14 PROCEDURE — 128N000001 HC R&B CD/MH ADULT

## 2022-04-14 PROCEDURE — 99223 1ST HOSP IP/OBS HIGH 75: CPT | Performed by: NURSE PRACTITIONER

## 2022-04-14 PROCEDURE — 250N000013 HC RX MED GY IP 250 OP 250 PS 637: Performed by: NURSE PRACTITIONER

## 2022-04-14 PROCEDURE — 250N000013 HC RX MED GY IP 250 OP 250 PS 637

## 2022-04-14 RX ORDER — ACETAMINOPHEN 500 MG
TABLET ORAL DAILY
Status: DISCONTINUED | OUTPATIENT
Start: 2022-04-14 | End: 2022-04-14

## 2022-04-14 RX ORDER — VITAMIN B COMPLEX
25 TABLET ORAL DAILY
Status: DISCONTINUED | OUTPATIENT
Start: 2022-04-14 | End: 2022-04-22 | Stop reason: HOSPADM

## 2022-04-14 RX ORDER — OLANZAPINE 10 MG/2ML
10 INJECTION, POWDER, FOR SOLUTION INTRAMUSCULAR 3 TIMES DAILY PRN
Status: DISCONTINUED | OUTPATIENT
Start: 2022-04-14 | End: 2022-04-22 | Stop reason: HOSPADM

## 2022-04-14 RX ORDER — ACETAMINOPHEN 325 MG/1
975 TABLET ORAL EVERY 6 HOURS PRN
Status: DISCONTINUED | OUTPATIENT
Start: 2022-04-14 | End: 2022-04-20

## 2022-04-14 RX ORDER — HYDROXYZINE HYDROCHLORIDE 25 MG/1
50 TABLET, FILM COATED ORAL 3 TIMES DAILY PRN
Status: DISCONTINUED | OUTPATIENT
Start: 2022-04-14 | End: 2022-04-22 | Stop reason: HOSPADM

## 2022-04-14 RX ORDER — NICOTINE POLACRILEX 4 MG/1
40 GUM, CHEWING ORAL DAILY
Status: DISCONTINUED | OUTPATIENT
Start: 2022-04-14 | End: 2022-04-14 | Stop reason: CLARIF

## 2022-04-14 RX ORDER — MULTIVITAMIN,THERAPEUTIC
1 TABLET ORAL DAILY
Status: DISCONTINUED | OUTPATIENT
Start: 2022-04-14 | End: 2022-04-22 | Stop reason: HOSPADM

## 2022-04-14 RX ORDER — LACTOBACILLUS RHAMNOSUS GG 10B CELL
2 CAPSULE ORAL DAILY
Status: DISCONTINUED | OUTPATIENT
Start: 2022-04-14 | End: 2022-04-22 | Stop reason: HOSPADM

## 2022-04-14 RX ORDER — OLANZAPINE 10 MG/1
10 TABLET ORAL 3 TIMES DAILY PRN
Status: DISCONTINUED | OUTPATIENT
Start: 2022-04-14 | End: 2022-04-22 | Stop reason: HOSPADM

## 2022-04-14 RX ORDER — ACETAMINOPHEN 325 MG/1
650 TABLET ORAL EVERY 4 HOURS PRN
Status: DISCONTINUED | OUTPATIENT
Start: 2022-04-14 | End: 2022-04-14

## 2022-04-14 RX ORDER — DULOXETIN HYDROCHLORIDE 60 MG/1
60 CAPSULE, DELAYED RELEASE ORAL EVERY 24 HOURS
Status: DISCONTINUED | OUTPATIENT
Start: 2022-04-14 | End: 2022-04-14

## 2022-04-14 RX ORDER — MAGNESIUM HYDROXIDE/ALUMINUM HYDROXICE/SIMETHICONE 120; 1200; 1200 MG/30ML; MG/30ML; MG/30ML
30 SUSPENSION ORAL EVERY 4 HOURS PRN
Status: DISCONTINUED | OUTPATIENT
Start: 2022-04-14 | End: 2022-04-22 | Stop reason: HOSPADM

## 2022-04-14 RX ORDER — TRAZODONE HYDROCHLORIDE 50 MG/1
50 TABLET, FILM COATED ORAL
Status: DISCONTINUED | OUTPATIENT
Start: 2022-04-14 | End: 2022-04-22 | Stop reason: HOSPADM

## 2022-04-14 RX ORDER — CYCLOBENZAPRINE HCL 5 MG
10 TABLET ORAL 3 TIMES DAILY PRN
Status: DISCONTINUED | OUTPATIENT
Start: 2022-04-14 | End: 2022-04-22 | Stop reason: HOSPADM

## 2022-04-14 RX ORDER — ONDANSETRON 4 MG/1
4 TABLET, FILM COATED ORAL EVERY 6 HOURS PRN
Status: DISCONTINUED | OUTPATIENT
Start: 2022-04-14 | End: 2022-04-22 | Stop reason: HOSPADM

## 2022-04-14 RX ORDER — SULFAMETHOXAZOLE/TRIMETHOPRIM 800-160 MG
1 TABLET ORAL 2 TIMES DAILY
Status: DISCONTINUED | OUTPATIENT
Start: 2022-04-14 | End: 2022-04-14

## 2022-04-14 RX ORDER — MULTIVIT-MIN/IRON/FOLIC ACID/K 18-600-40
1000 CAPSULE ORAL DAILY
Status: DISCONTINUED | OUTPATIENT
Start: 2022-04-14 | End: 2022-04-14

## 2022-04-14 RX ORDER — LIDOCAINE 4 G/G
1 PATCH TOPICAL EVERY 24 HOURS
Status: DISCONTINUED | OUTPATIENT
Start: 2022-04-14 | End: 2022-04-22 | Stop reason: HOSPADM

## 2022-04-14 RX ORDER — PANTOPRAZOLE SODIUM 20 MG/1
40 TABLET, DELAYED RELEASE ORAL
Status: DISCONTINUED | OUTPATIENT
Start: 2022-04-14 | End: 2022-04-22 | Stop reason: HOSPADM

## 2022-04-14 RX ORDER — DIVALPROEX SODIUM 500 MG/1
500 TABLET, DELAYED RELEASE ORAL AT BEDTIME
Status: DISCONTINUED | OUTPATIENT
Start: 2022-04-14 | End: 2022-04-19

## 2022-04-14 RX ORDER — BUSPIRONE HYDROCHLORIDE 10 MG/1
10 TABLET ORAL 3 TIMES DAILY
Status: DISCONTINUED | OUTPATIENT
Start: 2022-04-14 | End: 2022-04-15

## 2022-04-14 RX ORDER — HYDROXYZINE HYDROCHLORIDE 25 MG/1
25 TABLET, FILM COATED ORAL EVERY 4 HOURS PRN
Status: DISCONTINUED | OUTPATIENT
Start: 2022-04-14 | End: 2022-04-22 | Stop reason: HOSPADM

## 2022-04-14 RX ORDER — AMOXICILLIN 250 MG
1 CAPSULE ORAL 2 TIMES DAILY PRN
Status: DISCONTINUED | OUTPATIENT
Start: 2022-04-14 | End: 2022-04-22 | Stop reason: HOSPADM

## 2022-04-14 RX ORDER — TOPIRAMATE 25 MG/1
25 TABLET, FILM COATED ORAL EVERY 12 HOURS SCHEDULED
Status: DISCONTINUED | OUTPATIENT
Start: 2022-04-14 | End: 2022-04-22 | Stop reason: HOSPADM

## 2022-04-14 RX ORDER — AMLODIPINE BESYLATE 5 MG/1
5 TABLET ORAL DAILY
Status: DISCONTINUED | OUTPATIENT
Start: 2022-04-14 | End: 2022-04-22 | Stop reason: HOSPADM

## 2022-04-14 RX ADMIN — SULFAMETHOXAZOLE AND TRIMETHOPRIM 1 TABLET: 800; 160 TABLET ORAL at 09:39

## 2022-04-14 RX ADMIN — TOPIRAMATE 25 MG: 25 TABLET, FILM COATED ORAL at 11:27

## 2022-04-14 RX ADMIN — PANTOPRAZOLE SODIUM 40 MG: 20 TABLET, DELAYED RELEASE ORAL at 09:41

## 2022-04-14 RX ADMIN — TOPIRAMATE 25 MG: 25 TABLET, FILM COATED ORAL at 21:47

## 2022-04-14 RX ADMIN — DIVALPROEX SODIUM 500 MG: 500 TABLET, DELAYED RELEASE ORAL at 21:47

## 2022-04-14 RX ADMIN — BUSPIRONE HYDROCHLORIDE 10 MG: 10 TABLET ORAL at 21:46

## 2022-04-14 RX ADMIN — QUETIAPINE FUMARATE 400 MG: 300 TABLET ORAL at 21:47

## 2022-04-14 RX ADMIN — DULOXETINE 90 MG: 30 CAPSULE, DELAYED RELEASE ORAL at 09:21

## 2022-04-14 RX ADMIN — LIDOCAINE 1 PATCH: 246 PATCH TOPICAL at 11:27

## 2022-04-14 RX ADMIN — Medication 2 CAPSULE: at 13:27

## 2022-04-14 RX ADMIN — Medication 25 MCG: at 11:24

## 2022-04-14 RX ADMIN — BUSPIRONE HYDROCHLORIDE 10 MG: 10 TABLET ORAL at 13:27

## 2022-04-14 RX ADMIN — AMLODIPINE BESYLATE 5 MG: 5 TABLET ORAL at 09:19

## 2022-04-14 RX ADMIN — THERA TABS 1 TABLET: TAB at 09:19

## 2022-04-14 RX ADMIN — Medication 1 TABLET: at 11:24

## 2022-04-14 ASSESSMENT — ACTIVITIES OF DAILY LIVING (ADL)
DRESS: INDEPENDENT
ORAL_HYGIENE: INDEPENDENT
HYGIENE/GROOMING: INDEPENDENT
HYGIENE/GROOMING: INDEPENDENT
DRESS: INDEPENDENT;SCRUBS (BEHAVIORAL HEALTH)
ORAL_HYGIENE: INDEPENDENT

## 2022-04-14 ASSESSMENT — PATIENT HEALTH QUESTIONNAIRE - PHQ9: SUM OF ALL RESPONSES TO PHQ9 QUESTIONS 1 & 2: 6

## 2022-04-14 NOTE — PLAN OF CARE
"    Initial Psychosocial Assessment    Type of CM visit: Initial Assessment, Clinical Treatment Coordinator Role Introduction, Offer Discharge Planning    Information obtained from: [x]Patient   [x]Chart review  [x]Collateral Contacts  []Court Website    Hospitalization information:   Marissa Youssef is a 61 year old who was admitted to unit 5500 on 4/14/2022 due to SA,  Chart notes: Reportedly, patient attempted suicide last Thursday by overdosing on several medications with the plan to kill herself. Patient reportedly disappointed the medication overdose did not kill her despite taking large doses Thursday    Patient Self-Assessment  Patient reported reason for admission: \"I did not try to kill myself\" \"I am depressed\"     Patient reported symptoms of concern: [x]sadness    []anxiety     []anger    []poor sleep     []medications not working    []racing thoughts     []substance use     []agitation     []hearing voices     []hopelessness   []Eating concerns    []Self-injury      [] Other   Comments:    Current suicidal ideation:  [x]No    []Yes, no plan     []Yes, with plan (describe):          Comments:   Current homicidal ideation:  [x]No   [] Yes       Comments:     Legal Status at Admission: Voluntary/Patient has signed consent for treatment  No Contact Order Description: No  Provisionally discharged from (name): NA  Power of  (name): NA  Rep Payee (name): NA      History of Mental Health:  Describe current and past mental health symptoms present?    hx dx of Bipolar Disorder Type II,  Generalized Anxiety Disorder, PTSD and Borderline Personality  Disorder.    Do you understand your mental health diagnosis? YES [x]   NO []      History of psychiatric hospitalizations?  YES [x]     NO  []  Details: Chart notes: extensive history of suicide attempts by overdose in the 1090 with most recent attempt last September. multiple psychiatric hospitalizations in [place]         If YES, within the last 30 days? YES " []     NO  []    History of commitment?  YES [x]     NO  []    Details: civil commitment in the 90s.  History of ECT?  YES []     NO  [x]    Details:     History of Substance Use Disorder:  Have you used alcohol or substances in the past 12 months? YES []/ NO []              If Yes, Type Frequency None reported  Would you like a substance use disorder evaluation? YES [] / NO [x]    Previous Treatment? YES []/ NO [x]  Details:     Significant Life Events  (Illness, Death, Loss):   Patient's brother completed suicide at the age of 22; patient's niece completed suicide a couple of years ago; patient believes her mother suffered from Depression but was never diagnosed      Is there a history of abuse or psychological trauma:    [x]Denies       []Yes, present (type):         []Yes, past (type):        []Patient declined to answer    Identify current stressors:    []financial,    []legal issues,    []homelessness,    []housing,     []recent loss,    []relationships,    []substance use concerns,    [x]medical     []unemployment     []employment  concerns    []isolation,    []lack of resources,     []out of home placements,     []parenting issues     []domestic violence     []other:  Comments:       Living Situation:     [x]House/apt    []Group Home    []IRTS     []Homeless     []Assisted Living     []Nursing home    []Lives alone    []Lives with :                         []Other:          Family Composition: Self     Children, ages and current location if minor: Adult children     Relationship status  []Single     []     []     []       []Significant Other   []Other:     Educational Background:  []Less Than High School     []High School     []GED     []College       Cognitive/learning concerns: None reported    Financial Status: [] Employed, status and location:  []Unemployment    []County Assistance     []SSI/SSDI      []Waivered services    [x]Other: Works part time jobs    Legal  status(present):   [x]Voluntary, []72-hour hold, []Commitment, []Guardianship, []Revocation, []Stay of commitment,    Details:    Other legal issues identified:  [x]None, []Arrest,  []Probation/[place],  []Driving under influence,  []Incarceration,  []Sexual offense (level):   []Child Protective Services,      []Other:      Details:    Ethnic/Cultural considerations:  61 year old white cisgender female    Spiritual considerations: none reported     Service History:  [x]No     []Yes: details:    Social Functioning (organization, interests) and strengths: [employer]amos    Current Treatment Providers Are:     NO Name, Agency, and phone   Psychiatrist  [] Psychiatrist  [facility]    Psychotherapist  []    ARM worker  [x]      [x]    Waivered Services  [x]    ACT Teams  [x]    Day Treatment/PHP/[facility] trtmt  [] For 55+ Program:  [facility].  Address is [address] [place] [id number].    Group Home/AFC/AL  [x]      [x]    Other:  [] Aubrey Youssef  ALISSON signed           Social Service Assessment of identified patient needs and plan to meet those needs:   Patient stated that she did not attempt suicide. She agrees to remain in the hospital voluntarily to adjust and restart her medications. Patient has a hx of multiple SA and long term diagnosis of bipolar disorder MDD and RACQUEL. Patient has familial supports and plans to return home once she is stabilized on medications. She noted that she has awareness when she is beginning to struggle with her mental health. Patient has established psychiatry and therapy however appointments are further out in time. Patient would benefit from medication adjustments and symptom stabilization, working with transition clinic, community mental health support group and returning to her established psychiatry and therapy.     stated that patient was  At [facility] last fall. PHP  "believes that ist is seasonally motivated. Has SAD.He noted that patient had been at [facility] in the fall. He expressed that they have fran managing mental health symptoms for 20 years and both himself and patient can recognize the patterns. This spring has been dreary and has increased her symptoms. \"Lately she has been dropped off from a lot of medications. She needs to remain the hospital to stabilize. She is isolated at home. 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 She would benefit from a mental health community support group  club to socialize        Possible discharge plan: return to home with psychiatry therapy         Barriers: Medication Management, Symptom Stabilization, Coordination of Care                  "

## 2022-04-14 NOTE — CONSULTS
TELEPSYCHIATRY TELEPHONE ADMISSION NOTE    Discussed with nurse Yovani.  61-year-old female presented with suicidal ideation.  Patient has been medically cleared.  PMH significant for +UTI, HTN, DM2 diet controlled.  NKDA.  No known serious substance withdrawal reactions.      Lab findings: COVID negative. UDS negative. Alcohol level: nil.     PLAN:    --Disposition: Admit to psychiatry under voluntary status.  Routine admission orders placed.  --Precautions: Suicide.  --Medications: Restart home meds.          Ward Miranda MD  Psychiatrist

## 2022-04-14 NOTE — PROVIDER NOTIFICATION
04/14/22 1500   Engagement   Intervention Group   Topic Detail SW Process   Attendance Attended   Patient Response Demonstrated understanding of materials provided   Concentrated on Task duration of group   Cognition Goal-directed   Mood/Affect Pleasant   Social/Behavioral Engaged   Thought Content Reality oriented     GROUP LENGTH (MINS):   45   RELEVANT PRIMARY DIAGNOSIS: Patient Active Problem List   Diagnosis    Pain of female symphysis pubis    Pelvic floor dysfunction    Myalgia of pelvic floor    SI (sacroiliac) joint dysfunction    Chronic pelvic pain in female    MDD (major depressive disorder), recurrent severe, without psychosis (H)    Episode of recurrent major depressive disorder, unspecified depression episode severity (H)    Generalized anxiety disorder    Borderline personality disorder (H)    PTSD (post-traumatic stress disorder)    Moderate episode of recurrent major depressive disorder (H)    Suicidal ideation    Bipolar II disorder (H)        TREATMENT MODALITY:      []CBT       [x]DBT       []ACT       []Interpersonal psychotherapy                                 []Psychoeducational therapy       [x]Skill development       []Other:        ATTENDANCE:        [x]Stayed for entire group     []Arrived late    [] Left early       # OF PATIENTS IN GROUP: 5   BILLABLE:     [x]Yes            []No   Notes:

## 2022-04-14 NOTE — PROGRESS NOTES
04/14/22 1052   Engagement   Intervention Group   Topic Detail OT Wellness group-Scattergories for social engagement, cognitive wellness, following directions, focus, memory-recall, symptom management, and healthy distraction   Attendance Attended   Patient Response Demonstrated understanding of materials provided;Expressed feelings/issues;Accepted feedback;Asked questions and/or took notes   Concentrated on Task duration of group   Cognition Goal-directed;Sequences task;Follows through with task   Mood/Affect Pleasant;Content   Social/Behavioral Cooperative;Engaged;Redirectable   Goals addressed in session today pt actively engaged in group activity. pt demonstrated understanding of activity. pt was independent with task. pt readily shared answers to catergory questions. pt was polite and pleasant during interactions

## 2022-04-14 NOTE — PHARMACY-ADMISSION MEDICATION HISTORY
Pharmacy Note - Admission Medication History    Pertinent Provider Information:        ______________________________________________________________________    Prior To Admission (PTA) med list completed and updated in EMR.       PTA Med List   Medication Sig Note Last Dose     acetaminophen-codeine (TYLENOL #3) 300-30 MG tablet Take 1 tablet by mouth 3 times daily as needed for severe pain  4/13/2022 at 304     amLODIPine (NORVASC) 5 MG tablet Take 1 tablet (5 mg) by mouth daily  4/13/2022 at 752     Calcium Carbonate-Vit D-Min (CALCIUM 1200 PO) Take 600 capsules by mouth daily  4/13/2022 at Unknown time     cyclobenzaprine (FLEXERIL) 10 MG tablet Take 10 mg by mouth 3 times daily as needed for muscle spasms  Unknown at Unknown time     DULoxetine (CYMBALTA) 30 MG capsule Take 3 capsules (90 mg) by mouth daily  4/13/2022 at 753     hydrOXYzine (ATARAX) 50 MG tablet Take 1 tablet (50 mg) by mouth 3 times daily as needed for itching (Patient taking differently: Take 50 mg by mouth 3 times daily as needed for anxiety)  Unknown at Unknown time     Lidocaine (LIDOCARE) 4 % Patch Place 1 patch onto the skin every 24 hours To prevent lidocaine toxicity, patient should be patch free for 12 hrs daily.  Unknown at Unknown time     Melatonin 10 MG TABS tablet Take 1 tablet (10 mg) by mouth nightly as needed for sleep (Patient taking differently: Take 5 mg by mouth At Bedtime) 4/14/2022: Takes 1/2 of a 10 mg tablet every evening.  Plan to reduce to 3 mg when needs to purchase more. Unknown at Unknown time     multivitamin, therapeutic (THERA-VIT) TABS tablet Take 1 tablet by mouth daily  4/13/2022 at 752     omeprazole 20 MG tablet Take 2 tablets (40 mg) by mouth daily  4/13/2022 at 752     ondansetron (ZOFRAN) 4 MG tablet Take 1 tablet (4 mg) by mouth every 6 hours as needed for nausea or vomiting  Unknown at Unknown time     QUEtiapine (SEROQUEL) 400 MG tablet Take 1 tablet (400 mg) by mouth At Bedtime  4/12/2022 at 2345      sulfamethoxazole-trimethoprim (BACTRIM DS) 800-160 MG tablet Take 1 tablet by mouth 2 times daily  4/13/2022 at 752     Vitamin D, Cholecalciferol, 25 MCG (1000 UT) TABS Take 1 tablet (1,000 Units) by mouth daily  4/13/2022 at 752       Information source(s): Patient, Clinic records and [facility]Everywhere/[facility]  Method of interview communication: in-person    Summary of Changes to PTA Med List  New: N/A  Discontinued: Bactrim has now been completed  Changed: Melatonin changed from 10 mg at bedtime prn to 5 mg at bedtime- Plan to decrease to 3 mg at bedtime when needs to purchase more.  Patient was asked about OTC/herbal products specifically.  PTA med list reflects this.    Allergies were reviewed, assessed, and updated with the patient.      Patient does not use any multi-dose medications prior to admission.    The information provided in this note is only as accurate as the sources available at the time of the update(s).    Thank you for the opportunity to participate in the care of this patient.    Lalitha Edgar [facility]  4/14/2022 2:52 PM

## 2022-04-14 NOTE — PROGRESS NOTES
04/14/22 1442   Engagement   Intervention Group   Topic Detail OT Creative Expressions group-Watercolor painting for creativity, social engagement, focus, self expression, symptom management, and healthy distraction   Attendance Attended   Patient Response Demonstrated understanding of materials provided;Expressed feelings/issues;Asked questions and/or took notes;Accepted feedback   Concentrated on Task duration of group   Cognition Goal-directed;Sequences task;Attends to detail   Mood/Affect Content;Pleasant   Social/Behavioral Cooperative;Engaged;Redirectable   Goals addressed in session today pt actively engaged in group activity. pt was independent with supplies and task. pt completed task during group time. pt engaged in personal conversation with peers for duration of group. pt was pleasant during interactions with staff and peers.

## 2022-04-14 NOTE — CONSULTS
[facility]  Consult Note - Hospitalist Service  Date of Admission:  4/14/2022  Consult Requested by: Husam Villalobos APRN CNP  Reason for Consult: Plesase complete PTA med rec. Also, Patient positive for dysuria in the ED. Kindly assess            Assessment & Plan   Marissa Youssef is a 61 year old female admitted on 4/14/2022. She has a PMH of major depression, bipolar disorder, anxiety, PTSD, diabetes, HLD, HTN, GERD, pelvic floor instability. Face to face exam and interview completed on unit in patient room, cooperative and calm during assessment.    # Cystitis  Had an abnormal UA in the primary care office. She was started on Amoxicillin, did not tolerate it and she was changed to Bactrim. Note says she c/o dysuria and flank pain, pt denies ever having symptoms. Did not tolerate Bactrim either, states it made her dizzy.   - Urine Culture- no growth  - no need to finish course of abx or start new one    # Hypertension  Well controlled so far. Will continue to trend.   - PTA amlodipine 5 mg PO daily    # GERD  - PTA pantoprazole 40 mg daily    # Chronic Colitis- per patient   - no NSAIDs  - probiotic 2 capsules daily    # Diabetes  Possibly pre- diabetic with blood sugars in the 120-196 range. Last Hgb A1C 5.5 in 2007.   - check Hgb A1C in am  - lipid panel in am     # Chronic Shoulder Pain  States she has loss of cartilage and ligament damage in the right shoulder. Not damaged enough to require surgery. Normally uses Tylenol #3, understands she cannot get that while here.   - acetaminophen 975 mg every 6 hours prn- try first  - tramadol 50 mg daily prn for breath through or severe pain      The patient's care was discussed with the Patient.    BRIT Gomez CNP  [facility]  Securely message with the Vocera Web Console (learn more here)  Text page via [facility] Paging/[facility]       Hospitalist Service    Clinically Significant Risk Factors Present on  "Admission                  # Obesity: Estimated body mass index is 35.17 kg/m  as calculated from the following:    Height as of this encounter: 1.575 m (5' 2\").    Weight as of this encounter: 87.2 kg (192 lb 5 oz).      ______________________________________________________________________    Chief Complaint   \" I'm fine\"    History is obtained from the patient    History of Present Illness   Marissa Youssef is a 61 year old female who is an inpatient in the mental health unit. Reviewed co-morbidities and UTI diagnosis. See above charting.      Review of Systems   12 point review of systems negative except for pertinent positives mentioned in the HPI and Assessment and Plan.    Past Medical History    I have reviewed this patient's medical history and updated it with pertinent information if needed.   Past Medical History:   Diagnosis Date     Anxiety      Depressive disorder      Diabetes (H)     Diet controlled.     Eating disorder      Hx of previous reproductive problem 1977     Hyperlipidemia      Hypertension      Pelvic floor instability      PTSD (post-traumatic stress disorder)      Sphincter of Oddi dysfunction        Past Surgical History   I have reviewed this patient's surgical history and updated it with pertinent information if needed.  Past Surgical History:   Procedure Laterality Date     ABDOMEN SURGERY  1999     CHOLECYSTECTOMY  2000     COLONOSCOPY  2018     GYN SURGERY  2005    Complete hysterectomy     ORTHOPEDIC SURGERY      right knee replacement 2008     SOFT TISSUE SURGERY  2019    Carpel tunnel, trigger finger release       Social History   I have reviewed this patient's social history and updated it with pertinent information if needed.  Social History     Tobacco Use     Smoking status: Former Smoker     Smokeless tobacco: Never Used     Tobacco comment: 3 cigarettes a day   Substance Use Topics     Alcohol use: No     Drug use: No       Family History   I have reviewed this patient's " family history and updated it with pertinent information if needed.  Family History   Problem Relation Age of Onset     Chronic Obstructive Pulmonary Disease Mother      Coronary Artery Disease Father      Myocardial Infarction Father      Hyperlipidemia Sister      Kidney failure Brother      Heart Failure Brother        Medications   I have reviewed this patient's current medications    Allergies   No Known Allergies    Physical Exam   Vital Signs: Temp: 99.3  F (37.4  C) Temp src: Oral BP: 127/72 Pulse: 96   Resp: 14 SpO2: 94 % O2 Device: None (Room air)    Weight: 192 lbs 5 oz    Constitutional: awake, alert, cooperative, no apparent distress, and appears stated age  Respiratory: No increased work of breathing, good air exchange, clear to auscultation bilaterally, no crackles or wheezing  Cardiovascular: Normal apical impulse, regular rate and rhythm, normal S1 and S2, no S3 or S4, and no murmur noted  Neuropsychiatric: General: normal, calm and normal eye contact    Data   Results for orders placed or performed during the hospital encounter of 04/14/22 (from the past 24 hour(s))   Glucose by meter   Result Value Ref Range    GLUCOSE BY METER POCT 124 (H) 70 - 99 mg/dL

## 2022-04-14 NOTE — PLAN OF CARE
Occupational Therapy   AIDET      Patient was introduced to the role of occupational therapy and oriented to the process of occupational therapy services on the unit, including function of groups. Patient was given the Occupational Therapy Questionnaire to complete. Patient in lounge on approach after group. Pt receptive to completing form and following up with OT at a later date. OT will follow up with assessment and address any questions, needs, or concerns.

## 2022-04-14 NOTE — H&P
[facility]   Psychiatric History and Physical  Admission date: 4/14/2022        Chief Complaint:   *My depression is back*        HPI:     Patient is a 61 year old female with the history of Bipolar 2 disorder, PTSD, MDD, Borderline personality disorder and diabetes type 2 disorder who presented to the ED due to concern related to suicidal ideation with the plan to overdose on medicaton in the setting of worsening derepression. Current legal status is Voluntary. Reportedly, patient attempted suicide last Thursday by overdosing on several medications with the plan to kill herself. Patient reportedly disappointed the medication overdose did not kill her despite taking large doses.     Patient was seen and evaluated in the consult room on unit 5500. Patient presented as depressed, but calm and appropriate during this evaluation. Patient states she took several medication last Thursday before she felt as though her life has become purposeless. Patient states her depression has gotten worse of recent, saying she has been experiencing fatigue, loss of appetite, inadequate sleep, increased anxiety, feeling of hopelessness, lact of motivation and generally sad. She stated she took the medications because she was upset about how she was feeling at the time not to commit suicide. When writer inquired further about the disappointment feeling when she realized she did not die despite taking bunch of meds, she insisted her intention was not to kill herself. Patient currently denies suicidal and homicidal ideations. She also denies both auditory and visual hallucinations. Patient reported hearing music noise on her way to the ED, saying she had never heard voices in the past. Patient states she was recently taken off Depakote, Topamax and Buspar by her psychiatrist per her request since she felt she was doing okay. Patient states she is hoping to be back on all her medications. Writer restarted patient  "on her current medications which include Cymbalta 90 mg daily and Seroquel 400 mg at bedtime. Also, Depakote, Topamax and Buspar restarted at a lower dose.     Per chart review, patient positive for dysuria in the ED. Hospitalist consult placed to assess patient for physical problems.     ................................. ED 's note......................................  [facility] Crisis Assessment     Patient was assessed: remote  Patient location: [facility] ED     Referral Data and Chief Complaint  Marissa Youssef is a 61 year old who uses she/her pronouns. Patient presented to the ED with family/friends and was referred to the ED by community provider(s). Patient is presenting to the ED for the following concerns: suicidal ideation with a plan to overdose on various medications at home. The patient admits to this writer she attempted suicide last Thursday by taking several medications she is prescribed. She states \"I took enough, it should of killed me but instead I just got sick to my stomach.\"       Informed Consent and Assessment Methods  Patient is her own guardian. Writer met with patient and explained the crisis assessment process, including applicable information disclosures and limits to confidentiality, assessed understanding of the process, and obtained consent to proceed with the assessment. Patient was observed to be able to participate in the assessment as evidenced by calm, cooperative, oriented x4, insightful. Assessment methods included conducting a formal interview with patient, review of medical records, collaboration with medical staff, and obtaining relevant collateral information from family and community providers when available.     Narrative Summary of Presenting Problem and Current Functioning  What led to the patient presenting for crisis services, factors that make the crisis life threatening or complex, stressors, how is this disrupting the patient's life, and how current functioning " "is in comparison to baseline. How is patient presenting during the assessment.      Marissa Youssef is a [age] year old female who identifies as heterosexual and . She has a hx dx of Bipolar Disorder Type II,  Generalized Anxiety Disorder, PTSD and Borderline Personality Disorder. For the duration of the interview the patient is calm, cooperative, oriented x4. Patient presents with suicidal ideation with a plan to overdose on medications at home. She admits that she attempted suicide last Thursday by overdosing on various medications she had at home. She states \"I took enough to kill me, but all that happened was I got sick to my stomach.\" The patient reports an increase in depressive symptoms for about 1 month. She complains of sad, depressed mood, loss of interest, difficulty staying asleep, feeling fatigued, increased anxiety and fear about leaving her house, isolating to her home and intermittent panic attacks. She reports she has been off of work for about 3 weeks due to her mental health. Her manager has been supportive of her. Patient denies H/I, SIB or access to weapons. She reports today she experienced auditory hallucinations of \"music that lasted for about 90 minutes.\" Patient reports this is the first time this has happened. She asked her  if he heard the music playing and he did not. Patient also reports V/H of \"something in the corner of my eye\" that happens intermittently. Patient denies use of alcohol or other substances.     The patient is currently prescribed hydroxyzine, Cymbalta and Seroquel. She reports compliance with the medication regiment. Of note, patient was discontinued from Buspar and Depakote at the end of February under the care of her psychiatrist.        Past Psychiatric History:     Patient has historic diagnoses of MDD, PTSD, borderline personality disorder, and recently diagnosed with Bipolar 2 disorder. Patient has extensive history of suicide attempts by " overdose in the 1090 with most recent attempt last September. Patient reports being under civil commitment in the 90s. Patient reported multiple psychiatric hospitalizations in [place], with most recently at [facility] in October 2021. Past medications trials include Celaxa, Prozac, Zyprexa, Abilify, Effexor, Zoloft, Clozaril, Latuda, Vraylar, Wellbutrin and Trazodone.         Substance Use and History:     Patient denies history of substance abuse. 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 Patient denies history of inpatient or outpatient CD treatment        Past Medical History:   PAST MEDICAL HISTORY:   Past Medical History:   Diagnosis Date     Anxiety      Depressive disorder      Diabetes (H)     Diet controlled.     Eating disorder      Hx of previous reproductive problem 1977     Hyperlipidemia      Hypertension      Pelvic floor instability      PTSD (post-traumatic stress disorder)      Sphincter of Oddi dysfunction        PAST SURGICAL HISTORY:   Past Surgical History:   Procedure Laterality Date     ABDOMEN SURGERY  1999     CHOLECYSTECTOMY  2000     COLONOSCOPY  2018     GYN SURGERY  2005    Complete hysterectomy     ORTHOPEDIC SURGERY      right knee replacement 2008     SOFT TISSUE SURGERY  2019    Carpel tunnel, trigger finger release             Family History:   FAMILY HISTORY: Patient's brother completed suicide at the age of 22; patient's niece completed suicide a couple of years ago; patient believes her mother suffered from Depression but was never diagnosed  Family History   Problem Relation Age of Onset     Chronic Obstructive Pulmonary Disease Mother      Coronary Artery Disease Father      Myocardial Infarction Father      Hyperlipidemia Sister      Kidney failure Brother      Heart Failure Brother            Social History:   Please see the full psychosocial profile from the clinical treatment  coordinator.   SOCIAL HISTORY: Patient is currently living with her  in [place]. Patient reports having part time jobs and visits with Children and grandchildren very often.   Social History     Tobacco Use     Smoking status: Former Smoker     Smokeless tobacco: Never Used     Tobacco comment: 3 cigarettes a day   Substance Use Topics     Alcohol use: No            Physical ROS:   The patient endorsed shoulder and lower back pain. Patient positive for dysuria in the ED. The remainder of 10-point review of systems was negative except as noted in HPI.         PTA Medications:     Medications Prior to Admission   Medication Sig Dispense Refill Last Dose     acetaminophen-codeine (TYLENOL #3) 300-30 MG tablet Take 1 tablet by mouth 3 times daily as needed for severe pain   4/13/2022 at 304     amLODIPine (NORVASC) 5 MG tablet Take 1 tablet (5 mg) by mouth daily 30 tablet 3 4/13/2022 at 752     Calcium Carbonate-Vit D-Min (CALCIUM 1200 PO) Take 600 capsules by mouth daily   4/13/2022 at Unknown time     cyclobenzaprine (FLEXERIL) 10 MG tablet Take 10 mg by mouth 3 times daily as needed for muscle spasms   Unknown at Unknown time     DULoxetine (CYMBALTA) 30 MG capsule Take 3 capsules (90 mg) by mouth daily 90 capsule 3 4/13/2022 at 753     hydrOXYzine (ATARAX) 50 MG tablet Take 1 tablet (50 mg) by mouth 3 times daily as needed for itching 90 tablet 3 Unknown at Unknown time     Lidocaine (LIDOCARE) 4 % Patch Place 1 patch onto the skin every 24 hours To prevent lidocaine toxicity, patient should be patch free for 12 hrs daily. 30 patch 3 Unknown at Unknown time     Melatonin 10 MG TABS tablet Take 1 tablet (10 mg) by mouth nightly as needed for sleep 30 tablet 3 Unknown at Unknown time     multivitamin, therapeutic (THERA-VIT) TABS tablet Take 1 tablet by mouth daily 90 tablet 1 4/13/2022 at 752     omeprazole 20 MG tablet Take 2 tablets (40 mg) by mouth daily 30 tablet 3 4/13/2022 at 752     ondansetron  (ZOFRAN) 4 MG tablet Take 1 tablet (4 mg) by mouth every 6 hours as needed for nausea or vomiting 60 tablet 1 Unknown at Unknown time     QUEtiapine (SEROQUEL) 400 MG tablet Take 1 tablet (400 mg) by mouth At Bedtime 30 tablet 3 4/12/2022 at 2345     sulfamethoxazole-trimethoprim (BACTRIM DS) 800-160 MG tablet Take 1 tablet by mouth 2 times daily   4/13/2022 at 752     Vitamin D, Cholecalciferol, 25 MCG (1000 UT) TABS Take 1 tablet (1,000 Units) by mouth daily 90 tablet 1 4/13/2022 at 752          Allergies:   No Known Allergies       Labs:     Recent Results (from the past 48 hour(s))   EKG 12-lead, tracing only    Collection Time: 04/12/22  8:52 PM   Result Value Ref Range    Systolic Blood Pressure  mmHg    Diastolic Blood Pressure  mmHg    Ventricular Rate 88 BPM    Atrial Rate 88 BPM    MD Interval 148 ms    QRS Duration 82 ms     ms    QTc 467 ms    P Axis 60 degrees    R AXIS 53 degrees    T Axis 69 degrees    Interpretation ECG       Sinus rhythm  Low voltage QRS  Nonspecific T wave abnormality  Abnormal ECG  Unconfirmed report - interpretation of this ECG is computer generated - see medical record for final interpretation    Confirmed by - EMERGENCY ROOM, PHYSICIAN ([id number]),  JOSELYN GREENBERG ([id number]) on 4/13/2022 2:10:15 PM     Comprehensive metabolic panel    Collection Time: 04/12/22  9:18 PM   Result Value Ref Range    Sodium 139 133 - 144 mmol/L    Potassium 3.5 3.4 - 5.3 mmol/L    Chloride 107 94 - 109 mmol/L    Carbon Dioxide (CO2) 26 20 - 32 mmol/L    Anion Gap 6 3 - 14 mmol/L    Urea Nitrogen 18 7 - 30 mg/dL    Creatinine 0.78 0.52 - 1.04 mg/dL    Calcium 9.0 8.5 - 10.1 mg/dL    Glucose 196 (H) 70 - 99 mg/dL    Alkaline Phosphatase 127 40 - 150 U/L    AST      ALT 55 (H) 0 - 50 U/L    Protein Total 6.7 (L) 6.8 - 8.8 g/dL    Albumin 3.2 (L) 3.4 - 5.0 g/dL    Bilirubin Total 0.4 0.2 - 1.3 mg/dL    GFR Estimate 85 >60 mL/min/1.73m2   Acetaminophen level    Collection Time: 04/12/22  9:18 PM    Result Value Ref Range    Acetaminophen <2 (L) 10 - 30 mg/L   Salicylate level    Collection Time: 04/12/22  9:18 PM   Result Value Ref Range    Salicylate <2 <20 mg/dL   CBC with platelets and differential    Collection Time: 04/12/22  9:18 PM   Result Value Ref Range    WBC Count 5.5 4.0 - 11.0 10e3/uL    RBC Count 4.21 3.80 - 5.20 10e6/uL    Hemoglobin 13.0 11.7 - 15.7 g/dL    Hematocrit 38.9 35.0 - 47.0 %    MCV 92 78 - 100 fL    MCH 30.9 26.5 - 33.0 pg    MCHC 33.4 31.5 - 36.5 g/dL    RDW 11.9 10.0 - 15.0 %    Platelet Count 283 150 - 450 10e3/uL    % Neutrophils 51 %    % Lymphocytes 39 %    % Monocytes 7 %    % Eosinophils 3 %    % Basophils 0 %    % Immature Granulocytes 0 %    NRBCs per 100 WBC 0 <1 /100    Absolute Neutrophils 2.8 1.6 - 8.3 10e3/uL    Absolute Lymphocytes 2.2 0.8 - 5.3 10e3/uL    Absolute Monocytes 0.4 0.0 - 1.3 10e3/uL    Absolute Eosinophils 0.1 0.0 - 0.7 10e3/uL    Absolute Basophils 0.0 0.0 - 0.2 10e3/uL    Absolute Immature Granulocytes 0.0 <=0.4 10e3/uL    Absolute NRBCs 0.0 10e3/uL   Asymptomatic COVID-19 Virus (Coronavirus) by PCR Nose    Collection Time: 04/12/22  9:19 PM    Specimen: Nose; Swab   Result Value Ref Range    SARS CoV2 PCR Negative Negative   UA with Microscopic reflex to Culture    Collection Time: 04/12/22  9:20 PM    Specimen: Urine, Clean Catch   Result Value Ref Range    Color Urine Yellow Colorless, Straw, Light Yellow, Yellow    Appearance Urine Clear Clear    Glucose Urine Negative Negative mg/dL    Bilirubin Urine Negative Negative    Ketones Urine Negative Negative mg/dL    Specific Gravity Urine 1.021 1.003 - 1.035    Blood Urine Negative Negative    pH Urine 5.5 5.0 - 7.0    Protein Albumin Urine 30  (A) Negative mg/dL    Urobilinogen Urine Normal Normal, 2.0 mg/dL    Nitrite Urine Negative Negative    Leukocyte Esterase Urine Moderate (A) Negative    Bacteria Urine Few (A) None Seen /HPF    Mucus Urine Present (A) None Seen /LPF    RBC Urine 1 <=2 /HPF  "   WBC Urine 21 (H) <=5 /HPF    Squamous Epithelials Urine 5 (H) <=1 /HPF    Transitional Epithelials Urine 1 <=1 /HPF   Drug abuse screen 1 urine (ED)    Collection Time: 04/12/22  9:20 PM   Result Value Ref Range    Amphetamines Urine Screen Negative Screen Negative    Barbiturates Urine Screen Negative Screen Negative    Benzodiazepines Urine Screen Negative Screen Negative    Cannabinoids Urine Screen Negative Screen Negative    Cocaine Urine Screen Negative Screen Negative    Opiates Urine Screen Positive (A) Screen Negative   Urine Culture    Collection Time: 04/12/22  9:20 PM    Specimen: Urine, Clean Catch   Result Value Ref Range    Culture No Growth    Potassium    Collection Time: 04/12/22 11:58 PM   Result Value Ref Range    Potassium 3.6 3.4 - 5.3 mmol/L   AST    Collection Time: 04/12/22 11:58 PM   Result Value Ref Range    AST 47 (H) 0 - 45 U/L   INR    Collection Time: 04/12/22 11:58 PM   Result Value Ref Range    INR 1.04 0.86 - 1.14   Glucose by meter    Collection Time: 04/13/22 11:55 PM   Result Value Ref Range    GLUCOSE BY METER POCT 134 (H) 70 - 99 mg/dL   Glucose by meter    Collection Time: 04/14/22  7:20 AM   Result Value Ref Range    GLUCOSE BY METER POCT 124 (H) 70 - 99 mg/dL          Physical and Psychiatric Examination:     /72   Pulse 96   Temp 99.3  F (37.4  C) (Oral)   Resp 14   Ht 1.575 m (5' 2\")   Wt 87.2 kg (192 lb 5 oz)   SpO2 94%   BMI 35.17 kg/m    Weight is 192 lbs 5 oz  Body mass index is 35.17 kg/m .    Physical Exam:  Physical Exam  Vitals and nursing note reviewed.   Constitutional:       General: She is not in acute distress.     Appearance: Normal appearance. She is not diaphoretic.   HENT:      Head: Atraumatic.      Mouth/Throat:      Pharynx: No oropharyngeal exudate.   Eyes:      General: No scleral icterus.     Pupils: Pupils are equal, round, and reactive to light.   Cardiovascular:      Rate and Rhythm: Normal rate and regular rhythm.      Heart " "sounds: Normal heart sounds.   Pulmonary:      Effort: No respiratory distress.      Breath sounds: Normal breath sounds.   Abdominal:      General: Bowel sounds are normal.      Palpations: Abdomen is soft.      Tenderness: There is no abdominal tenderness.   Musculoskeletal:         General: No tenderness.   Skin:     General: Skin is warm.      Findings: No rash.   Neurological:      General: No focal deficit present.      Mental Status: She is alert and oriented to person, place,    Mental Status Exam:  Appearance: awake, alert and dressed in hospital scrubs  Attitude:  cooperative  Eye Contact:  good  Mood:  \"better\"  Affect:  appropriate and in normal range and intensity is normal  Speech:  clear, coherent  Language: fluent and intact in English  Psychomotor, Gait, Musculoskeletal:  no evidence of tardive dyskinesia, dystonia, or tics  Thought Process:  linear and and goal oriented  Associations:  no loose associations  Thought Content:  no evidence of suicidal ideation or homicidal ideation, no evidence of psychotic thought, no auditory hallucinations present and no visual hallucinations present  Insight:  fair  Judgement:  fair  Oriented to:  time, person, and place  Attention Span and Concentration:  fair  Recent and Remote Memory:  fair  Fund of Knowledge:  appropriate         Admission Diagnoses:   Bipolar II disorder  Borderline personality disorder         Assessment & Plan:     Patient is a 61 year old female with the history of Bipolar 2 disorder, PTSD, MDD, Borderline personality disorder and diabetes type 2 disorder who presented to the ED due to concern related to suicidal ideation with the plan to overdose on medicaton in the setting of worsening derepression. Patient presents as depressed but calm and appropriate. Patient vehemently denied medication overdose was an intent to commit suicide. Patient currently denies suicidal ideations. No overt psychosis noted during assessment. Patient restarted " on Depakote 500 mg at bedtime, buspar 10 mg tid, and Topamax 25 mg bid in addition to her current medications    1) Medication: Cymbalta 90 mg daily                          Buspar 10 mg tid                          Topamax 25 mg bid                          Depakote 500 mg at bedtime                          Seroquel 400 mg at bedtime  2) Hospitalist consult: Hospitalist to see patient as needed. Consult placed to assess for dysuria and PTA meds rec  3). Legal: Voluntary  4).  to follow regarding collecting and reviewing collateral information, referrals and disposition planning    Disposition Plan   Reason for ongoing admission: poses an imminent risk to self  Discharge location: home with family  Discharge Medications: not ordered at this time  Follow-up Appointments: not scheduled at this time  Legal Status: voluntary    Entered by: Husam Villalobos on 4/14/2022 at 11:19 AM

## 2022-04-14 NOTE — PLAN OF CARE
Problem: Behavioral Health Plan of Care  Goal: Adheres to Safety Considerations for Self and Others  Outcome: Ongoing, Progressing     Problem: Behavioral Health Plan of Care  Goal: Develops/Participates in Therapeutic [place] to Support Successful Transition  Outcome: Ongoing, Progressing   Goal Outcome Evaluation:  Patient visible on the unit, calm, pleasant, cooperative. Rated anxiety 2, depression 4, denies all other psych symptoms. Contracted for safety. Engaged and social with staff and peers. Attended and participated in unit community meeting and occupational therapy session.

## 2022-04-14 NOTE — PLAN OF CARE
Problem: Behavioral Health Plan of Care  Goal: Plan of Care Review  Outcome: Ongoing, Progressing  Flowsheets (Taken 4/14/2022 0214)  Plan of Care Reviewed With: patient  Patient Agreement with Plan of Care: agrees  Consent Given to Review Plan with:  germaine  Goal: Adheres to Safety Considerations for Self and Others  Outcome: Ongoing, Progressing  Intervention: Develop and Maintain Individualized Safety Plan  Recent Flowsheet Documentation  Taken 4/14/2022 0214 by Cherrie Torres RN  Safety Measures: safety rounds completed     Problem: Suicide Risk  Goal: Absence of Self-Harm  Outcome: Ongoing, Progressing  Intervention: Assess Risk to Self and Maintain Safety  Recent Flowsheet Documentation  Taken 4/14/2022 0214 by Cherrie Torers RN  Behavior Management: behavioral plan reviewed  Self-Harm Prevention: environmental self-harm risks assessed  Intervention: Promote Psychosocial Wellbeing  Recent Flowsheet Documentation  Taken 4/14/2022 0214 by Cherrie Torres RN  Supportive Measures: active listening utilized  Sleep/Rest Enhancement:   awakenings minimized   regular sleep/rest pattern promoted  Family/Support System Care: self-care encouraged  Intervention: Establish Safety Plan and Continuity of Care  Recent Flowsheet Documentation  Taken 4/14/2022 0214 by Cherrie Torres RN  Safe Transition Promotion: protective factors promoted   Goal Outcome Evaluation:    Plan of Care Reviewed With: patient        Patient admitted to the unit at 2325pm. Admission process completed and patient given orientation to unit , policies, and surroundings. Time allowed for questions and concerns. Patient is started of SI precautions for thoughts of overdose. Will continue to monitor for safety and behaviors.

## 2022-04-14 NOTE — PROGRESS NOTES
Admission:    Patient admitted to unit at 2325pm voluntarily. She presents with increasing depression x 2 weeks and has had suicidal ideations with an ongoing plan to overdose. Patient states that she did take 30 amiodipine, flexeril, and hydroxyzine this past Thursday. She also states being taken off buspar and depakote this past February because having felt better, wanting to come off of some of her medications. Patient is cooperative with admission process and contracts for safety. Unit orientation provided with admission packet and time allowed for questions and concerns.  Past medical history includes:   eating disorder, htn, dm type 2, abdomen surgery, bruce, hysterectomy, right knee replacement in 2008, soft tissue surgery carpel tunnel trigger finger release.  Mental Health history includes: bipolar, anxiety, depressive disorder, and PTSD.  Patient presents with flat affect and little eye contact. She is soft spoken , During assessment patient appearing drowsy and states that she is feeling dizzy, light headed, and nauseated. Blood Glucose at 2355pm 134. Patient given snack. /60 sitting with radial pulse of 92. Patient assisted to her room thereafter and she settled into bed after 0130am. She states that feeling better after snack and tired from the long day. She denies pain/discomfort.  Patient rates anxiety 6/10 and depression 8/10. Denies SI, HI,SIB, or other psych symptoms. No signs of hallucinations or delusions. Patient reports having been lastly admitted for  in 10/21. Patient goal is to discharge within one week and learn about medications for her bipolar and to become consistently compliant with taking her medications.

## 2022-04-15 LAB
CHOLEST SERPL-MCNC: 194 MG/DL
HBA1C MFR BLD: 6.5 %
HDLC SERPL-MCNC: 49 MG/DL
LDLC SERPL CALC-MCNC: 113 MG/DL
TRIGL SERPL-MCNC: 160 MG/DL
TSH SERPL DL<=0.005 MIU/L-ACNC: 0.63 UIU/ML (ref 0.3–5)

## 2022-04-15 PROCEDURE — 99232 SBSQ HOSP IP/OBS MODERATE 35: CPT | Performed by: NURSE PRACTITIONER

## 2022-04-15 PROCEDURE — 84443 ASSAY THYROID STIM HORMONE: CPT | Performed by: PSYCHIATRY & NEUROLOGY

## 2022-04-15 PROCEDURE — 80061 LIPID PANEL: CPT | Performed by: PSYCHIATRY & NEUROLOGY

## 2022-04-15 PROCEDURE — 250N000013 HC RX MED GY IP 250 OP 250 PS 637: Performed by: PSYCHIATRY & NEUROLOGY

## 2022-04-15 PROCEDURE — 128N000001 HC R&B CD/MH ADULT

## 2022-04-15 PROCEDURE — 99232 SBSQ HOSP IP/OBS MODERATE 35: CPT

## 2022-04-15 PROCEDURE — 83036 HEMOGLOBIN GLYCOSYLATED A1C: CPT

## 2022-04-15 PROCEDURE — 250N000013 HC RX MED GY IP 250 OP 250 PS 637: Performed by: NURSE PRACTITIONER

## 2022-04-15 PROCEDURE — 250N000013 HC RX MED GY IP 250 OP 250 PS 637

## 2022-04-15 PROCEDURE — 36415 COLL VENOUS BLD VENIPUNCTURE: CPT

## 2022-04-15 RX ORDER — BUSPIRONE HYDROCHLORIDE 7.5 MG/1
15 TABLET ORAL 3 TIMES DAILY
Status: DISCONTINUED | OUTPATIENT
Start: 2022-04-15 | End: 2022-04-22 | Stop reason: HOSPADM

## 2022-04-15 RX ORDER — NALOXONE HYDROCHLORIDE 0.4 MG/ML
0.4 INJECTION, SOLUTION INTRAMUSCULAR; INTRAVENOUS; SUBCUTANEOUS
Status: DISCONTINUED | OUTPATIENT
Start: 2022-04-15 | End: 2022-04-22 | Stop reason: HOSPADM

## 2022-04-15 RX ORDER — NALOXONE HYDROCHLORIDE 0.4 MG/ML
0.2 INJECTION, SOLUTION INTRAMUSCULAR; INTRAVENOUS; SUBCUTANEOUS
Status: DISCONTINUED | OUTPATIENT
Start: 2022-04-15 | End: 2022-04-22 | Stop reason: HOSPADM

## 2022-04-15 RX ADMIN — Medication 2 CAPSULE: at 08:33

## 2022-04-15 RX ADMIN — AMLODIPINE BESYLATE 5 MG: 5 TABLET ORAL at 08:33

## 2022-04-15 RX ADMIN — ACETAMINOPHEN AND CODEINE PHOSPHATE 1 TABLET: 300; 30 TABLET ORAL at 21:07

## 2022-04-15 RX ADMIN — TOPIRAMATE 25 MG: 25 TABLET, FILM COATED ORAL at 08:34

## 2022-04-15 RX ADMIN — PANTOPRAZOLE SODIUM 40 MG: 20 TABLET, DELAYED RELEASE ORAL at 06:30

## 2022-04-15 RX ADMIN — TOPIRAMATE 25 MG: 25 TABLET, FILM COATED ORAL at 21:03

## 2022-04-15 RX ADMIN — DULOXETINE 90 MG: 30 CAPSULE, DELAYED RELEASE ORAL at 08:33

## 2022-04-15 RX ADMIN — HYDROXYZINE HYDROCHLORIDE 25 MG: 25 TABLET, FILM COATED ORAL at 14:15

## 2022-04-15 RX ADMIN — BUSPIRONE HYDROCHLORIDE 10 MG: 10 TABLET ORAL at 08:33

## 2022-04-15 RX ADMIN — QUETIAPINE FUMARATE 400 MG: 300 TABLET ORAL at 21:03

## 2022-04-15 RX ADMIN — DIVALPROEX SODIUM 500 MG: 500 TABLET, DELAYED RELEASE ORAL at 21:03

## 2022-04-15 RX ADMIN — LIDOCAINE 1 PATCH: 246 PATCH TOPICAL at 08:37

## 2022-04-15 RX ADMIN — THERA TABS 1 TABLET: TAB at 08:33

## 2022-04-15 RX ADMIN — BUSPIRONE HYDROCHLORIDE 10 MG: 10 TABLET ORAL at 14:15

## 2022-04-15 RX ADMIN — Medication 1 TABLET: at 08:34

## 2022-04-15 RX ADMIN — Medication 25 MCG: at 08:33

## 2022-04-15 RX ADMIN — BUSPIRONE HYDROCHLORIDE 15 MG: 7.5 TABLET ORAL at 21:03

## 2022-04-15 ASSESSMENT — ACTIVITIES OF DAILY LIVING (ADL)
LAUNDRY: WITH SUPERVISION
HYGIENE/GROOMING: INDEPENDENT
DRESS: INDEPENDENT
ORAL_HYGIENE: INDEPENDENT

## 2022-04-15 NOTE — PLAN OF CARE
Problem: Behavioral Health Plan of Care  Goal: Plan of Care Review  Outcome: Ongoing, Progressing  Flowsheets (Taken 4/14/2022 2219)  Plan of Care Reviewed With: patient  Patient Agreement with Plan of Care: agrees   Goal Outcome Evaluation:    Plan of Care Reviewed With: patient      Patient visible on the unit, calm, pleasant, cooperative. Rated anxiety 3, depression 3, denies right shoulder pain and all other psych symptoms. Contracted for safety. Engaged and social with staff and peers. Attended and participated in activities. No complains of dizziness/light headiness reported during the shift.

## 2022-04-15 NOTE — PROGRESS NOTES
04/15/22 1118   Engagement   Intervention Group   Topic Detail OT Wellness group-Exercise dice and 5 second rule for physical and cognitive wellness, movement, focus, direction following, symptom management, healthy distraction, and social engagement   Attendance Attended   Patient Response Demonstrated understanding of materials provided;Accepted feedback;Expressed feelings/issues;Prosocial behavior;Was respectful;Asked questions and/or took notes   Concentrated on Task duration of group   Cognition Goal-directed;Sequences task;Attends to detail   Mood/Affect Content;Pleasant   Social/Behavioral Cooperative;Engaged   Goals addressed in session today pt actively engaged in group activities. pt was independent with movements and cognitive task following initial instructions and demonstration. pt assisted witih keeping time and counting for exercises. pt endorsed importance of physical and cogntiive activities.

## 2022-04-15 NOTE — PLAN OF CARE
Goal Outcome Evaluation:       Problem: Behavioral Health Plan of Care  Goal: Develops/Participates in Therapeutic [place] to Support Successful Transition  Outcome: Ongoing, Progressing     Pt is up for breakfast at this time.    Room checks/sweeps performed per unit protocol.    Emily Abreu RN

## 2022-04-15 NOTE — PROGRESS NOTES
04/15/22 1453   Engagement   Intervention Group   Topic Detail OT Creative Expressions group-Easter/Spring paper craft for social engagement, creativity, symptom management, following directions, frustration tolerance, and focus   Attendance Attended   Patient Response Demonstrated understanding of materials provided;Expressed feelings/issues;Was respectful;Positive attitude;Prosocial behavior   Concentrated on Task duration of group   Cognition Goal-directed;Takes initiative;Follows through with task;Attends to detail;Sequences task   Mood/Affect Content;Pleasant   Social/Behavioral Cooperative;Engaged   Goals addressed in session today pt actively engaged in group activity. pt was polite and pleasant during interactions with staff and peers. pt was independent with supplies and task following initial verbal instruction and demonstration. pt endorsed positive response to finished project

## 2022-04-15 NOTE — PROGRESS NOTES
[facility]    Medicine Progress Note - Hospitalist Service    Date of Admission:  4/14/2022    Assessment & Plan        Marissa Youssef is a 61 year old female admitted on 4/14/2022. She has a PMH of major depression, bipolar disorder, anxiety, PTSD, diabetes, HLD, HTN, GERD, pelvic floor instability. Face to face exam and interview completed on unit in patient room, cooperative and calm during assessment.     # Cystitis  Had an abnormal UA in the primary care office. She was started on Amoxicillin, did not tolerate it and she was changed to Bactrim. Note says she c/o dysuria and flank pain, pt denies ever having symptoms. Did not tolerate Bactrim either, states it made her dizzy.   - Urine Culture- no growth  - no need to finish course of abx or start new one  - reconsider abx if symptoms return     # Hypertension  Well controlled so far. Will continue to trend.   - PTA amlodipine 5 mg PO daily     # GERD  - PTA pantoprazole 40 mg daily  - avoid chocolate, peppermint, high-fat foods, citrus, spicy foods, tomatoes, coffee, and limit caffeinated beverages  - do not eat meals or drink carbonated beverages within 3 hours of bedtime   - focus on weight loss and avoid tight-fitting clothes around the waist     # Chronic Colitis- per patient   - no NSAIDs  - probiotic 2 capsules daily     # Diabetes  Possibly pre- diabetic with blood sugars in the 120-196 range. Last Hgb A1C 5.5 in 2007.   - check Hgb A1C in am- 6.5 which indicates diabetes. Spoke with patient and she is being followed closely for this with her primary care. She has an appointment at the end of the month where they were going to check A1C. A1C has been up and down in the past. We discussed plan for her to follow up in primary care, will not start medication during this hospitalization. She has a specific treatment plan with a weekly injection we do not stock here.   - choose healthy foods, weight loss, exercise  - lipid panel-  "cholesterol 194, HDL 49 (L), , Trig 160. Review results with primary  - regular diet, pt verbalized understanding of proper food choices and is not getting what she needs with diabetic diet     # Chronic Shoulder Pain  States she has loss of cartilage and ligament damage in the right shoulder. Not damaged enough to require surgery. Normally uses Tylenol #3  - acetaminophen 975 mg every 6 hours prn- try first  - PTA Tylenol # 3 for severe pain if vitally stable   - tramadol contraindicated with other medication     HMS will sign off     Diet: Consistent Carbohydrate Diet High Consistent Carb (75 g CHO per Meal) Diet    DVT Prophylaxis: Low Risk/Ambulatory with no VTE prophylaxis indicated  Thomson Catheter: Not present  Central Lines: None  Cardiac Monitoring: None  Code Status: Full Code      Disposition Plan   Expected Discharge: TBD  Anticipated discharge location: home with familyAwaiting care coordination huddle  Delays:per psychiatry        The patient's care was discussed with the Patient.    BRIT Gomez [facility]  Hospitalist Service  [facility]  Securely message with the Vocera Web Console (learn more here)  Text page via [employer] Paging/Directory         Clinically Significant Risk Factors Present on Admission             # Obesity: Estimated body mass index is 35.17 kg/m  as calculated from the following:    Height as of this encounter: 1.575 m (5' 2\").    Weight as of this encounter: 87.2 kg (192 lb 5 oz).      ______________________________________________________________________    Interval History   12 point review of systems negative except for pertinent positives mentioned in the HPI and Assessment and Plan.     Data reviewed today: I reviewed all medications, new labs and imaging results over the last 24 hours. I personally reviewed no images or EKG's today.    Physical Exam   Vital Signs: Temp: 98.5  F (36.9  C) Temp src: Oral BP: 119/58 Pulse: 98   Resp: 18 SpO2: 99 % " O2 Device: None (Room air)    Weight: 192 lbs 5 oz      Data

## 2022-04-15 NOTE — PROGRESS NOTES
"PSYCHIATRY  PROGRESS NOTE     DATE OF SERVICE   4/15/2022         CHIEF COMPLAINT   \"I can't complain.\"       SUBJECTIVE   Nursing reports: Pt attends all groups. She denies psych symptoms in a.m., although appears more flat in afternoon. Pt spends majority of time in common area. Affect is polite, calm. Pt very conversant this a.m., spoke with writer about museums she likes to visit in [place] when she travels to see one of her sons     reports: Assessment/Intervention/Current Symtoms and Care Coordination  Writer met with patient to discuss discharge plans. Patient stated that she had stopped taking her medication since she \"had felt better.\" She voiced that she is aware that she should not stop her medications and often reminds her support group members \"to not stop their medications\". Her goal is to remain in the hospital to stabilize on her medications. Patient has established community supports and will consider community mental health support groups and the transition clinic for therapy since her appointment is not until June. family is I support of plan.       OBJECTIVE   Chart reviewed and patient assessed. Patient was seen and evaluated in the consult room on unit 5500. Upon patient interview, patient reports she is doing well, saying she slept the majority of night. Patient describes her mood as being \"hopeful\", saying she has learned her lesson. When writer inquired further about the lesson she has learned, she stated she will never stop taking any of her medication, even If she is doing great from this moment going forward. Patient stated she is now aware that being med compliant is the reason why people are able to function well for a long period of time. Patient states she is happy she is back on her medications. Patient currently denies both suicidal and homicidal ideations. Patient also denies both auditory and visual hallucinations. Patient states both anxiety and depressions are " "at baseline and manageable. Patient does not appear to be responding to internal stimuli. No psychosis observed during this evaluation. Buspar increased to 15 mg tid to effectively target anxiety. Valproic acid level to be checked on 4/18 at 1900          MEDICATIONS   Medications:  Scheduled Meds:    amLODIPine  5 mg Oral Daily     busPIRone  10 mg Oral TID     calcium carbonate-vitamin D  1 tablet Oral Daily     divalproex sodium delayed-release  500 mg Oral At Bedtime     DULoxetine  90 mg Oral Daily     lactobacillus rhamnosus (GG)  2 capsule Oral Daily     Lidocaine  1 patch Transdermal Q24H     lidocaine   Transdermal Q8H     multivitamin, therapeutic  1 tablet Oral Daily     pantoprazole  40 mg Oral QAM AC     QUEtiapine  400 mg Oral At Bedtime     QUEtiapine  400 mg Oral Once     topiramate  25 mg Oral Q12H PARISA     cholecalciferol  25 mcg Oral Daily     Continuous Infusions:  PRN Meds:.acetaminophen, acetaminophen-codeine, alum & mag hydroxide-simethicone, cyclobenzaprine, hydrOXYzine, hydrOXYzine, nicotine, OLANZapine **OR** OLANZapine, ondansetron, senna-docusate, traZODone    Medication adherence issues: MS Med Adherence Y/N: No  Medication side effects: MEDICATION SIDE EFFECTS: no side effects reported  Benefit: Yes / No: Yes       ROS   A comprehensive review of systems was negative.       MENTAL STATUS EXAM   Vitals: /58 (BP Location: Right arm, Patient Position: Sitting, Cuff Size: Adult Large)   Pulse 98   Temp 98.1  F (36.7  C) (Oral)   Resp 16   Ht 1.575 m (5' 2\")   Wt 87.2 kg (192 lb 5 oz)   SpO2 98%   BMI 35.17 kg/m      Appearance:  Casually groomed  Mood:  \"fine\"  Affect: full range  was congruent to speech Mood congruent, intensity is normal and reactive  Suicidal Ideation: PRESENT / ABSENT: absent   Homicidal Ideation: PRESENT / ABSENT: absent   Thought process: linear and goal oriented   Thought content: denies suicidal ideation, violent ideation, delusions, magical thinking and " paranoid ideation.   Fund of Knowledge: Average  Attention/Concentration: Normal  Language ability:  Intact  Memory:  Immediate recall intact, Short-term memory intact and Long-term memory intact  Insight:  fair.  Judgement: fair  Orientation: Yes, x4  Psychomotor Behavior: denies tardive dyskinesia, dystonia or tics  Muscle Strength and Tone: MuscleStrength: Normal  Gait and Station: Normal       LABS   personally reviewed.     No results found for: PHENYTOIN, PHENOBARB, VALPROATE, CBMZ       DIAGNOSIS   Principal Problem:    Bipolar II disorder (H)    Active Problem List:  Patient Active Problem List   Diagnosis     Pain of female symphysis pubis     Pelvic floor dysfunction     Myalgia of pelvic floor     SI (sacroiliac) joint dysfunction     Chronic pelvic pain in female     MDD (major depressive disorder), recurrent severe, without psychosis (H)     Episode of recurrent major depressive disorder, unspecified depression episode severity (H)     Generalized anxiety disorder     Borderline personality disorder (H)     PTSD (post-traumatic stress disorder)     Moderate episode of recurrent major depressive disorder (H)     Suicidal ideation     Bipolar II disorder (H)          PLAN   1. Ongoing education given regarding diagnostic and treatment options with risks, benefits and alternatives and adequate verbalization of understanding.  2. Medications: Cymbalta 90 mg daily                           Buspar 10 mg tid, titrated to 15 mg tid starting 4/15                           Depakote 500 mg at bedtime                           Topamax 25 mg bid                           Seroquel 400 mg at bedtime  3) Hospitalist Consult: Hospitalist to see patient as needed  4)Legal: Voluntary  5)  to follow regarding collecting and reviewing collateral information, referrals and disposition planning                     Risk Assessment: [facility] RISK ASSESSMENT: Patient able to contract for safety and Patient on  precautions    Coordination of Care:   Treatment Plan reviewed and physician signed, Care discussed with Care/Treatment Team Members, Chart reviewed and Patient seen      Re-Certification I certify that the inpatient psychiatric facility services furnished since the previous certification were, and continue to be, medically necessary for, either, treatment which could reasonably be expected to improve the patient s condition or diagnostic study and that the hospital records indicate that the services furnished were, either, intensive treatment services, admission and related services necessary for diagnostic study, or equivalent services.     I certify that the patient continues to need, on a daily basis, active treatment furnished directly by or requiring the supervision of inpatient psychiatric facility personnel.   I estimate 5-7 days of hospitalization is necessary for proper treatment of the patient. My plans for post-hospital care for this patient are  home with family     BRIT Avendaño CNP    -     4/15/2022     -     12:16 PM    Total time  25 minutes with > 50%spent on coordination of cares and psycho-education.    This note was created with help of Dragon dictation system. Grammatical / typing errors are not intentional.    BRIT Avendaño CNP

## 2022-04-15 NOTE — PLAN OF CARE
"Assessment/Intervention/Current Symtoms and Care Coordination  Writer met with patient to discuss discharge plans. Patient stated that she had stopped taking her medication since she \"had felt better.\" She voiced that she is aware that she should not stop her medications and often reminds her support group members \"to not stop their medications\". Her goal is to remain in the hospital to stabilize on her medications. Patient has established community supports and will consider community mental health support groups and the transition clinic for therapy since her appointment is not until June. family is I support of plan.      Discharge Plan or Goal   return to home with established psychiatry and therapy      Barriers to Discharge   symptom stabilization, medication managment        Referral Status  Pending - Pt has established psychiatry and therapy ALISSON's signed    Therapist: Devi Barajas jeses [place]  Psychiatrist: Chalo Skelton [facility]        Legal Status  Voluntary      Mayte Yu, [facility], [facility], 4/15/2022, 10:45 AM     "

## 2022-04-15 NOTE — PLAN OF CARE
Care plan initiated for patient who admitted on 4/14/22. Occupational therapy will engage patient as appropriate, providing invitation to groups and encouraging active participation.  Formal assessment to be completed next week.

## 2022-04-15 NOTE — PROGRESS NOTES
Pt attends all groups. She denies psych symptoms in a.m., although appears more flat in afternoon. Pt spends majority of time in common area.     Affect is polite, calm. Pt very conversant this a.m., spoke with writer about museums she likes to visit in [place] when she travels to see one of her sons.     Emily Abreu RN

## 2022-04-15 NOTE — PLAN OF CARE
Problem: Behavioral Health Plan of Care  Goal: Plan of Care Review  Outcome: Ongoing, Progressing  Goal: Adheres to Safety Considerations for Self and Others  Outcome: Ongoing, Progressing  Intervention: Develop and Maintain Individualized Safety Plan  Recent Flowsheet Documentation  Taken 4/15/2022 0036 by Cherrie Torres RN  Safety Measures:   environmental rounds completed   safety plan reviewed     Problem: Suicide Risk  Goal: Absence of Self-Harm  Outcome: Ongoing, Progressing  Intervention: Promote Psychosocial Wellbeing  Recent Flowsheet Documentation  Taken 4/15/2022 0036 by Cherrie Torres RN  Supportive Measures: active listening utilized  Sleep/Rest Enhancement:   awakenings minimized   noise level reduced   Goal Outcome Evaluation:      Patient has been sleeping this night shift without incident. No indications of right shoulder pain/discomfort thus far tonight. No signs of SI,HI,SIB, or other psych symptoms displayed. Patient remains on SI precautions and no behaviors noted. Will continue to monitor safety and behaviors. Will  follow current plan of care.  states that patient has had MH symptoms for the past 20 years. Symptoms flare seasonably this time of year when weather is dreary. Discharge goal is home with community MH support group, medication stabilization, and establish psychiatry and therapy.

## 2022-04-15 NOTE — PLAN OF CARE
04/15/22 1116   Group Therapy Session   Group Attendance attended group session   Total Time patient participated (minutes) 35   Total # Attendees 3   Group Type psychotherapeutic   Group Topic Covered coping skills/lifestyle management   Group Session Detail Building Resilience   Patient Response/Contribution cooperative with task;expressed understanding of topic;listened actively   Patient Response Detail Participated in group discussion

## 2022-04-16 PROCEDURE — 250N000013 HC RX MED GY IP 250 OP 250 PS 637: Performed by: PSYCHIATRY & NEUROLOGY

## 2022-04-16 PROCEDURE — 128N000001 HC R&B CD/MH ADULT

## 2022-04-16 PROCEDURE — 250N000013 HC RX MED GY IP 250 OP 250 PS 637: Performed by: NURSE PRACTITIONER

## 2022-04-16 PROCEDURE — 250N000013 HC RX MED GY IP 250 OP 250 PS 637

## 2022-04-16 RX ADMIN — Medication 2 CAPSULE: at 08:31

## 2022-04-16 RX ADMIN — TOPIRAMATE 25 MG: 25 TABLET, FILM COATED ORAL at 21:01

## 2022-04-16 RX ADMIN — TOPIRAMATE 25 MG: 25 TABLET, FILM COATED ORAL at 08:32

## 2022-04-16 RX ADMIN — BUSPIRONE HYDROCHLORIDE 15 MG: 7.5 TABLET ORAL at 21:01

## 2022-04-16 RX ADMIN — ACETAMINOPHEN AND CODEINE PHOSPHATE 1 TABLET: 300; 30 TABLET ORAL at 12:11

## 2022-04-16 RX ADMIN — HYDROXYZINE HYDROCHLORIDE 50 MG: 25 TABLET, FILM COATED ORAL at 21:07

## 2022-04-16 RX ADMIN — Medication 1 TABLET: at 08:30

## 2022-04-16 RX ADMIN — BUSPIRONE HYDROCHLORIDE 15 MG: 7.5 TABLET ORAL at 08:29

## 2022-04-16 RX ADMIN — THERA TABS 1 TABLET: TAB at 08:32

## 2022-04-16 RX ADMIN — HYDROXYZINE HYDROCHLORIDE 50 MG: 25 TABLET, FILM COATED ORAL at 12:12

## 2022-04-16 RX ADMIN — LIDOCAINE 1 PATCH: 246 PATCH TOPICAL at 09:36

## 2022-04-16 RX ADMIN — Medication 25 MCG: at 08:32

## 2022-04-16 RX ADMIN — DIVALPROEX SODIUM 500 MG: 500 TABLET, DELAYED RELEASE ORAL at 21:01

## 2022-04-16 RX ADMIN — BUSPIRONE HYDROCHLORIDE 15 MG: 7.5 TABLET ORAL at 14:28

## 2022-04-16 RX ADMIN — DULOXETINE 90 MG: 30 CAPSULE, DELAYED RELEASE ORAL at 08:31

## 2022-04-16 RX ADMIN — PANTOPRAZOLE SODIUM 40 MG: 20 TABLET, DELAYED RELEASE ORAL at 06:43

## 2022-04-16 RX ADMIN — QUETIAPINE FUMARATE 400 MG: 300 TABLET ORAL at 21:08

## 2022-04-16 RX ADMIN — AMLODIPINE BESYLATE 5 MG: 5 TABLET ORAL at 08:29

## 2022-04-16 ASSESSMENT — ACTIVITIES OF DAILY LIVING (ADL)
HYGIENE/GROOMING: HANDWASHING;SHOWER;INDEPENDENT
ORAL_HYGIENE: INDEPENDENT
DRESS: SCRUBS (BEHAVIORAL HEALTH);INDEPENDENT
HYGIENE/GROOMING: INDEPENDENT

## 2022-04-16 NOTE — PLAN OF CARE
Problem: Behavioral Health Plan of Care  Goal: Plan of Care Review  Outcome: Ongoing, Progressing  Flowsheets (Taken 4/16/2022 0900)  Plan of Care Reviewed With: patient  Patient Agreement with Plan of Care: agrees     Problem: Behavioral Health Plan of Care  Goal: Adheres to Safety Considerations for Self and Others  Outcome: Ongoing, Progressing  Intervention: Develop and Maintain Individualized Safety Plan  Recent Flowsheet Documentation  Taken 4/16/2022 0900 by Marline Schultz RN  Safety Measures:    suicide assessment completed    self-directed behavior promoted    safety rounds completed    suicide check-in completed   Goal Outcome Evaluation:    Plan of Care Reviewed With: patient      Pt up and visible on unit. Social with peers and attends groups. Pt states she if feeling much better. Denies any thoughts of self harm. Medication compliant. Took self initiated shower this morning.    Rates anxiety 4/10 and denies any depression Denies hallucinations. . Pt does report some rt shoulder pain and was given lidocaine patch.     Will continue to monitor closely.

## 2022-04-16 NOTE — PLAN OF CARE
Problem: Behavioral Health Plan of Care  Goal: Develops/Participates in Therapeutic [place] to Support Successful Transition  Outcome: Ongoing, Progressing  Intervention: Foster Therapeutic [place]    Problem: Suicidal Behavior  Goal: Suicidal Behavior is Absent or Managed  Outcome: Ongoing, Progressing   Goal Outcome Evaluation:    Plan of Care Reviewed With: patient      Patient visible on the unit; calm, cooperative and polite. Denies psych symptoms this shift. Presents with flat affect, but pleasant on approach. Attended all groups. Socialized with peers. Reported bilateral shoulder pain of 3, but stated pain is manageable. No intervention needed per patient. Anxiety 2, denied depression, SI/SIB, hallucination. Contracted for safety. At h.s, patient complained of bilateral shoulder pain of 6/10. Prn Tylenol #3 was given. Patient was observed sleeping on recheck.

## 2022-04-16 NOTE — PLAN OF CARE
Problem: Suicide Risk  Goal: Absence of Self-Harm  Outcome: Ongoing, Progressing  Intervention: Assess Risk to Self and Maintain Safety  Recent Flowsheet Documentation  Taken 4/16/2022 0030 by Cherrie Torres RN  Behavior Management: behavioral plan reviewed  Intervention: Promote Psychosocial Wellbeing  Recent Flowsheet Documentation  Taken 4/16/2022 0030 by Cherrie Torres RN  Supportive Measures: active listening utilized  Sleep/Rest Enhancement:   comfort measures   regular sleep/rest pattern promoted     Problem: Behavioral Health Plan of Care  Goal: Optimized Coping Skills in Response to Life Stressors  Intervention: Promote Effective Coping Strategies  Recent Flowsheet Documentation  Taken 4/16/2022 0030 by Cherrie Torres RN  Supportive Measures: active listening utilized     Problem: Behavioral Health Plan of Care  Goal: Plan of Care Review  4/16/2022 0448 by Cherrie Torres RN  Outcome: Ongoing, Progressing   Goal Outcome Evaluation:    Plan of Care Reviewed With: patient      Patient has been sleeping this night shift without incident. No indications of kathia shoulder pain this night shift thus far. No signs of SI,HI,SIB, or other psych symptoms displayed. Patient remains on SI precautions and no behaviors noted. Will continue to monitor safety and behaviors. Discharge goal is home with her  and community MH support group, medication stabilization, and established psychiatry and therapy.

## 2022-04-16 NOTE — PROGRESS NOTES
Pt is having some anxiety this afternoon. Medicated with prn hydroxyzine with verbalized relief. Also has chronic pain of rt shoulder and sciatica pains. Pt given prn tylenol # 3 and pt reports it helped and she was able to nap this afternoon.  Pt is pleasant, polite and cooperative. Eating and drinking well. Speech organized and reality based. Pt denies any thoughts of self harm.   Will continue to monitor closely.

## 2022-04-16 NOTE — PLAN OF CARE
04/16/22 1156   Group Therapy Session   Group Attendance attended group session   Total # Attendees 3   Group Type psychoeducation   Group Topic Covered coping skills/lifestyle management   Group Session Detail Nutrition and Mental Health   Patient Response/Contribution cooperative with task;expressed understanding of topic;listened actively   Patient Response Detail Participated in group discussion

## 2022-04-17 PROCEDURE — 250N000013 HC RX MED GY IP 250 OP 250 PS 637

## 2022-04-17 PROCEDURE — 128N000001 HC R&B CD/MH ADULT

## 2022-04-17 PROCEDURE — 250N000013 HC RX MED GY IP 250 OP 250 PS 637: Performed by: PSYCHIATRY & NEUROLOGY

## 2022-04-17 PROCEDURE — 250N000013 HC RX MED GY IP 250 OP 250 PS 637: Performed by: NURSE PRACTITIONER

## 2022-04-17 RX ADMIN — DIVALPROEX SODIUM 500 MG: 500 TABLET, DELAYED RELEASE ORAL at 21:03

## 2022-04-17 RX ADMIN — THERA TABS 1 TABLET: TAB at 09:04

## 2022-04-17 RX ADMIN — ACETAMINOPHEN AND CODEINE PHOSPHATE 1 TABLET: 300; 30 TABLET ORAL at 21:03

## 2022-04-17 RX ADMIN — TOPIRAMATE 25 MG: 25 TABLET, FILM COATED ORAL at 21:03

## 2022-04-17 RX ADMIN — PANTOPRAZOLE SODIUM 40 MG: 20 TABLET, DELAYED RELEASE ORAL at 06:38

## 2022-04-17 RX ADMIN — AMLODIPINE BESYLATE 5 MG: 5 TABLET ORAL at 08:59

## 2022-04-17 RX ADMIN — BUSPIRONE HYDROCHLORIDE 15 MG: 7.5 TABLET ORAL at 21:03

## 2022-04-17 RX ADMIN — BUSPIRONE HYDROCHLORIDE 15 MG: 7.5 TABLET ORAL at 14:53

## 2022-04-17 RX ADMIN — TOPIRAMATE 25 MG: 25 TABLET, FILM COATED ORAL at 09:05

## 2022-04-17 RX ADMIN — BUSPIRONE HYDROCHLORIDE 15 MG: 7.5 TABLET ORAL at 08:57

## 2022-04-17 RX ADMIN — LIDOCAINE 1 PATCH: 246 PATCH TOPICAL at 09:03

## 2022-04-17 RX ADMIN — DULOXETINE 90 MG: 30 CAPSULE, DELAYED RELEASE ORAL at 09:01

## 2022-04-17 RX ADMIN — QUETIAPINE FUMARATE 400 MG: 300 TABLET ORAL at 21:03

## 2022-04-17 RX ADMIN — Medication 2 CAPSULE: at 09:02

## 2022-04-17 RX ADMIN — Medication 25 MCG: at 09:05

## 2022-04-17 RX ADMIN — Medication 1 TABLET: at 09:00

## 2022-04-17 ASSESSMENT — ACTIVITIES OF DAILY LIVING (ADL)
HYGIENE/GROOMING: INDEPENDENT
HYGIENE/GROOMING: INDEPENDENT
ORAL_HYGIENE: INDEPENDENT
LAUNDRY: WITH SUPERVISION
HYGIENE/GROOMING: INDEPENDENT
DRESS: INDEPENDENT
DRESS: INDEPENDENT;STREET CLOTHES
PREVIOUS_RESPONSIBILITIES: MEAL PREP;HOUSEKEEPING;LAUNDRY;SHOPPING;YARDWORK;MEDICATION MANAGEMENT;FINANCES;DRIVING;WORK

## 2022-04-17 NOTE — PROGRESS NOTES
04/16/22 1126   Engagement   Intervention Group   Topic Detail OT: Creative expression group (butterfly/dragonfly pipecleaner beading activity) for seasonal/reality orientation, fine motor, instruction following, attention to detail, time management, healthy leisure, and coping with symptoms through distraction.   Attendance Attended   Patient Response Demonstrated understanding of materials provided;Positive attitude;Improved mood in response to group;Prosocial behavior   Concentrated on Task duration of group   Cognition Goal-directed   Mood/Affect Bright;Pleasant   Social/Behavioral Cooperative;Engaged   Thought Content Reality oriented

## 2022-04-17 NOTE — PROGRESS NOTES
04/17/22 1529   Engagement   Intervention Group   Topic Detail OT: Easter/spring cookie decorating with pictionary activity for season/reality orienting, social skills, sequencing, attention to detail, mood improvement, and coping with symptom by distraction.   Attendance Attended   Patient Response Demonstrated understanding of materials provided;Improved mood in response to group   Concentrated on Task duration of group   Cognition Follows through with task   Mood/Affect Content   Social/Behavioral Cooperative;Engaged   Thought Content Reality oriented

## 2022-04-17 NOTE — PLAN OF CARE
Problem: Behavioral Health Plan of Care  Goal: Plan of Care Review  Outcome: Ongoing, Progressing  Flowsheets (Taken 4/17/2022 0033)  Plan of Care Reviewed With: patient     Problem: Suicide Risk  Goal: Absence of Self-Harm  Outcome: Ongoing, Progressing  Intervention: Assess Risk to Self and Maintain Safety  Recent Flowsheet Documentation  Taken 4/17/2022 0033 by Cherrie Torres RN  Behavior Management: behavioral plan reviewed  Self-Harm Prevention: environmental self-harm risks assessed  Intervention: Promote Psychosocial Wellbeing  Recent Flowsheet Documentation  Taken 4/17/2022 0033 by Cherrie Torres RN  Supportive Measures: active listening utilized  Sleep/Rest Enhancement:    awakenings minimized    noise level reduced    relaxation techniques promoted  Intervention: Establish Safety Plan and Continuity of Care  Recent Flowsheet Documentation  Taken 4/17/2022 0033 by Cherrie Torres RN  Safe Transition Promotion: personal safety plan developed   Goal Outcome Evaluation:    Plan of Care Reviewed With: patient           Patient has been sleeping this night shift without incident. No indications of right shoulder pain this night shift. No signs of SI,HI,SIB, or other psych symptoms displayed. Patient remains on SI precautions and no behaviors noted. Will continue to monitor for safety and behaviors. Depakote level on 4/18/22. Discharge goal is home with  and community MH support groups, medication stablilization, and established psychiatry therapy.

## 2022-04-17 NOTE — PLAN OF CARE
Problem: Behavioral Health Plan of Care  Goal: Plan of Care Review  Outcome: Ongoing, Progressing  Flowsheets  Taken 4/17/2022 1532  Plan of Care Reviewed With: patient  Overall Patient Progress: improving  Patient Agreement with Plan of Care: agrees  Taken 4/17/2022 0900  Plan of Care Reviewed With: patient  Patient Agreement with Plan of Care: agrees   Goal Outcome Evaluation:    Plan of Care Reviewed With: patient     Overall Patient Progress: improving         Patient calm, cooperative, flat, visible on the unit some of the shift; also napped.  Patient denies SI/HI/SIB, hallucinations and other psych symptoms. Accepting of care plan, fair insight, no problems tolerating meds. Mild to moderate shoulder pain, which lidocaine patch helps with somewhat. Socialized some with peers. Patient has Valproic acid draw on 4/18.

## 2022-04-17 NOTE — PLAN OF CARE
Problem: Behavioral Health Plan of Care  Goal: Plan of Care Review  Outcome: Ongoing, Progressing  Flowsheets (Taken 4/16/2022 1846)  Plan of Care Reviewed With: patient  Patient Agreement with Plan of Care: agrees  Goal: Patient-Specific Goal (Individualization)  Outcome: Ongoing, Progressing  Goal: Adheres to Safety Considerations for Self and Others  Outcome: Ongoing, Progressing  Intervention: Develop and Maintain Individualized Safety Plan  Recent Flowsheet Documentation  Taken 4/16/2022 1846 by Belle Pereira, RN  Safety Measures:    suicide assessment completed    self-directed behavior promoted    safety rounds completed    suicide check-in completed  Goal: Absence of New-Onset Illness or Injury  Outcome: Ongoing, Progressing  Intervention: Identify and Manage Fall Risk  Recent Flowsheet Documentation  Taken 4/16/2022 1846 by Belle Pereira, RN  Safety Measures:    suicide assessment completed    self-directed behavior promoted    safety rounds completed    suicide check-in completed  Goal: Optimal Comfort and Wellbeing  Outcome: Ongoing, Progressing  Intervention: Provide Person-Centered Care  Recent Flowsheet Documentation  Taken 4/16/2022 1846 by Belle Pereira RN  Trust Relationship/Rapport:    care explained    choices provided    thoughts/feelings acknowledged    questions encouraged  Goal: Optimized Coping Skills in Response to Life Stressors  Outcome: Ongoing, Progressing  Intervention: Promote Effective Coping Strategies  Recent Flowsheet Documentation  Taken 4/16/2022 1846 by Belle Pereira RN  Supportive Measures:    active listening utilized    verbalization of feelings encouraged    self-responsibility promoted  Goal: Develops/Participates in Therapeutic [place] to Support Successful Transition  Outcome: Ongoing, Progressing  Intervention: Foster Therapeutic [place]  Recent Flowsheet Documentation  Taken 4/16/2022 1846 by Belle Pereira RN  Trust Relationship/Rapport:    care  explained    choices provided    thoughts/feelings acknowledged    questions encouraged  Goal: Team Discussion  Outcome: Ongoing, Progressing     Problem: Suicide Risk  Goal: Absence of Self-Harm  Outcome: Ongoing, Progressing  Intervention: Assess Risk to Self and Maintain Safety  Recent Flowsheet Documentation  Taken 4/16/2022 1846 by Belle Pereira RN  Behavior Management: behavioral plan reviewed  Self-Harm Prevention: environmental self-harm risks assessed  Intervention: Promote Psychosocial Wellbeing  Recent Flowsheet Documentation  Taken 4/16/2022 1846 by Belle Pereira RN  Supportive Measures:    active listening utilized    verbalization of feelings encouraged    self-responsibility promoted  Family/Support System Care: self-care encouraged  Intervention: Establish Safety Plan and Continuity of Care  Recent Flowsheet Documentation  Taken 4/16/2022 1846 by Belle Pereira RN  Safe Transition Promotion: protective factors promoted     Problem: Suicidal Behavior  Goal: Suicidal Behavior is Absent or Managed  Outcome: Ongoing, Progressing  Intervention: Provide Immediate and Ongoing Protective Physical Environment  Recent Flowsheet Documentation  Taken 4/16/2022 1846 by Belle Pereira RN  Safe Transition Promotion: protective factors promoted   Goal Outcome Evaluation:    Plan of Care Reviewed With: patient       Patient calm, cooperative, pleasant on approach. Med complaint. Patient  visible on the unit majority of the shift.  visited with patient this evening. The visit went well and no significant event. Patient denies SI/HI/SIB, hallucinations and other psych symptoms. Denies depression but rates anxiety 4/10 .Atarax was utilized. Presents with flat affect. Attended community group this evening. Socialized with peers. Reported pain on the right shoulder at 4/10. (Previous injury) but stated pain is manageable. Hot pack given. Patient is patch free this evening. Patient has Valproic acid draw  on 4/18.

## 2022-04-17 NOTE — PROGRESS NOTES
"   04/17/22 1100   Occupational Therapy Psychosocial Assessment   Assessment Type Interview Form;Chart Review   Prior Level of Function   People in Home spouse   Current Living Arrangements house   ADLs   Activity/Exercise/Self-Care Comment Pt appears well kempt   Instrumental Activities of Daily Living (IADL)   Previous Responsibilities meal prep;housekeeping;laundry;shopping;yardwork;medication management;finances;driving;work   IADL Comments Independent, has spouse for support as needed   Functional Mobility   Functional Mobility WNL   Equipment Currently Used at Home none   Therapy Assessment   Patient Presentation Pt is a 62 yo female who presented to the ED after overdosing on mutiple medications and UTI symptoms. Patient denies this was SA motivated; however, has hx of mutiple SA. Diagnoses include RACQUEL, MDD, and bipolar disorder. Current triggers include recently went off some medications, drawn out and cold spring impacting charted SAD. Per chart, patient identifies the importance of medication adherence for symptom stabilzation, demonstrating some insight to mental health management. Patient has engaged in 3 OT groups thus far. She has been pleasant and cooperative, and has demonstrated good cognitive and social skills. Patient woud benefit from continued enagement in meaningful activity for purpose and socilization.   Patient-identified areas of success Time spent with family or friends;Living independently;Taking care of the place of living;Transportation;Managing own finances;Managing basic needs (food, medicine, sleep);Learning new things;Ability to pursue personal goals;Healthy leisure activities   Healthy Leisure Activities \"Hiking, crocheting, knitting, binging\"   Patient-identified areas of difficulty Motivation/mood;Lack of satisfying daily routine   Patient-identified sources of support Safe place to live;Family, friends or caregivers to help when needed;A best frient or significant " "other;Pets;Access to email, social media, phone calls;Professional support   Professional support details psychotherapist, psychiatrist, community support group   Patient-identified emotional, physical or mental health concerns \"I stopped my meds and went into a depression\"   Patient-identified coping stategies for these concerns \"distractions, forcing myself to go outside\"   Patient-identified stressors or changes in the past year \"I was diagnosed with bipolar 2\"   Patient-identified values \"equality and peace\"   Patient's goal for the future \"have more\"   Patient-identified community resources Other (see comments)  (\"my family, my pets\")   Patient's goals to work on with OT Feel more confident;Handle own frustrations;Manage own physical pain   Clinical Impression   Patient Personal Strengths community support;coping skills;expressive of emotions;family/social support;independent living skills;insight into illness/situation;interests/hobbies;motivated for recovery;positive attitude;positive vocational history;self-awareness;stable living environment   Pt appears to have the following barriers/limitations Hopelessness;Poorly managed mental health symptoms   Patient's barriers/limitations contribute to Exacerbation of mental health symptoms;Poor follow through with treatment recommendations   Planned Interventions Encourage group attendance;Continue to build rapport;Encourage engagement in meaningful activities;Encourage improved social interaction skills   Risk & Benefits of therapy have been explained care plan/treatment goals reviewed;participants included;patient   Minutes Spent with Patient 0   Beh OT Plan for Next Session Pt will demonstrate improved feelings of self-worth/purpose by expressing satisfaction in competing a group task 3 times this review period.     "

## 2022-04-18 LAB — VALPROATE SERPL-MCNC: 27.7 UG/ML

## 2022-04-18 PROCEDURE — 128N000001 HC R&B CD/MH ADULT

## 2022-04-18 PROCEDURE — 250N000013 HC RX MED GY IP 250 OP 250 PS 637: Performed by: PSYCHIATRY & NEUROLOGY

## 2022-04-18 PROCEDURE — 250N000013 HC RX MED GY IP 250 OP 250 PS 637: Performed by: NURSE PRACTITIONER

## 2022-04-18 PROCEDURE — 99232 SBSQ HOSP IP/OBS MODERATE 35: CPT | Performed by: NURSE PRACTITIONER

## 2022-04-18 PROCEDURE — 250N000013 HC RX MED GY IP 250 OP 250 PS 637

## 2022-04-18 PROCEDURE — 80164 ASSAY DIPROPYLACETIC ACD TOT: CPT | Performed by: NURSE PRACTITIONER

## 2022-04-18 RX ADMIN — LIDOCAINE 1 PATCH: 246 PATCH TOPICAL at 08:42

## 2022-04-18 RX ADMIN — TOPIRAMATE 25 MG: 25 TABLET, FILM COATED ORAL at 08:44

## 2022-04-18 RX ADMIN — Medication 25 MCG: at 08:44

## 2022-04-18 RX ADMIN — QUETIAPINE FUMARATE 400 MG: 300 TABLET ORAL at 21:28

## 2022-04-18 RX ADMIN — DULOXETINE 90 MG: 30 CAPSULE, DELAYED RELEASE ORAL at 08:41

## 2022-04-18 RX ADMIN — BUSPIRONE HYDROCHLORIDE 15 MG: 7.5 TABLET ORAL at 14:53

## 2022-04-18 RX ADMIN — HYDROXYZINE HYDROCHLORIDE 50 MG: 25 TABLET, FILM COATED ORAL at 16:25

## 2022-04-18 RX ADMIN — PANTOPRAZOLE SODIUM 40 MG: 20 TABLET, DELAYED RELEASE ORAL at 06:39

## 2022-04-18 RX ADMIN — THERA TABS 1 TABLET: TAB at 08:44

## 2022-04-18 RX ADMIN — Medication 2 CAPSULE: at 08:42

## 2022-04-18 RX ADMIN — ACETAMINOPHEN AND CODEINE PHOSPHATE 1 TABLET: 300; 30 TABLET ORAL at 21:29

## 2022-04-18 RX ADMIN — ACETAMINOPHEN 975 MG: 325 TABLET ORAL at 16:25

## 2022-04-18 RX ADMIN — BUSPIRONE HYDROCHLORIDE 15 MG: 7.5 TABLET ORAL at 08:40

## 2022-04-18 RX ADMIN — Medication 1 TABLET: at 08:41

## 2022-04-18 RX ADMIN — AMLODIPINE BESYLATE 5 MG: 5 TABLET ORAL at 08:39

## 2022-04-18 RX ADMIN — DIVALPROEX SODIUM 500 MG: 500 TABLET, DELAYED RELEASE ORAL at 21:28

## 2022-04-18 RX ADMIN — TOPIRAMATE 25 MG: 25 TABLET, FILM COATED ORAL at 21:28

## 2022-04-18 RX ADMIN — BUSPIRONE HYDROCHLORIDE 15 MG: 7.5 TABLET ORAL at 21:28

## 2022-04-18 ASSESSMENT — ACTIVITIES OF DAILY LIVING (ADL)
ORAL_HYGIENE: INDEPENDENT
DRESS: INDEPENDENT
LAUNDRY: WITH SUPERVISION
ORAL_HYGIENE: INDEPENDENT
ORAL_HYGIENE: INDEPENDENT
HYGIENE/GROOMING: INDEPENDENT
HYGIENE/GROOMING: INDEPENDENT
DRESS: INDEPENDENT;SCRUBS (BEHAVIORAL HEALTH)
HYGIENE/GROOMING: INDEPENDENT

## 2022-04-18 NOTE — PLAN OF CARE
Assessment/Intervention/Current Symtoms and Care Coordination  Writer met with patient to discuss discharge plans. Patient was lying in bed resting. Patient stated that she did not feel well today. Writer provided information regarding community mental health support groups. Patient stated that when she was feeling better she would get times and dates for her upcoming mental health appointments. Writer agreed to meet with patient tomorrow.    Discharge Plan or Goal   return to home with established psychiatry and therapy    Barriers to Discharge   symptom stabilization, medication managment    Referral Status  Pending - Pt has established psychiatry and therapy ALISSON's signed    Therapist: Devi Barajas [facility]  Psychiatrist: Chalo Skelton [facility]    Legal Status  Voluntary      Mayte Yu, [facility], [facility] , 4/18/2022, 11:51 AM

## 2022-04-18 NOTE — PLAN OF CARE
"  Problem: Behavioral Health Plan of Care  Goal: Patient-Specific Goal (Individualization)  Outcome: Ongoing, Progressing  Flowsheets (Taken 4/18/2022 1522)  Patient-Specific Goals (Include Timeframe): states goal to \"go with the flow\"   Goal Outcome Evaluation:    Plan of Care Reviewed With: patient     Overall Patient Progress: improving       Patient has been calm, cooperative. Flat, visible on the unit majority of the shift, but not very interactive. Attended groups.  Patient denied SI/HI/SIB, hallucinations and other psych symptoms. Complained of mild to moderate shoulder pain, which lidocaine patch helps somewhat.Patient to have Valproic acid draw tonight before HS dose. Med compliant.     "

## 2022-04-18 NOTE — PROVIDER NOTIFICATION
04/18/22 1046   Individualization/Patient Specific Goals   Patient Personal Strengths community support;coping skills;expressive of emotions;family/social support;independent living skills;insight into illness/situation;interests/hobbies;motivated for recovery;positive attitude;positive vocational history;self-awareness;stable living environment   Patient Vulnerabilities other (see comments)   Patient-Specific Goals (Include Timeframe) medication management   Interprofessional Rounds   Participants advanced practice nurse;CTC;nursing;OT;pharmacy   Team Discussion   Participants ASIF AA, PharmD AS, OT VD, SW LM, RN PE   Progress Improving   Anticipated length of stay 3-10 days   Continued Stay Criteria/Rationale medication managment symptom stabilization   Medical/Physical none reported   Plan No change   Anticipated Discharge Disposition home with family

## 2022-04-18 NOTE — PLAN OF CARE
Problem: Behavioral Health Plan of Care  Goal: Optimal Comfort and Wellbeing  Problem: Suicide Risk  Goal: Absence of Self-Harm  Outcome: Ongoing, Progressing     Problem: Pain Chronic (Persistent)  Goal: Acceptable Pain Control and Functional Ability  Outcome: Ongoing, Progressing  Intervention: Develop Pain Management Plan  Intervention: Manage Persistent Pain     Goal Outcome Evaluation:    Plan of Care Reviewed With: patient     Patient has been calm, cooperative. Flat and blunted in affect, but pleasant on approach. Visible on the unit majority of the shift. Attended groups and watched movie with peers. Patient denied SI/HI/SIB, hallucinations and other psych symptoms. Complained of mild to moderate shoulder pain, which lidocaine patch helps somewhat. Prn Tylenol #3 also given. Socialized with some peers. Patient has Valproic acid draw on 4/18.

## 2022-04-18 NOTE — PROGRESS NOTES
"PSYCHIATRY  PROGRESS NOTE     DATE OF SERVICE   4/18/2022         CHIEF COMPLAINT   \"It's going.\"       SUBJECTIVE   Nursing reports: Pt calm, polite and appropriate. She was medication compliant. She attended group. She denies psych symptoms     reports: Assessment/Intervention/Current Symtoms and Care Coordination I have reviewed patient with the  earlier today during IDT and plan remains for continued hospitalization for medication management with the goal to discharge towards the end of the week.        OBJECTIVE   Chart reviewed and patient assessed. Patient was seen and evaluated in the consult room on unit 5500. Upon patient interview, patient reports she had a great weekend, saying her  visited twice during the weekend. Patient reports improvement in symptoms as she states she is no longer actively suicidal or depressed. She states her depression is somewhat stabilized, saying sleep has been improved considerably. Patient states she was tired much of yesterday morning and afternoon, saying she spent majority of the morning in her room sleeping. Patient denies suicidal and homicidal ideations. Patient also denies both auditory and visual hallucinations. Writer informed patient that her tiredness may likely due to the fact her body is adjusting to her medications. Writer assured patient she is going to get used to the side effect of her medications as she continues to take them consistently. Valproic acid level to be checked tonight at 1900          MEDICATIONS   Medications:  Scheduled Meds:    amLODIPine  5 mg Oral Daily     busPIRone  15 mg Oral TID     calcium carbonate-vitamin D  1 tablet Oral Daily     divalproex sodium delayed-release  500 mg Oral At Bedtime     DULoxetine  90 mg Oral Daily     lactobacillus rhamnosus (GG)  2 capsule Oral Daily     Lidocaine  1 patch Transdermal Q24H     lidocaine   Transdermal Q8H     multivitamin, therapeutic  1 tablet Oral Daily     " "pantoprazole  40 mg Oral QAM AC     QUEtiapine  400 mg Oral At Bedtime     topiramate  25 mg Oral Q12H PARISA     cholecalciferol  25 mcg Oral Daily     Continuous Infusions:  PRN Meds:.acetaminophen, acetaminophen-codeine, alum & mag hydroxide-simethicone, cyclobenzaprine, hydrOXYzine, hydrOXYzine, naloxone **OR** naloxone **OR** naloxone **OR** naloxone, nicotine, OLANZapine **OR** OLANZapine, ondansetron, senna-docusate, traZODone    Medication adherence issues: MS Med Adherence Y/N: No  Medication side effects: MEDICATION SIDE EFFECTS: no side effects reported  Benefit: Yes / No: Yes       ROS   A comprehensive review of systems was negative.       MENTAL STATUS EXAM   Vitals: /77   Pulse 96   Temp 98.4  F (36.9  C) (Oral)   Resp 16   Ht 1.575 m (5' 2\")   Wt 88.5 kg (195 lb 1.6 oz)   SpO2 96%   BMI 35.68 kg/m      Appearance:  Casually groomed  Mood:  \"fine\"  Affect: full range  was congruent to speech Mood congruent, intensity is normal and reactive  Suicidal Ideation: PRESENT / ABSENT: absent   Homicidal Ideation: PRESENT / ABSENT: absent   Thought process: linear and goal oriented   Thought content: denies suicidal ideation, violent ideation, delusions, magical thinking and paranoid ideation.   Fund of Knowledge: Average  Attention/Concentration: Normal  Language ability:  Intact  Memory:  Immediate recall intact, Short-term memory intact and Long-term memory intact  Insight:  fair.  Judgement: fair  Orientation: Yes, x4  Psychomotor Behavior: denies tardive dyskinesia, dystonia or tics  Muscle Strength and Tone: MuscleStrength: Normal  Gait and Station: Normal       LABS   personally reviewed.     No results found for: PHENYTOIN, PHENOBARB, VALPROATE, CBMZ       DIAGNOSIS   Principal Problem:    Bipolar II disorder (H)    Active Problem List:  Patient Active Problem List   Diagnosis     Pain of female symphysis pubis     Pelvic floor dysfunction     Myalgia of pelvic floor     SI (sacroiliac) joint " dysfunction     Chronic pelvic pain in female     MDD (major depressive disorder), recurrent severe, without psychosis (H)     Episode of recurrent major depressive disorder, unspecified depression episode severity (H)     Generalized anxiety disorder     Borderline personality disorder (H)     PTSD (post-traumatic stress disorder)     Moderate episode of recurrent major depressive disorder (H)     Suicidal ideation     Bipolar II disorder (H)          PLAN   1. Ongoing education given regarding diagnostic and treatment options with risks, benefits and alternatives and adequate verbalization of understanding.  2. Medications: Cymbalta 90 mg daily                           Buspar 10 mg tid, titrated to 15 mg tid starting 4/15                           Depakote 500 mg at bedtime                           Topamax 25 mg bid                           Seroquel 400 mg at bedtime  3) Hospitalist Consult: Hospitalist to see patient as needed  4)Legal: Voluntary  5)  to follow regarding collecting and reviewing collateral information, referrals and disposition planning                     Risk Assessment: [facility] RISK ASSESSMENT: Patient able to contract for safety and Patient on precautions    Coordination of Care:   Treatment Plan reviewed and physician signed, Care discussed with Care/Treatment Team Members, Chart reviewed and Patient seen      Re-Certification I certify that the inpatient psychiatric facility services furnished since the previous certification were, and continue to be, medically necessary for, either, treatment which could reasonably be expected to improve the patient s condition or diagnostic study and that the hospital records indicate that the services furnished were, either, intensive treatment services, admission and related services necessary for diagnostic study, or equivalent services.     I certify that the patient continues to need, on a daily basis, active treatment furnished  directly by or requiring the supervision of inpatient psychiatric facility personnel.   I estimate 5-7 days of hospitalization is necessary for proper treatment of the patient. My plans for post-hospital care for this patient are  home with family     BRIT Avendaño CNP    -     4/18/2022     -     10:50 AM    Total time  25 minutes with > 50%spent on coordination of cares and psycho-education.    This note was created with help of Dragon dictation system. Grammatical / typing errors are not intentional.    BRIT Avendaño CNP

## 2022-04-18 NOTE — DISCHARGE INSTRUCTIONS
Medical follow-up:  Please follow-up with primary care provider about starting a medication for your elevated hgb A1c as discussed, starting a statin for your elevated lipids, a basic metabolic panel lab to monitor your kidney function and electrolytes with starting a blood pressure medication hydrochlorothiazide and your GFR trends over the last several years. Please follow-up with your GI doctor about taking your pancreatic enzymes consistently. Please buy a blood pressure cuff and check your blood pressure twice a day (mornings after you empty your bladder and before your medications and before bed) and record it. Bring these measurements and your blood pressure cuff to your primary care appointment to review and correlate your cuff with the clinic's blood pressure cuff.     Behavioral Discharge Planning and Instructions    Summary: You were admitted on 4/14/2022  due to Suicidal Ideations.  You were treated by Husam Villalobos APRN CNP and discharged on 4/22/2022 from [facility] to Home    Main Diagnosis: Bipolar II disorder (H)    Health Care Follow-up:   Interim Psychiatry  Monday May 05/09 @ 10:30 am In person Provider: [facility]   [address] [address]    Therapist: June 6th at 1 pm. Provider: Devi Costa  [place] [address]. On wait list for sooner appointment    Psychiatrist: June 14th at 9:00 am Chalo Skelton [place] [address]. On wait list for sooner appointment.    Information for Community Mental health support groups have been provided to patient      Attend all scheduled appointments with your outpatient providers. Call at least 24 hours in advance if you need to reschedule an appointment to ensure continued access to your outpatient providers.     Major Treatments, Procedures and Findings:  You were provided with: a  psychiatric assessment, medication evaluation and/or management, group therapy, individual therapy, and milieu management    Symptoms to Report: losing more sleep, mood getting worse, or thoughts of suicide    Early warning signs can include: increased depression or anxiety sleep disturbances increased thoughts or behaviors of suicide or self-harm  increased unusual thinking, such as paranoia or hearing voices    Safety and Wellness:  Take all medicines as directed.  Make no changes unless your doctor suggests them.      Follow treatment recommendations.  Refrain from alcohol and non-prescribed drugs.  If there is a concern for safety, call 911.    Resources:   National [place] [employer] Mental Illness (www.mn.[place].org): 700.396.2378 or 808-924-2286.  National Suicide Prevention Line (www.mentalhealthmn.org): 047-708-XBHJ (4291)  Mental Health Association of [place] (www.mentalhealth.org): 598.650.4009 or 496-700-2291    General Medication Instructions:   See your medication sheet(s) for instructions.   Take all medicines as directed.  Make no changes unless your doctor suggests them.   Go to all your doctor visits.  Be sure to have all your required lab tests. This way, your medicines can be refilled on time.  Do not use any drugs not prescribed by your doctor.  Avoid alcohol.    Advance Directives:   Scanned document on file with [facility]? No scanned doc  Is document scanned? Pt states no documents  Honoring Choices Your Rights Handout: Informed and given  Was more information offered? Pt declined    The Treatment team has appreciated the opportunity to work with you. If you have any questions or concerns about your recent admission, you can contact the unit which can receive your call 24 hours a day, 7 days a week. They will be able to get in touch with a Provider if needed. The unit number is  .    Medical Appointment: Appointment with ** on *** at ***.   Address: ***  Phone number: ***

## 2022-04-18 NOTE — PLAN OF CARE
Problem: Behavioral Health Plan of Care  Goal: Plan of Care Review  Outcome: Ongoing, Progressing  Flowsheets (Taken 4/18/2022 0102)  Plan of Care Reviewed With: patient     Problem: Suicide Risk  Goal: Absence of Self-Harm  Outcome: Ongoing, Progressing  Intervention: Assess Risk to Self and Maintain Safety  Recent Flowsheet Documentation  Taken 4/18/2022 0102 by Cherrie Torres RN  Behavior Management: behavioral plan reviewed  Self-Harm Prevention: environmental self-harm risks assessed  Intervention: Promote Psychosocial Wellbeing  Recent Flowsheet Documentation  Taken 4/18/2022 0102 by Cherrie Torres RN  Supportive Measures:   active listening utilized   verbalization of feelings encouraged  Sleep/Rest Enhancement:   awakenings minimized   regular sleep/rest pattern promoted     Problem: Pain Chronic (Persistent)  Goal: Acceptable Pain Control and Functional Ability  Outcome: Ongoing, Progressing  Intervention: Develop Pain Management Plan  Recent Flowsheet Documentation  Taken 4/18/2022 0102 by Cherrie Torres RN  Pain Management Interventions:   environmental changes   essential oils  Intervention: Manage Persistent Pain  Recent Flowsheet Documentation  Taken 4/18/2022 0102 by Cherrie Torres RN  Sleep/Rest Enhancement:   awakenings minimized   regular sleep/rest pattern promoted  Medication Review/Management: medications reviewed  Intervention: Optimize Psychosocial Wellbeing  Recent Flowsheet Documentation  Taken 4/18/2022 0102 by Cherrie Torres RN  Supportive Measures:   active listening utilized   verbalization of feelings encouraged   Goal Outcome Evaluation:    Plan of Care Reviewed With: patient           Patient has been sleeping this night shift without incident. No indications of right shoulder pain thus far tonight. Appears to get relief from prn tylenol #3 and lidocaine patch. Patient is scheduled for valproic level this am, since restarting medication. No signs of SI,HI,SIB, or other psych symptoms displayed.  Patient remains on SI precautions and no behaviors noted. Will continue to monitor for safety and behaviors. Discharge plan is home with her  and [facility] support groups and establish psychiatry and therapy.

## 2022-04-18 NOTE — PROVIDER NOTIFICATION
04/18/22 1400   Engagement   Intervention Group   Topic Detail SW Process   Attendance Attended   Patient Response Demonstrated understanding of materials provided   Concentrated on Task duration of group   Cognition Goal-directed   Mood/Affect Flat   Social/Behavioral Cooperative   Thought Content Reality oriented     GROUP LENGTH (MINS):   45   RELEVANT PRIMARY DIAGNOSIS: Patient Active Problem List   Diagnosis    Pain of female symphysis pubis    Pelvic floor dysfunction    Myalgia of pelvic floor    SI (sacroiliac) joint dysfunction    Chronic pelvic pain in female    MDD (major depressive disorder), recurrent severe, without psychosis (H)    Episode of recurrent major depressive disorder, unspecified depression episode severity (H)    Generalized anxiety disorder    Borderline personality disorder (H)    PTSD (post-traumatic stress disorder)    Moderate episode of recurrent major depressive disorder (H)    Suicidal ideation    Bipolar II disorder (H)        TREATMENT MODALITY:      []CBT       [x]DBT       []ACT       []Interpersonal psychotherapy                                 []Psychoeducational therapy       [x]Skill development       []Other:        ATTENDANCE:        [x]Stayed for entire group     []Arrived late    [] Left early       # OF PATIENTS IN GROUP: 2   BILLABLE:     []Yes            [x]No   Notes:

## 2022-04-18 NOTE — PROGRESS NOTES
04/18/22 1052   Engagement   Intervention Group   Topic Detail OT Creative Expressions group-Window clings for focus, healthy distractions, symptom management, social engagement, and creativity   Attendance Attended   Patient Response Demonstrated understanding of materials provided;Was respectful;Expressed feelings/issues   Concentrated on Task duration of group   Cognition Goal-directed;Follows through with task;Attends to detail;Sequences task   Mood/Affect Content   Social/Behavioral Cooperative;Engaged;Motivated   Goals addressed in session today pt actively engaged in group activity. pt was independent with supplies and task. pt worked quietly for duration of group; pt completed task. pt was polite and pleasant during interactions

## 2022-04-19 ENCOUNTER — TELEPHONE (OUTPATIENT)
Dept: BEHAVIORAL HEALTH | Facility: CLINIC | Age: 62
End: 2022-04-19
Payer: COMMERCIAL

## 2022-04-19 LAB
ANION GAP SERPL CALCULATED.3IONS-SCNC: 12 MMOL/L (ref 5–18)
BNP SERPL-MCNC: <10 PG/ML (ref 0–98)
BUN SERPL-MCNC: 16 MG/DL (ref 8–22)
CALCIUM SERPL-MCNC: 9.4 MG/DL (ref 8.5–10.5)
CHLORIDE BLD-SCNC: 105 MMOL/L (ref 98–107)
CO2 SERPL-SCNC: 21 MMOL/L (ref 22–31)
CREAT SERPL-MCNC: 0.82 MG/DL (ref 0.6–1.1)
GFR SERPL CREATININE-BSD FRML MDRD: 80 ML/MIN/1.73M2
GLUCOSE BLD-MCNC: 188 MG/DL (ref 70–125)
POTASSIUM BLD-SCNC: 4.3 MMOL/L (ref 3.5–5)
SODIUM SERPL-SCNC: 138 MMOL/L (ref 136–145)

## 2022-04-19 PROCEDURE — 99232 SBSQ HOSP IP/OBS MODERATE 35: CPT

## 2022-04-19 PROCEDURE — 128N000001 HC R&B CD/MH ADULT

## 2022-04-19 PROCEDURE — 90853 GROUP PSYCHOTHERAPY: CPT

## 2022-04-19 PROCEDURE — 36415 COLL VENOUS BLD VENIPUNCTURE: CPT

## 2022-04-19 PROCEDURE — 99232 SBSQ HOSP IP/OBS MODERATE 35: CPT | Performed by: NURSE PRACTITIONER

## 2022-04-19 PROCEDURE — 83880 ASSAY OF NATRIURETIC PEPTIDE: CPT

## 2022-04-19 PROCEDURE — 250N000013 HC RX MED GY IP 250 OP 250 PS 637: Performed by: PSYCHIATRY & NEUROLOGY

## 2022-04-19 PROCEDURE — 250N000013 HC RX MED GY IP 250 OP 250 PS 637

## 2022-04-19 PROCEDURE — 80048 BASIC METABOLIC PNL TOTAL CA: CPT

## 2022-04-19 PROCEDURE — 250N000013 HC RX MED GY IP 250 OP 250 PS 637: Performed by: NURSE PRACTITIONER

## 2022-04-19 RX ORDER — ASPIRIN 81 MG/1
81 TABLET, CHEWABLE ORAL DAILY
Status: DISCONTINUED | OUTPATIENT
Start: 2022-04-19 | End: 2022-04-22 | Stop reason: HOSPADM

## 2022-04-19 RX ORDER — HYDROCHLOROTHIAZIDE 12.5 MG/1
12.5 TABLET ORAL DAILY
Status: DISCONTINUED | OUTPATIENT
Start: 2022-04-19 | End: 2022-04-22 | Stop reason: HOSPADM

## 2022-04-19 RX ORDER — ATORVASTATIN CALCIUM 10 MG/1
10 TABLET, FILM COATED ORAL EVERY EVENING
Status: DISCONTINUED | OUTPATIENT
Start: 2022-04-19 | End: 2022-04-22 | Stop reason: HOSPADM

## 2022-04-19 RX ADMIN — LIDOCAINE 1 PATCH: 246 PATCH TOPICAL at 08:19

## 2022-04-19 RX ADMIN — DULOXETINE 90 MG: 30 CAPSULE, DELAYED RELEASE ORAL at 08:20

## 2022-04-19 RX ADMIN — ACETAMINOPHEN AND CODEINE PHOSPHATE 1 TABLET: 300; 30 TABLET ORAL at 21:10

## 2022-04-19 RX ADMIN — THERA TABS 1 TABLET: TAB at 08:20

## 2022-04-19 RX ADMIN — HYDROXYZINE HYDROCHLORIDE 50 MG: 25 TABLET, FILM COATED ORAL at 21:02

## 2022-04-19 RX ADMIN — BUSPIRONE HYDROCHLORIDE 15 MG: 7.5 TABLET ORAL at 21:03

## 2022-04-19 RX ADMIN — ASPIRIN 81 MG: 81 TABLET, CHEWABLE ORAL at 12:05

## 2022-04-19 RX ADMIN — QUETIAPINE FUMARATE 400 MG: 300 TABLET ORAL at 21:03

## 2022-04-19 RX ADMIN — Medication 2 CAPSULE: at 08:20

## 2022-04-19 RX ADMIN — Medication 1 TABLET: at 08:20

## 2022-04-19 RX ADMIN — AMLODIPINE BESYLATE 5 MG: 5 TABLET ORAL at 08:21

## 2022-04-19 RX ADMIN — BUSPIRONE HYDROCHLORIDE 15 MG: 7.5 TABLET ORAL at 14:29

## 2022-04-19 RX ADMIN — DIVALPROEX SODIUM 750 MG: 250 TABLET, DELAYED RELEASE ORAL at 21:03

## 2022-04-19 RX ADMIN — Medication 25 MCG: at 08:21

## 2022-04-19 RX ADMIN — HYDROCHLOROTHIAZIDE 12.5 MG: 12.5 TABLET ORAL at 12:04

## 2022-04-19 RX ADMIN — BUSPIRONE HYDROCHLORIDE 15 MG: 7.5 TABLET ORAL at 08:21

## 2022-04-19 RX ADMIN — PANTOPRAZOLE SODIUM 40 MG: 20 TABLET, DELAYED RELEASE ORAL at 08:20

## 2022-04-19 RX ADMIN — TOPIRAMATE 25 MG: 25 TABLET, FILM COATED ORAL at 21:03

## 2022-04-19 RX ADMIN — TOPIRAMATE 25 MG: 25 TABLET, FILM COATED ORAL at 08:20

## 2022-04-19 RX ADMIN — ATORVASTATIN CALCIUM 10 MG: 10 TABLET, FILM COATED ORAL at 21:03

## 2022-04-19 ASSESSMENT — ACTIVITIES OF DAILY LIVING (ADL)
ORAL_HYGIENE: INDEPENDENT
HYGIENE/GROOMING: INDEPENDENT
DRESS: INDEPENDENT
ORAL_HYGIENE: INDEPENDENT
HYGIENE/GROOMING: INDEPENDENT

## 2022-04-19 NOTE — TELEPHONE ENCOUNTER
[facility]Addiction ([facility]&A)Transition [facility] (TC):     Provides Patient Support While Waiting to Access Programmatic and Outpatient MH&A Care and Provides Select Crisis Assessment Services     NURSING Referral Review  _________________________________________    This RN has reviewed this Medication Management referral to the Transition Clinic and deemed the referral   [] Appropriate  [] Inappropriate   [x]Consulting - pt has established provider (last note 4/12/22)    Based on the following criteria:    Pt has a psychiatric provider (or pending plan) in place for future prescribing: Yes: 6/14/22 w/ Dr. Skelton at [facility]     Timeframe until pt's scheduled psychiatry appointment is less than 6 months: Yes: 2 mo     Pt takes psychiatric medications: Yes: Depakote, Buspar, Topamax, Olanzapine     Pt's goals seem to align with this temporary service: No     Any additional pertinent information regarding this referral: [facility]    Initial contact w/ patient/parent: will await provider consult    The Transition [facility] phone # is [phone].    Ari Dennis, RN on April 19, 2022 at 2:01 PM    Mayte Yu LPCC, DAMIAN  P Transition Clinic Rn Pool  Transition Clinic Referral   [place] Only   Limited [place] Availability     Type of Referral:       _____Therapy   ____x_Therapy & Medication (Psychiatry next level of care appointment needs to be scheduled)   _____Medication Only (Psychiatry next level of care appointment needs to be scheduled)   _____Diagnostic Assessment Only       Referring Provider Name: HAYDEE Huddleston, DAMIAN     Clinician completing the assessment. HAYDEE Huddleston, DAMIAN     Referring Provider: [facility] PROVIDER     If known, referring provider contact name: HAYDEE Huddleston, DAMIAN; Phone Number: [phone]   Service Line/Location: [facility]     Reason for Transition Clinic Referral: Appointment for therapy and  psychiatry not scheduled until June. Current medication changes while in the hospital. Patient lives in [facility], [place]     Next Level of Care Patient Will Be Transitioned To: Home       Therapist: June 6th at 1 pm. Provider: Devi Costa  [address] [address]. On wait list for sooner appointment       Psychiatrist: June 14th at 9:00 [place] [address]. On wait list for sooner appointment     TC Psychiatry cannot see patients who do not have active medical insurance     What Would Be Helpful from the Transition Clinic: one medication refill and check in with therapist 2 times monthly      Needs: NO     Does Patient Have Access to Technology: yes     Patient E-mail Address: [email or web address]     Current Patient Phone Number: [phone];     Clinician Gender Preference (if applicable): NO     Mayte Yu, LPCC, LADC

## 2022-04-19 NOTE — PLAN OF CARE
Problem: Behavioral Health Plan of Care  Goal: Plan of Care Review  Outcome: Ongoing, Progressing  Flowsheets (Taken 4/18/2022 1625)  Plan of Care Reviewed With: patient  Patient Agreement with Plan of Care: agrees     Problem: Suicide Risk  Goal: Absence of Self-Harm  Outcome: Ongoing, Progressing  Intervention: Assess Risk to Self and Maintain Safety  Recent Flowsheet Documentation  Taken 4/18/2022 1625 by Oscar Lombardi RN  Behavior Management: behavioral plan reviewed  Self-Harm Prevention: environmental self-harm risks assessed  Intervention: Promote Psychosocial Wellbeing  Recent Flowsheet Documentation  Taken 4/18/2022 1625 by Oscar Lombardi RN  Supportive Measures:    active listening utilized    self-care encouraged    verbalization of feelings encouraged  Sleep/Rest Enhancement:    comfort measures    regular sleep/rest pattern promoted  Family/Support System Care:    self-care encouraged    support provided  Intervention: Establish Safety Plan and Continuity of Care  Recent Flowsheet Documentation  Taken 4/18/2022 1625 by Oscar Lombardi RN  Safe Transition Promotion: personal safety plan developed   Goal Outcome Evaluation:    Plan of Care Reviewed With: patient        Patient has been calm and cooperative. Affect flat but pleasant upon approach. She was out in the lounge watching TV with peers most of the shift. Attended community meeting. Rated pain on her shoulders between 5 and 7 and was given prn Tylenol 975 mg and Tylenol # 3 at bed time. Rated anxiety 6/10 and was given Atarax 50 mg which was helpful. Patient denied depression, SI/HI/SIB, hallucinations and contracted for safety. She was cooperative and med compliant. Valproic acid draw done this evening before scheduled Depakote at bed time. No other concerns or behavior noted at this time. Will continue to monitor and will assist if need arise.           [Dear  ___] : Dear  [unfilled], [Attached please find my note.] : Attached please find my note. [Thank you very much for allowing me to participate in the care of this patient. If you have any questions, please do not hesitate to contact me] : Thank you very much for allowing me to participate in the care of this patient. If you have any questions, please do not hesitate to contact me.

## 2022-04-19 NOTE — PROGRESS NOTES
"   04/19/22 1315   Engagement   Intervention Group   Topic Detail OT: Education on cognitive wellness and interactive social activity ([employer]) to increase self-esteem, attention, concentration, attention to task/detail, cognitive processing, coping with sx through distraction, cognitive wellness, social wellness, and overall wellness   Attendance Attended   Patient Response Demonstrated understanding of materials provided;Contributes to conversation;Prosocial behavior   Concentrated on Task duration of group   Cognition Goal-directed;Follows through with task   Mood/Affect Content   Social/Behavioral Engaged;Cooperative   Thought Content Reality oriented     Pt reported she enjoys \"logic puzzles\" as a way to increase her cognitive wellness. Pt reported she enjoyed the activities; pt was observed to be laughing and smiling with peers. Pt engaged in reciprocal social interactions with peers. Pt progressing towards goal.   "

## 2022-04-19 NOTE — PLAN OF CARE
Assessment/Intervention/Current Symtoms and Care Coordination  Writer met with patient to discuss discharge plans.Patient was lying down in bed when writer approached, patient agreed to go meet with writer in another room. Patient stated that she did not recall that she was not feeling well yesterday and stated that she was just tired. Writer gathered information regarding upcoming therapy and psychiatry, patient has appointments in June and is on a wait list for a more immediate appointment. Patient agreed to work with the transition clinic if [place]. Writer agreed to meet with patient tomorrow.    Discharge Plan or Goal   return to home with established psychiatry and therapy    Barriers to Discharge   symptom stabilization, medication managment    Referral Status  Pending - Pt has established psychiatry and therapy, ALISSON's signed. Writer sent a referral to the transition clinic.    Therapist: June 6th at 1 pm. Provider: Devi Costa  [place] [address]. On wait list for sooner appointment    Psychiatrist: June 14th at 9:00 Ar[place] [address]. On wait list for sooner appointment      Legal Status  Voluntary      Mayte Yu, [facility], LADC , 4/18/2022, 11:51 AM

## 2022-04-19 NOTE — PROVIDER NOTIFICATION
04/19/22 1600   Engagement   Intervention Group   Topic Detail SW Process   Attendance Attended   Patient Response Demonstrated understanding of materials provided   Concentrated on Task duration of group   Cognition Goal-directed   Mood/Affect Content   Social/Behavioral Engaged   Thought Content Reality oriented     GROUP LENGTH (MINS):   45   RELEVANT PRIMARY DIAGNOSIS: Patient Active Problem List   Diagnosis    Pain of female symphysis pubis    Pelvic floor dysfunction    Myalgia of pelvic floor    SI (sacroiliac) joint dysfunction    Chronic pelvic pain in female    MDD (major depressive disorder), recurrent severe, without psychosis (H)    Episode of recurrent major depressive disorder, unspecified depression episode severity (H)    Generalized anxiety disorder    Borderline personality disorder (H)    PTSD (post-traumatic stress disorder)    Moderate episode of recurrent major depressive disorder (H)    Suicidal ideation    Bipolar II disorder (H)        TREATMENT MODALITY:      []CBT       [x]DBT       []ACT       []Interpersonal psychotherapy                                 []Psychoeducational therapy       [x]Skill development       []Other:        ATTENDANCE:        [x]Stayed for entire group     []Arrived late    [] Left early       # OF PATIENTS IN GROUP: 4   BILLABLE:     [x]Yes            []No   Notes:

## 2022-04-19 NOTE — PROGRESS NOTES
[facility]    Medicine Progress Note - Hospitalist Service    Date of Admission:  4/14/2022    Assessment & Plan        Marissa Youssef is a 61 year old female admitted on 4/14/2022. She has a PMH of major depression, bipolar disorder, anxiety, PTSD, diabetes, HLD, HTN, GERD, pelvic floor instability. Face to face exam and interview completed on unit in patient room, cooperative and calm during assessment.        # Hypertension  # LE edema  Was well controlled, now mildly hypertensive and LE edema. Will continue to trend. BLE edema, 2+ pitting.    - PTA amlodipine 5 mg PO daily  - lipid panel- cholesterol 194, HDL 49 (L), , Trig 160.  - ASCVD risk 13% over 10 years. We reviewed the reduction tin risk step by step by adding an additional antihypertensive, statin, and aspirin therapy. Brought risk down to 6.4%. Would like to start all additional therapies  - atorvastatin 10 mg at bedime  - aspirin 81 mg daily  - hydrochlorothiazide 12.5 mg daily  - encourage elevation of legs when possible  - changed diet to 2 Gm Na. Discussed that if she does eat food high in sodium to drink an extra glass of water  - Check BMP, BNP and both in normal limits      # Diabetes  Possibly pre- diabetic with blood sugars in the 120-196 range. Last Hgb A1C 5.5 in 2007.   - check Hgb A1C in am- 6.5 which indicates diabetes. Spoke with patient and she is being followed closely for this with her primary care. She has an appointment at the end of the month where they were going to check A1C. A1C has been up and down in the past. We discussed plan for her to follow up in primary care, will not start medication during this hospitalization. She has a specific treatment plan with a weekly injection we do not stock here.   - blood sugar on . Discuss with patient if she wants sliding scale insulin while inpatient   - offer to check finger stick blood sugar in am, agreed and will start to trend   - choose  healthy foods, weight loss, exercise  - regular diet, pt verbalized understanding of proper food choices and is not getting what she needs with diabetic diet    # GERD  Well controlled   - PTA pantoprazole 40 mg daily  - avoid chocolate, peppermint, high-fat foods, citrus, spicy foods, tomatoes, coffee, and limit caffeinated beverages  - do not eat meals or drink carbonated beverages within 3 hours of bedtime   - focus on weight loss and avoid tight-fitting clothes around the waist     # Chronic Colitis- per patient   - no NSAIDs  - probiotic 2 capsules daily        # Chronic Shoulder Pain  States she has loss of cartilage and ligament damage in the right shoulder. Not damaged enough to require surgery. Normally uses Tylenol #3  - acetaminophen 975 mg every 6 hours prn- try first  - PTA Tylenol # 3 for severe pain if vitally stable- taking about once a day   - tramadol contraindicated with other medication    # Cystitis  Had an abnormal UA in the primary care office. She was started on Amoxicillin, did not tolerate it and she was changed to Bactrim. Note says she c/o dysuria and flank pain, pt denies ever having symptoms. Did not tolerate Bactrim either, states it made her dizzy.   - Urine Culture- no growth  - no need to finish course of abx or start new one  - reconsider abx if symptoms return         Diet: Regular Diet Adult    DVT Prophylaxis: Low Risk/Ambulatory with no VTE prophylaxis indicated  Thomson Catheter: Not present  Central Lines: None  Cardiac Monitoring: None  Code Status: Full Code      Disposition Plan   Expected Discharge:TBD  Anticipated discharge location: home with familyAwaiting care coordination huddle  Delays: per psychiatry     The patient's care was discussed with the Patient.    BRIT Gomez [facility]  Hospitalist Service  [facility]  Securely message with the Vocera Web Console (learn more here)  Text page via [employer] Paging/Directory         Clinically  Significant Risk Factors Present on Admission                    ______________________________________________________________________    Interval History   12 point review of systems negative except for pertinent positives mentioned in the HPI and Assessment and Plan. Concerned about high blood pressure. Interview, exam and plan discussed above     Data reviewed today: I reviewed all medications, new labs and imaging results over the last 24 hours. I personally reviewed no images or EKG's today.    Physical Exam   Vital Signs: Temp: 98.4  F (36.9  C) Temp src: Oral BP: 134/84 Pulse: 96   Resp: 16 SpO2: 98 % O2 Device: None (Room air)    Weight: 195 lbs 1.6 oz  Constitutional: awake, alert, cooperative, no apparent distress, and appears stated age  Respiratory: No increased work of breathing, good air exchange, clear to auscultation bilaterally, no crackles or wheezing  Cardiovascular: Normal apical impulse, regular rate and rhythm, normal S1 and S2, no S3 or S4, and no murmur noted. BLE 2+ pitting edema, L>R  Skin: no bruising or bleeding, no rashes and no lesions  Neuropsychiatric: General: normal, calm and normal eye contact    Data

## 2022-04-19 NOTE — PLAN OF CARE
Problem: Behavioral Health Plan of Care  Goal: Optimal Comfort and Wellbeing  Intervention: Monitor Pain and Promote Comfort  Recent Flowsheet Documentation  Taken 4/19/2022 1146 by Cait Nogueira RN  Pain Management Interventions: (Warm pack.) --     Problem: Suicide Risk  Goal: Absence of Self-Harm  Outcome: Ongoing, Progressing  Intervention: Assess Risk to Self and Maintain Safety  Recent Flowsheet Documentation  Taken 4/19/2022 1146 by Cait Nogueira RN  Self-Harm Prevention: environmental self-harm risks assessed  Intervention: Promote Psychosocial Wellbeing  Recent Flowsheet Documentation  Taken 4/19/2022 1146 by Cait Nogueira, RN  Sleep/Rest Enhancement: comfort measures  Family/Support System Care: support provided     Problem: Suicidal Behavior  Goal: Suicidal Behavior is Absent or Managed  Outcome: Ongoing, Progressing   Goal Outcome Evaluation:  Patient calm, pleasant, visible on unit, engaged and social with peers. Rated anxiety 0, depression 0. Denies suicidal and homicidal ideations. Contracted for safety. Complains of right shoulder pains, rated pain level 5/10. Offered pain medication, pt decline, applied warm pack to pt right shoulder per her requested, with good effects.    Plan of Care Reviewed With: patient

## 2022-04-19 NOTE — PROGRESS NOTES
"PSYCHIATRY  PROGRESS NOTE     DATE OF SERVICE   4/19/2022         CHIEF COMPLAINT   \"Things are improving.\"       SUBJECTIVE   Nursing reports: Pt calm, polite and appropriate. She attended group. She denies psych symptoms. C/o pain but declined intervention     reports: Assessment/Intervention/Current Symtoms and Care Coordination   Writer met with patient to discuss discharge plans.Patient was lying down in bed when writer approached, patient agreed to go meet with writer in another room. Patient stated that she did not recall that she was not feeling well yesterday and stated that she was just tired. Writer gathered information regarding upcoming therapy and psychiatry, patient has appointments in June and is on a wait list for a more immediate appointment. Patient agreed to work with the transition clinic if [place]. Writer agreed to meet with patient tomorrow.     OBJECTIVE   Chart reviewed and patient assessed. Patient was seen and evaluated in the day area by herself. Upon patient interview, patient reports she is doing great, saying everything is gradually coming back to normal. Patient reports her sleep has improved greatly while anxiety and depression are now at barest minimal. Patient states she is now optimistic and hopeful about the future, saying she cannot wait to visit her grandchildren when she leaves the hospital. Patient reports improvement in mood, perception and thinking. Patient states she is no longer feeling fatigued, saying her body is getting used to all her medications.  Patient denies suicidal and homicidal ideations. Patient also denies both auditory and visual hallucinations. The valproic acid level is subtherapeutic at 22.5 with the plan to titrate Depakote to 750 mg starting tonight. Medical team is following patient regarding physical problem including elevated BP. Valproic acid level to be rechecked on 4/22 with the plan to " "discharge patient in the afternoon if Valproic acid level comes back therapeutic.      MEDICATIONS   Medications:  Scheduled Meds:    amLODIPine  5 mg Oral Daily     aspirin  81 mg Oral Daily     atorvastatin  10 mg Oral QPM     busPIRone  15 mg Oral TID     calcium carbonate-vitamin D  1 tablet Oral Daily     divalproex sodium delayed-release  750 mg Oral At Bedtime     DULoxetine  90 mg Oral Daily     hydrochlorothiazide  12.5 mg Oral Daily     lactobacillus rhamnosus (GG)  2 capsule Oral Daily     Lidocaine  1 patch Transdermal Q24H     lidocaine   Transdermal Q8H     multivitamin, therapeutic  1 tablet Oral Daily     pantoprazole  40 mg Oral QAM AC     QUEtiapine  400 mg Oral At Bedtime     topiramate  25 mg Oral Q12H PARISA     cholecalciferol  25 mcg Oral Daily     Continuous Infusions:  PRN Meds:.acetaminophen, acetaminophen-codeine, alum & mag hydroxide-simethicone, cyclobenzaprine, hydrOXYzine, hydrOXYzine, naloxone **OR** naloxone **OR** naloxone **OR** naloxone, nicotine, OLANZapine **OR** OLANZapine, ondansetron, senna-docusate, traZODone    Medication adherence issues: MS Med Adherence Y/N: No  Medication side effects: MEDICATION SIDE EFFECTS: no side effects reported  Benefit: Yes / No: Yes       ROS   A comprehensive review of systems was negative.       MENTAL STATUS EXAM   Vitals: BP (!) 146/66   Pulse 106   Temp 97.8  F (36.6  C) (Oral)   Resp 18   Ht 1.575 m (5' 2\")   Wt 86.7 kg (191 lb 1.6 oz)   SpO2 96%   BMI 34.95 kg/m      Appearance:  Casually groomed  Mood:  \"fine\"  Affect: full range  was congruent to speech Mood congruent, intensity is normal and reactive  Suicidal Ideation: PRESENT / ABSENT: absent   Homicidal Ideation: PRESENT / ABSENT: absent   Thought process: linear and goal oriented   Thought content: denies suicidal ideation, violent ideation, delusions, magical thinking and paranoid ideation.   Fund of Knowledge: Average  Attention/Concentration: Normal  Language ability:  " Intact  Memory:  Immediate recall intact, Short-term memory intact and Long-term memory intact  Insight:  fair.  Judgement: fair  Orientation: Yes, x4  Psychomotor Behavior: denies tardive dyskinesia, dystonia or tics  Muscle Strength and Tone: MuscleStrength: Normal  Gait and Station: Normal       LABS   personally reviewed.     Lab Results   Component Value Date    VALPROATE 27.7 04/18/2022          DIAGNOSIS   Principal Problem:    Bipolar II disorder (H)    Active Problem List:  Patient Active Problem List   Diagnosis     Pain of female symphysis pubis     Pelvic floor dysfunction     Myalgia of pelvic floor     SI (sacroiliac) joint dysfunction     Chronic pelvic pain in female     MDD (major depressive disorder), recurrent severe, without psychosis (H)     Episode of recurrent major depressive disorder, unspecified depression episode severity (H)     Generalized anxiety disorder     Borderline personality disorder (H)     PTSD (post-traumatic stress disorder)     Moderate episode of recurrent major depressive disorder (H)     Suicidal ideation     Bipolar II disorder (H)          PLAN   1. Ongoing education given regarding diagnostic and treatment options with risks, benefits and alternatives and adequate verbalization of understanding.  2. Medications: Cymbalta 90 mg daily                           Buspar 10 mg tid, titrated to 15 mg tid starting 4/15                           Depakote 500 mg at bedtime, titrated to 750 mg starting 4/19/22                           Topamax 25 mg bid                           Seroquel 400 mg at bedtime  3) Hospitalist Consult: Hospitalist to see patient as needed  4)Legal: Voluntary  5)  to follow regarding collecting and reviewing collateral information, referrals and disposition planning                     Risk Assessment:  [facility] RISK ASSESSMENT: Patient able to contract for safety and Patient on precautions    Coordination of Care:   Treatment Plan reviewed  and physician signed, Care discussed with Care/Treatment Team Members, Chart reviewed and Patient seen      Re-Certification I certify that the inpatient psychiatric facility services furnished since the previous certification were, and continue to be, medically necessary for, either, treatment which could reasonably be expected to improve the patient s condition or diagnostic study and that the hospital records indicate that the services furnished were, either, intensive treatment services, admission and related services necessary for diagnostic study, or equivalent services.     I certify that the patient continues to need, on a daily basis, active treatment furnished directly by or requiring the supervision of inpatient psychiatric facility personnel.   I estimate 5-7 days of hospitalization is necessary for proper treatment of the patient. My plans for post-hospital care for this patient are  home with family     BRIT Avendaño CNP    -     4/19/2022     -     1:08 PM    Total time  25 minutes with > 50%spent on coordination of cares and psycho-education.    This note was created with help of Dragon dictation system. Grammatical / typing errors are not intentional.    BRIT Avendaño CNP

## 2022-04-19 NOTE — PLAN OF CARE
Problem: Behavioral Health Plan of Care  Goal: Plan of Care Review  Outcome: Ongoing, Progressing  Goal: Patient-Specific Goal (Individualization)  Outcome: Ongoing, Progressing  Goal: Adheres to Safety Considerations for Self and Others  Outcome: Ongoing, Progressing   Goal Outcome Evaluation:

## 2022-04-20 LAB — GLUCOSE BLDC GLUCOMTR-MCNC: 117 MG/DL (ref 70–99)

## 2022-04-20 PROCEDURE — 250N000013 HC RX MED GY IP 250 OP 250 PS 637: Performed by: NURSE PRACTITIONER

## 2022-04-20 PROCEDURE — 99231 SBSQ HOSP IP/OBS SF/LOW 25: CPT

## 2022-04-20 PROCEDURE — 250N000013 HC RX MED GY IP 250 OP 250 PS 637

## 2022-04-20 PROCEDURE — 99232 SBSQ HOSP IP/OBS MODERATE 35: CPT | Performed by: NURSE PRACTITIONER

## 2022-04-20 PROCEDURE — 250N000013 HC RX MED GY IP 250 OP 250 PS 637: Performed by: PSYCHIATRY & NEUROLOGY

## 2022-04-20 PROCEDURE — 128N000001 HC R&B CD/MH ADULT

## 2022-04-20 RX ORDER — ASPIRIN 81 MG/1
81 TABLET, CHEWABLE ORAL DAILY
Qty: 30 TABLET | Refills: 0 | Status: SHIPPED | OUTPATIENT
Start: 2022-04-21

## 2022-04-20 RX ORDER — LACTOBACILLUS RHAMNOSUS GG 10B CELL
2 CAPSULE ORAL DAILY
Qty: 60 CAPSULE | Refills: 0 | COMMUNITY
Start: 2022-04-21

## 2022-04-20 RX ORDER — ACETAMINOPHEN 325 MG/1
975 TABLET ORAL EVERY 8 HOURS PRN
Refills: 0 | COMMUNITY
Start: 2022-04-20

## 2022-04-20 RX ORDER — HYDROCHLOROTHIAZIDE 12.5 MG/1
12.5 TABLET ORAL DAILY
Qty: 60 TABLET | Refills: 0 | Status: SHIPPED | OUTPATIENT
Start: 2022-04-21

## 2022-04-20 RX ORDER — LIDOCAINE 4 G/G
1 PATCH TOPICAL EVERY 24 HOURS
Qty: 14 PATCH | Refills: 0 | Status: SHIPPED | OUTPATIENT
Start: 2022-04-21

## 2022-04-20 RX ORDER — ATORVASTATIN CALCIUM 10 MG/1
10 TABLET, FILM COATED ORAL EVERY EVENING
Qty: 60 TABLET | Refills: 0 | Status: SHIPPED | OUTPATIENT
Start: 2022-04-20

## 2022-04-20 RX ORDER — ACETAMINOPHEN 325 MG/1
975 TABLET ORAL EVERY 8 HOURS PRN
Status: DISCONTINUED | OUTPATIENT
Start: 2022-04-20 | End: 2022-04-22 | Stop reason: HOSPADM

## 2022-04-20 RX ADMIN — ASPIRIN 81 MG: 81 TABLET, CHEWABLE ORAL at 08:47

## 2022-04-20 RX ADMIN — DULOXETINE 90 MG: 30 CAPSULE, DELAYED RELEASE ORAL at 08:47

## 2022-04-20 RX ADMIN — TOPIRAMATE 25 MG: 25 TABLET, FILM COATED ORAL at 08:47

## 2022-04-20 RX ADMIN — ACETAMINOPHEN AND CODEINE PHOSPHATE 1 TABLET: 300; 30 TABLET ORAL at 21:27

## 2022-04-20 RX ADMIN — ACETAMINOPHEN 975 MG: 325 TABLET ORAL at 11:37

## 2022-04-20 RX ADMIN — THERA TABS 1 TABLET: TAB at 08:47

## 2022-04-20 RX ADMIN — Medication 25 MCG: at 08:47

## 2022-04-20 RX ADMIN — BUSPIRONE HYDROCHLORIDE 15 MG: 7.5 TABLET ORAL at 21:27

## 2022-04-20 RX ADMIN — ATORVASTATIN CALCIUM 10 MG: 10 TABLET, FILM COATED ORAL at 21:26

## 2022-04-20 RX ADMIN — Medication 2 CAPSULE: at 08:47

## 2022-04-20 RX ADMIN — BUSPIRONE HYDROCHLORIDE 15 MG: 7.5 TABLET ORAL at 08:47

## 2022-04-20 RX ADMIN — BUSPIRONE HYDROCHLORIDE 15 MG: 7.5 TABLET ORAL at 14:56

## 2022-04-20 RX ADMIN — TOPIRAMATE 25 MG: 25 TABLET, FILM COATED ORAL at 21:26

## 2022-04-20 RX ADMIN — HYDROXYZINE HYDROCHLORIDE 25 MG: 25 TABLET, FILM COATED ORAL at 21:27

## 2022-04-20 RX ADMIN — HYDROCHLOROTHIAZIDE 12.5 MG: 12.5 TABLET ORAL at 08:47

## 2022-04-20 RX ADMIN — LIDOCAINE 1 PATCH: 246 PATCH TOPICAL at 08:47

## 2022-04-20 RX ADMIN — Medication 1 TABLET: at 08:47

## 2022-04-20 RX ADMIN — DIVALPROEX SODIUM 750 MG: 250 TABLET, DELAYED RELEASE ORAL at 21:25

## 2022-04-20 RX ADMIN — ACETAMINOPHEN AND CODEINE PHOSPHATE 1 TABLET: 300; 30 TABLET ORAL at 07:13

## 2022-04-20 RX ADMIN — AMLODIPINE BESYLATE 5 MG: 5 TABLET ORAL at 08:47

## 2022-04-20 RX ADMIN — QUETIAPINE FUMARATE 400 MG: 300 TABLET ORAL at 21:27

## 2022-04-20 RX ADMIN — PANTOPRAZOLE SODIUM 40 MG: 20 TABLET, DELAYED RELEASE ORAL at 07:11

## 2022-04-20 NOTE — PROGRESS NOTES
Pt appeared to sleep well last night without any problems or issues.  She voiced no c/o discomfort or pain, and she has remained safe on the unit.  Behavior has been appropriate.

## 2022-04-20 NOTE — PROGRESS NOTES
Pt is pleasant, engaged--attending groups. Coop with meds. Denies symptoms, denies SI.  Reports relief from prn Tylenol given for right shoulder pain.     Pt resting in her room at this time.    Emily Abreu RN

## 2022-04-20 NOTE — PLAN OF CARE
Problem: Behavioral Health Plan of Care  Goal: Adheres to Safety Considerations for Self and Others  Intervention: Develop and Maintain Individualized Safety Plan  Recent Flowsheet Documentation  Taken 4/19/2022 1700 by Teetee Prasad RN  Safety Measures: safety plan reviewed     Problem: Behavioral Health Plan of Care  Goal: Absence of New-Onset Illness or Injury  Outcome: Ongoing, Progressing  Intervention: Identify and Manage Fall Risk  Recent Flowsheet Documentation  Taken 4/19/2022 1700 by Teetee Prasad RN  Safety Measures: safety plan reviewed   Goal Outcome Evaluation:    Plan of Care Reviewed With: patient   Patient  appears calm, and cooperative.Patient complains of chronic right shoulder pain rated 7/10, requested prn Tylenol #3 which is effective per patient. Patient prefers to have it at hs. Medication was given at 2110 . Patient attended community meeting and watched TV with peers. Patient socialized with select peers. Patient denies anxiety, depression, and other psych symptoms. Contracts for safety.

## 2022-04-20 NOTE — PROGRESS NOTES
04/20/22 1439   Engagement   Intervention Group   Topic Detail OT Creative Expressions group-sand art for following directions, frustration tolerance, socialization, symptom management, focus, creativity, and self worth   Attendance Attended   Patient Response Demonstrated understanding of materials provided;Was respectful;Positive attitude   Concentrated on Task duration of group   Cognition Goal-directed;Takes initiative;Follows through with task;Sequences task;Attends to detail   Mood/Affect Content   Social/Behavioral Cooperative;Engaged;Motivated   Goals addressed in session today pt actively engaged in group activity. pt worked quietly and diligently on project until completion. pt was independent with task and supplies. pt was polite and appropriate.

## 2022-04-20 NOTE — TELEPHONE ENCOUNTER
[facility] (TC):     NURSING Post-Consultation Referral Review  _________________________________________    This RN has consulted with provider & this Medication Management referral to the Transition Clinic is deemed   [x] Appropriate  [] Inappropriate     Based on the following additional information gathered:   Maria L Walker, APRN CNP  Transition Clinic Rn Pool 16 hours ago (4:10 PM)     AK    Even though there is long term outpatient psychiatry set up, usually patients need to be seen within 30 days of an inpatient visits.     Therefore it's okay to schedule the patient at the Transition Clinic in early to mid May/2022 since long term psych isn't available until June/2022.     Thanks for your help.     Maria L    Routing comment     Provider note from 4/19 indicates discharge plan for 4/22/22 - best practice is schedule 2 weeks post-discharge which would be 5/6/22    Contact w/ patient/parent: Wtr attempted to reach referring agent via phone to [facility] [phone] - [facility] requesting return call to schedule pt for 5/6. Referring agent called back & she reported in-person appt would likely be best so we scheduled for Mon 5/9 in-clinic at 10:30am; wtr routed to [facility] to facilitate scheduling.    Ari Dennis, RN on April 20, 2022 at 8:27 AM

## 2022-04-20 NOTE — PROVIDER NOTIFICATION
04/20/22 1300   Engagement   Intervention Group   Topic Detail SW Process   Attendance Attended   Patient Response Demonstrated understanding of materials provided   Concentrated on Task duration of group   Cognition Goal-directed   Mood/Affect Content   Social/Behavioral Engaged   Thought Content Reality oriented     GROUP LENGTH (MINS):   45   RELEVANT PRIMARY DIAGNOSIS: Patient Active Problem List   Diagnosis    Pain of female symphysis pubis    Pelvic floor dysfunction    Myalgia of pelvic floor    SI (sacroiliac) joint dysfunction    Chronic pelvic pain in female    MDD (major depressive disorder), recurrent severe, without psychosis (H)    Episode of recurrent major depressive disorder, unspecified depression episode severity (H)    Generalized anxiety disorder    Borderline personality disorder (H)    PTSD (post-traumatic stress disorder)    Moderate episode of recurrent major depressive disorder (H)    Suicidal ideation    Bipolar II disorder (H)        TREATMENT MODALITY:      []CBT       [x]DBT       []ACT       []Interpersonal psychotherapy                                 []Psychoeducational therapy       [x]Skill development       []Other:        ATTENDANCE:        [x]Stayed for entire group     []Arrived late    [] Left early       # OF PATIENTS IN GROUP: 2   BILLABLE:     []Yes            [x]No   Notes:

## 2022-04-20 NOTE — PLAN OF CARE
Goal Outcome Evaluation: Pt is social, attends groups.      Problem: Behavioral Health Plan of Care  Goal: Adheres to Safety Considerations for Self and Others  Outcome: Ongoing, Progressing     Problem: Behavioral Health Plan of Care  Goal: Optimal Comfort and Wellbeing  Outcome: Ongoing, Progressing     Pt out for breakfast, affect is calm, pleasant.     Room checks/sweeps performed per unit routine.    Emily Abreu RN

## 2022-04-20 NOTE — PROGRESS NOTES
"PSYCHIATRY  PROGRESS NOTE     DATE OF SERVICE   4/20/2022         CHIEF COMPLAINT   \"Doing okay.\"       SUBJECTIVE   Nursing reports: Pt was pleasant, polite and appropriate. She attended group. She denies psych symptoms. C/o pain but declined intervention     reports: Assessment/Intervention/Current Symtoms and Care Coordination   Patient is progressing towards discharge. The plan remains for patient to discharge on Friday afternoon with her  providing transportation.      OBJECTIVE   Chart reviewed and patient assessed. Patient was seen and evaluated in the day area by herself. Upon patient interview, patient reports she is doing well, saying she is looking forward to discharging home on Friday. Patient states her medications are effectively targeting symptoms, saying she is no longer depress. Patient does endorse mild anxiety related to being in the hospital as well as finding out she has edema on both legs. Patient states she is willing to make changes to her diet as recommended by the hospitality, saying she is now on a low sodium diet. Patien denies suicidal and homicidal ideations. Patient also denies both auditory and visual hallucinations. Depakote titrated with good tolerability. Valproic acid level to be rechecked on 4/22 AM with the plan to discharge patient in the afternoon if Valproic acid level comes back therapeutic. Medical team is following patient regarding Edema and other physical problems     MEDICATIONS   Medications:  Scheduled Meds:    amLODIPine  5 mg Oral Daily     aspirin  81 mg Oral Daily     atorvastatin  10 mg Oral QPM     busPIRone  15 mg Oral TID     calcium carbonate-vitamin D  1 tablet Oral Daily     divalproex sodium delayed-release  750 mg Oral At Bedtime     DULoxetine  90 mg Oral Daily     hydrochlorothiazide  12.5 mg Oral Daily     lactobacillus rhamnosus (GG)  2 capsule Oral Daily     Lidocaine  1 patch Transdermal Q24H     lidocaine   Transdermal Q8H     " "multivitamin, therapeutic  1 tablet Oral Daily     pantoprazole  40 mg Oral QAM AC     QUEtiapine  400 mg Oral At Bedtime     topiramate  25 mg Oral Q12H PARISA     cholecalciferol  25 mcg Oral Daily     Continuous Infusions:  PRN Meds:.acetaminophen, acetaminophen-codeine, alum & mag hydroxide-simethicone, cyclobenzaprine, hydrOXYzine, hydrOXYzine, naloxone **OR** naloxone **OR** naloxone **OR** naloxone, nicotine, OLANZapine **OR** OLANZapine, ondansetron, senna-docusate, traZODone    Medication adherence issues: MS Med Adherence Y/N: No  Medication side effects: MEDICATION SIDE EFFECTS: no side effects reported  Benefit: Yes / No: Yes       ROS   A comprehensive review of systems was negative.       MENTAL STATUS EXAM   Vitals: BP (!) 146/66   Pulse 106   Temp 98.2  F (36.8  C) (Oral)   Resp 18   Ht 1.575 m (5' 2\")   Wt 86.7 kg (191 lb 1.6 oz)   SpO2 95%   BMI 34.95 kg/m      Appearance:  Casually groomed  Mood:  \"okay\"  Affect: full range  was congruent to speech Mood congruent, intensity is normal and reactive  Suicidal Ideation: PRESENT / ABSENT: absent   Homicidal Ideation: PRESENT / ABSENT: absent   Thought process: linear and goal oriented   Thought content: denies suicidal ideation, violent ideation, delusions, magical thinking and paranoid ideation.   Fund of Knowledge: Average  Attention/Concentration: Normal  Language ability:  Intact  Memory:  Immediate recall intact, Short-term memory intact and Long-term memory intact  Insight:  fair.  Judgement: fair  Orientation: Yes, x4  Psychomotor Behavior: denies tardive dyskinesia, dystonia or tics  Muscle Strength and Tone: MuscleStrength: Normal  Gait and Station: Normal       LABS   personally reviewed.     Lab Results   Component Value Date    VALPROATE 27.7 04/18/2022          DIAGNOSIS   Principal Problem:    Bipolar II disorder (H)    Active Problem List:  Patient Active Problem List   Diagnosis     Pain of female symphysis pubis     Pelvic floor " dysfunction     Myalgia of pelvic floor     SI (sacroiliac) joint dysfunction     Chronic pelvic pain in female     MDD (major depressive disorder), recurrent severe, without psychosis (H)     Episode of recurrent major depressive disorder, unspecified depression episode severity (H)     Generalized anxiety disorder     Borderline personality disorder (H)     PTSD (post-traumatic stress disorder)     Moderate episode of recurrent major depressive disorder (H)     Suicidal ideation     Bipolar II disorder (H)          PLAN   1. Ongoing education given regarding diagnostic and treatment options with risks, benefits and alternatives and adequate verbalization of understanding.  2. Medications: Cymbalta 90 mg daily                           Buspar 10 mg tid, titrated to 15 mg tid starting 4/15                           Depakote 500 mg at bedtime, titrated to 750 mg starting 4/19/22                           Topamax 25 mg bid                           Seroquel 400 mg at bedtime  3) Hospitalist Consult: Hospitalist to see patient as needed  4)Legal: Voluntary  5)  to follow regarding collecting and reviewing collateral information, referrals and disposition planning                     Risk Assessment: [facility] RISK ASSESSMENT: Patient able to contract for safety and Patient on precautions    Coordination of Care:   Treatment Plan reviewed and physician signed, Care discussed with Care/Treatment Team Members, Chart reviewed and Patient seen      Re-Certification I certify that the inpatient psychiatric facility services furnished since the previous certification were, and continue to be, medically necessary for, either, treatment which could reasonably be expected to improve the patient s condition or diagnostic study and that the hospital records indicate that the services furnished were, either, intensive treatment services, admission and related services necessary for diagnostic study, or equivalent  services.     I certify that the patient continues to need, on a daily basis, active treatment furnished directly by or requiring the supervision of inpatient psychiatric facility personnel.   I estimate 5-7 days of hospitalization is necessary for proper treatment of the patient. My plans for post-hospital care for this patient are  home with family     BRIT Avendaño CNP    -     4/20/2022     -     12:35 PM    Total time  25 minutes with > 50%spent on coordination of cares and psycho-education.    This note was created with help of Dragon dictation system. Grammatical / typing errors are not intentional.    BRIT Avendaño CNP

## 2022-04-20 NOTE — PLAN OF CARE
Assessment/Intervention/Current Symptoms and Care Coordination  Writer met with patient to discuss discharge plans. Patient expressed that she is doing well and adjusting to her medications. Patient reviewed discharge plans and remains in agreement to meet with transition clinic as she waits for her appointments with her established providers.     will be providing transportation at noon on Friday 4/22 @ 2 pm. A 30 day supply of Medications to be filled at [facility].     Discharge Plan or Goal   return to home with established psychiatry and therapy    Barriers to Discharge   symptom stabilization, medication managment    Referral Status  Completed-    [facility]  Monday May 05/09 @ 10:30 am In person Provider: [facility]   [address] [address]    Therapist: June 6th at 1 pm. Provider: Devi Costa  [place] [address]. On wait list for sooner appointment    Psychiatrist: June 14th at 9:00 am Chalo Skelton [place] [address]. On wait list for sooner appointment.    Information for Community Mental health support groups have been provided to patient      Legal Status  Voluntary      Mayte Yu, LPCC, LADC , 4/20/2022, 09:40 AM

## 2022-04-20 NOTE — PROGRESS NOTES
04/20/22 1128   Engagement   Intervention Group   Topic Detail OT Wellness group-[place] for cognitive wellness strategies, focus, following directions, concentration, problem solving, symptom management, social engagement, and healthy distraction   Attendance Attended   Patient Response Demonstrated understanding of materials provided;Expressed feelings/issues;Asked questions and/or took notes;Was respectful   Concentrated on Task duration of group   Cognition Goal-directed;Sequences task;Attends to detail;Initiates task   Mood/Affect Content;Pleasant   Social/Behavioral Cooperative;Engaged   Goals addressed in session today pt actively engaged in group activity. pt shared she enjoys logic puzzles for her cognitive wellness. pt was independent with task. pt endorsed positive response to cognitive activity. pt was helpful with peers during activity.

## 2022-04-20 NOTE — PROGRESS NOTES
[facility]    Medicine Progress Note - Hospitalist Service    Date of Admission:  4/14/2022    Assessment & Plan    Marissa Youssef is a 61 year old female admitted on 4/14/2022 for increasing depression and SI. She has a past medical history anxiety, bipolar disorder, major depression, bipolar disorder, anxiety, PTSD, HLD, HTN, diabetes, GERD, and pelvic floor instability.       # Hypertension  # LE edema  # Prinzmetal angina  BP trends since admission 110-140/60-80s. Mildly hypertensive. Amlodipine was also for prinzmetal angina.  BLE edema, 2+ pitting. Swelling at baseline today.   - PTA amlodipine 5 mg PO daily. BMP and BNP WNL.  - hydrochlorothiazide 12.5 mg daily started on 4/19/22  - encourage elevation of legs when possible  - 2 Gm Na diet   - education on decreasing high sodium food   - recheck BMP on 4/22/22, ordered    #HLD   Lipid panel- cholesterol 194, HDL 49 (L), , Trig 160 on 4/15/22. ASCVD risk 13% over 10 years. Vin reduction was reviewed per prior NP with bringing down risk to 6.4% with  additional antihypertensive, statin, and aspirin therapy. Will need to discuss with GI and PCP as statins can possibly increase colitis flares.   - atorvastatin 10 mg at bedtime  - hydrochlorothiazide started, see above  - aspirin 81 mg daily     # Diabetes, Type II  Possibly pre- diabetic with blood sugars in the 120-196 range. Last Hgb A1C 5.5 in 2007. Hgb A1c on 4/15 at 6.5. BG on BMP at 188. Was going to be seen at the end of her month with her PCP, who has been following closely. Hgb A1c has fluctuated in the past, was 6.0 one year ago. Per discussion with previous NP, will not start medication in hospital and defer to PCP outpatient. She has a specific treatment plan with a weekly injection, unsure of the name.  - trend daily BG with finger sticks-today at 117 prior to breakfast   - regular diet, pt verbalized understanding of proper food choices and is not getting what she  needs with diabetic diet  - education on healthy foods, weight loss, exercise  - will follow-up with PCP at end of the month to discuss BG management plan    # GERD  Well controlled on PTA meds.   - PTA pantoprazole 40 mg daily  - education on avoiding trigger foods (chocolate, peppermint, high-fat foods, citrus, spicy foods, tomatoes, coffee, and limit caffeinated beverages), not eat meals or drink carbonated beverages within 3 hours of bedtime, and focus on weight loss and avoid tight-fitting clothes around the waist     # Chronic collagenous colitis  # Chronic abdominal pain  # Chronic diarrhea   # Exocrine pancreatic insufficiency  # Gastritis  # Mild reflux esophagitis   Seen by Dr. Rees in GI on 4/8/2022. Was not treated initially. Started on cholestyramine which did not help. Repeat colonoscopy in 2018. Was started on Budesonide, but could not afford it. Prednisone was considered, but no started for SE. GI thinks pt would benefit from neuromodulator for chronic abdominal pain. No complaints of acute diarrhea here. Pancreatic elastase of 64 on stool work-up. Prescribed creon, but insurance would not cover. Started on OTC pancreatic enzymes-take 2 pills with food and one with snacks.Pt discussed she had just started on the enzymes outpatient and did not want to restart here.   - probiotic 2 capsules daily  - omeprazole 20 mg daily   - avoid NSAIDs, PPIs, sertraline, and statins that could increase colitis flares. Discussed the risks and benefits of statin. Pt to continue here with statin and discuss overall picture with GI and PCP, currently pt benefiting with statin, will not stop.   - see GI outpatient      # Chronic Shoulder Pain   States she has loss of cartilage and ligament damage in the right shoulder. Not damaged enough to require surgery. Normally uses Tylenol #3.   - PRN acetaminophen 975 mg every 6 hrs changed to every 8 hrs to keep acetaminophen below 4 gm daily if pt is taking PTA Tylenol #3.  -  PTA Tylenol # 3 for severe pain if vitally stable- taking about once a day  - tramadol contraindicated with other medication  - discuss need for outpatient PT, pt agreeable.    # Possible acute pyelonephritis?   # Possible UTI?  Pt reports that she had plans to overdose and stated that she took some flexeril, hydroxyzine, and 30 amlodipine on 3/7/22. She reported an increased HR, but BP never dropped. She had dysuria, hematuria, an right flank pain the next day on 4/8/222, for which she sought tx at an urgent care and was prescribed amoxicillin for a UTI. UCx was not completed. She then saw her PCP on 4/12/22. Started on Bactrim 800 mg -160 mg BID for 10 days on 4/12/22. Stated her dysuria and flank pain were improving upon admission to the ED on 4/12/22. UCx with NGTD on 4/12/22. She did have a UA completed in ED positive for LE, WBC, UCx negative and abx stopped. Denying dysuria, abdominal/pelvic pain, flank pain, hematuria, fevers, or malaise.   - no need to finish course of abx or start new one  - reconsider abx and testing if symptoms return       Diet: Combination Diet Regular Diet; 2 gm NA Diet    DVT Prophylaxis: Low Risk/Ambulatory with no VTE prophylaxis indicated  Thomson Catheter: Not present  Central Lines: None  Cardiac Monitoring: None  Code Status: Full Code      Disposition Plan   Expected Discharge: TBD  Anticipated discharge location: home with familyAwaiting care coordination huddleDelays:None per HMS       The patient's care was discussed with the Bedside Nurse and Patient.    BRIT Jensen [facility]  Hospitalist Service  [facility]  Securely message with the Vocera Web Console (learn more here)  Text page via [employer] Paging/Directory         Clinically Significant Risk Factors Present on Admission                    ______________________________________________________________________    Interval History   Nursing notes reviewed. No acute events overnight. Took Tylenol  #3 with relief for 7/10 shoulder pain. Pt massaging shoulder upon exam. Stating Tylenol #3 helps bring the pain to 5/10 to 2/10. Heat brings it to a 3-4/10. ROS: 12 point ROS neg other than the symptoms noted above in the HPI.    Data reviewed today: I reviewed all medications, new labs and imaging results over the last 24 hours. I personally reviewed no images or EKG's today.    Physical Exam   Vital Signs: Temp: 98.3  F (36.8  C) Temp src: Oral BP: (!) 146/66 (wrong value filed) Pulse: 106   Resp: 22 SpO2: 95 % O2 Device: None (Room air)    Weight: 191 lbs 1.6 oz  Constitutional: awake, alert, cooperative, no apparent distress, and appears stated age  Respiratory: No increased work of breathing, good air exchange, clear to auscultation bilaterally, no crackles or wheezing  Cardiovascular: Normal apical impulse, regular rate and rhythm, normal S1 and S2, no S3 or S4, and no murmur noted  GI: No scars, normal bowel sounds, soft, non-distended, non-tender, no masses palpated, no hepatosplenomegally  Skin: normal skin color, texture, turgor and no redness, warmth, or swelling  Musculoskeletal: There is no redness, warmth, or swelling of the joints.  Full range of motion noted.  Motor strength is 5 out of 5 all extremities bilaterally.  Tone is normal.  Neuropsychiatric: General: normal, calm and normal eye contact  Level of consciousness: alert / normal  Affect: normal  Orientation: oriented to self, place, time and situation  Memory and insight: normal, memory for past and recent events intact and thought process normal    Data   Recent Labs   Lab 04/20/22  0739 04/19/22  1002 04/14/22  0720   NA  --  138  --    POTASSIUM  --  4.3  --    CHLORIDE  --  105  --    CO2  --  21*  --    BUN  --  16  --    CR  --  0.82  --    ANIONGAP  --  12  --    ELLI  --  9.4  --    * 188* 124*     No results found for this or any previous visit (from the past 24 hour(s)).  Medications       amLODIPine  5 mg Oral Daily      aspirin  81 mg Oral Daily     atorvastatin  10 mg Oral QPM     busPIRone  15 mg Oral TID     calcium carbonate-vitamin D  1 tablet Oral Daily     divalproex sodium delayed-release  750 mg Oral At Bedtime     DULoxetine  90 mg Oral Daily     hydrochlorothiazide  12.5 mg Oral Daily     lactobacillus rhamnosus (GG)  2 capsule Oral Daily     Lidocaine  1 patch Transdermal Q24H     lidocaine   Transdermal Q8H     multivitamin, therapeutic  1 tablet Oral Daily     pantoprazole  40 mg Oral QAM AC     QUEtiapine  400 mg Oral At Bedtime     topiramate  25 mg Oral Q12H PARISA     cholecalciferol  25 mcg Oral Daily

## 2022-04-21 LAB
ANION GAP SERPL CALCULATED.3IONS-SCNC: 11 MMOL/L (ref 5–18)
BUN SERPL-MCNC: 19 MG/DL (ref 8–22)
CALCIUM SERPL-MCNC: 9.8 MG/DL (ref 8.5–10.5)
CHLORIDE BLD-SCNC: 104 MMOL/L (ref 98–107)
CK SERPL-CCNC: 32 U/L (ref 30–190)
CO2 SERPL-SCNC: 25 MMOL/L (ref 22–31)
CREAT SERPL-MCNC: 0.89 MG/DL (ref 0.6–1.1)
GFR SERPL CREATININE-BSD FRML MDRD: 73 ML/MIN/1.73M2
GLUCOSE BLD-MCNC: 199 MG/DL (ref 70–125)
GLUCOSE BLDC GLUCOMTR-MCNC: 124 MG/DL (ref 70–99)
POTASSIUM BLD-SCNC: 4 MMOL/L (ref 3.5–5)
SODIUM SERPL-SCNC: 140 MMOL/L (ref 136–145)

## 2022-04-21 PROCEDURE — 80048 BASIC METABOLIC PNL TOTAL CA: CPT

## 2022-04-21 PROCEDURE — 82550 ASSAY OF CK (CPK): CPT

## 2022-04-21 PROCEDURE — 128N000001 HC R&B CD/MH ADULT

## 2022-04-21 PROCEDURE — 99232 SBSQ HOSP IP/OBS MODERATE 35: CPT | Performed by: NURSE PRACTITIONER

## 2022-04-21 PROCEDURE — 99231 SBSQ HOSP IP/OBS SF/LOW 25: CPT

## 2022-04-21 PROCEDURE — 250N000013 HC RX MED GY IP 250 OP 250 PS 637: Performed by: NURSE PRACTITIONER

## 2022-04-21 PROCEDURE — 36415 COLL VENOUS BLD VENIPUNCTURE: CPT

## 2022-04-21 PROCEDURE — 90853 GROUP PSYCHOTHERAPY: CPT

## 2022-04-21 PROCEDURE — 250N000013 HC RX MED GY IP 250 OP 250 PS 637

## 2022-04-21 PROCEDURE — 250N000013 HC RX MED GY IP 250 OP 250 PS 637: Performed by: PSYCHIATRY & NEUROLOGY

## 2022-04-21 RX ORDER — CETIRIZINE HYDROCHLORIDE 10 MG/1
10 TABLET ORAL DAILY PRN
Status: DISCONTINUED | OUTPATIENT
Start: 2022-04-21 | End: 2022-04-21

## 2022-04-21 RX ORDER — FLUTICASONE PROPIONATE 50 MCG
1 SPRAY, SUSPENSION (ML) NASAL DAILY PRN
Status: DISCONTINUED | OUTPATIENT
Start: 2022-04-21 | End: 2022-04-21

## 2022-04-21 RX ADMIN — Medication 1 TABLET: at 08:01

## 2022-04-21 RX ADMIN — HYDROXYZINE HYDROCHLORIDE 50 MG: 25 TABLET, FILM COATED ORAL at 21:22

## 2022-04-21 RX ADMIN — CYCLOBENZAPRINE HYDROCHLORIDE 10 MG: 5 TABLET, FILM COATED ORAL at 21:21

## 2022-04-21 RX ADMIN — Medication 2 CAPSULE: at 07:53

## 2022-04-21 RX ADMIN — TOPIRAMATE 25 MG: 25 TABLET, FILM COATED ORAL at 07:54

## 2022-04-21 RX ADMIN — LIDOCAINE 1 PATCH: 246 PATCH TOPICAL at 08:54

## 2022-04-21 RX ADMIN — HYDROXYZINE HYDROCHLORIDE 50 MG: 25 TABLET, FILM COATED ORAL at 16:26

## 2022-04-21 RX ADMIN — DIVALPROEX SODIUM 750 MG: 250 TABLET, DELAYED RELEASE ORAL at 21:20

## 2022-04-21 RX ADMIN — BUSPIRONE HYDROCHLORIDE 15 MG: 7.5 TABLET ORAL at 14:06

## 2022-04-21 RX ADMIN — PANTOPRAZOLE SODIUM 40 MG: 20 TABLET, DELAYED RELEASE ORAL at 06:56

## 2022-04-21 RX ADMIN — QUETIAPINE FUMARATE 400 MG: 300 TABLET ORAL at 21:22

## 2022-04-21 RX ADMIN — BUSPIRONE HYDROCHLORIDE 15 MG: 7.5 TABLET ORAL at 21:21

## 2022-04-21 RX ADMIN — ACETAMINOPHEN 975 MG: 325 TABLET ORAL at 16:23

## 2022-04-21 RX ADMIN — DULOXETINE 90 MG: 30 CAPSULE, DELAYED RELEASE ORAL at 07:54

## 2022-04-21 RX ADMIN — ASPIRIN 81 MG: 81 TABLET, CHEWABLE ORAL at 07:53

## 2022-04-21 RX ADMIN — TOPIRAMATE 25 MG: 25 TABLET, FILM COATED ORAL at 21:22

## 2022-04-21 RX ADMIN — THERA TABS 1 TABLET: TAB at 07:54

## 2022-04-21 RX ADMIN — ACETAMINOPHEN AND CODEINE PHOSPHATE 1 TABLET: 300; 30 TABLET ORAL at 21:21

## 2022-04-21 RX ADMIN — ATORVASTATIN CALCIUM 10 MG: 10 TABLET, FILM COATED ORAL at 21:22

## 2022-04-21 RX ADMIN — HYDROCHLOROTHIAZIDE 12.5 MG: 12.5 TABLET ORAL at 07:57

## 2022-04-21 RX ADMIN — ACETAMINOPHEN AND CODEINE PHOSPHATE 1 TABLET: 300; 30 TABLET ORAL at 08:07

## 2022-04-21 RX ADMIN — BUSPIRONE HYDROCHLORIDE 15 MG: 7.5 TABLET ORAL at 07:53

## 2022-04-21 RX ADMIN — AMLODIPINE BESYLATE 5 MG: 5 TABLET ORAL at 07:56

## 2022-04-21 RX ADMIN — Medication 25 MCG: at 08:05

## 2022-04-21 ASSESSMENT — ACTIVITIES OF DAILY LIVING (ADL)
DRESS: INDEPENDENT
LAUNDRY: WITH SUPERVISION
HYGIENE/GROOMING: INDEPENDENT
ORAL_HYGIENE: INDEPENDENT
HYGIENE/GROOMING: INDEPENDENT
ORAL_HYGIENE: INDEPENDENT
DRESS: INDEPENDENT

## 2022-04-21 NOTE — PROVIDER NOTIFICATION
04/21/22 1018   Individualization/Patient Specific Goals   Patient Personal Strengths community support;coping skills;expressive of emotions;family/social support;independent living skills;insight into illness/situation;interests/hobbies;motivated for recovery;positive attitude;positive vocational history;self-awareness;stable living environment   Patient Vulnerabilities other (see comments)   Interprofessional Rounds   Participants advanced practice nurse;CTC;nursing;OT;pharmacy   Team Discussion   Participants TEE-MEGAN AA, PharmD AS, OT VD, SW LM, RN LB   Progress Improving   Anticipated length of stay Discharge 4/22   Continued Stay Criteria/Rationale medication managment symptom stabilization   Medical/Physical none reported   Plan No change   Anticipated Discharge Disposition home with family

## 2022-04-21 NOTE — PROGRESS NOTES
04/21/22 1455   Engagement   Intervention Group   Topic Detail OT Creative Expressions group-celestino pot painting/decorating for social engagement, self expression, symptom management, creativity, healthy distraction and focus   Patient Response Demonstrated understanding of materials provided;Expressed feelings/issues;Accepted feedback;Asked questions and/or took notes;Was respectful   Concentrated on Task duration of group   Cognition Goal-directed;Follows through with task;Attends to detail;Poor attention to detail   Mood/Affect Content;Pleasant   Social/Behavioral Cooperative;Engaged   Goals addressed in session today pt actively engaged in group activity. pt engaged in pleasant conversation with staff and peers during activity. pt was independent with supplies and task.

## 2022-04-21 NOTE — PROGRESS NOTES
"PSYCHIATRY  PROGRESS NOTE     DATE OF SERVICE   4/21/2022         CHIEF COMPLAINT   \"some new pain\"       SUBJECTIVE   Nursing reports: Pt was calm and pleasant.. She attended group. She denies psych symptoms. C/o pain but declined intervention     reports: Assessment/Intervention/Current Symtoms and Care Coordination   Patient is progressing towards discharge. The plan remains for patient to discharge on Friday afternoon with her  providing transportation.      OBJECTIVE   Chart reviewed and patient assessed. Patient was seen and evaluated in the day area by herself. Upon patient interview, patient reports she is doing great except for the new joint pain she is currently experiencing. Patient states she started having pain in her elbow, knee, hip and ankle last evening, saying she is worried about the sudden onset of this pain. Patient states she is doing great mentally except for little anxiety related to the joint pain. Patient states she would like to what causes this sudden pain. Patient does mentioned she utilized PRN pain medication with little effect. Patient denies suicidal and homicidal ideations. Patient also denies both auditory and visual hallucinations. Depakote titrated with good tolerability. She is aware Valproic acid level to be rechecked on 4/22 AM with the plan to discharge patient in the afternoon. Hospitalist consult placed for sudden joint pain.      MEDICATIONS   Medications:  Scheduled Meds:    amLODIPine  5 mg Oral Daily     aspirin  81 mg Oral Daily     atorvastatin  10 mg Oral QPM     busPIRone  15 mg Oral TID     calcium carbonate-vitamin D  1 tablet Oral Daily     divalproex sodium delayed-release  750 mg Oral At Bedtime     DULoxetine  90 mg Oral Daily     hydrochlorothiazide  12.5 mg Oral Daily     lactobacillus rhamnosus (GG)  2 capsule Oral Daily     Lidocaine  1 patch Transdermal Q24H     lidocaine   Transdermal Q8H     multivitamin, therapeutic  1 tablet Oral " "Daily     pantoprazole  40 mg Oral QAM AC     QUEtiapine  400 mg Oral At Bedtime     topiramate  25 mg Oral Q12H PARISA     cholecalciferol  25 mcg Oral Daily     Continuous Infusions:  PRN Meds:.acetaminophen, acetaminophen-codeine, alum & mag hydroxide-simethicone, cyclobenzaprine, hydrOXYzine, hydrOXYzine, naloxone **OR** naloxone **OR** naloxone **OR** naloxone, nicotine, OLANZapine **OR** OLANZapine, ondansetron, senna-docusate, traZODone    Medication adherence issues: MS Med Adherence Y/N: No  Medication side effects: MEDICATION SIDE EFFECTS: no side effects reported  Benefit: Yes / No: Yes       ROS   A comprehensive review of systems was negative.       MENTAL STATUS EXAM   Vitals: /84   Pulse 96   Temp 97.6  F (36.4  C) (Oral)   Resp 18   Ht 1.575 m (5' 2\")   Wt 86.7 kg (191 lb 1.6 oz)   SpO2 96%   BMI 34.95 kg/m      Appearance:  Casually groomed  Mood:  \"okay\"  Affect: full range  was congruent to speech Mood congruent, intensity is normal and reactive  Suicidal Ideation: PRESENT / ABSENT: absent   Homicidal Ideation: PRESENT / ABSENT: absent   Thought process: linear and goal oriented   Thought content: denies suicidal ideation, violent ideation, delusions, magical thinking and paranoid ideation.   Fund of Knowledge: Average  Attention/Concentration: Normal  Language ability:  Intact  Memory:  Immediate recall intact, Short-term memory intact and Long-term memory intact  Insight:  fair.  Judgement: fair  Orientation: Yes, x4  Psychomotor Behavior: denies tardive dyskinesia, dystonia or tics  Muscle Strength and Tone: MuscleStrength: Normal  Gait and Station: Normal       LABS   personally reviewed.     Lab Results   Component Value Date    VALPROATE 27.7 04/18/2022          DIAGNOSIS   Principal Problem:    Bipolar II disorder (H)    Active Problem List:  Patient Active Problem List   Diagnosis     Pain of female symphysis pubis     Pelvic floor dysfunction     Myalgia of pelvic floor     SI " (sacroiliac) joint dysfunction     Chronic pelvic pain in female     MDD (major depressive disorder), recurrent severe, without psychosis (H)     Episode of recurrent major depressive disorder, unspecified depression episode severity (H)     Generalized anxiety disorder     Borderline personality disorder (H)     PTSD (post-traumatic stress disorder)     Moderate episode of recurrent major depressive disorder (H)     Suicidal ideation     Bipolar II disorder (H)          PLAN   1. Ongoing education given regarding diagnostic and treatment options with risks, benefits and alternatives and adequate verbalization of understanding.  2. Medications: Cymbalta 90 mg daily                           Buspar 10 mg tid, titrated to 15 mg tid starting 4/15                           Depakote 500 mg at bedtime, titrated to 750 mg starting 4/19/22                           Topamax 25 mg bid                           Seroquel 400 mg at bedtime  3) Hospitalist Consult: Hospitalist to see patient as needed  4)Legal: Voluntary  5)  to follow regarding collecting and reviewing collateral information, referrals and disposition planning                     Risk Assessment:  [facility] RISK ASSESSMENT: Patient able to contract for safety and Patient on precautions    Coordination of Care:   Treatment Plan reviewed and physician signed, Care discussed with Care/Treatment Team Members, Chart reviewed and Patient seen      Re-Certification I certify that the inpatient psychiatric facility services furnished since the previous certification were, and continue to be, medically necessary for, either, treatment which could reasonably be expected to improve the patient s condition or diagnostic study and that the hospital records indicate that the services furnished were, either, intensive treatment services, admission and related services necessary for diagnostic study, or equivalent services.     I certify that the patient continues  to need, on a daily basis, active treatment furnished directly by or requiring the supervision of inpatient psychiatric facility personnel.   I estimate 5-7 days of hospitalization is necessary for proper treatment of the patient. My plans for post-hospital care for this patient are  home with family     BRIT Avendaño CNP    -     4/21/2022     -     10:09 AM    Total time  25 minutes with > 50%spent on coordination of cares and psycho-education.    This note was created with help of Dragon dictation system. Grammatical / typing errors are not intentional.    BRIT Avendaño CNP

## 2022-04-21 NOTE — PROGRESS NOTES
04/21/22 1100   Engagement   Intervention Group   Topic Detail OT Wellness group-Movement bingo for physcial movement, following directions, socialization, focus, symptom management, wellness strategies, and healthy distraction   Attendance Attended   Patient Response Demonstrated understanding of materials provided;Expressed feelings/issues;Accepted feedback;Was respectful;Asked questions and/or took notes   Concentrated on Task duration of group   Cognition Goal-directed;Sequences task   Mood/Affect Content;Pleasant   Social/Behavioral Cooperative;Engaged   Goals addressed in session today pt actively engaged in group activity. pt was independent with task. pt shared physical limitations due to knee surgeries-pt readily participated as able.

## 2022-04-21 NOTE — PROGRESS NOTES
Chart check today. BMP WNL, no need for lyte replacement and Cr stable. Can continue on hydrochlorothiazide at discharge and discuss with PCP. Discharge instructions placed in discharge papers about follow-up for BP, GFR trends, starting a statin (check lipids in 4-6 weeks), HgbA1C, and to discuss about taking pancreatic enzymes consistently with GI docotor. Discussed with pt today and she verbalized understanding and also the need to buy a BP cuff.    Addendum: got paged at 1634 about pt having sudden onset of joint pain. Discussed with oncoming RN, Micheal, that pt had been in bed most of the day with chronic pain in the right shoulder. Discussed that this is a chronic issue and that pt has some more PRNs that she can take. Will come see pt prior to discharge tomorrow. Will need to follow-up with PCP about pain plan and we have discussed the need for PT for her shoulder and pt agrees that this would be a good idea.     Asked to come see pt at 1730. Discussed with pt that she has some new joint pain in right wrist and left knee. Despite Cr WNL today, pt did start new statin several days ago. CK ordered. Discussed following up with PCP or orthopedic clinic upon discharge tomorrow.     BRIT Jensen, CNP  Hospitalist Service  [facility]

## 2022-04-21 NOTE — PLAN OF CARE
Problem: Behavioral Health Plan of Care  Goal: Adheres to Safety Considerations for Self and Others  Outcome: Ongoing, Progressing  Intervention: Develop and Maintain Individualized Safety Plan  Recent Flowsheet Documentation  Taken 4/21/2022 0000 by Sierra Kent, RN  Safety Measures: safety rounds completed     Problem: Suicide Risk  Goal: Absence of Self-Harm  Outcome: Ongoing, Progressing   Goal Outcome Evaluation:      Pt is on suicidal precaution, no suicide noted during the night. Pt slept >  6 hours, denied pains / discomforts, resp wnl. No behavior problems, will continue to monitor.  Pt has BMP this morning.

## 2022-04-21 NOTE — PLAN OF CARE
Problem: Suicide Risk  Goal: Absence of Self-Harm  Outcome: Ongoing, Progressing     Problem: Suicidal Behavior  Goal: Suicidal Behavior is Absent or Managed  Outcome: Ongoing, Progressing     Problem: Pain Chronic (Persistent)  Goal: Acceptable Pain Control and Functional Ability  Outcome: Ongoing, Progressing     Pt has been calm and engaged on the unit. She attended community meeting and was social with other patients. Good food and fluid intake at meal times. Patient c/o chronic right shoulder pain before bedtime, as well as pain in low back, elbows, wrists, knees and ankles. Pain rated 6/10. Tylenol 3 prn requested and given, as well as prn hydroxyzine. Patient rated anxiety level 4/10 due to pain, otherwise denied all other psych symptoms and contracted for safety.

## 2022-04-21 NOTE — PLAN OF CARE
Assessment/Intervention/Current Symptoms and Care Coordination  Writer met with patient to discuss discharge plans. Patient is aware that she will need a blood draw prior to discharge on Friday. Writer reviewed patient appointments and patient is in agreement with an in person appointment at the transition clinic prior to meeting with her previously established psychiatrist.  will provide transportation at 2 pm. x. Appts have been placed in the AVS. Patient states that she is feeling better today and notes no further concerns.      A 30 day supply of Medications to be filled at [facility].     Discharge Plan or Goal   return to home with established psychiatry and therapy    Barriers to Discharge   symptom stabilization, medication managment    Referral Status  Completed-    Interim Psychiatry  Monday May 05/09 @ 10:30 am In person Provider: [facility]   [address] [address]    Therapist: June 6th at 1 pm. Provider: Devi Costa  [place] [id number]. On wait list for sooner appointment    Psychiatrist: June 14th at 9:00 am Chalo Skelton [place] [address]. On wait list for sooner appointment.    Information for Community Mental health support groups have been provided to patient      Legal Status  Voluntary      Mayte Yu, [facility], LADC , 4/20/2022, 09:40 AM

## 2022-04-21 NOTE — PLAN OF CARE
Problem: Suicidal Behavior  Goal: Suicidal Behavior is Absent or Managed  Outcome: Ongoing, Progressing     Problem: Suicide Risk  Goal: Absence of Self-Harm  Outcome: Ongoing, Progressing   Goal Outcome Evaluation:    Plan of Care Reviewed With: patient      Pt has been visible on the unit attending groups. Rated anxiety 3 denying other psych symptoms. Contracted for safety. Medication compliant. Pt refused asymptomatic Covid test.

## 2022-04-22 VITALS
RESPIRATION RATE: 16 BRPM | WEIGHT: 191.1 LBS | HEART RATE: 108 BPM | OXYGEN SATURATION: 96 % | SYSTOLIC BLOOD PRESSURE: 117 MMHG | DIASTOLIC BLOOD PRESSURE: 75 MMHG | HEIGHT: 62 IN | TEMPERATURE: 98 F | BODY MASS INDEX: 35.17 KG/M2

## 2022-04-22 LAB
GLUCOSE BLDC GLUCOMTR-MCNC: 144 MG/DL (ref 70–99)
HOLD SPECIMEN: NORMAL
SARS-COV-2 RNA RESP QL NAA+PROBE: NEGATIVE
VALPROATE SERPL-MCNC: 45.1 UG/ML

## 2022-04-22 PROCEDURE — 250N000013 HC RX MED GY IP 250 OP 250 PS 637

## 2022-04-22 PROCEDURE — 99207 PR NO CHARGE LOS: CPT

## 2022-04-22 PROCEDURE — 87635 SARS-COV-2 COVID-19 AMP PRB: CPT | Performed by: NURSE PRACTITIONER

## 2022-04-22 PROCEDURE — 36415 COLL VENOUS BLD VENIPUNCTURE: CPT | Performed by: NURSE PRACTITIONER

## 2022-04-22 PROCEDURE — 250N000013 HC RX MED GY IP 250 OP 250 PS 637: Performed by: PSYCHIATRY & NEUROLOGY

## 2022-04-22 PROCEDURE — 250N000013 HC RX MED GY IP 250 OP 250 PS 637: Performed by: NURSE PRACTITIONER

## 2022-04-22 PROCEDURE — 99239 HOSP IP/OBS DSCHRG MGMT >30: CPT | Performed by: NURSE PRACTITIONER

## 2022-04-22 PROCEDURE — 80164 ASSAY DIPROPYLACETIC ACD TOT: CPT | Performed by: NURSE PRACTITIONER

## 2022-04-22 RX ORDER — BUSPIRONE HYDROCHLORIDE 15 MG/1
15 TABLET ORAL 3 TIMES DAILY
Qty: 90 TABLET | Refills: 0 | Status: SHIPPED | OUTPATIENT
Start: 2022-04-22

## 2022-04-22 RX ORDER — DIVALPROEX SODIUM 250 MG/1
750 TABLET, DELAYED RELEASE ORAL AT BEDTIME
Qty: 90 TABLET | Refills: 0 | Status: SHIPPED | OUTPATIENT
Start: 2022-04-22

## 2022-04-22 RX ORDER — TOPIRAMATE 25 MG/1
25 TABLET, FILM COATED ORAL EVERY 12 HOURS
Qty: 60 TABLET | Refills: 0 | Status: SHIPPED | OUTPATIENT
Start: 2022-04-22

## 2022-04-22 RX ORDER — QUETIAPINE FUMARATE 400 MG/1
400 TABLET, FILM COATED ORAL AT BEDTIME
Qty: 30 TABLET | Refills: 0 | Status: SHIPPED | OUTPATIENT
Start: 2022-04-22

## 2022-04-22 RX ORDER — HYDROXYZINE HYDROCHLORIDE 50 MG/1
50 TABLET, FILM COATED ORAL 3 TIMES DAILY PRN
Qty: 60 TABLET | Refills: 0 | Status: SHIPPED | OUTPATIENT
Start: 2022-04-22

## 2022-04-22 RX ORDER — DULOXETIN HYDROCHLORIDE 30 MG/1
90 CAPSULE, DELAYED RELEASE ORAL DAILY
Qty: 90 CAPSULE | Refills: 0 | Status: SHIPPED | OUTPATIENT
Start: 2022-04-23

## 2022-04-22 RX ADMIN — TOPIRAMATE 25 MG: 25 TABLET, FILM COATED ORAL at 08:56

## 2022-04-22 RX ADMIN — HYDROCHLOROTHIAZIDE 12.5 MG: 12.5 TABLET ORAL at 08:57

## 2022-04-22 RX ADMIN — AMLODIPINE BESYLATE 5 MG: 5 TABLET ORAL at 08:57

## 2022-04-22 RX ADMIN — LIDOCAINE 1 PATCH: 246 PATCH TOPICAL at 08:57

## 2022-04-22 RX ADMIN — ASPIRIN 81 MG: 81 TABLET, CHEWABLE ORAL at 08:56

## 2022-04-22 RX ADMIN — PANTOPRAZOLE SODIUM 40 MG: 20 TABLET, DELAYED RELEASE ORAL at 07:09

## 2022-04-22 RX ADMIN — ACETAMINOPHEN AND CODEINE PHOSPHATE 1 TABLET: 300; 30 TABLET ORAL at 09:13

## 2022-04-22 RX ADMIN — Medication 25 MCG: at 08:57

## 2022-04-22 RX ADMIN — CYCLOBENZAPRINE HYDROCHLORIDE 10 MG: 5 TABLET, FILM COATED ORAL at 09:12

## 2022-04-22 RX ADMIN — THERA TABS 1 TABLET: TAB at 08:56

## 2022-04-22 RX ADMIN — BUSPIRONE HYDROCHLORIDE 15 MG: 7.5 TABLET ORAL at 08:56

## 2022-04-22 RX ADMIN — Medication 2 CAPSULE: at 08:56

## 2022-04-22 RX ADMIN — Medication 1 TABLET: at 08:57

## 2022-04-22 RX ADMIN — DULOXETINE 90 MG: 30 CAPSULE, DELAYED RELEASE ORAL at 08:55

## 2022-04-22 ASSESSMENT — ACTIVITIES OF DAILY LIVING (ADL)
HYGIENE/GROOMING: INDEPENDENT
DRESS: STREET CLOTHES;SCRUBS (BEHAVIORAL HEALTH);INDEPENDENT
ORAL_HYGIENE: INDEPENDENT

## 2022-04-22 NOTE — PLAN OF CARE
Problem: Relapse Prevention  Goal: Engage in OT Group  Description: Pt will demonstrate improved feelings of self-worth/purpose by expressing satisfaction in competing a group task 3 times this review period.  Outcome: Adequate for Care Transition   Occupational Therapy Discharge Note    Patient Name:  Marissa HENSLEY Mikael    D: Refer to daily doc flowsheets for details of progress toward goals.  A: Improvement seen in symptom management and engagement.   P: Patient discharging to home with spouse and supports. Discontinue OT Care Plan goals and interventions.    Date: 4/22/2022  Time: 2:30 PM

## 2022-04-22 NOTE — PLAN OF CARE
Assessment/Intervention/Current Symptoms and Care Coordination  Writer met with patient to discuss discharge plans. Patient has had her blood draw. She stated that she is ready to discharge. Patient does not endorse SI/SIB/HI at thi time.  is providing transportation at 2 pm. Patient has no further concerns at this time. Appts reviewed and placed in AVS.      A 30 day supply of Medications to be filled at [facility].     Discharge Plan or Goal   return to home with established psychiatry and therapy    Barriers to Discharge   symptom stabilization, medication managment    Referral Status  Completed-    Interim Psychiatry  Monday May 05/09 @ 10:30 am In person Provider: [facility]   [address] [address]    Therapist: June 6th at 1 pm. Provider: Devi Costa  [place] [id number]. On wait list for sooner appointment    Psychiatrist: June 14th at 9:00 am Chalo Skelton [place] [address]. On wait list for sooner appointment.    Information for Community Mental health support groups have been provided to patient      Legal Status  Voluntary      Mayte Yu, [facility], LADC , 4/20/2022, 09:40 AM

## 2022-04-22 NOTE — PLAN OF CARE
Problem: Behavioral Health Plan of Care  Goal: Plan of Care Review  Outcome: Adequate for Care Transition  Flowsheets (Taken 4/22/2022 0853)  Plan of Care Reviewed With: patient  Patient Agreement with Plan of Care: agrees  Goal: Patient-Specific Goal (Individualization)  Outcome: Adequate for Care Transition  Goal: Adheres to Safety Considerations for Self and Others  Outcome: Adequate for Care Transition  Goal: Absence of New-Onset Illness or Injury  Outcome: Adequate for Care Transition  Goal: Optimal Comfort and Wellbeing  Outcome: Adequate for Care Transition  Intervention: Monitor Pain and Promote Comfort  Recent Flowsheet Documentation  Taken 4/22/2022 0920 by Steffanie Talbert RN  Pain Management Interventions: (tylenol #3 at 0913, flexeril 10 mg at 0912) medication (see MAR)  Goal: Optimized Coping Skills in Response to Life Stressors  Outcome: Adequate for Care Transition  Goal: Develops/Participates in Therapeutic [place] to Support Successful Transition  Outcome: Adequate for Care Transition  Goal: Team Discussion  Outcome: Adequate for Care Transition     Problem: Suicide Risk  Goal: Absence of Self-Harm  Outcome: Adequate for Care Transition     Problem: Suicidal Behavior  Goal: Suicidal Behavior is Absent or Managed  Outcome: Adequate for Care Transition     Problem: Pain Chronic (Persistent)  Goal: Acceptable Pain Control and Functional Ability  Outcome: Adequate for Care Transition  Intervention: Develop Pain Management Plan  Recent Flowsheet Documentation  Taken 4/22/2022 0920 by Steffanie Talbert RN  Pain Management Interventions: (tylenol #3 at 0913, flexeril 10 mg at 0912) medication (see MAR)   Goal Outcome Evaluation:    Plan of Care Reviewed With: patient    Patient's affect noted to be flat with a guarded behavior, anxiety of 3/10 was reported, denied other psych symptoms, contracted for safety. Patient was out in the lounge at the time of this assessment, noted to withdrawn to self at this  time, pleasant on approach. Patient reported a 6/10 pain on right shoulder,left elbow and lower back (dull to sharp with position change), had flexeril 10 mg and tylenol #3 at 0912 and 0913 respectively with some relieve. Patient attended community meeting and participated, paces hallway intermittently

## 2022-04-22 NOTE — PROGRESS NOTES
CK WNL. Pt discharging today. Should go see PCP and orthopedics/sports medicine about pain. Continue statin.     [facility] will sign off.     BRIT Jensen, CNP  Hospitalist Service  [facility]

## 2022-04-22 NOTE — PLAN OF CARE
Problem: Behavioral Health Plan of Care  Goal: Plan of Care Review  Outcome: Ongoing, Progressing  Flowsheets (Taken 4/21/2022 1700)  Plan of Care Reviewed With: patient  Patient Agreement with Plan of Care: agrees     Problem: Suicide Risk  Goal: Absence of Self-Harm  Outcome: Ongoing, Progressing     Problem: Suicidal Behavior  Goal: Suicidal Behavior is Absent or Managed  Outcome: Ongoing, Progressing     Problem: Pain Chronic (Persistent)  Goal: Acceptable Pain Control and Functional Ability  Outcome: Ongoing, Progressing  Intervention: Manage Persistent Pain  Recent Flowsheet Documentation  Taken 4/21/2022 1700 by Micheal Griffin, RN  Medication Review/Management: medications reviewed   Goal Outcome Evaluation:    Plan of Care Reviewed With: patient      t., c/o right shoulder pain and rated at 8/10. She endorsed anxiety and rated at 6/10, but denied all other psych symptoms and contracted for safety. Tylenol and Atarax administered with some relief. Up and visible on the unit all shift engaging with peers. Pt continued to be pleasant on approach, affect remained flat, she was calm, cooperative, and medication compliant.

## 2022-04-22 NOTE — PLAN OF CARE
Problem: Suicide Risk  Goal: Absence of Self-Harm  Outcome: Ongoing, Progressing   Goal Outcome Evaluation:        No indication of sleep disturbance or distress noted on pt during safety checks. No concerns reported to staff. Pt slept at least 6 hours.

## 2022-04-22 NOTE — PROGRESS NOTES
Discharged home at 1400 in a stable condition with outpatient support, necessary papers and medications via spouse

## 2022-04-22 NOTE — DISCHARGE SUMMARY
PSYCHIATRY  DISCHARGE SUMMARY     DATE OF DISCHARGE   04/22/2022           DISCHARGE DIAGNOSIS   Bipolar II disorder (H)    Patient Active Problem List   Diagnosis     Pain of female symphysis pubis     Pelvic floor dysfunction     Myalgia of pelvic floor     SI (sacroiliac) joint dysfunction     Chronic pelvic pain in female     MDD (major depressive disorder), recurrent severe, without psychosis (H)     Episode of recurrent major depressive disorder, unspecified depression episode severity (H)     Generalized anxiety disorder     Borderline personality disorder (H)     PTSD (post-traumatic stress disorder)     Moderate episode of recurrent major depressive disorder (H)     Suicidal ideation     Bipolar II disorder (H)          REASON FOR ADMISSION   This is a 61 year old female with history of Bipolar II disorder (H).    Patient is a 61 year old female with the history of Bipolar 2 disorder, PTSD, MDD, Borderline personality disorder and diabetes type 2 disorder who presented to the ED due to concern related to suicidal ideation with the plan to overdose on medicaton in the setting of worsening derepression. Current legal status is Voluntary. Reportedly, patient attempted suicide last Thursday by overdosing on several medications with the plan to kill herself. Patient reportedly disappointed the medication overdose did not kill her despite taking large doses.      Patient was seen and evaluated in the consult room on unit 5500. Patient presented as depressed, but calm and appropriate during this evaluation. Patient states she took several medication last Thursday before she felt as though her life has become purposeless. Patient states her depression has gotten worse of recent, saying she has been experiencing fatigue, loss of appetite, inadequate sleep, increased anxiety, feeling of hopelessness, lact of motivation and generally sad. She stated she took the medications because she was upset about how she was  feeling at the time not to commit suicide. When writer inquired further about the disappointment feeling when she realized she did not die despite taking bunch of meds, she insisted her intention was not to kill herself. Patient currently denies suicidal and homicidal ideations. She also denies both auditory and visual hallucinations. Patient reported hearing music noise on her way to the ED, saying she had never heard voices in the past. Patient states she was recently taken off Depakote, Topamax and Buspar by her psychiatrist per her request since she felt she was doing okay. Patient states she is hoping to be back on all her medications. Writer restarted patient on her current medications which include Cymbalta 90 mg daily and Seroquel 400 mg at bedtime. Also, Depakote, Topamax and Buspar restarted at a lower dose.     Per chart review, patient positive for dysuria in the ED. Hospitalist consult placed to assess patient for physical problems.        HOSPITAL COURSE   Admitted due to aforementioned presentation.  Education regarding diagnostic and treatment options with risks, benefits and alternatives and adequate verbalization of understanding.  Discussed reviewed in further detail, stressors and events leading to presentation.    Upon arrival, the multidisciplinary treatment team met (RN, OT, , Physician) on a daily basis to discuss patient care and treatment planning. The psychiatric inpatient setting provided close nursing supervision and access to multiple treatment modalities and programming (group therapy, OT, one-to-one therapy.) Patient support systems such as family, , and other care providers were contacted as appropriate for collateral information and treatment planning.     Patient was maintained on her PTA medication including 400 mg Seroquel at bedtime and Cymbalta 90 mg daily. In addition, all the medication that was discontinued was started at a lower dose as I started  Depakote DR at 500 mg at bedtime, Buspar at 10 mg tid and Topamax at 25 mg bid which patient tolerated well.  Patient started exhibiting improvement in symptoms after spending two nights in the hospital as evidenced by verbalizing she is no longer suicidal or having any urge to kill herself. She stated she has learned her lesson to never stop taking her medication even if she thinks she is perfectly fine. She continued to endorsed moderate anxiety and depression. Medication monitoring and adjustment continued with Valproic acid level checked on 4/18 with result came back to be 27.7 which was subtherapeutic. Depakote titrated to 750 mg at bedtime to continue targeting mood dysregulation and augmentation to Seroquel while Buspar titrated to 15 mg tid to effectively manage anxiety. Patient patient exhibited improvement in mood, thinking and perception. Suicidal ideations improved and patient denies any SI or SIB urges. Sleep improved and remained stable. Appetite at baseline.   Patient was pleasant and appropriate throughout this hospitalization. She was active in all the unit programming.     Patient was followed by the medical team relating to physical problems which includes chronic shoulder pain, elevated hgb A1c, elevated BP as well as kidney function monitoring by BMP test. Patient was in a stable physical condition at the time of discharge.     Patient's Valproic acid level was at borderline of 45 at the time of discharge, with the plan to recheck the Valproic Acid level in the outpatient basis. Patient has Psychiatric appointment coming on 5/9/22 at the transition clinic as the outpatient provider will continue with the medication management and depakote level monitoring with appropriate test. Since patient is willing to discharge today, she is not holdable just for the purpose of achieving Valproic acid therapeutic level.   At the time of discharge, there appeared to be no evidence that the patient posed an  imminent threat to self or others and a reasonable discharge plan was in place, we determined that the patient had achieved optimal medical benefit from hospitalization and was appropriate for outpatient level of care. Patient discharged home with outpatient support.     ........................................................  discharge summary.....................................................   Assessment/Intervention/Current Symptoms and Care Coordination  Writer met with patient to discuss discharge plans. Patient has had her blood draw. She stated that she is ready to discharge. Patient does not endorse SI/SIB/HI at thi time.  is providing transportation at 2 pm. Patient has no further concerns at this time. Appts reviewed and placed in AVS.        A 30 day supply of Medications to be filled at [facility].      Discharge Plan or Goal   return to home with established psychiatry and therapy     Barriers to Discharge   symptom stabilization, medication managment     Referral Status  Completed-     Interim Psychiatry  Monday May 05/09 @ 10:30 am In person Provider: [facility]   [address] [place] [id number]     Therapist: June 6th at 1 pm. Provider: Devi Costa  [address] [address]. On wait list for sooner appointment     Psychiatrist: June 14th at 9:00 am [place] [address] [address]. On wait list for sooner appointment.     Information for Community Mental health support groups have been provided to patient        Legal Status  Voluntary    ..................................................... Hospitalist discharge summary.....................................  Chart check today. BMP WNL, no need for lyte replacement and Cr stable. Can continue on hydrochlorothiazide at discharge and discuss with PCP. Discharge  "instructions placed in discharge papers about follow-up for BP, GFR trends, starting a statin (check lipids in 4-6 weeks), HgbA1C, and to discuss about taking pancreatic enzymes consistently with GI docotor. Discussed with pt today and she verbalized understanding and also the need to buy a BP cuff.     Addendum: got paged at [id number] about pt having sudden onset of joint pain. Discussed with oncoming RN, Micheal, that pt had been in bed most of the day with chronic pain in the right shoulder. Discussed that this is a chronic issue and that pt has some more PRNs that she can take. Will come see pt prior to discharge tomorrow. Will need to follow-up with PCP about pain plan and we have discussed the need for PT for her shoulder and pt agrees that this would be a good idea.      Asked to come see pt at 1730. Discussed with pt that she has some new joint pain in right wrist and left knee. Despite Cr WNL today, pt did start new statin several days ago. CK ordered. Discussed following up with PCP or orthopedic clinic upon discharge tomorrow.      BRIT Jensen, CNP  Hospitalist Service  [facility]        MENTAL STATUS EXAM   Vitals: /75 (BP Location: Left arm, Patient Position: Sitting)   Pulse 108   Temp 98  F (36.7  C) (Oral)   Resp 16   Ht 1.575 m (5' 2\")   Wt 86.7 kg (191 lb 1.6 oz)   SpO2 96%   BMI 34.95 kg/m      Mental Status:  Appearance:  Neatly groomed  Mood:  \"excited to leave the hospital\"  Affect: full range was was congruent to speech Mood congruent, intensity is normal and reactive  Suicidal Ideation: PRESENT / ABSENT: absent   Homicidal Ideation: PRESENT / ABSENT: absent   Thought process: logical and organized   Thought content: denies suicidal ideation, violent ideation, delusions and paranoid ideation.   Fund of Knowledge: Average  Attention/Concentration: Normal  Language ability:  Intact  Memory:  Immediate recall intact, Short-term memory intact and Long-term memory " intact  Insight:  fair.  Judgement: fair  Orientation: Yes, x4  Psychomotor Behavior: denies tardive dyskinesia, dystonia or tics    Muscle Strength and Tone: MuscleStrength: Normal  Gait and Station: Normal         DISCHARGE MEDICATIONS   Discharge Medication Options:   Current Discharge Medication List      START taking these medications    Details   acetaminophen (TYLENOL) 325 MG tablet Take 3 tablets (975 mg) by mouth every 8 hours as needed for mild pain (to moderate pain)  Refills: 0    Associated Diagnoses: Right shoulder pain, unspecified chronicity      aspirin (ASA) 81 MG chewable tablet Take 1 tablet (81 mg) by mouth daily  Qty: 30 tablet, Refills: 0    Associated Diagnoses: Preventative health care      atorvastatin (LIPITOR) 10 MG tablet Take 1 tablet (10 mg) by mouth every evening  Qty: 60 tablet, Refills: 0    Associated Diagnoses: Hyperlipidemia LDL goal <100      busPIRone (BUSPAR) 15 MG tablet Take 1 tablet (15 mg) by mouth 3 times daily  Qty: 90 tablet, Refills: 0    Associated Diagnoses: Bipolar II disorder (H); Generalized anxiety disorder      divalproex sodium delayed-release (DEPAKOTE) 250 MG DR tablet Take 3 tablets (750 mg) by mouth At Bedtime  Qty: 90 tablet, Refills: 0    Associated Diagnoses: Bipolar II disorder (H)      hydrochlorothiazide (HYDRODIURIL) 12.5 MG tablet Take 1 tablet (12.5 mg) by mouth daily  Qty: 60 tablet, Refills: 0    Associated Diagnoses: Essential hypertension      lactobacillus rhamnosus, GG, (CULTURELL) capsule Take 2 capsules by mouth daily  Qty: 60 capsule, Refills: 0    Associated Diagnoses: Preventative health care      topiramate (TOPAMAX) 25 MG tablet Take 1 tablet (25 mg) by mouth every 12 hours  Qty: 60 tablet, Refills: 0    Associated Diagnoses: Bipolar II disorder (H)         CONTINUE these medications which have CHANGED    Details   DULoxetine (CYMBALTA) 30 MG capsule Take 3 capsules (90 mg) by mouth daily  Qty: 90 capsule, Refills: 0    Associated  Diagnoses: Bipolar II disorder (H); Moderate episode of recurrent major depressive disorder (H)      hydrOXYzine (ATARAX) 50 MG tablet Take 1 tablet (50 mg) by mouth 3 times daily as needed for anxiety  Qty: 60 tablet, Refills: 0    Associated Diagnoses: RACQUEL (generalized anxiety disorder)      Lidocaine (LIDOCARE) 4 % Patch Place 1 patch onto the skin every 24 hours To prevent lidocaine toxicity, patient should be patch free for 12 hrs daily. Two week trial of patches. Follow up with primary care provider.  Qty: 14 patch, Refills: 0    Associated Diagnoses: Right shoulder pain, unspecified chronicity      QUEtiapine (SEROQUEL) 400 MG tablet Take 1 tablet (400 mg) by mouth At Bedtime  Qty: 30 tablet, Refills: 0    Associated Diagnoses: MDD (major depressive disorder), recurrent severe, without psychosis (H)         CONTINUE these medications which have NOT CHANGED    Details   acetaminophen-codeine (TYLENOL #3) 300-30 MG tablet Take 1 tablet by mouth 3 times daily as needed for severe pain      amLODIPine (NORVASC) 5 MG tablet Take 1 tablet (5 mg) by mouth daily  Qty: 30 tablet, Refills: 3    Associated Diagnoses: Hypertension, unspecified type      Calcium Carbonate-Vit D-Min (CALCIUM 1200 PO) Take 600 capsules by mouth daily      cyclobenzaprine (FLEXERIL) 10 MG tablet Take 10 mg by mouth 3 times daily as needed for muscle spasms      Melatonin 10 MG TABS tablet Take 1 tablet (10 mg) by mouth nightly as needed for sleep  Qty: 30 tablet, Refills: 3    Associated Diagnoses: MDD (major depressive disorder), recurrent severe, without psychosis (H)      multivitamin, therapeutic (THERA-VIT) TABS tablet Take 1 tablet by mouth daily  Qty: 90 tablet, Refills: 1    Associated Diagnoses: MDD (major depressive disorder), recurrent severe, without psychosis (H)      omeprazole 20 MG tablet Take 2 tablets (40 mg) by mouth daily  Qty: 30 tablet, Refills: 3    Associated Diagnoses: Gastroesophageal reflux disease with  esophagitis, unspecified whether hemorrhage      ondansetron (ZOFRAN) 4 MG tablet Take 1 tablet (4 mg) by mouth every 6 hours as needed for nausea or vomiting  Qty: 60 tablet, Refills: 1    Associated Diagnoses: Gastroesophageal reflux disease with esophagitis, unspecified whether hemorrhage      Vitamin D, Cholecalciferol, 25 MCG (1000 UT) TABS Take 1 tablet (1,000 Units) by mouth daily  Qty: 90 tablet, Refills: 1    Associated Diagnoses: Vitamin D deficiency         STOP taking these medications       sulfamethoxazole-trimethoprim (BACTRIM DS) 800-160 MG tablet Comments:   Reason for Stopping:               Medication adherence issues: MS Med Adherence Y/N: No  Medication side effects: MEDICATION SIDE EFFECTS: no side effects reported         DISCHARGE PLAN   1.  Education given regarding diagnostic and treatment options with risks, benefits and alternatives with adequate verbalization of understanding.  2.  Discharge to Home with outpatient supports.  Upon detailed review of risk factors, patient amenable for release.   3.  Continue aforementioned medications and associated medication changes with follow-up by outpatient mental health provider.  4.  Crisis management planning in place.    5.  Continue efforts for sobriety.  6.    Nursing and  to review further discharge recommendations.     TOTAL TIME:  Greater than 30 minutes for discharge planning.    This note was created with help of Dragon dictation system. Grammatical / typing errors are not intentional.    BRIT Avendaño CNP

## 2022-04-22 NOTE — PROGRESS NOTES
04/22/22 1042   Engagement   Intervention One to One   Topic Detail Planting activity   Attendance Attended   Concentrated on Task 5 - 10 min   Mood/Affect Content;Pleasant   Social/Behavioral Cooperative;Motivated   Goals addressed in session today met with patient and a peer to complete their plant potting prior to scheduled discharge. pt was independent with tasks; pt shared she has many plants at home. pt was polite and pleasant. pt expressed plans for discharge home today.

## 2022-04-22 NOTE — PROVIDER NOTIFICATION
04/21/22 1015   Engagement   Intervention Group   Topic Detail SW Process   Attendance Attended   Patient Response Demonstrated understanding of materials provided;Expressed feelings/issues;Accepted feedback;Was respectful;Asked questions and/or took notes   Concentrated on Task duration of group   Cognition Goal-directed   Mood/Affect Content;Pleasant   Social/Behavioral Cooperative;Engaged   Thought Content Reality oriented     GROUP LENGTH (MINS):   45   RELEVANT PRIMARY DIAGNOSIS: Patient Active Problem List   Diagnosis    Pain of female symphysis pubis    Pelvic floor dysfunction    Myalgia of pelvic floor    SI (sacroiliac) joint dysfunction    Chronic pelvic pain in female    MDD (major depressive disorder), recurrent severe, without psychosis (H)    Episode of recurrent major depressive disorder, unspecified depression episode severity (H)    Generalized anxiety disorder    Borderline personality disorder (H)    PTSD (post-traumatic stress disorder)    Moderate episode of recurrent major depressive disorder (H)    Suicidal ideation    Bipolar II disorder (H)        TREATMENT MODALITY:      []CBT       [x]DBT       []ACT       []Interpersonal psychotherapy                                 []Psychoeducational therapy       [x]Skill development       []Other:        ATTENDANCE:        [x]Stayed for entire group     []Arrived late    [] Left early       # OF PATIENTS IN GROUP: 6   BILLABLE:     [x]Yes            []No   Notes:

## 2022-04-26 ENCOUNTER — TELEPHONE (OUTPATIENT)
Dept: PHARMACY | Facility: OTHER | Age: 62
End: 2022-04-26

## 2022-04-26 ENCOUNTER — TELEPHONE (OUTPATIENT)
Dept: BEHAVIORAL HEALTH | Facility: CLINIC | Age: 62
End: 2022-04-26
Payer: COMMERCIAL

## 2022-04-26 NOTE — TELEPHONE ENCOUNTER
Patient called and spoke to this RN.  She explained that she needed to cancel her May 9th appointment with Shruthi Walker.  The reason for the cancellation is she has a medical appointment that day for medication injections.  She asked to reschedule and is able to make a May 10th appointment at 10:30. [facility] were able to reschedule her for May 10th at 10:30 a.m. Patient encouraged to call [facility]  for any other concerns.

## 2022-10-22 ENCOUNTER — HEALTH MAINTENANCE LETTER (OUTPATIENT)
Age: 62
End: 2022-10-22

## 2023-04-01 ENCOUNTER — HEALTH MAINTENANCE LETTER (OUTPATIENT)
Age: 63
End: 2023-04-01

## 2023-08-27 ENCOUNTER — HEALTH MAINTENANCE LETTER (OUTPATIENT)
Age: 63
End: 2023-08-27

## 2024-06-02 ENCOUNTER — HEALTH MAINTENANCE LETTER (OUTPATIENT)
Age: 64
End: 2024-06-02

## 2024-10-27 ASSESSMENT — ANXIETY QUESTIONNAIRES: GAD7 TOTAL SCORE: 0

## 2024-12-22 ENCOUNTER — HEALTH MAINTENANCE LETTER (OUTPATIENT)
Age: 64
End: 2024-12-22

## 2025-04-12 ENCOUNTER — HEALTH MAINTENANCE LETTER (OUTPATIENT)
Age: 65
End: 2025-04-12

## (undated) RX ORDER — LIDOCAINE HYDROCHLORIDE 10 MG/ML
INJECTION, SOLUTION EPIDURAL; INFILTRATION; INTRACAUDAL; PERINEURAL
Status: DISPENSED
Start: 2018-11-15

## (undated) RX ORDER — TRIAMCINOLONE ACETONIDE 40 MG/ML
INJECTION, SUSPENSION INTRA-ARTICULAR; INTRAMUSCULAR
Status: DISPENSED
Start: 2018-11-15